# Patient Record
Sex: FEMALE | Race: WHITE | NOT HISPANIC OR LATINO | Employment: FULL TIME | URBAN - NONMETROPOLITAN AREA
[De-identification: names, ages, dates, MRNs, and addresses within clinical notes are randomized per-mention and may not be internally consistent; named-entity substitution may affect disease eponyms.]

---

## 2017-03-02 ENCOUNTER — TRANSCRIBE ORDERS (OUTPATIENT)
Dept: LAB | Facility: MEDICAL CENTER | Age: 42
End: 2017-03-02

## 2017-03-02 ENCOUNTER — APPOINTMENT (OUTPATIENT)
Dept: LAB | Facility: MEDICAL CENTER | Age: 42
End: 2017-03-02
Payer: COMMERCIAL

## 2017-03-02 DIAGNOSIS — K91.2 MALNUTRITION FOLLOWING GASTROINTESTINAL SURGERY: ICD-10-CM

## 2017-03-02 DIAGNOSIS — E46 OTHER PROTEIN-CALORIE MALNUTRITION: ICD-10-CM

## 2017-03-02 DIAGNOSIS — Z98.84 BARIATRIC SURGERY STATUS: ICD-10-CM

## 2017-03-02 DIAGNOSIS — Z98.84 BARIATRIC SURGERY STATUS: Primary | ICD-10-CM

## 2017-03-02 LAB
25(OH)D3 SERPL-MCNC: 26.5 NG/ML (ref 30–100)
ANION GAP SERPL CALCULATED.3IONS-SCNC: 9 MMOL/L (ref 4–13)
BUN SERPL-MCNC: 10 MG/DL (ref 5–25)
CALCIUM SERPL-MCNC: 9.2 MG/DL (ref 8.3–10.1)
CHLORIDE SERPL-SCNC: 106 MMOL/L (ref 100–108)
CO2 SERPL-SCNC: 25 MMOL/L (ref 21–32)
CREAT SERPL-MCNC: 0.53 MG/DL (ref 0.6–1.3)
ERYTHROCYTE [DISTWIDTH] IN BLOOD BY AUTOMATED COUNT: 13.2 % (ref 11.6–15.1)
FERRITIN SERPL-MCNC: 119 NG/ML (ref 8–388)
FOLATE SERPL-MCNC: >20 NG/ML (ref 3.1–17.5)
GFR SERPL CREATININE-BSD FRML MDRD: >60 ML/MIN/1.73SQ M
GLUCOSE SERPL-MCNC: 100 MG/DL (ref 65–140)
HCT VFR BLD AUTO: 39.7 % (ref 34.8–46.1)
HGB BLD-MCNC: 13.3 G/DL (ref 11.5–15.4)
IRON SERPL-MCNC: 59 UG/DL (ref 50–170)
MCH RBC QN AUTO: 30.3 PG (ref 26.8–34.3)
MCHC RBC AUTO-ENTMCNC: 33.5 G/DL (ref 31.4–37.4)
MCV RBC AUTO: 90 FL (ref 82–98)
PLATELET # BLD AUTO: 219 THOUSANDS/UL (ref 149–390)
PMV BLD AUTO: 11.9 FL (ref 8.9–12.7)
POTASSIUM SERPL-SCNC: 3.5 MMOL/L (ref 3.5–5.3)
PTH-INTACT SERPL-MCNC: 40.3 PG/ML (ref 14–72)
RBC # BLD AUTO: 4.39 MILLION/UL (ref 3.81–5.12)
SODIUM SERPL-SCNC: 140 MMOL/L (ref 136–145)
VIT B12 SERPL-MCNC: 454 PG/ML (ref 100–900)
WBC # BLD AUTO: 4.62 THOUSAND/UL (ref 4.31–10.16)

## 2017-03-02 PROCEDURE — 82306 VITAMIN D 25 HYDROXY: CPT

## 2017-03-02 PROCEDURE — 82728 ASSAY OF FERRITIN: CPT

## 2017-03-02 PROCEDURE — 83970 ASSAY OF PARATHORMONE: CPT

## 2017-03-02 PROCEDURE — 83540 ASSAY OF IRON: CPT

## 2017-03-02 PROCEDURE — 85027 COMPLETE CBC AUTOMATED: CPT

## 2017-03-02 PROCEDURE — 82607 VITAMIN B-12: CPT

## 2017-03-02 PROCEDURE — 36415 COLL VENOUS BLD VENIPUNCTURE: CPT

## 2017-03-02 PROCEDURE — 82746 ASSAY OF FOLIC ACID SERUM: CPT

## 2017-03-02 PROCEDURE — 84425 ASSAY OF VITAMIN B-1: CPT

## 2017-03-02 PROCEDURE — 80048 BASIC METABOLIC PNL TOTAL CA: CPT

## 2017-03-02 PROCEDURE — 84590 ASSAY OF VITAMIN A: CPT

## 2017-03-02 PROCEDURE — 84630 ASSAY OF ZINC: CPT

## 2017-03-04 LAB — ZINC SERPL-MCNC: 91 UG/DL (ref 56–134)

## 2017-03-06 LAB
VIT A SERPL-MCNC: 29 UG/DL (ref 20–65)
VIT B1 BLD-SCNC: 130 NMOL/L (ref 66.5–200)

## 2017-06-23 ENCOUNTER — TRANSCRIBE ORDERS (OUTPATIENT)
Dept: LAB | Facility: MEDICAL CENTER | Age: 42
End: 2017-06-23

## 2017-06-23 ENCOUNTER — APPOINTMENT (OUTPATIENT)
Dept: LAB | Facility: MEDICAL CENTER | Age: 42
End: 2017-06-23
Payer: COMMERCIAL

## 2017-06-23 DIAGNOSIS — E55.9 UNSPECIFIED VITAMIN D DEFICIENCY: ICD-10-CM

## 2017-06-23 DIAGNOSIS — E46 UNSPECIFIED PROTEIN-CALORIE MALNUTRITION (HCC): ICD-10-CM

## 2017-06-23 DIAGNOSIS — K91.2 MALNUTRITION FOLLOWING GASTROINTESTINAL SURGERY: ICD-10-CM

## 2017-06-23 DIAGNOSIS — Z98.84 BARIATRIC SURGERY STATUS: Primary | ICD-10-CM

## 2017-06-23 DIAGNOSIS — Z98.84 BARIATRIC SURGERY STATUS: ICD-10-CM

## 2017-06-23 LAB
25(OH)D3 SERPL-MCNC: 26.1 NG/ML (ref 30–100)
ANION GAP SERPL CALCULATED.3IONS-SCNC: 6 MMOL/L (ref 4–13)
BUN SERPL-MCNC: 14 MG/DL (ref 5–25)
CALCIUM SERPL-MCNC: 9 MG/DL (ref 8.3–10.1)
CHLORIDE SERPL-SCNC: 107 MMOL/L (ref 100–108)
CO2 SERPL-SCNC: 25 MMOL/L (ref 21–32)
CREAT SERPL-MCNC: 0.49 MG/DL (ref 0.6–1.3)
ERYTHROCYTE [DISTWIDTH] IN BLOOD BY AUTOMATED COUNT: 12.9 % (ref 11.6–15.1)
FERRITIN SERPL-MCNC: 132 NG/ML (ref 8–388)
FOLATE SERPL-MCNC: 19.4 NG/ML (ref 3.1–17.5)
GFR SERPL CREATININE-BSD FRML MDRD: >60 ML/MIN/1.73SQ M
GLUCOSE P FAST SERPL-MCNC: 79 MG/DL (ref 65–99)
HCT VFR BLD AUTO: 40.1 % (ref 34.8–46.1)
HGB BLD-MCNC: 13.6 G/DL (ref 11.5–15.4)
IRON SERPL-MCNC: 130 UG/DL (ref 50–170)
MCH RBC QN AUTO: 31.2 PG (ref 26.8–34.3)
MCHC RBC AUTO-ENTMCNC: 33.9 G/DL (ref 31.4–37.4)
MCV RBC AUTO: 92 FL (ref 82–98)
PLATELET # BLD AUTO: 180 THOUSANDS/UL (ref 149–390)
PMV BLD AUTO: 12.4 FL (ref 8.9–12.7)
POTASSIUM SERPL-SCNC: 4.9 MMOL/L (ref 3.5–5.3)
PTH-INTACT SERPL-MCNC: 32 PG/ML (ref 14–72)
RBC # BLD AUTO: 4.36 MILLION/UL (ref 3.81–5.12)
SODIUM SERPL-SCNC: 138 MMOL/L (ref 136–145)
VIT B12 SERPL-MCNC: 2051 PG/ML (ref 100–900)
WBC # BLD AUTO: 4.28 THOUSAND/UL (ref 4.31–10.16)

## 2017-06-23 PROCEDURE — 83970 ASSAY OF PARATHORMONE: CPT

## 2017-06-23 PROCEDURE — 85027 COMPLETE CBC AUTOMATED: CPT

## 2017-06-23 PROCEDURE — 84590 ASSAY OF VITAMIN A: CPT

## 2017-06-23 PROCEDURE — 84425 ASSAY OF VITAMIN B-1: CPT

## 2017-06-23 PROCEDURE — 82306 VITAMIN D 25 HYDROXY: CPT

## 2017-06-23 PROCEDURE — 82607 VITAMIN B-12: CPT

## 2017-06-23 PROCEDURE — 84630 ASSAY OF ZINC: CPT

## 2017-06-23 PROCEDURE — 82746 ASSAY OF FOLIC ACID SERUM: CPT

## 2017-06-23 PROCEDURE — 82728 ASSAY OF FERRITIN: CPT

## 2017-06-23 PROCEDURE — 80048 BASIC METABOLIC PNL TOTAL CA: CPT

## 2017-06-23 PROCEDURE — 83540 ASSAY OF IRON: CPT

## 2017-06-24 ENCOUNTER — APPOINTMENT (OUTPATIENT)
Dept: RADIOLOGY | Facility: CLINIC | Age: 42
End: 2017-06-24
Payer: COMMERCIAL

## 2017-06-24 ENCOUNTER — OFFICE VISIT (OUTPATIENT)
Dept: URGENT CARE | Facility: CLINIC | Age: 42
End: 2017-06-24
Payer: COMMERCIAL

## 2017-06-24 DIAGNOSIS — S69.90XA UNSPECIFIED INJURY OF UNSPECIFIED WRIST, HAND AND FINGER(S), INITIAL ENCOUNTER: ICD-10-CM

## 2017-06-24 LAB — ZINC SERPL-MCNC: 83 UG/DL (ref 56–134)

## 2017-06-24 PROCEDURE — 73140 X-RAY EXAM OF FINGER(S): CPT

## 2017-06-24 PROCEDURE — 99213 OFFICE O/P EST LOW 20 MIN: CPT

## 2017-06-24 PROCEDURE — 29130 APPL FINGER SPLINT STATIC: CPT

## 2017-06-26 LAB
VIT A SERPL-MCNC: 36 UG/DL (ref 20–65)
VIT B1 BLD-SCNC: 136 NMOL/L (ref 66.5–200)

## 2017-06-29 ENCOUNTER — GENERIC CONVERSION - ENCOUNTER (OUTPATIENT)
Dept: OTHER | Facility: OTHER | Age: 42
End: 2017-06-29

## 2017-07-15 ENCOUNTER — GENERIC CONVERSION - ENCOUNTER (OUTPATIENT)
Dept: OTHER | Facility: OTHER | Age: 42
End: 2017-07-15

## 2017-07-15 ENCOUNTER — APPOINTMENT (EMERGENCY)
Dept: CT IMAGING | Facility: HOSPITAL | Age: 42
End: 2017-07-15
Payer: COMMERCIAL

## 2017-07-15 ENCOUNTER — HOSPITAL ENCOUNTER (EMERGENCY)
Facility: HOSPITAL | Age: 42
Discharge: ANOTHER HOSPITAL | End: 2017-07-15
Attending: EMERGENCY MEDICINE
Payer: COMMERCIAL

## 2017-07-15 VITALS
HEART RATE: 70 BPM | BODY MASS INDEX: 23.18 KG/M2 | SYSTOLIC BLOOD PRESSURE: 161 MMHG | WEIGHT: 139.11 LBS | HEIGHT: 65 IN | DIASTOLIC BLOOD PRESSURE: 69 MMHG | RESPIRATION RATE: 16 BRPM | OXYGEN SATURATION: 100 % | TEMPERATURE: 99.4 F

## 2017-07-15 DIAGNOSIS — K35.80 ACUTE APPENDICITIS: Primary | ICD-10-CM

## 2017-07-15 LAB
ALBUMIN SERPL BCP-MCNC: 3.7 G/DL (ref 3.5–5)
ALP SERPL-CCNC: 45 U/L (ref 46–116)
ALT SERPL W P-5'-P-CCNC: 18 U/L (ref 12–78)
ANION GAP SERPL CALCULATED.3IONS-SCNC: 8 MMOL/L (ref 4–13)
AST SERPL W P-5'-P-CCNC: 13 U/L (ref 5–45)
BASOPHILS # BLD AUTO: 0.02 THOUSANDS/ΜL (ref 0–0.1)
BASOPHILS NFR BLD AUTO: 0 % (ref 0–1)
BILIRUB SERPL-MCNC: 0.4 MG/DL (ref 0.2–1)
BILIRUB UR QL STRIP: NEGATIVE
BUN SERPL-MCNC: 16 MG/DL (ref 5–25)
CALCIUM SERPL-MCNC: 8.9 MG/DL (ref 8.3–10.1)
CHLORIDE SERPL-SCNC: 106 MMOL/L (ref 100–108)
CLARITY UR: CLEAR
CO2 SERPL-SCNC: 27 MMOL/L (ref 21–32)
COLOR UR: YELLOW
CREAT SERPL-MCNC: 0.62 MG/DL (ref 0.6–1.3)
EOSINOPHIL # BLD AUTO: 0.1 THOUSAND/ΜL (ref 0–0.61)
EOSINOPHIL NFR BLD AUTO: 2 % (ref 0–6)
ERYTHROCYTE [DISTWIDTH] IN BLOOD BY AUTOMATED COUNT: 12.4 % (ref 11.6–15.1)
GFR SERPL CREATININE-BSD FRML MDRD: >60 ML/MIN/1.73SQ M
GLUCOSE SERPL-MCNC: 85 MG/DL (ref 65–140)
GLUCOSE UR STRIP-MCNC: NEGATIVE MG/DL
HCT VFR BLD AUTO: 40.2 % (ref 34.8–46.1)
HGB BLD-MCNC: 13.3 G/DL (ref 11.5–15.4)
HGB UR QL STRIP.AUTO: NEGATIVE
KETONES UR STRIP-MCNC: NEGATIVE MG/DL
LEUKOCYTE ESTERASE UR QL STRIP: NEGATIVE
LIPASE SERPL-CCNC: 194 U/L (ref 73–393)
LYMPHOCYTES # BLD AUTO: 2.49 THOUSANDS/ΜL (ref 0.6–4.47)
LYMPHOCYTES NFR BLD AUTO: 42 % (ref 14–44)
MCH RBC QN AUTO: 30.7 PG (ref 26.8–34.3)
MCHC RBC AUTO-ENTMCNC: 33.1 G/DL (ref 31.4–37.4)
MCV RBC AUTO: 93 FL (ref 82–98)
MONOCYTES # BLD AUTO: 0.5 THOUSAND/ΜL (ref 0.17–1.22)
MONOCYTES NFR BLD AUTO: 8 % (ref 4–12)
NEUTROPHILS # BLD AUTO: 2.88 THOUSANDS/ΜL (ref 1.85–7.62)
NEUTS SEG NFR BLD AUTO: 48 % (ref 43–75)
NITRITE UR QL STRIP: NEGATIVE
PH UR STRIP.AUTO: 6 [PH] (ref 4.5–8)
PLATELET # BLD AUTO: 204 THOUSANDS/UL (ref 149–390)
PMV BLD AUTO: 10.9 FL (ref 8.9–12.7)
POTASSIUM SERPL-SCNC: 4.2 MMOL/L (ref 3.5–5.3)
PROT SERPL-MCNC: 7.4 G/DL (ref 6.4–8.2)
PROT UR STRIP-MCNC: NEGATIVE MG/DL
RBC # BLD AUTO: 4.33 MILLION/UL (ref 3.81–5.12)
SODIUM SERPL-SCNC: 141 MMOL/L (ref 136–145)
SP GR UR STRIP.AUTO: 1.01 (ref 1–1.03)
UROBILINOGEN UR QL STRIP.AUTO: 0.2 E.U./DL
WBC # BLD AUTO: 5.99 THOUSAND/UL (ref 4.31–10.16)

## 2017-07-15 PROCEDURE — 74177 CT ABD & PELVIS W/CONTRAST: CPT

## 2017-07-15 PROCEDURE — 96375 TX/PRO/DX INJ NEW DRUG ADDON: CPT

## 2017-07-15 PROCEDURE — 83690 ASSAY OF LIPASE: CPT | Performed by: EMERGENCY MEDICINE

## 2017-07-15 PROCEDURE — 80053 COMPREHEN METABOLIC PANEL: CPT | Performed by: EMERGENCY MEDICINE

## 2017-07-15 PROCEDURE — 96361 HYDRATE IV INFUSION ADD-ON: CPT

## 2017-07-15 PROCEDURE — 99285 EMERGENCY DEPT VISIT HI MDM: CPT

## 2017-07-15 PROCEDURE — 81003 URINALYSIS AUTO W/O SCOPE: CPT | Performed by: EMERGENCY MEDICINE

## 2017-07-15 PROCEDURE — 85025 COMPLETE CBC W/AUTO DIFF WBC: CPT | Performed by: EMERGENCY MEDICINE

## 2017-07-15 PROCEDURE — 96365 THER/PROPH/DIAG IV INF INIT: CPT

## 2017-07-15 RX ORDER — ACETAMINOPHEN 325 MG/1
650 TABLET ORAL ONCE
Status: COMPLETED | OUTPATIENT
Start: 2017-07-15 | End: 2017-07-15

## 2017-07-15 RX ORDER — LEVOFLOXACIN 5 MG/ML
750 INJECTION, SOLUTION INTRAVENOUS ONCE
Status: COMPLETED | OUTPATIENT
Start: 2017-07-15 | End: 2017-07-15

## 2017-07-15 RX ORDER — DIPHENOXYLATE HYDROCHLORIDE AND ATROPINE SULFATE 2.5; .025 MG/1; MG/1
1 TABLET ORAL DAILY
COMMUNITY

## 2017-07-15 RX ADMIN — SODIUM CHLORIDE 1000 ML: 0.9 INJECTION, SOLUTION INTRAVENOUS at 18:32

## 2017-07-15 RX ADMIN — IOHEXOL 50 ML: 240 INJECTION, SOLUTION INTRATHECAL; INTRAVASCULAR; INTRAVENOUS; ORAL at 16:22

## 2017-07-15 RX ADMIN — LEVOFLOXACIN 750 MG: 5 INJECTION, SOLUTION INTRAVENOUS at 19:33

## 2017-07-15 RX ADMIN — IOHEXOL 100 ML: 350 INJECTION, SOLUTION INTRAVENOUS at 17:46

## 2017-07-15 RX ADMIN — SODIUM CHLORIDE 1000 ML: 0.9 INJECTION, SOLUTION INTRAVENOUS at 17:31

## 2017-07-15 RX ADMIN — ACETAMINOPHEN 650 MG: 325 TABLET, FILM COATED ORAL at 19:31

## 2017-07-15 RX ADMIN — METRONIDAZOLE 500 MG: 500 INJECTION, SOLUTION INTRAVENOUS at 18:28

## 2017-08-02 ENCOUNTER — GENERIC CONVERSION - ENCOUNTER (OUTPATIENT)
Dept: OTHER | Facility: OTHER | Age: 42
End: 2017-08-02

## 2017-08-16 ENCOUNTER — GENERIC CONVERSION - ENCOUNTER (OUTPATIENT)
Dept: OTHER | Facility: OTHER | Age: 42
End: 2017-08-16

## 2017-08-28 ENCOUNTER — APPOINTMENT (OUTPATIENT)
Dept: PHYSICAL THERAPY | Facility: CLINIC | Age: 42
End: 2017-08-28
Payer: COMMERCIAL

## 2017-08-28 PROCEDURE — 97760 ORTHOTIC MGMT&TRAING 1ST ENC: CPT

## 2017-08-28 PROCEDURE — 97162 PT EVAL MOD COMPLEX 30 MIN: CPT

## 2017-08-28 PROCEDURE — 97140 MANUAL THERAPY 1/> REGIONS: CPT

## 2017-08-28 PROCEDURE — G0283 ELEC STIM OTHER THAN WOUND: HCPCS

## 2017-08-28 PROCEDURE — L3913 HFO W/O JOINTS CF: HCPCS

## 2017-08-28 PROCEDURE — 97014 ELECTRIC STIMULATION THERAPY: CPT

## 2017-08-31 ENCOUNTER — APPOINTMENT (OUTPATIENT)
Dept: PHYSICAL THERAPY | Facility: CLINIC | Age: 42
End: 2017-08-31
Payer: COMMERCIAL

## 2017-08-31 PROCEDURE — 97014 ELECTRIC STIMULATION THERAPY: CPT

## 2017-08-31 PROCEDURE — G0283 ELEC STIM OTHER THAN WOUND: HCPCS

## 2017-08-31 PROCEDURE — 97110 THERAPEUTIC EXERCISES: CPT

## 2017-08-31 PROCEDURE — 97035 APP MDLTY 1+ULTRASOUND EA 15: CPT

## 2017-08-31 PROCEDURE — 97140 MANUAL THERAPY 1/> REGIONS: CPT

## 2017-09-05 ENCOUNTER — APPOINTMENT (OUTPATIENT)
Dept: PHYSICAL THERAPY | Facility: CLINIC | Age: 42
End: 2017-09-05
Payer: COMMERCIAL

## 2017-09-05 PROCEDURE — 97140 MANUAL THERAPY 1/> REGIONS: CPT

## 2017-09-05 PROCEDURE — 97110 THERAPEUTIC EXERCISES: CPT

## 2017-09-05 PROCEDURE — 97014 ELECTRIC STIMULATION THERAPY: CPT

## 2017-09-05 PROCEDURE — G0283 ELEC STIM OTHER THAN WOUND: HCPCS

## 2017-09-05 PROCEDURE — 97035 APP MDLTY 1+ULTRASOUND EA 15: CPT

## 2017-09-07 ENCOUNTER — APPOINTMENT (OUTPATIENT)
Dept: PHYSICAL THERAPY | Facility: CLINIC | Age: 42
End: 2017-09-07
Payer: COMMERCIAL

## 2017-09-07 PROCEDURE — 97035 APP MDLTY 1+ULTRASOUND EA 15: CPT

## 2017-09-07 PROCEDURE — 97760 ORTHOTIC MGMT&TRAING 1ST ENC: CPT

## 2017-09-07 PROCEDURE — 97140 MANUAL THERAPY 1/> REGIONS: CPT

## 2017-09-07 PROCEDURE — G0283 ELEC STIM OTHER THAN WOUND: HCPCS

## 2017-09-07 PROCEDURE — 97014 ELECTRIC STIMULATION THERAPY: CPT

## 2017-09-07 PROCEDURE — 97110 THERAPEUTIC EXERCISES: CPT

## 2017-09-11 ENCOUNTER — APPOINTMENT (OUTPATIENT)
Dept: PHYSICAL THERAPY | Facility: CLINIC | Age: 42
End: 2017-09-11
Payer: COMMERCIAL

## 2017-09-11 PROCEDURE — 97140 MANUAL THERAPY 1/> REGIONS: CPT

## 2017-09-11 PROCEDURE — 97014 ELECTRIC STIMULATION THERAPY: CPT

## 2017-09-11 PROCEDURE — G0283 ELEC STIM OTHER THAN WOUND: HCPCS

## 2017-09-11 PROCEDURE — 97110 THERAPEUTIC EXERCISES: CPT

## 2017-09-11 PROCEDURE — 97035 APP MDLTY 1+ULTRASOUND EA 15: CPT

## 2017-09-18 ENCOUNTER — APPOINTMENT (OUTPATIENT)
Dept: PHYSICAL THERAPY | Facility: CLINIC | Age: 42
End: 2017-09-18
Payer: COMMERCIAL

## 2017-09-19 ENCOUNTER — APPOINTMENT (OUTPATIENT)
Dept: PHYSICAL THERAPY | Facility: CLINIC | Age: 42
End: 2017-09-19
Payer: COMMERCIAL

## 2017-09-19 PROCEDURE — G8987 SELF CARE CURRENT STATUS: HCPCS

## 2017-09-19 PROCEDURE — 97035 APP MDLTY 1+ULTRASOUND EA 15: CPT

## 2017-09-19 PROCEDURE — G0283 ELEC STIM OTHER THAN WOUND: HCPCS

## 2017-09-19 PROCEDURE — G8988 SELF CARE GOAL STATUS: HCPCS

## 2017-09-19 PROCEDURE — 97110 THERAPEUTIC EXERCISES: CPT

## 2017-09-19 PROCEDURE — 97014 ELECTRIC STIMULATION THERAPY: CPT

## 2017-09-19 PROCEDURE — 97140 MANUAL THERAPY 1/> REGIONS: CPT

## 2017-09-20 ENCOUNTER — GENERIC CONVERSION - ENCOUNTER (OUTPATIENT)
Dept: OTHER | Facility: OTHER | Age: 42
End: 2017-09-20

## 2017-09-21 ENCOUNTER — APPOINTMENT (OUTPATIENT)
Dept: PHYSICAL THERAPY | Facility: CLINIC | Age: 42
End: 2017-09-21
Payer: COMMERCIAL

## 2017-09-21 PROCEDURE — 97760 ORTHOTIC MGMT&TRAING 1ST ENC: CPT

## 2017-09-26 ENCOUNTER — APPOINTMENT (OUTPATIENT)
Dept: PHYSICAL THERAPY | Facility: CLINIC | Age: 42
End: 2017-09-26
Payer: COMMERCIAL

## 2017-09-26 PROCEDURE — 97014 ELECTRIC STIMULATION THERAPY: CPT

## 2017-09-26 PROCEDURE — 97035 APP MDLTY 1+ULTRASOUND EA 15: CPT

## 2017-09-26 PROCEDURE — 97140 MANUAL THERAPY 1/> REGIONS: CPT

## 2017-09-26 PROCEDURE — G0283 ELEC STIM OTHER THAN WOUND: HCPCS

## 2017-09-26 PROCEDURE — 97110 THERAPEUTIC EXERCISES: CPT

## 2017-09-28 ENCOUNTER — APPOINTMENT (OUTPATIENT)
Dept: PHYSICAL THERAPY | Facility: CLINIC | Age: 42
End: 2017-09-28
Payer: COMMERCIAL

## 2017-09-28 PROCEDURE — 97110 THERAPEUTIC EXERCISES: CPT

## 2017-09-28 PROCEDURE — 97035 APP MDLTY 1+ULTRASOUND EA 15: CPT

## 2017-09-28 PROCEDURE — G0283 ELEC STIM OTHER THAN WOUND: HCPCS

## 2017-09-28 PROCEDURE — 97140 MANUAL THERAPY 1/> REGIONS: CPT

## 2017-09-28 PROCEDURE — 97014 ELECTRIC STIMULATION THERAPY: CPT

## 2017-10-03 ENCOUNTER — APPOINTMENT (OUTPATIENT)
Dept: PHYSICAL THERAPY | Facility: CLINIC | Age: 42
End: 2017-10-03
Payer: COMMERCIAL

## 2017-10-03 PROCEDURE — G0283 ELEC STIM OTHER THAN WOUND: HCPCS

## 2017-10-03 PROCEDURE — 97140 MANUAL THERAPY 1/> REGIONS: CPT

## 2017-10-03 PROCEDURE — 97035 APP MDLTY 1+ULTRASOUND EA 15: CPT

## 2017-10-03 PROCEDURE — 97760 ORTHOTIC MGMT&TRAING 1ST ENC: CPT

## 2017-10-03 PROCEDURE — 97110 THERAPEUTIC EXERCISES: CPT

## 2017-10-03 PROCEDURE — 97014 ELECTRIC STIMULATION THERAPY: CPT

## 2017-10-05 ENCOUNTER — APPOINTMENT (OUTPATIENT)
Dept: PHYSICAL THERAPY | Facility: CLINIC | Age: 42
End: 2017-10-05
Payer: COMMERCIAL

## 2017-10-05 PROCEDURE — G0283 ELEC STIM OTHER THAN WOUND: HCPCS

## 2017-10-05 PROCEDURE — 97110 THERAPEUTIC EXERCISES: CPT

## 2017-10-05 PROCEDURE — 97035 APP MDLTY 1+ULTRASOUND EA 15: CPT

## 2017-10-05 PROCEDURE — 97014 ELECTRIC STIMULATION THERAPY: CPT

## 2017-10-05 PROCEDURE — 97140 MANUAL THERAPY 1/> REGIONS: CPT

## 2017-10-12 ENCOUNTER — APPOINTMENT (OUTPATIENT)
Dept: PHYSICAL THERAPY | Facility: CLINIC | Age: 42
End: 2017-10-12
Payer: COMMERCIAL

## 2017-10-12 PROCEDURE — 97035 APP MDLTY 1+ULTRASOUND EA 15: CPT

## 2017-10-12 PROCEDURE — G0283 ELEC STIM OTHER THAN WOUND: HCPCS

## 2017-10-12 PROCEDURE — 97760 ORTHOTIC MGMT&TRAING 1ST ENC: CPT

## 2017-10-12 PROCEDURE — 97110 THERAPEUTIC EXERCISES: CPT

## 2017-10-12 PROCEDURE — 97014 ELECTRIC STIMULATION THERAPY: CPT

## 2017-10-12 PROCEDURE — 97140 MANUAL THERAPY 1/> REGIONS: CPT

## 2017-10-17 ENCOUNTER — APPOINTMENT (OUTPATIENT)
Dept: PHYSICAL THERAPY | Facility: CLINIC | Age: 42
End: 2017-10-17
Payer: COMMERCIAL

## 2017-10-17 PROCEDURE — 97110 THERAPEUTIC EXERCISES: CPT

## 2017-10-17 PROCEDURE — 97035 APP MDLTY 1+ULTRASOUND EA 15: CPT

## 2017-10-17 PROCEDURE — 97140 MANUAL THERAPY 1/> REGIONS: CPT

## 2017-10-17 PROCEDURE — 97014 ELECTRIC STIMULATION THERAPY: CPT

## 2017-10-17 PROCEDURE — G0283 ELEC STIM OTHER THAN WOUND: HCPCS

## 2017-10-18 ENCOUNTER — GENERIC CONVERSION - ENCOUNTER (OUTPATIENT)
Dept: OTHER | Facility: OTHER | Age: 42
End: 2017-10-18

## 2017-10-19 ENCOUNTER — APPOINTMENT (OUTPATIENT)
Dept: PHYSICAL THERAPY | Facility: CLINIC | Age: 42
End: 2017-10-19
Payer: COMMERCIAL

## 2017-10-20 ENCOUNTER — APPOINTMENT (OUTPATIENT)
Dept: PHYSICAL THERAPY | Facility: CLINIC | Age: 42
End: 2017-10-20
Payer: COMMERCIAL

## 2017-10-20 PROCEDURE — 97014 ELECTRIC STIMULATION THERAPY: CPT

## 2017-10-20 PROCEDURE — G0283 ELEC STIM OTHER THAN WOUND: HCPCS

## 2017-10-20 PROCEDURE — 97140 MANUAL THERAPY 1/> REGIONS: CPT

## 2017-10-20 PROCEDURE — 97035 APP MDLTY 1+ULTRASOUND EA 15: CPT

## 2017-10-20 PROCEDURE — 97760 ORTHOTIC MGMT&TRAING 1ST ENC: CPT

## 2017-10-20 PROCEDURE — 97110 THERAPEUTIC EXERCISES: CPT

## 2017-10-24 ENCOUNTER — APPOINTMENT (OUTPATIENT)
Dept: PHYSICAL THERAPY | Facility: CLINIC | Age: 42
End: 2017-10-24
Payer: COMMERCIAL

## 2017-10-24 PROCEDURE — 97110 THERAPEUTIC EXERCISES: CPT

## 2017-10-24 PROCEDURE — 97014 ELECTRIC STIMULATION THERAPY: CPT

## 2017-10-24 PROCEDURE — 97035 APP MDLTY 1+ULTRASOUND EA 15: CPT

## 2017-10-24 PROCEDURE — G0283 ELEC STIM OTHER THAN WOUND: HCPCS

## 2017-10-24 PROCEDURE — 97140 MANUAL THERAPY 1/> REGIONS: CPT

## 2017-10-26 ENCOUNTER — APPOINTMENT (OUTPATIENT)
Dept: PHYSICAL THERAPY | Facility: CLINIC | Age: 42
End: 2017-10-26
Payer: COMMERCIAL

## 2017-10-26 PROCEDURE — 97110 THERAPEUTIC EXERCISES: CPT

## 2017-10-26 PROCEDURE — 97035 APP MDLTY 1+ULTRASOUND EA 15: CPT

## 2017-10-26 PROCEDURE — G8988 SELF CARE GOAL STATUS: HCPCS

## 2017-10-26 PROCEDURE — G0283 ELEC STIM OTHER THAN WOUND: HCPCS

## 2017-10-26 PROCEDURE — 97140 MANUAL THERAPY 1/> REGIONS: CPT

## 2017-10-26 PROCEDURE — 97014 ELECTRIC STIMULATION THERAPY: CPT

## 2017-10-26 PROCEDURE — G8987 SELF CARE CURRENT STATUS: HCPCS

## 2017-10-31 ENCOUNTER — APPOINTMENT (OUTPATIENT)
Dept: PHYSICAL THERAPY | Facility: CLINIC | Age: 42
End: 2017-10-31
Payer: COMMERCIAL

## 2017-11-02 ENCOUNTER — APPOINTMENT (OUTPATIENT)
Dept: PHYSICAL THERAPY | Facility: CLINIC | Age: 42
End: 2017-11-02
Payer: COMMERCIAL

## 2017-11-02 PROCEDURE — 97014 ELECTRIC STIMULATION THERAPY: CPT

## 2017-11-02 PROCEDURE — 97140 MANUAL THERAPY 1/> REGIONS: CPT

## 2017-11-02 PROCEDURE — 97035 APP MDLTY 1+ULTRASOUND EA 15: CPT

## 2017-11-02 PROCEDURE — 97110 THERAPEUTIC EXERCISES: CPT

## 2017-11-02 PROCEDURE — G0283 ELEC STIM OTHER THAN WOUND: HCPCS

## 2017-11-07 ENCOUNTER — APPOINTMENT (OUTPATIENT)
Dept: PHYSICAL THERAPY | Facility: CLINIC | Age: 42
End: 2017-11-07
Payer: COMMERCIAL

## 2017-11-09 ENCOUNTER — APPOINTMENT (OUTPATIENT)
Dept: PHYSICAL THERAPY | Facility: CLINIC | Age: 42
End: 2017-11-09
Payer: COMMERCIAL

## 2017-11-09 PROCEDURE — 97014 ELECTRIC STIMULATION THERAPY: CPT

## 2017-11-09 PROCEDURE — 97140 MANUAL THERAPY 1/> REGIONS: CPT

## 2017-11-09 PROCEDURE — 97110 THERAPEUTIC EXERCISES: CPT

## 2017-11-09 PROCEDURE — 97035 APP MDLTY 1+ULTRASOUND EA 15: CPT

## 2017-11-09 PROCEDURE — G0283 ELEC STIM OTHER THAN WOUND: HCPCS

## 2017-11-16 ENCOUNTER — APPOINTMENT (OUTPATIENT)
Dept: PHYSICAL THERAPY | Facility: CLINIC | Age: 42
End: 2017-11-16
Payer: COMMERCIAL

## 2017-11-30 ENCOUNTER — ALLSCRIPTS OFFICE VISIT (OUTPATIENT)
Dept: OTHER | Facility: OTHER | Age: 42
End: 2017-11-30

## 2017-12-05 NOTE — PROGRESS NOTES
Assessment    1  Acute sinusitis (461 9) (J01 90)   2  Acute bronchitis (466 0) (J20 9)    Plan  Acute bronchitis, Acute sinusitis    · DrRx Zithromax Z-Greg 250 MG #6 pill pack; Take 2 pills on day 1, take 1 pill on  days 2-5  Depression screen    · *VB-Depression Screening; Status:Complete;   Done: 72KCT1557 07:17AM  Increased BMI    · We recommend that you bring your body mass index down to 26 ; Status:Complete;    Done: [id number]  Need for influenza vaccination    · Stop: Fluzone Quadrivalent Intramuscular Suspension    Discussion/Summary  The patient was counseled regarding instructions for management, risk factor reductions, prognosis, patient and family education, risks and benefits of treatment options, importance of compliance with treatment  Possible side effects of new medications were reviewed with the patient/guardian today  The treatment plan was reviewed with the patient/guardian  The patient/guardian understands and agrees with the treatment plan      Chief Complaint    1  Cold Symptoms  Geneva Sesay is here today with cold symptoms x 3-4 weeks  She took a complete course of Amoxicillin with temporary relief, but symptoms returned once she finished the medication  History of Present Illness  HPI: See chief complaint  Review of Systems    Constitutional: chills, but no fever  ENT: earache, sore throat and nasal discharge  Cardiovascular: no chest pain and no palpitations  Respiratory: cough, but no shortness of breath and no wheezing  Gastrointestinal: no abdominal pain, no constipation and no diarrhea  ROS reviewed  Active Problems    1  Acute pain of left wrist (719 43) (M25 532)   2  Arthralgia of hand (719 44) (M79 643)   3  Benign essential hypertension (401 1) (I10)   4  CAP (community acquired pneumonia) (5) (J18 9)   5  Carpal tunnel syndrome of left wrist (354 0) (G56 02)   6  Chest congestion (786 9) (R09 89)   7  Depression screen (V79 0) (Z13 89)   8   Increased BMI (783 9) (R63 8)   9  Morbid obesity with BMI of 40 0-44 9, adult (278 01,V85 41) (E66 01,Z68 41)   10  Need for influenza vaccination (V04 81) (Z23)   11  Obstructive sleep apnea, adult (327 23) (G47 33)   12  Pap smear for cervical cancer screening (V76 2) (Z12 4)   13  Sinusitis (473 9) (J32 9)   14  Thumb contusion (923 3) (S60 019A)   15  Thumb injury (959 5) (S69 90XA)    Past Medical History    1  History of Acute Bronchitis With Bronchospasm (466 0)   2  History of Acute conjunctivitis (372 00) (H10 30)   3  History of Acute pain (338 19) (R52)   4  History of Acute serous otitis media (381 01) (H65 00)   5  History of Acute sinusitis (461 9) (J01 90)   6  History of Acute tracheobronchitis (466 0) (J20 9)   7  History of Contusion of bone (924 9) (T14 8XXA)   8  History of Eye infection (360 00) (H44 009)   9  History of Head cold (460) (J00)   10  History of Vaginal candidiasis (112 1) (B37 3)  Active Problems And Past Medical History Reviewed: The active problems and past medical history were reviewed and updated today  Family History  Mother    1  Family history of hypertension (V17 49) (Z82 49)  Paternal Grandmother    2  Family history of Alzheimer's disease (V17 2) (Z82 0)  Maternal Aunt    3  Family history of Uterine fibroid in pregnancy (654 10,218 9) (O34 10,D25 9)  Family History Reviewed: The family history was reviewed and updated today  Social History    · Never a smoker   · Never Drank Alcohol   · Never Used Drugs  The social history was reviewed and updated today  The social history was reviewed and is unchanged  Surgical History    1  History of Hysteroscopy   2  History of Neuroplasty Decompression Median Nerve At Carpal Tunnel   3  History of Shoulder Surgery   4  History of Tonsillectomy With Adenoidectomy  Surgical History Reviewed: The surgical history was reviewed and updated today  Current Meds   1   No Reported Medications  Requested for: 80IXL1628 Recorded   2  No Reported Medications Recorded   3  No Reported Medications Recorded   4  No Reported Medications Recorded    Allergies    1  No Known Drug Allergies    Vitals   Recorded: 59LXI2967 07:16AM   Temperature 50 8 F   Systolic 274, LUE, Sitting   Diastolic 68, LUE, Sitting   Height 5 ft 1 5 in   Weight 136 lb 8 0 oz   BMI Calculated 25 37   BSA Calculated 1 62     Physical Exam    Constitutional   General appearance: No acute distress, well appearing and well nourished  Ears, Nose, Mouth, and Throat   External inspection of ears and nose: Normal     Otoscopic examination: Tympanic membranes translucent with normal light reflex  Canals patent without erythema  Oropharynx: Abnormal   The posterior pharynx was erythematous, but did not have an exudate  +PND  Pulmonary   Respiratory effort: No increased work of breathing or signs of respiratory distress  Auscultation of lungs: Abnormal   coarse BS  Cardiovascular   Auscultation of heart: Normal rate and rhythm, normal S1 and S2, without murmurs  Lymphatic   Palpation of lymph nodes in neck: No lymphadenopathy  Skin   Skin and subcutaneous tissue: Normal without rashes or lesions  Psychiatric   Orientation to person, place, and time: Normal     Mood and affect: Normal          Results/Data  PHQ-2 Adult Depression Screening 18BVA5145 07:17AM User, [facility]     Test Name Result Flag Reference   PHQ-2 Adult Depression Score 0     Over the last two weeks, how often have you been bothered by any of the following problems?   Little interest or pleasure in doing things: Not at all - 0  Feeling down, depressed, or hopeless: Not at all - 0   PHQ-2 Adult Depression Screening Negative       *VB-Depression Screening 52WQO9195 07:17AM Ricardo Gee     Test Name Result Flag Reference   Depression Scale Result      Depression Screen - Negative For Symptoms       Signatures   Electronically signed by : Mena Schwab, [facility]; Nov 30 2017  8:17AM EST (Author)    Electronically signed by : Melita Arreola DO; Nov 30 2017  8:23AM EST                       (Author)

## 2018-01-12 NOTE — RESULT NOTES
Verified Results  * XR CHEST PA & LATERAL [id number] 04:55PM Loc [facility] Order Number: [id number]     Test Name Result Flag Reference   XR CHEST PA & LATERAL (Report)     CHEST     INDICATION: Cough and congestion x4 days  Community-acquired pneumonia  Upper back pain  COMPARISON: November 14, 2014     VIEWS: PA and lateral; 2 images     FINDINGS:     The cardiomediastinal silhouette is unremarkable  The lungs are clear  No pleural effusions  Bony thorax unremarkable  IMPRESSION:     No active pulmonary disease          Workstation performed: [id number]     Signed by:   Desirae Luque MD   11/8/16

## 2018-01-15 VITALS
TEMPERATURE: 98.9 F | SYSTOLIC BLOOD PRESSURE: 104 MMHG | WEIGHT: 136.5 LBS | DIASTOLIC BLOOD PRESSURE: 68 MMHG | HEIGHT: 62 IN | BODY MASS INDEX: 25.12 KG/M2

## 2018-02-05 ENCOUNTER — LAB (OUTPATIENT)
Dept: LAB | Facility: MEDICAL CENTER | Age: 43
End: 2018-02-05
Payer: COMMERCIAL

## 2018-02-05 ENCOUNTER — TRANSCRIBE ORDERS (OUTPATIENT)
Dept: LAB | Facility: MEDICAL CENTER | Age: 43
End: 2018-02-05

## 2018-02-05 DIAGNOSIS — Z98.84 BARIATRIC SURGERY STATUS: ICD-10-CM

## 2018-02-05 DIAGNOSIS — Z09 FOLLOW-UP EXAMINATION AFTER ABDOMINAL SURGERY: ICD-10-CM

## 2018-02-05 DIAGNOSIS — E55.9 VITAMIN D DEFICIENCY: ICD-10-CM

## 2018-02-05 DIAGNOSIS — R74.8 ELEVATED VITAMIN B12 LEVEL: ICD-10-CM

## 2018-02-05 DIAGNOSIS — E46 PROTEIN-CALORIE MALNUTRITION, UNSPECIFIED SEVERITY (HCC): ICD-10-CM

## 2018-02-05 DIAGNOSIS — K91.2 POSTSURGICAL MALABSORPTION: ICD-10-CM

## 2018-02-05 DIAGNOSIS — E63.8 INADEQUATE DIETARY INTAKE OF PROTEIN: ICD-10-CM

## 2018-02-05 DIAGNOSIS — E55.9 VITAMIN D DEFICIENCY: Primary | ICD-10-CM

## 2018-02-05 LAB
25(OH)D3 SERPL-MCNC: 31.4 NG/ML (ref 30–100)
ALBUMIN SERPL BCP-MCNC: 3.9 G/DL (ref 3.5–5)
ALP SERPL-CCNC: 36 U/L (ref 46–116)
ALT SERPL W P-5'-P-CCNC: 30 U/L (ref 12–78)
ANION GAP SERPL CALCULATED.3IONS-SCNC: 5 MMOL/L (ref 4–13)
AST SERPL W P-5'-P-CCNC: 16 U/L (ref 5–45)
BASOPHILS # BLD AUTO: 0.04 THOUSANDS/ΜL (ref 0–0.1)
BASOPHILS NFR BLD AUTO: 1 % (ref 0–1)
BILIRUB SERPL-MCNC: 0.5 MG/DL (ref 0.2–1)
BUN SERPL-MCNC: 14 MG/DL (ref 5–25)
CALCIUM SERPL-MCNC: 8.6 MG/DL (ref 8.3–10.1)
CHLORIDE SERPL-SCNC: 106 MMOL/L (ref 100–108)
CO2 SERPL-SCNC: 28 MMOL/L (ref 21–32)
CREAT SERPL-MCNC: 0.47 MG/DL (ref 0.6–1.3)
EOSINOPHIL # BLD AUTO: 0.09 THOUSAND/ΜL (ref 0–0.61)
EOSINOPHIL NFR BLD AUTO: 2 % (ref 0–6)
ERYTHROCYTE [DISTWIDTH] IN BLOOD BY AUTOMATED COUNT: 12.7 % (ref 11.6–15.1)
FERRITIN SERPL-MCNC: 104 NG/ML (ref 8–388)
FOLATE SERPL-MCNC: >20 NG/ML (ref 3.1–17.5)
GFR SERPL CREATININE-BSD FRML MDRD: 121 ML/MIN/1.73SQ M
GLUCOSE P FAST SERPL-MCNC: 84 MG/DL (ref 65–99)
HCT VFR BLD AUTO: 40 % (ref 34.8–46.1)
HGB BLD-MCNC: 13.8 G/DL (ref 11.5–15.4)
IRON SERPL-MCNC: 107 UG/DL (ref 50–170)
LYMPHOCYTES # BLD AUTO: 2.12 THOUSANDS/ΜL (ref 0.6–4.47)
LYMPHOCYTES NFR BLD AUTO: 49 % (ref 14–44)
MCH RBC QN AUTO: 31.4 PG (ref 26.8–34.3)
MCHC RBC AUTO-ENTMCNC: 34.5 G/DL (ref 31.4–37.4)
MCV RBC AUTO: 91 FL (ref 82–98)
MONOCYTES # BLD AUTO: 0.28 THOUSAND/ΜL (ref 0.17–1.22)
MONOCYTES NFR BLD AUTO: 7 % (ref 4–12)
NEUTROPHILS # BLD AUTO: 1.79 THOUSANDS/ΜL (ref 1.85–7.62)
NEUTS SEG NFR BLD AUTO: 41 % (ref 43–75)
NRBC BLD AUTO-RTO: 0 /100 WBCS
PLATELET # BLD AUTO: 213 THOUSANDS/UL (ref 149–390)
PMV BLD AUTO: 10.3 FL (ref 8.9–12.7)
POTASSIUM SERPL-SCNC: 4.1 MMOL/L (ref 3.5–5.3)
PROT SERPL-MCNC: 7.3 G/DL (ref 6.4–8.2)
PTH-INTACT SERPL-MCNC: 56.5 PG/ML (ref 14–72)
RBC # BLD AUTO: 4.4 MILLION/UL (ref 3.81–5.12)
SODIUM SERPL-SCNC: 139 MMOL/L (ref 136–145)
VIT B12 SERPL-MCNC: 817 PG/ML (ref 100–900)
WBC # BLD AUTO: 4.33 THOUSAND/UL (ref 4.31–10.16)

## 2018-02-05 PROCEDURE — 80053 COMPREHEN METABOLIC PANEL: CPT

## 2018-02-05 PROCEDURE — 82728 ASSAY OF FERRITIN: CPT

## 2018-02-05 PROCEDURE — 36415 COLL VENOUS BLD VENIPUNCTURE: CPT

## 2018-02-05 PROCEDURE — 82746 ASSAY OF FOLIC ACID SERUM: CPT

## 2018-02-05 PROCEDURE — 82607 VITAMIN B-12: CPT

## 2018-02-05 PROCEDURE — 83970 ASSAY OF PARATHORMONE: CPT

## 2018-02-05 PROCEDURE — 82306 VITAMIN D 25 HYDROXY: CPT

## 2018-02-05 PROCEDURE — 83540 ASSAY OF IRON: CPT

## 2018-02-05 PROCEDURE — 84630 ASSAY OF ZINC: CPT

## 2018-02-05 PROCEDURE — 84425 ASSAY OF VITAMIN B-1: CPT

## 2018-02-05 PROCEDURE — 84590 ASSAY OF VITAMIN A: CPT

## 2018-02-05 PROCEDURE — 85025 COMPLETE CBC W/AUTO DIFF WBC: CPT

## 2018-02-07 LAB — ZINC SERPL-MCNC: 70 UG/DL (ref 56–134)

## 2018-02-08 LAB
VIT A SERPL-MCNC: 47 UG/DL (ref 20–65)
VIT B1 BLD-SCNC: 125.8 NMOL/L (ref 66.5–200)

## 2018-03-06 ENCOUNTER — TELEPHONE (OUTPATIENT)
Dept: FAMILY MEDICINE CLINIC | Facility: CLINIC | Age: 43
End: 2018-03-06

## 2018-03-06 DIAGNOSIS — J06.0 ACUTE LARYNGOPHARYNGITIS: Primary | ICD-10-CM

## 2018-03-06 RX ORDER — AZITHROMYCIN 250 MG/1
TABLET, FILM COATED ORAL
Qty: 6 TABLET | Refills: 0 | Status: SHIPPED | OUTPATIENT
Start: 2018-03-06 | End: 2018-03-11

## 2018-03-06 NOTE — TELEPHONE ENCOUNTER
Sinus congestion with headache, sore throat, Ear ache    Requesting an antibiotic - Zpack     NKDA    Pharm: [facility]

## 2018-03-13 ENCOUNTER — OFFICE VISIT (OUTPATIENT)
Dept: FAMILY MEDICINE CLINIC | Facility: CLINIC | Age: 43
End: 2018-03-13
Payer: COMMERCIAL

## 2018-03-13 VITALS
HEIGHT: 62 IN | WEIGHT: 140.4 LBS | TEMPERATURE: 98.4 F | BODY MASS INDEX: 25.83 KG/M2 | OXYGEN SATURATION: 98 % | SYSTOLIC BLOOD PRESSURE: 108 MMHG | DIASTOLIC BLOOD PRESSURE: 68 MMHG

## 2018-03-13 DIAGNOSIS — J40 BRONCHITIS: Primary | ICD-10-CM

## 2018-03-13 DIAGNOSIS — J06.9 UPPER RESPIRATORY TRACT INFECTION, UNSPECIFIED TYPE: ICD-10-CM

## 2018-03-13 PROBLEM — Z90.49 S/P LAPAROSCOPIC APPENDECTOMY: Status: ACTIVE | Noted: 2017-08-01

## 2018-03-13 PROCEDURE — 99213 OFFICE O/P EST LOW 20 MIN: CPT | Performed by: PHYSICIAN ASSISTANT

## 2018-03-13 RX ORDER — AZITHROMYCIN 250 MG/1
TABLET, FILM COATED ORAL
Qty: 6 TABLET | Refills: 0 | Status: SHIPPED | OUTPATIENT
Start: 2018-03-13 | End: 2018-03-17

## 2018-03-13 NOTE — PATIENT INSTRUCTIONS

## 2018-04-04 ENCOUNTER — OFFICE VISIT (OUTPATIENT)
Dept: URGENT CARE | Facility: CLINIC | Age: 43
End: 2018-04-04
Payer: COMMERCIAL

## 2018-04-04 VITALS
HEART RATE: 88 BPM | SYSTOLIC BLOOD PRESSURE: 112 MMHG | OXYGEN SATURATION: 99 % | TEMPERATURE: 100.7 F | DIASTOLIC BLOOD PRESSURE: 80 MMHG | RESPIRATION RATE: 18 BRPM

## 2018-04-04 DIAGNOSIS — J20.9 ACUTE BRONCHITIS, UNSPECIFIED ORGANISM: Primary | ICD-10-CM

## 2018-04-04 DIAGNOSIS — G93.3 POST-INFLUENZA SYNDROME: ICD-10-CM

## 2018-04-04 PROCEDURE — 99213 OFFICE O/P EST LOW 20 MIN: CPT | Performed by: PHYSICIAN ASSISTANT

## 2018-04-04 RX ORDER — LEVOFLOXACIN 500 MG/1
500 TABLET, FILM COATED ORAL EVERY 24 HOURS
Qty: 7 TABLET | Refills: 0 | Status: SHIPPED | OUTPATIENT
Start: 2018-04-04 | End: 2018-04-11

## 2018-04-04 RX ORDER — ALBUTEROL SULFATE 90 UG/1
2 AEROSOL, METERED RESPIRATORY (INHALATION) EVERY 4 HOURS PRN
Qty: 1 INHALER | Refills: 0 | Status: SHIPPED | OUTPATIENT
Start: 2018-04-04

## 2018-04-04 NOTE — LETTER
April 4, 2018     Patient: Noreen Barlow   YOB: 1975   Date of Visit: 4/4/2018       To Whom It May Concern: It is my medical opinion that Noreen Barlow may return to work on 4/6/18  If you have any questions or concerns, please don't hesitate to call           Sincerely,        Bert Flores PA-C    CC: No Recipients

## 2018-04-04 NOTE — PROGRESS NOTES
[facility]    NAME: Elizabeth Gallardo is a 37 y o  female  : 1975    MRN: [id number]  DATE: 2018  TIME: 4:36 PM    Assessment and Plan   Acute bronchitis, unspecified organism [J20 9]  1  Acute bronchitis, unspecified organism  levofloxacin (LEVAQUIN) 500 mg tablet    albuterol (PROVENTIL HFA,VENTOLIN HFA) 90 mcg/act inhaler   2  Post-influenza syndrome      This is likely began as influenza  Patient's symptoms should be improving and fever should be gone at this point  Will treat for bronchitis with antibiotic and albuterol inhaler  Patient Instructions     Patient Instructions   I have prescribed an antibiotic for the infection  Please take the antibiotic as prescribed and finish the entire prescription  I recommend that the patient takes an over the counter probiotic or eats yogurt with live cultures in it [place]) to keep good bacteria in the gut and help prevent diarrhea  Wash hands frequently to prevent the spread of infection  Can use over the counter cough and cold medications to help with symptoms  Ibuprofen and/or tylenol as needed for pain or fever  If not improving over the next 7-10 days, follow up with PCP  The antibiotic you have been prescribed can affect the tendons  Patient warned of the risk of tendon rupture due to the antibiotic  Patient is to limit physical activity for 3-4 weeks after finishing the antibiotic  Chief Complaint     Chief Complaint   Patient presents with   Garsia Muscat Like Symptoms     Started Friday with body aches dry cough upper back pain and fevers  burning of eyes  Tye Edwards LPN        History of Present Illness   25-year-old female here with complaint of fever, cough and pain behind her eyes for the last 5-6 days  Fever is high around 104  Temp is decreased with taking Tylenol or ibuprofen but then returns when the medication wears off  She reports having body aches and pain in her upper back  Has chills    Decreased appetite  Cough is very dry and nonproductive  Chest feels tight  Denies any urinary complaints  Denies any nausea, vomiting or diarrhea  Review of Systems   Review of Systems   Constitutional: Positive for chills, fatigue and fever  Negative for activity change, appetite change, diaphoresis and unexpected weight change  HENT: Positive for congestion and sore throat  Negative for dental problem, hearing loss, sinus pressure, sneezing, tinnitus, trouble swallowing and voice change  Eyes: Positive for pain  Negative for photophobia, redness and visual disturbance  Respiratory: Positive for cough and chest tightness  Negative for apnea, shortness of breath, wheezing and stridor  Cardiovascular: Negative for chest pain, palpitations and leg swelling  Gastrointestinal: Negative for abdominal distention, abdominal pain, blood in stool, constipation, diarrhea, nausea and vomiting  Endocrine: Negative for cold intolerance, heat intolerance, polydipsia, polyphagia and polyuria  Genitourinary: Negative for difficulty urinating, dysuria, flank pain, frequency, hematuria and urgency  Musculoskeletal: Positive for myalgias  Negative for arthralgias, back pain, gait problem, joint swelling, neck pain and neck stiffness  Skin: Negative for pallor, rash and wound  Neurological: Negative for dizziness, tremors, seizures, speech difficulty, weakness and headaches  Hematological: Negative for adenopathy  Does not bruise/bleed easily  Psychiatric/Behavioral: Negative for agitation, confusion, dysphoric mood and sleep disturbance  The patient is not nervous/anxious  All other systems reviewed and are negative        Current Medications     Current Outpatient Prescriptions:     Calcium Carbonate (CALCIUM 600 PO), Take by mouth, Disp: , Rfl:     Ferrous Gluconate-C-Folic Acid (IRON-C PO), Take by mouth, Disp: , Rfl:     multivitamin (THERAGRAN) TABS, Take 1 tablet by mouth daily, Disp: , Rfl:    VITAMIN D, ERGOCALCIFEROL, PO, Take by mouth, Disp: , Rfl:     albuterol (PROVENTIL HFA,VENTOLIN HFA) 90 mcg/act inhaler, Inhale 2 puffs every 4 (four) hours as needed for wheezing, Disp: 1 Inhaler, Rfl: 0    levofloxacin (LEVAQUIN) 500 mg tablet, Take 1 tablet (500 mg total) by mouth every 24 hours for 7 days, Disp: 7 tablet, Rfl: 0    Current Allergies     Allergies as of 04/04/2018    (No Known Allergies)          The following portions of the patient's history were reviewed and updated as appropriate: allergies, current medications, past family history, past medical history, past social history, past surgical history and problem list    Past Medical History:   Diagnosis Date    Benign essential hypertension 12/6/2016     Past Surgical History:   Procedure Laterality Date    GASTRECTOMY SLEEVE LAPAROSCOPIC  12/2016    HYSTERECTOMY       Family History   Problem Relation Age of Onset    No Known Problems Mother      Medications have been verified  Objective   /80   Pulse 88   Temp (!) 100 7 °F (38 2 °C) (Tympanic)   Resp 18   LMP  (LMP Unknown)   SpO2 99%      Physical Exam   Physical Exam   Constitutional: She appears well-developed and well-nourished  No distress  HENT:   Head: Normocephalic  Right Ear: Tympanic membrane and external ear normal    Left Ear: Tympanic membrane and external ear normal    Nose: Mucosal edema present  Mouth/Throat: Posterior oropharyngeal erythema present  No oropharyngeal exudate  Neck: Normal range of motion  Neck supple  Cardiovascular: Normal rate, regular rhythm and normal heart sounds  No murmur heard  Pulmonary/Chest: Effort normal and breath sounds normal  No respiratory distress  She has no wheezes  She has no rales  Abdominal: Soft  Bowel sounds are normal  There is no tenderness  Musculoskeletal: Normal range of motion  Lymphadenopathy:     She has no cervical adenopathy  Skin: Skin is warm  No rash noted

## 2018-04-04 NOTE — PATIENT INSTRUCTIONS
I have prescribed an antibiotic for the infection  Please take the antibiotic as prescribed and finish the entire prescription  I recommend that the patient takes an over the counter probiotic or eats yogurt with live cultures in it [place]) to keep good bacteria in the gut and help prevent diarrhea  Wash hands frequently to prevent the spread of infection  Can use over the counter cough and cold medications to help with symptoms  Ibuprofen and/or tylenol as needed for pain or fever  If not improving over the next 7-10 days, follow up with PCP  The antibiotic you have been prescribed can affect the tendons  Patient warned of the risk of tendon rupture due to the antibiotic  Patient is to limit physical activity for 3-4 weeks after finishing the antibiotic

## 2018-04-16 ENCOUNTER — APPOINTMENT (OUTPATIENT)
Dept: RADIOLOGY | Facility: MEDICAL CENTER | Age: 43
End: 2018-04-16
Payer: COMMERCIAL

## 2018-04-16 ENCOUNTER — TRANSCRIBE ORDERS (OUTPATIENT)
Dept: RADIOLOGY | Facility: MEDICAL CENTER | Age: 43
End: 2018-04-16

## 2018-04-16 ENCOUNTER — OFFICE VISIT (OUTPATIENT)
Dept: FAMILY MEDICINE CLINIC | Facility: CLINIC | Age: 43
End: 2018-04-16
Payer: COMMERCIAL

## 2018-04-16 ENCOUNTER — APPOINTMENT (OUTPATIENT)
Dept: LAB | Facility: MEDICAL CENTER | Age: 43
End: 2018-04-16
Payer: COMMERCIAL

## 2018-04-16 VITALS
BODY MASS INDEX: 25.91 KG/M2 | TEMPERATURE: 97.7 F | WEIGHT: 140.8 LBS | HEIGHT: 62 IN | SYSTOLIC BLOOD PRESSURE: 120 MMHG | DIASTOLIC BLOOD PRESSURE: 62 MMHG

## 2018-04-16 DIAGNOSIS — R07.81 PLEURITIC CHEST PAIN: ICD-10-CM

## 2018-04-16 DIAGNOSIS — R07.81 PLEURITIC CHEST PAIN: Primary | ICD-10-CM

## 2018-04-16 LAB
BASOPHILS # BLD AUTO: 0.03 THOUSANDS/ΜL (ref 0–0.1)
BASOPHILS NFR BLD AUTO: 1 % (ref 0–1)
DEPRECATED D DIMER PPP: 324 NG/ML (FEU) (ref 0–424)
EOSINOPHIL # BLD AUTO: 0.15 THOUSAND/ΜL (ref 0–0.61)
EOSINOPHIL NFR BLD AUTO: 2 % (ref 0–6)
ERYTHROCYTE [DISTWIDTH] IN BLOOD BY AUTOMATED COUNT: 12.3 % (ref 11.6–15.1)
HCT VFR BLD AUTO: 43.2 % (ref 34.8–46.1)
HGB BLD-MCNC: 14.1 G/DL (ref 11.5–15.4)
LYMPHOCYTES # BLD AUTO: 2.7 THOUSANDS/ΜL (ref 0.6–4.47)
LYMPHOCYTES NFR BLD AUTO: 41 % (ref 14–44)
MCH RBC QN AUTO: 30.7 PG (ref 26.8–34.3)
MCHC RBC AUTO-ENTMCNC: 32.6 G/DL (ref 31.4–37.4)
MCV RBC AUTO: 94 FL (ref 82–98)
MONOCYTES # BLD AUTO: 0.37 THOUSAND/ΜL (ref 0.17–1.22)
MONOCYTES NFR BLD AUTO: 6 % (ref 4–12)
NEUTROPHILS # BLD AUTO: 3.33 THOUSANDS/ΜL (ref 1.85–7.62)
NEUTS SEG NFR BLD AUTO: 50 % (ref 43–75)
NRBC BLD AUTO-RTO: 0 /100 WBCS
PLATELET # BLD AUTO: 296 THOUSANDS/UL (ref 149–390)
PMV BLD AUTO: 10.4 FL (ref 8.9–12.7)
RBC # BLD AUTO: 4.59 MILLION/UL (ref 3.81–5.12)
WBC # BLD AUTO: 6.59 THOUSAND/UL (ref 4.31–10.16)

## 2018-04-16 PROCEDURE — 99213 OFFICE O/P EST LOW 20 MIN: CPT | Performed by: FAMILY MEDICINE

## 2018-04-16 PROCEDURE — 3008F BODY MASS INDEX DOCD: CPT | Performed by: FAMILY MEDICINE

## 2018-04-16 PROCEDURE — 85379 FIBRIN DEGRADATION QUANT: CPT

## 2018-04-16 PROCEDURE — 85025 COMPLETE CBC W/AUTO DIFF WBC: CPT

## 2018-04-16 PROCEDURE — 71046 X-RAY EXAM CHEST 2 VIEWS: CPT

## 2018-04-16 PROCEDURE — 36415 COLL VENOUS BLD VENIPUNCTURE: CPT

## 2018-04-23 ENCOUNTER — TELEPHONE (OUTPATIENT)
Dept: FAMILY MEDICINE CLINIC | Facility: CLINIC | Age: 43
End: 2018-04-23

## 2018-04-23 DIAGNOSIS — E03.9 ACQUIRED HYPOTHYROIDISM: Primary | ICD-10-CM

## 2018-04-23 NOTE — TELEPHONE ENCOUNTER
Thyroid order has been put in the chart any time she wants that she can just go to the lab and they will draw at

## 2018-04-23 NOTE — TELEPHONE ENCOUNTER
She still feels terrible, she wants to know if you can do labs to check her thyroid    Her sister has thyroid issues

## 2018-04-26 ENCOUNTER — TRANSCRIBE ORDERS (OUTPATIENT)
Dept: RADIOLOGY | Facility: MEDICAL CENTER | Age: 43
End: 2018-04-26

## 2018-04-26 ENCOUNTER — LAB (OUTPATIENT)
Dept: LAB | Facility: MEDICAL CENTER | Age: 43
End: 2018-04-26
Payer: COMMERCIAL

## 2018-04-26 DIAGNOSIS — E03.9 ACQUIRED HYPOTHYROIDISM: ICD-10-CM

## 2018-04-26 LAB — TSH SERPL DL<=0.05 MIU/L-ACNC: 2.64 UIU/ML (ref 0.36–3.74)

## 2018-04-26 PROCEDURE — 36415 COLL VENOUS BLD VENIPUNCTURE: CPT

## 2018-04-26 PROCEDURE — 84443 ASSAY THYROID STIM HORMONE: CPT

## 2018-08-22 ENCOUNTER — APPOINTMENT (OUTPATIENT)
Dept: LAB | Facility: MEDICAL CENTER | Age: 43
End: 2018-08-22
Payer: COMMERCIAL

## 2018-08-22 ENCOUNTER — TRANSCRIBE ORDERS (OUTPATIENT)
Dept: RADIOLOGY | Facility: MEDICAL CENTER | Age: 43
End: 2018-08-22

## 2018-08-22 DIAGNOSIS — E55.9 VITAMIN D DEFICIENCY DISEASE: ICD-10-CM

## 2018-08-22 DIAGNOSIS — R53.83 FATIGUE, UNSPECIFIED TYPE: ICD-10-CM

## 2018-08-22 DIAGNOSIS — K91.2 MALNUTRITION FOLLOWING GASTROINTESTINAL SURGERY: ICD-10-CM

## 2018-08-22 DIAGNOSIS — E46 PROTEIN-CALORIE MALNUTRITION, UNSPECIFIED SEVERITY (HCC): ICD-10-CM

## 2018-08-22 DIAGNOSIS — Z98.84 BARIATRIC SURGERY STATUS: ICD-10-CM

## 2018-08-22 DIAGNOSIS — R63.8 INADEQUATE ORAL INTAKE: ICD-10-CM

## 2018-08-22 DIAGNOSIS — E46 PROTEIN-CALORIE MALNUTRITION, UNSPECIFIED SEVERITY (HCC): Primary | ICD-10-CM

## 2018-08-22 LAB
25(OH)D3 SERPL-MCNC: 79.2 NG/ML (ref 30–100)
ANION GAP SERPL CALCULATED.3IONS-SCNC: 7 MMOL/L (ref 4–13)
BASOPHILS # BLD AUTO: 0.04 THOUSANDS/ΜL (ref 0–0.1)
BASOPHILS NFR BLD AUTO: 1 % (ref 0–1)
BUN SERPL-MCNC: 16 MG/DL (ref 5–25)
CALCIUM SERPL-MCNC: 8.7 MG/DL (ref 8.3–10.1)
CHLORIDE SERPL-SCNC: 104 MMOL/L (ref 100–108)
CO2 SERPL-SCNC: 25 MMOL/L (ref 21–32)
CREAT SERPL-MCNC: 0.52 MG/DL (ref 0.6–1.3)
EOSINOPHIL # BLD AUTO: 0.11 THOUSAND/ΜL (ref 0–0.61)
EOSINOPHIL NFR BLD AUTO: 3 % (ref 0–6)
ERYTHROCYTE [DISTWIDTH] IN BLOOD BY AUTOMATED COUNT: 11.9 % (ref 11.6–15.1)
FERRITIN SERPL-MCNC: 137 NG/ML (ref 8–388)
FOLATE SERPL-MCNC: >20 NG/ML (ref 3.1–17.5)
GFR SERPL CREATININE-BSD FRML MDRD: 116 ML/MIN/1.73SQ M
GLUCOSE P FAST SERPL-MCNC: 89 MG/DL (ref 65–99)
HCT VFR BLD AUTO: 41.1 % (ref 34.8–46.1)
HGB BLD-MCNC: 13.9 G/DL (ref 11.5–15.4)
IMM GRANULOCYTES # BLD AUTO: 0.01 THOUSAND/UL (ref 0–0.2)
IMM GRANULOCYTES NFR BLD AUTO: 0 % (ref 0–2)
IRON SERPL-MCNC: 126 UG/DL (ref 50–170)
LYMPHOCYTES # BLD AUTO: 1.55 THOUSANDS/ΜL (ref 0.6–4.47)
LYMPHOCYTES NFR BLD AUTO: 44 % (ref 14–44)
MCH RBC QN AUTO: 31.7 PG (ref 26.8–34.3)
MCHC RBC AUTO-ENTMCNC: 33.8 G/DL (ref 31.4–37.4)
MCV RBC AUTO: 94 FL (ref 82–98)
MONOCYTES # BLD AUTO: 0.25 THOUSAND/ΜL (ref 0.17–1.22)
MONOCYTES NFR BLD AUTO: 7 % (ref 4–12)
NEUTROPHILS # BLD AUTO: 1.57 THOUSANDS/ΜL (ref 1.85–7.62)
NEUTS SEG NFR BLD AUTO: 45 % (ref 43–75)
NRBC BLD AUTO-RTO: 0 /100 WBCS
PLATELET # BLD AUTO: 171 THOUSANDS/UL (ref 149–390)
PMV BLD AUTO: 11 FL (ref 8.9–12.7)
POTASSIUM SERPL-SCNC: 3.8 MMOL/L (ref 3.5–5.3)
PTH-INTACT SERPL-MCNC: 70.6 PG/ML (ref 18.4–80.1)
RBC # BLD AUTO: 4.38 MILLION/UL (ref 3.81–5.12)
SODIUM SERPL-SCNC: 136 MMOL/L (ref 136–145)
VIT B12 SERPL-MCNC: 892 PG/ML (ref 100–900)
WBC # BLD AUTO: 3.53 THOUSAND/UL (ref 4.31–10.16)

## 2018-08-22 PROCEDURE — 84590 ASSAY OF VITAMIN A: CPT

## 2018-08-22 PROCEDURE — 83540 ASSAY OF IRON: CPT

## 2018-08-22 PROCEDURE — 82728 ASSAY OF FERRITIN: CPT

## 2018-08-22 PROCEDURE — 84630 ASSAY OF ZINC: CPT

## 2018-08-22 PROCEDURE — 82607 VITAMIN B-12: CPT

## 2018-08-22 PROCEDURE — 84425 ASSAY OF VITAMIN B-1: CPT

## 2018-08-22 PROCEDURE — 80048 BASIC METABOLIC PNL TOTAL CA: CPT

## 2018-08-22 PROCEDURE — 82306 VITAMIN D 25 HYDROXY: CPT

## 2018-08-22 PROCEDURE — 83970 ASSAY OF PARATHORMONE: CPT

## 2018-08-22 PROCEDURE — 82746 ASSAY OF FOLIC ACID SERUM: CPT

## 2018-08-22 PROCEDURE — 36415 COLL VENOUS BLD VENIPUNCTURE: CPT

## 2018-08-22 PROCEDURE — 85025 COMPLETE CBC W/AUTO DIFF WBC: CPT

## 2018-08-24 LAB — ZINC SERPL-MCNC: 74 UG/DL (ref 56–134)

## 2018-08-25 LAB
VIT A SERPL-MCNC: 26.4 UG/DL (ref 33.1–100)
VIT B1 BLD-SCNC: 95.8 NMOL/L (ref 66.5–200)

## 2018-10-02 ENCOUNTER — TELEPHONE (OUTPATIENT)
Dept: FAMILY MEDICINE CLINIC | Facility: CLINIC | Age: 43
End: 2018-10-02

## 2018-10-02 DIAGNOSIS — J01.01 ACUTE RECURRENT MAXILLARY SINUSITIS: Primary | ICD-10-CM

## 2018-10-02 RX ORDER — AMOXICILLIN 500 MG/1
1000 CAPSULE ORAL EVERY 12 HOURS SCHEDULED
Qty: 40 CAPSULE | Refills: 0 | Status: SHIPPED | OUTPATIENT
Start: 2018-10-02 | End: 2018-10-12

## 2018-10-02 NOTE — TELEPHONE ENCOUNTER
CC: Sinusitis - Sore throat, Ear ache, nasal congestion     Requesting antibiotic    NKDA    Pharm: [facility]

## 2018-12-17 ENCOUNTER — EVALUATION (OUTPATIENT)
Dept: PHYSICAL THERAPY | Facility: CLINIC | Age: 43
End: 2018-12-17
Payer: COMMERCIAL

## 2018-12-17 DIAGNOSIS — S63.641D TRAUMATIC GAMEKEEPER'S THUMB OF RIGHT HAND, SUBSEQUENT ENCOUNTER: Primary | ICD-10-CM

## 2018-12-17 PROCEDURE — 97535 SELF CARE MNGMENT TRAINING: CPT | Performed by: PHYSICAL THERAPIST

## 2018-12-17 PROCEDURE — 97140 MANUAL THERAPY 1/> REGIONS: CPT | Performed by: PHYSICAL THERAPIST

## 2018-12-17 PROCEDURE — 97010 HOT OR COLD PACKS THERAPY: CPT | Performed by: PHYSICAL THERAPIST

## 2018-12-17 PROCEDURE — G8991 OTHER PT/OT GOAL STATUS: HCPCS | Performed by: PHYSICAL THERAPIST

## 2018-12-17 PROCEDURE — L3913 HFO W/O JOINTS CF: HCPCS | Performed by: PHYSICAL THERAPIST

## 2018-12-17 PROCEDURE — G8990 OTHER PT/OT CURRENT STATUS: HCPCS | Performed by: PHYSICAL THERAPIST

## 2018-12-17 PROCEDURE — 97112 NEUROMUSCULAR REEDUCATION: CPT | Performed by: PHYSICAL THERAPIST

## 2018-12-17 PROCEDURE — 97110 THERAPEUTIC EXERCISES: CPT | Performed by: PHYSICAL THERAPIST

## 2018-12-17 PROCEDURE — 97161 PT EVAL LOW COMPLEX 20 MIN: CPT | Performed by: PHYSICAL THERAPIST

## 2018-12-17 NOTE — LETTER
2018    Matt Martinez MD  [facility] [address] [place] [address]    Patient: Ernesto Concepcion   YOB: 1975   Date of Visit: 2018     Encounter Diagnosis     ICD-10-CM    1  Traumatic gamekeeper's thumb of right hand, subsequent encounter Z00 146V        Dear Dr Gris Choudhury:    Please review the attached Plan of Care from [facility] recent visit  Please verify that you agree therapy should continue by signing the attached document and sending it back to our office  If you have any questions or concerns, please don't hesitate to call  Sincerely,    Grady Bustamante, DPT, CHT    Referring Provider:      I certify that I have read the below Plan of Care and certify the need for these services furnished under this plan of treatment while under my care  Matt Martinez MD  [facility]: [phone]          PT Evaluation     Today's date: 2018  Patient name: Ernesto Concepcion  : 1975  MRN: [id number]  Referring provider: Renan Stanford MD  Dx:   Encounter Diagnosis     ICD-10-CM    1  Traumatic gamekeeper's thumb of right hand, subsequent encounter S63 648B                   Assessment  Assessment details: Pt is a 38 YO female/male presenting to PT with pain, decreased AROM, strength and tolerance to activity  Pt would benefit from skilled intervention to adddress these issues and maximize overall function  Occupation- teacher- currently working  Dominant- Right; Involved- Right    Goals  ST  Decrease pain to 3-4 in 4 weeks            2  Decrease swelling in righ thand            3  Increase AROM to IP; MP when allowed by protocol            4  Maintain clean wound and promote healing            5   Provide orthotic for protection  LT  Increase functional motion and strength for independence with ADL and self care by DC            2   Ability to RTW and recreational activity by DC    Plan  Frequency: 2x week  Duration in weeks: 4  Treatment plan discussed with: patient        Subjective Evaluation    History of Present Illness  Date of surgery: 2018  Mechanism of injury: History of injury to UCL right thumb with recent re-injury requiring surgery  Pain  Current pain ratin  At best pain ratin  At worst pain ratin  Location: right hand    Hand dominance: right    Treatments  Current treatment: physical therapy  Patient Goals  Patient goals for therapy: decreased edema, decreased pain, increased motion, increased strength, independence with ADLs/IADLs, return to sport/leisure activities and return to work          Objective     General Comments     Wrist/Hand Comments  AROM right wrist E/F-45/45; Thumb MP- 0/0; IP- 0/35  Strength- un-assessed  Inspection- sutures clean and dry  Sensation- dulled distal thumb  Circumference at wrist- 14 5 cm; MPs- 16 5 cm;  Thumb P1- 6 0 cm  HFO fabricated with protection to MO and radial IP      Flowsheet Rows      Most Recent Value   PT/OT G-Codes   Current Score  psfs-82   Projected Score  psfs-20   FOTO information reviewed  N/A   Assessment Type  Evaluation   G code set  Other PT/OT Primary   Other PT Primary Current Status ()  CM   Other PT Primary Goal Status ()  CJ          Precautions: wear aplint at all times ecept hygiene    Daily Treatment Diary     Manual              MAG 15                                      liners 2            HFO MAG                Exercise Diary              Wrist/ IP 2/5                                                                                                                                                                                                                                                                       Modalities              HP/ Biph 15                         CP 15

## 2018-12-17 NOTE — PROGRESS NOTES
PT Evaluation     Today's date: 2018  Patient name: Megan Rowley  : 1975  MRN: [id number]  Referring provider: Vy Farooq MD  Dx:   Encounter Diagnosis     ICD-10-CM    1  Traumatic gamekeeper's thumb of right hand, subsequent encounter S61 303A                   Assessment  Assessment details: Pt is a 36 YO female/male presenting to PT with pain, decreased AROM, strength and tolerance to activity  Pt would benefit from skilled intervention to adddress these issues and maximize overall function  Occupation- teacher- currently working  Dominant- Right; Involved- Right    Goals  ST  Decrease pain to 3-4 in 4 weeks            2  Decrease swelling in righ thand            3  Increase AROM to IP; MP when allowed by protocol            4  Maintain clean wound and promote healing            5   Provide orthotic for protection  LT  Increase functional motion and strength for independence with ADL and self care by DC            2  Ability to RTW and recreational activity by DC    Plan  Frequency: 2x week  Duration in weeks: 4  Treatment plan discussed with: patient        Subjective Evaluation    History of Present Illness  Date of surgery: 2018  Mechanism of injury: History of injury to UCL right thumb with recent re-injury requiring surgery  Pain  Current pain ratin  At best pain ratin  At worst pain ratin  Location: right hand    Hand dominance: right    Treatments  Current treatment: physical therapy  Patient Goals  Patient goals for therapy: decreased edema, decreased pain, increased motion, increased strength, independence with ADLs/IADLs, return to sport/leisure activities and return to work          Objective     General Comments     Wrist/Hand Comments  AROM right wrist E/F-45/45; Thumb MP- 0/0; IP- 0/35  Strength- un-assessed  Inspection- sutures clean and dry  Sensation- dulled distal thumb  Circumference at wrist- 14 5 cm; MPs- 16 5 cm;  Thumb P1- 6 0 cm  HFO fabricated with protection to MO and radial IP      Flowsheet Rows      Most Recent Value   PT/OT G-Codes   Current Score  psfs-82   Projected Score  psfs-20   FOTO information reviewed  N/A   Assessment Type  Evaluation   G code set  Other PT/OT Primary   Other PT Primary Current Status ()  CM   Other PT Primary Goal Status ()  CJ          Precautions: wear aplint at all times ecept hygiene    Daily Treatment Diary     Manual  12/17            MAG 15                                      liners 2            HFO MAG                Exercise Diary  12/17            Wrist/ IP 2/5                                                                                                                                                                                                                                                                       Modalities  12/17            HP/ Biph 15                         CP 15

## 2018-12-18 ENCOUNTER — TRANSCRIBE ORDERS (OUTPATIENT)
Dept: PHYSICAL THERAPY | Facility: CLINIC | Age: 43
End: 2018-12-18

## 2018-12-18 DIAGNOSIS — S63.641D TRAUMATIC GAMEKEEPER'S THUMB OF RIGHT HAND, SUBSEQUENT ENCOUNTER: Primary | ICD-10-CM

## 2018-12-19 ENCOUNTER — OFFICE VISIT (OUTPATIENT)
Dept: PHYSICAL THERAPY | Facility: CLINIC | Age: 43
End: 2018-12-19
Payer: COMMERCIAL

## 2018-12-19 DIAGNOSIS — S63.641D TRAUMATIC GAMEKEEPER'S THUMB OF RIGHT HAND, SUBSEQUENT ENCOUNTER: Primary | ICD-10-CM

## 2018-12-19 PROCEDURE — 97140 MANUAL THERAPY 1/> REGIONS: CPT | Performed by: PHYSICAL THERAPIST

## 2018-12-19 PROCEDURE — 97010 HOT OR COLD PACKS THERAPY: CPT | Performed by: PHYSICAL THERAPIST

## 2018-12-19 PROCEDURE — 97535 SELF CARE MNGMENT TRAINING: CPT | Performed by: PHYSICAL THERAPIST

## 2018-12-19 PROCEDURE — 97112 NEUROMUSCULAR REEDUCATION: CPT | Performed by: PHYSICAL THERAPIST

## 2018-12-19 PROCEDURE — 97110 THERAPEUTIC EXERCISES: CPT | Performed by: PHYSICAL THERAPIST

## 2018-12-19 NOTE — PROGRESS NOTES
Daily Note     Today's date: 2018  Patient name: Kourtney Muller  : 1975  MRN: [id number]  Referring provider: Kaelyn Payan MD  Dx:   Encounter Diagnosis     ICD-10-CM    1  Traumatic gamekeeper's thumb of right hand, subsequent encounter S63 641D                   Subjective: no irritability; moving slowly to protect thumb  Objective: See treatment diary below    AROM right wrist E/F-45/45; Thumb MP- 0/0; IP- 0/35  Strength- un-assessed  Inspection- sutures clean and dry  Sensation- dulled distal thumb  Circumference at wrist- 14 5 cm; MPs- 16 5 cm; Thumb P1- 6 0 cm  HFO fabricated with protection to MO and radial IP     Manual                     MAG 15  15                                                                   liners 2  2                   HFO MAG                           Exercise Diary                     Wrist/ IP 2/5  2/5                                                                                                                                                                                                                                                                                                                                                                                                                                                                                                 Modalities                     HP/ Biph 15  15                                           CP 15  15                                   Assessment: Tolerated treatment well  Patient would benefit from continued PT      Plan: Continue per plan of care

## 2018-12-26 ENCOUNTER — OFFICE VISIT (OUTPATIENT)
Dept: PHYSICAL THERAPY | Facility: CLINIC | Age: 43
End: 2018-12-26
Payer: COMMERCIAL

## 2018-12-26 DIAGNOSIS — S63.641D TRAUMATIC GAMEKEEPER'S THUMB OF RIGHT HAND, SUBSEQUENT ENCOUNTER: Primary | ICD-10-CM

## 2018-12-26 PROCEDURE — 97140 MANUAL THERAPY 1/> REGIONS: CPT

## 2018-12-26 PROCEDURE — 97110 THERAPEUTIC EXERCISES: CPT

## 2018-12-26 PROCEDURE — 97112 NEUROMUSCULAR REEDUCATION: CPT

## 2018-12-26 PROCEDURE — 97010 HOT OR COLD PACKS THERAPY: CPT

## 2018-12-26 NOTE — PROGRESS NOTES
Daily Note     Today's date: 2018  Patient name: Vel Brown  : 1975  MRN: [id number]  Referring provider: Chacho Ulrich MD  Dx:   Encounter Diagnosis     ICD-10-CM    1  Traumatic gamekeeper's thumb of right hand, subsequent encounter C25 503K                   Subjective: Pt reports less swelling but was allergic to recent Vit E lotion usage  Objective: AROM right wrist E/F-45/45; Thumb MP- 0/0; IP- 0/35  Strength- un-assessed  Inspection- sutures clean and dry  Sensation- dulled distal thumb  Circumference at wrist- 14 5 cm; MPs- 16 5 cm; Thumb P1- 6 0 cm  HFO fabricated with protection to MO and radial IP     Manual                   MAG 15  15  15                                                                 liners 2  2  1                 HFO MAG                           Exercise Diary                   Wrist/ IP 2/5  2/5  2/5                                                                                                                                                                                                                                                                                                                                                                                                                                                                                               Modalities                   HP/ Biph 15  15  15                                         CP 15  15  15                            Assessment: Tolerated treatment well  Patient would benefit from continued PT   Pt given additional liner          Plan: Continue per plan of care

## 2018-12-28 ENCOUNTER — OFFICE VISIT (OUTPATIENT)
Dept: PHYSICAL THERAPY | Facility: CLINIC | Age: 43
End: 2018-12-28
Payer: COMMERCIAL

## 2018-12-28 DIAGNOSIS — S63.641D TRAUMATIC GAMEKEEPER'S THUMB OF RIGHT HAND, SUBSEQUENT ENCOUNTER: Primary | ICD-10-CM

## 2018-12-28 PROCEDURE — 97110 THERAPEUTIC EXERCISES: CPT | Performed by: PHYSICAL THERAPIST

## 2018-12-28 PROCEDURE — 97140 MANUAL THERAPY 1/> REGIONS: CPT | Performed by: PHYSICAL THERAPIST

## 2018-12-28 PROCEDURE — 97010 HOT OR COLD PACKS THERAPY: CPT | Performed by: PHYSICAL THERAPIST

## 2018-12-28 PROCEDURE — 97035 APP MDLTY 1+ULTRASOUND EA 15: CPT | Performed by: PHYSICAL THERAPIST

## 2018-12-28 PROCEDURE — 97112 NEUROMUSCULAR REEDUCATION: CPT | Performed by: PHYSICAL THERAPIST

## 2018-12-28 NOTE — PROGRESS NOTES
Daily Note     Today's date: 2018  Patient name: Dale Jeronimo  : 1975  MRN: [id number]  Referring provider: Bola Esquivel MD  Dx:   Encounter Diagnosis     ICD-10-CM    1  Traumatic gamekeeper's thumb of right hand, subsequent encounter S63 641D                   Subjective: pt notes transient tingling in her palm area at nighttime  This is also associated with thenat pain  PT will monitor and provide wrist support if needed  Objective: See treatment diary below  AROM right wrist E/F-45/45; Thumb MP- 0/0; IP- 0/35  Strength- un-assessed  Inspection- incision healing well  Sensation- dulled distal thumb  Circumference at wrist- 14 5 cm; MPs- 16 5 cm; Thumb P1- 6 0 cm  HFO fabricated with protection to MP and radial IP     Manual                 MAG 15  15  15  15                                                               liners 2  2  1                 HFO MAG                           Exercise Diary                 Wrist/ IP 2/5  2/5  2/5  2/5                                                                                                                                                                                                                                                                                                                                                                                                                                                                                             Modalities                 HP/ Biph 15  15  15  15                US 3 MZ        12               CP 15  15  15  15                       Assessment: Tolerated treatment well  Patient would benefit from continued PT      Plan: Continue per plan of care

## 2018-12-31 ENCOUNTER — OFFICE VISIT (OUTPATIENT)
Dept: PHYSICAL THERAPY | Facility: CLINIC | Age: 43
End: 2018-12-31
Payer: COMMERCIAL

## 2018-12-31 DIAGNOSIS — S63.641D TRAUMATIC GAMEKEEPER'S THUMB OF RIGHT HAND, SUBSEQUENT ENCOUNTER: Primary | ICD-10-CM

## 2018-12-31 PROCEDURE — 97035 APP MDLTY 1+ULTRASOUND EA 15: CPT | Performed by: PHYSICAL THERAPIST

## 2018-12-31 PROCEDURE — 97112 NEUROMUSCULAR REEDUCATION: CPT | Performed by: PHYSICAL THERAPIST

## 2018-12-31 PROCEDURE — L3908 WHO COCK-UP NONMOLDE PRE OTS: HCPCS | Performed by: PHYSICAL THERAPIST

## 2018-12-31 PROCEDURE — 97010 HOT OR COLD PACKS THERAPY: CPT | Performed by: PHYSICAL THERAPIST

## 2018-12-31 PROCEDURE — 97140 MANUAL THERAPY 1/> REGIONS: CPT | Performed by: PHYSICAL THERAPIST

## 2018-12-31 PROCEDURE — 97110 THERAPEUTIC EXERCISES: CPT | Performed by: PHYSICAL THERAPIST

## 2018-12-31 NOTE — PROGRESS NOTES
Daily Note     Today's date: 2018  Patient name: Megan Rowley  : 1975  MRN: [id number]  Referring provider: Vy Farooq MD  Dx:   Encounter Diagnosis     ICD-10-CM    1  Traumatic gamekeeper's thumb of right hand, subsequent encounter S62 073Q                   Subjective: pt notes continued wrist and palmar soreness  She cannot associate with activity or position and notes it has been getiing worse since last week      Objective: See treatment diary below    AROM right wrist E/F-45/45; Thumb MP- 0/0; IP- 0/35  Strength- un-assessed  Inspection- incision healing well  Sensation- dulled distal thumb  Circumference at wrist- 14 5 cm; MPs- 16 5 cm; Thumb P1- 6 0 cm  HFO fabricated with protection to MP and radial IP     Manual               MAG 15  15  15  15  15                                      WHO          MAG             liners 2  2  1                 HFO MAG                           Exercise Diary               Wrist/ IP 2/5  2/5  2/5  2/5  2/5                                                                                                                                                                                                                                                                                                                                                                                                                                                                                           Modalities               HP/ Biph 15  15  15  15  15              US 3 MZ        12  12             CP 15  15  15  15  15                  Assessment: Tolerated treatment well  Patient would benefit from continued PT      Plan: Continue per plan of care

## 2019-01-08 ENCOUNTER — OFFICE VISIT (OUTPATIENT)
Dept: PHYSICAL THERAPY | Facility: CLINIC | Age: 44
End: 2019-01-08
Payer: COMMERCIAL

## 2019-01-08 DIAGNOSIS — S63.641D TRAUMATIC GAMEKEEPER'S THUMB OF RIGHT HAND, SUBSEQUENT ENCOUNTER: Primary | ICD-10-CM

## 2019-01-08 PROCEDURE — 97110 THERAPEUTIC EXERCISES: CPT | Performed by: PHYSICAL THERAPIST

## 2019-01-08 PROCEDURE — 97112 NEUROMUSCULAR REEDUCATION: CPT | Performed by: PHYSICAL THERAPIST

## 2019-01-08 PROCEDURE — 97010 HOT OR COLD PACKS THERAPY: CPT | Performed by: PHYSICAL THERAPIST

## 2019-01-08 PROCEDURE — 97035 APP MDLTY 1+ULTRASOUND EA 15: CPT | Performed by: PHYSICAL THERAPIST

## 2019-01-08 PROCEDURE — 97535 SELF CARE MNGMENT TRAINING: CPT | Performed by: PHYSICAL THERAPIST

## 2019-01-08 PROCEDURE — 97140 MANUAL THERAPY 1/> REGIONS: CPT | Performed by: PHYSICAL THERAPIST

## 2019-01-08 NOTE — PROGRESS NOTES
Daily Note     Today's date: 2019  Patient name: Dale Jeronimo  : 1975  MRN: [id number]  Referring provider: Bola Esquivel MD  Dx:   Encounter Diagnosis     ICD-10-CM    1  Traumatic gamekeeper's thumb of right hand, subsequent encounter Z50 382L                   Subjective: pt notes no increase in pain, swelling as she us=es her hand for light activity      Objective: See treatment diary below  AROM right wrist E/F-45/45; Thumb MP- 0/0; IP- 0/35  Strength- un-assessed  Inspection- incision healing well  Sensation- dulled distal thumb  Circumference at wrist- 14 5 cm; MPs- 16 5 cm; Thumb P1- 6 0 cm  HFO fabricated with protection to MP and radial IP     Manual             MAG 15  15  15  15  15  15                                    WHO          MAG             liners 2  2  1      2           HFO MAG                           Exercise Diary    18           Wrist/ IP 2/5  2/5  2/5  2/5  2/5  2/5                                                                                                                                                                                                                                                                                                                                                                                                                                                                                         Modalities    18           HP/ Biph 15  15  15  15  15  15            US 3 MZ          12  12           CP 15  15  15  15  15  15             Assessment: Tolerated treatment well  Patient would benefit from continued PT      Plan: Continue per plan of care

## 2019-01-15 ENCOUNTER — TRANSCRIBE ORDERS (OUTPATIENT)
Dept: PHYSICAL THERAPY | Facility: CLINIC | Age: 44
End: 2019-01-15

## 2019-01-15 ENCOUNTER — OFFICE VISIT (OUTPATIENT)
Dept: PHYSICAL THERAPY | Facility: CLINIC | Age: 44
End: 2019-01-15
Payer: COMMERCIAL

## 2019-01-15 DIAGNOSIS — S63.641D GAMEKEEPER'S THUMB, TRAUMATIC, RIGHT, SUBSEQUENT ENCOUNTER: Primary | ICD-10-CM

## 2019-01-15 DIAGNOSIS — S63.641D TRAUMATIC GAMEKEEPER'S THUMB OF RIGHT HAND, SUBSEQUENT ENCOUNTER: Primary | ICD-10-CM

## 2019-01-15 PROCEDURE — 97010 HOT OR COLD PACKS THERAPY: CPT | Performed by: PHYSICAL THERAPIST

## 2019-01-15 PROCEDURE — 97035 APP MDLTY 1+ULTRASOUND EA 15: CPT | Performed by: PHYSICAL THERAPIST

## 2019-01-15 PROCEDURE — G8991 OTHER PT/OT GOAL STATUS: HCPCS | Performed by: PHYSICAL THERAPIST

## 2019-01-15 PROCEDURE — G8990 OTHER PT/OT CURRENT STATUS: HCPCS | Performed by: PHYSICAL THERAPIST

## 2019-01-15 PROCEDURE — 97535 SELF CARE MNGMENT TRAINING: CPT | Performed by: PHYSICAL THERAPIST

## 2019-01-15 PROCEDURE — 97140 MANUAL THERAPY 1/> REGIONS: CPT | Performed by: PHYSICAL THERAPIST

## 2019-01-15 PROCEDURE — 97110 THERAPEUTIC EXERCISES: CPT | Performed by: PHYSICAL THERAPIST

## 2019-01-15 PROCEDURE — 97112 NEUROMUSCULAR REEDUCATION: CPT | Performed by: PHYSICAL THERAPIST

## 2019-01-15 NOTE — PROGRESS NOTES
PT Re-Evaluation     Today's date: 1/15/2019  Patient name: Humberto Rivera  : 1975  MRN: [id number]  Referring provider: Lisa Wilkinson MD  Dx:   Encounter Diagnosis     ICD-10-CM    1  Traumatic gamekeeper's thumb of right hand, subsequent encounter O01 160C                   Assessment  Assessment details: Pt is a 36 YO female/male presenting to PT with pain, decreased AROM, strength and tolerance to activity  Pt would benefit from skilled intervention to adddress these issues and maximize overall function  Occupation- teacher- currently working  Dominant- Right; Involved- Right  Pt is now 4 weeks post surgery and continues to improve AROM  She wears her spica for protection    Goals  ST  Decrease pain to 3-4 in 4 weeks            2  Decrease swelling in righ thand            3  Increase AROM to IP; MP when allowed by protocol            4  Maintain clean wound and promote healing            5   Provide orthotic for protection  LT  Increase functional motion and strength for independence with ADL and self care by DC            2  Ability to RTW and recreational activity by DC    Plan  Frequency: 2x week  Duration in weeks: 4  Treatment plan discussed with: patient        Subjective Evaluation    History of Present Illness  Date of surgery: 2018  Mechanism of injury: History of injury to UCL right thumb with recent re-injury requiring surgery  Pain  Current pain ratin  At best pain ratin  At worst pain ratin  Location: right hand    Hand dominance: right    Treatments  Current treatment: physical therapy  Patient Goals  Patient goals for therapy: decreased edema, decreased pain, increased motion, increased strength, independence with ADLs/IADLs, return to sport/leisure activities and return to work          Objective     General Comments     Wrist/Hand Comments  AROM right wrist E/F-75/80;  Thumb MP- 0/0; IP- 0505  Strength- un-assessed  Inspection- sutures clean and dry  Sensation- dulled distal thumb  Circumference at wrist- 14 5 cm; MPs- 16 5 cm;  Thumb P1- 5 3; PIP- 7 4 cm  HFO fabricated with protection to MP and radial IP      Flowsheet Rows      Most Recent Value   PT/OT G-Codes   Current Score  62   Projected Score  20   FOTO information reviewed  N/A   Assessment Type  Re-evaluation   G code set  Other PT/OT Primary   Other PT Primary Current Status ()  CL   Other PT Primary Goal Status ()  CJ          Precautions: wear aplint at all times ecept hygiene    Daily Treatment Diary   Manual  12/17  12/19  12/26  12/28  12/31  1/8  1/15         MAG 15  15  15  15  15  15  15                                  WHO          MAG             liners 2  2  1      2  2         HFO MAG                           Exercise Diary  12/17  12/19  12/26  12/28 12/31  1/8  1/15         Wrist/ IP 2/5  2/5  2/5  2/5  2/5  2/5  2/5                                                                                                                                                                                                                                                                                                                                                                                                                                                                                       Modalities  12/17 12/19  12/26  12/28  12/31  1/8  1/15         HP/ Biph 15  15  15  15  15  15  15          US 3 MZ        12 12 12 12         CP 15  15  15  15  15  15  15

## 2019-01-15 NOTE — LETTER
January 15, 2019    Radha Hua MD  [facility] [place] [address]    Patient: Yvonne Roa   YOB: 1975   Date of Visit: 1/15/2019     Encounter Diagnosis     ICD-10-CM    1  Traumatic gamekeeper's thumb of right hand, subsequent encounter B00 137Z        Dear Dr Nolan Guzmán:    Please review the attached Plan of Care from [facility] recent visit  Please verify that you agree therapy should continue by signing the attached document and sending it back to our office  If you have any questions or concerns, please don't hesitate to call  Sincerely,    Carisa Banuelos, DPT, CHT    Referring Provider:      I certify that I have read the below Plan of Care and certify the need for these services furnished under this plan of treatment while under my care  Radha Hua MD  [facility]: [phone]          PT Re-Evaluation     Today's date: 1/15/2019  Patient name: Yvonne Roa  : 1975  MRN: [id number]  Referring provider: Corey Morrison MD  Dx:   Encounter Diagnosis     ICD-10-CM    1  Traumatic gamekeeper's thumb of right hand, subsequent encounter S63 641D                   Assessment  Assessment details: Pt is a 38 YO female/male presenting to PT with pain, decreased AROM, strength and tolerance to activity  Pt would benefit from skilled intervention to adddress these issues and maximize overall function  Occupation- teacher- currently working  Dominant- Right; Involved- Right  Pt is now 4 weeks post surgery and continues to improve AROM  She wears her spica for protection    Goals  ST  Decrease pain to 3-4 in 4 weeks            2  Decrease swelling in righ thand            3  Increase AROM to IP; MP when allowed by protocol            4  Maintain clean wound and promote healing            5   Provide orthotic for protection  LT    Increase functional motion and strength for independence with ADL and self care by DC            2  Ability to RTW and recreational activity by DC    Plan  Frequency: 2x week  Duration in weeks: 4  Treatment plan discussed with: patient        Subjective Evaluation    History of Present Illness  Date of surgery: 2018  Mechanism of injury: History of injury to UCL right thumb with recent re-injury requiring surgery  Pain  Current pain ratin  At best pain ratin  At worst pain ratin  Location: right hand    Hand dominance: right    Treatments  Current treatment: physical therapy  Patient Goals  Patient goals for therapy: decreased edema, decreased pain, increased motion, increased strength, independence with ADLs/IADLs, return to sport/leisure activities and return to work          Objective     General Comments     Wrist/Hand Comments  AROM right wrist E/F-75/80; Thumb MP- 0/0; IP- 0505  Strength- un-assessed  Inspection- sutures clean and dry  Sensation- dulled distal thumb  Circumference at wrist- 14 5 cm; MPs- 16 5 cm;  Thumb P1- 5 3; PIP- 7 4 cm  HFO fabricated with protection to MP and radial IP      Flowsheet Rows      Most Recent Value   PT/OT G-Codes   Current Score  62   Projected Score  20   FOTO information reviewed  N/A   Assessment Type  Re-evaluation   G code set  Other PT/OT Primary   Other PT Primary Current Status ()  CL   Other PT Primary Goal Status ()  CJ          Precautions: wear aplint at all times ecept hygiene    Daily Treatment Diary   Manual  12/17  12/19  12/26  12/28  12/31  1/8  1/15         MAG 15  15  15  15  15  15  15                                  WHO          MAG             liners 2  2  1      2  2         HFO MAG                           Exercise Diary  12/17  12/19  12/26  12/28 12/31  1/8  1/15         Wrist/ IP 2/5  2/5  2  2  2/  2  2                                                                                                                                                                                                                                                                                                                                                                                                                                                                                       Modalities  12/17 12/19  12/26  12/28  12/31  1/8  1/15         HP/ Biph 15  15  15  15  15  15  15          US 3 MZ        12  12  12  12         CP 15  15  15  15  15  15  15

## 2019-01-21 ENCOUNTER — OFFICE VISIT (OUTPATIENT)
Dept: PHYSICAL THERAPY | Facility: CLINIC | Age: 44
End: 2019-01-21
Payer: COMMERCIAL

## 2019-01-21 DIAGNOSIS — S63.641D TRAUMATIC GAMEKEEPER'S THUMB OF RIGHT HAND, SUBSEQUENT ENCOUNTER: Primary | ICD-10-CM

## 2019-01-21 PROCEDURE — 97535 SELF CARE MNGMENT TRAINING: CPT

## 2019-01-21 PROCEDURE — 97010 HOT OR COLD PACKS THERAPY: CPT

## 2019-01-21 PROCEDURE — 97112 NEUROMUSCULAR REEDUCATION: CPT

## 2019-01-21 PROCEDURE — 97110 THERAPEUTIC EXERCISES: CPT

## 2019-01-21 PROCEDURE — 97140 MANUAL THERAPY 1/> REGIONS: CPT

## 2019-01-21 PROCEDURE — 97035 APP MDLTY 1+ULTRASOUND EA 15: CPT

## 2019-01-21 NOTE — PROGRESS NOTES
Daily Note     Today's date: 2019  Patient name: Dale Jeronimo  : 1975  MRN: [id number]  Referring provider: Bola Esquivel MD  Dx:   Encounter Diagnosis     ICD-10-CM    1  Traumatic gamekeeper's thumb of right hand, subsequent encounter Q21 565O                   Subjective: Pt reports a callous on her thumb maybe from the brace but does not bother me  Objective: AROM right wrist E/F-45/45; Thumb MP- 0/0; IP- 0/35  Strength- un-assessed  Inspection- sutures clean and dry  Sensation- dulled distal thumb  Circumference at wrist- 14 5 cm; MPs- 16 5 cm; Thumb P1- 6 0 cm  HFO fabricated with protection to MO and radial IP     Manual                 MAG 15  15  15  15                                                               liners 2  2  1                 HFO MAG                           Exercise Diary                 Wrist/ IP 2/5  2/5  2/5  2/5                                                                                                                                                                                                                                                                                                                                                                                                                                                                                             Modalities                 HP/ Biph 15  15  15  15                                       CP 15  15  15  15                           Assessment: Tolerated treatment well  Patient would benefit from continued PT   Pt brace adjusted for callous           Plan: Continue per plan of care

## 2019-01-28 ENCOUNTER — OFFICE VISIT (OUTPATIENT)
Dept: PHYSICAL THERAPY | Facility: CLINIC | Age: 44
End: 2019-01-28
Payer: COMMERCIAL

## 2019-01-28 DIAGNOSIS — S63.641D TRAUMATIC GAMEKEEPER'S THUMB OF RIGHT HAND, SUBSEQUENT ENCOUNTER: Primary | ICD-10-CM

## 2019-01-28 PROCEDURE — 97140 MANUAL THERAPY 1/> REGIONS: CPT

## 2019-01-28 PROCEDURE — 97535 SELF CARE MNGMENT TRAINING: CPT

## 2019-01-28 PROCEDURE — 97035 APP MDLTY 1+ULTRASOUND EA 15: CPT

## 2019-01-28 PROCEDURE — 97110 THERAPEUTIC EXERCISES: CPT

## 2019-01-28 PROCEDURE — 97010 HOT OR COLD PACKS THERAPY: CPT

## 2019-01-28 PROCEDURE — 97112 NEUROMUSCULAR REEDUCATION: CPT

## 2019-01-28 NOTE — PROGRESS NOTES
Daily Note     Today's date: 2019  Patient name: Servando Silva  : 1975  MRN: [id number]  Referring provider: Damian Montano MD  Dx:   Encounter Diagnosis     ICD-10-CM    1  Traumatic gamekeeper's thumb of right hand, subsequent encounter G45 848F                   Subjective: Pt reports the thumb feels okay but "I am not moving it "      Objective: AROM right wrist E/F-45/45; Thumb MP- 0/0; IP- 0/35  Strength- un-assessed  Inspection- sutures clean and dry  Sensation- dulled distal thumb  Circumference at wrist- 14 5 cm; MPs- 16 5 cm; Thumb P1- 6 0 cm  HFO fabricated with protection to MO and radial IP     Manual               MAG 15  15  15  15                                                               liners 2  2  1    2             HFO MAG                           Exercise Diary               Wrist/ IP 2/5  2/5  2/5  2/5  2/5                                                                                                                                                                                                                                                                                                                                                                                                                                                                                           Modalities               HP/ Biph 15  15  15  15  15                                     CP 15  15  15  15  15                         Assessment: Tolerated treatment well  Patient would benefit from continued PT   Pt to get shoulder surgery this Wednesday           Plan: Continue per plan of care

## 2019-02-04 NOTE — PROGRESS NOTES
PT Evaluation     Today's date: 2019  Patient name: Nandini Bradley  : 1975  MRN: [id number]  Referring provider: Jomar Camejo MD  Dx:   Encounter Diagnosis     ICD-10-CM    1  Acute pain of left shoulder M25 512                   Assessment  Assessment details:   CURRENT FUNCTIONAL STATUS    Dressing tolerance: Asisst of one  Bathing/washing hair tolerance: Unable to use left arm  Unable to reach with the left arm  Unable to lift with the left arm  Unable to perform housework with the left arm  Work status: Off     SHORT TERM GOALS (2 WEEKS)    Increase shoulder as per protocol  Increase elbow AROM to WNL  Decrease pain to 2-4/10  Dressing tolerance: Independent  Bathing/washing hair tolerance: Refrain from using left arm  Refrain from reaching with the left arm  Refrain from lifting with the left arm  Refrain from performing housework with the left arm  Work status: Remain off    LONG TERM GOALS (DISCHARGE)    Shoulder AROM: WFL  Shoulder PROM: [facility]   Shoulder Strength: 5/5 t/o  Elbow AROM: WFL  Elbow Strength: 5/5 t/o  Decrease pain to 0-2/10  Dressing tolerance: Independent  Bathing/washing hair tolerance: Good with left arm  Able to reach overhead with the left arm  Able to lift 8 lbs to shoulder level, and 5 lbs overhead with the left arm  Able to perform all housework with the left arm  Work status: Full Duty  Understanding of Dx/Px/POC: good   Prognosis: good    Plan  Plan details: Nandini Bradley is a 37 y o  female presenting to PT with pain, decreased range of motion, decreased strength, and decreased tolerance to activity  This patient would benefit from skilled PT services to address these issues and to maximize function  A home exercise program was provided and all questions were answered   Thank you for the referral     Patient would benefit from: skilled physical therapy  Planned modality interventions: unattended electrical stimulation and cryotherapy  Planned therapy interventions: manual therapy, therapeutic exercise and therapeutic activities  Frequency: 1x week  Duration in weeks: 4        Subjective Evaluation    History of Present Illness  Mechanism of injury: CC: Left shoulder pain, stiffness, and weakness  HPI: The patient's left shoulder pain began from a work injury which occurred on 2018  She was standing on a chair 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  She lost her balance and grabbed onto a ledge, yanking on her shoulder and twisting it  She felt immediate pain  She went to the doctor next day  An MRI without contrast showed normal wear and tear  She performed a HEP for 3 months without improvement  Then an MRI with contrast revealed a possible RTC  She had it repaired on 2019  She is wearing a sling, applies ice, and takes medication for relief  She is off of work as a   Pain  Current pain ratin  At best pain ratin  At worst pain ratin    Hand dominance: right    Patient Goals  Patient goals for therapy: decreased pain, increased motion, increased strength, return to work and independence with ADLs/IADLs          Objective     General Comments:      Shoulder Comments   CURRENT OBJECTIVE MEASUREMENTS    Shoulder AROM: Deferred  Shoulder PROM: Deferred  Shoulder Strength: Deferred  Elbow AROM: -5-140 deg  Elbow Strength: Deferred               Precautions: None    Daily Treatment Diary     Manual          PROM                  Exercise Diary          Pendulums 10x TID        Scapular retractions 10x TID        Elbow, forearm, wrist, and hand AROM 10x TID        Pulleys         Wand         Table Slides         Wall Slides         UBE: S         T-Band High Row         T-Band Mid Row         T-Band Low Row         T-Band Biceps         T-Band Triceps         T-Band SA Pulldowns         T-Band IR         T-Band ER         Forward/Lateral Raises         Hoist Row: S          Lat Pulldown: S XO Biceps         XO Triceps         XO Upright Row         XO IR         XO ER         XO SA Pulldown         [facility]         Ball Stabilization         Bodyblade                  Modalities          CP 15'

## 2019-02-05 ENCOUNTER — OFFICE VISIT (OUTPATIENT)
Dept: PHYSICAL THERAPY | Facility: CLINIC | Age: 44
End: 2019-02-05
Payer: OTHER MISCELLANEOUS

## 2019-02-05 ENCOUNTER — EVALUATION (OUTPATIENT)
Dept: PHYSICAL THERAPY | Facility: CLINIC | Age: 44
End: 2019-02-05
Payer: OTHER MISCELLANEOUS

## 2019-02-05 DIAGNOSIS — M25.512 ACUTE PAIN OF LEFT SHOULDER: Primary | ICD-10-CM

## 2019-02-05 DIAGNOSIS — S63.641D TRAUMATIC GAMEKEEPER'S THUMB OF RIGHT HAND, SUBSEQUENT ENCOUNTER: Primary | ICD-10-CM

## 2019-02-05 PROCEDURE — 97110 THERAPEUTIC EXERCISES: CPT | Performed by: PHYSICAL THERAPIST

## 2019-02-05 PROCEDURE — 97140 MANUAL THERAPY 1/> REGIONS: CPT | Performed by: PHYSICAL THERAPIST

## 2019-02-05 PROCEDURE — G8991 OTHER PT/OT GOAL STATUS: HCPCS | Performed by: PHYSICAL THERAPIST

## 2019-02-05 PROCEDURE — 97035 APP MDLTY 1+ULTRASOUND EA 15: CPT | Performed by: PHYSICAL THERAPIST

## 2019-02-05 PROCEDURE — 97010 HOT OR COLD PACKS THERAPY: CPT | Performed by: PHYSICAL THERAPIST

## 2019-02-05 PROCEDURE — 97112 NEUROMUSCULAR REEDUCATION: CPT | Performed by: PHYSICAL THERAPIST

## 2019-02-05 PROCEDURE — G8990 OTHER PT/OT CURRENT STATUS: HCPCS | Performed by: PHYSICAL THERAPIST

## 2019-02-05 PROCEDURE — 97161 PT EVAL LOW COMPLEX 20 MIN: CPT | Performed by: PHYSICAL THERAPIST

## 2019-02-05 NOTE — PROGRESS NOTES
Daily Note     Today's date: 2019  Patient name: Ailyn Haji  : 1975  MRN: [id number]  Referring provider: Owen Pike MD  Dx:   Encounter Diagnosis     ICD-10-CM    1  Traumatic gamekeeper's thumb of right hand, subsequent encounter Z92 304H                   Subjective: pt doing well with gaining AROM; she has been wearing her hand based spica for protection      Objective: See treatment diary below     AROM right wrist E/F-45/45; Thumb MP- 0/0; IP- 0/35  Strength- un-assessed  Inspection- sutures clean and dry  Sensation- dulled distal thumb  Circumference at wrist- 14 5 cm; MPs- 16 5 cm; Thumb P1- 6 0 cm  HFO fabricated with protection to MO and radial IP     Manual    2/5           MAG 15  15  15  15  15  15                                                           liners 2  2  1    2             HFO MAG                           Exercise Diary   2/5           Wrist/ IP 2/5  2/5  2/5  2/5  2/5  2/10            AAROM MP/IP            2/5                                                                                                                                                                                                                                                                                                                                                                                                                                                                 Modalities    2/5           HP/ Biph 15  15  15  15  15  15                                   CP 15  15  15  15  15  15                  Assessment: Tolerated treatment well  Patient would benefit from continued PT      Plan: Continue per plan of care

## 2019-02-05 NOTE — LETTER
2019    Lisa Castaneda MD  [facility] [place] [address]    Patient: Abdulkadir Maria   YOB: 1975   Date of Visit: 2019     Encounter Diagnosis     ICD-10-CM    1  Acute pain of left shoulder M25 512        Dear Dr Carlin Deleon:    Please review the attached Plan of Care from [facility] recent visit  Please verify that you agree therapy should continue by signing the attached document and sending it back to our office  If you have any questions or concerns, please don't hesitate to call  Sincerely,    Pramod Monique, PT      Referring Provider:      I certify that I have read the below Plan of Care and certify the need for these services furnished under this plan of treatment while under my care  Lisa Castaneda MD  [facility]: [phone]          PT Evaluation     Today's date: 2019  Patient name: Abdulkadir Maria  : 1975  MRN: [id number]  Referring provider: Geovany Gonzalez MD  Dx:   Encounter Diagnosis     ICD-10-CM    1  Acute pain of left shoulder M25 512                   Assessment  Assessment details:   CURRENT FUNCTIONAL STATUS    Dressing tolerance: Asisst of one  Bathing/washing hair tolerance: Unable to use left arm  Unable to reach with the left arm  Unable to lift with the left arm  Unable to perform housework with the left arm  Work status: Off     SHORT TERM GOALS (2 WEEKS)    Increase shoulder as per protocol  Increase elbow AROM to WNL  Decrease pain to 2-4/10  Dressing tolerance: Independent  Bathing/washing hair tolerance: Refrain from using left arm  Refrain from reaching with the left arm  Refrain from lifting with the left arm  Refrain from performing housework with the left arm    Work status: Remain off    LONG TERM GOALS (DISCHARGE)    Shoulder AROM: WFL  Shoulder PROM: [facility]   Shoulder Strength: 5/5 t/o    Elbow AROM: WFL  Elbow Strength: 5/5 t/o  Decrease pain to 0-2/10  Dressing tolerance: Independent  Bathing/washing hair tolerance: Good with left arm  Able to reach overhead with the left arm  Able to lift 8 lbs to shoulder level, and 5 lbs overhead with the left arm  Able to perform all housework with the left arm  Work status: Full Duty  Understanding of Dx/Px/POC: good   Prognosis: good    Plan  Plan details: Yousif Sher is a 37 y o  female presenting to PT with pain, decreased range of motion, decreased strength, and decreased tolerance to activity  This patient would benefit from skilled PT services to address these issues and to maximize function  A home exercise program was provided and all questions were answered  Thank you for the referral     Patient would benefit from: skilled physical therapy  Planned modality interventions: unattended electrical stimulation and cryotherapy  Planned therapy interventions: manual therapy, therapeutic exercise and therapeutic activities  Frequency: 1x week  Duration in weeks: 4        Subjective Evaluation    History of Present Illness  Mechanism of injury: CC: Left shoulder pain, stiffness, and weakness  HPI: The patient's left shoulder pain began from a work injury which occurred on 2018  She was standing on a chair 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  She lost her balance and grabbed onto a ledge, yanking on her shoulder and twisting it  She felt immediate pain  She went to the doctor next day  An MRI without contrast showed normal wear and tear  She performed a HEP for 3 months without improvement  Then an MRI with contrast revealed a possible RTC  She had it repaired on 2019  She is wearing a sling, applies ice, and takes medication for relief  She is off of work as a     Pain  Current pain ratin  At best pain ratin  At worst pain ratin    Hand dominance: right    Patient Goals  Patient goals for therapy: decreased pain, increased motion, increased strength, return to work and independence with ADLs/IADLs          Objective     General Comments:      Shoulder Comments   CURRENT OBJECTIVE MEASUREMENTS    Shoulder AROM: Deferred  Shoulder PROM: Deferred  Shoulder Strength: Deferred  Elbow AROM: -5-140 deg  Elbow Strength: Deferred               Precautions: None    Daily Treatment Diary     Manual  2/5        PROM                  Exercise Diary          Pendulums 10x TID        Scapular retractions 10x TID        Elbow, forearm, wrist, and hand AROM 10x TID        Pulleys         Wand         Table Slides         Wall Slides         UBE: S         T-Band High Row         T-Band Mid Row         T-Band Low Row         T-Band Biceps         T-Band Triceps         T-Band SA Pulldowns         T-Band IR         T-Band ER         Forward/Lateral Raises         Hoist Row: S          Lat Pulldown: S         XO Biceps         XO Triceps         XO Upright Row         XO IR         XO ER         XO SA Pulldown         Blackburns         Ball Stabilization         Bodyblade                  Modalities          CP 15'

## 2019-02-12 ENCOUNTER — APPOINTMENT (OUTPATIENT)
Dept: PHYSICAL THERAPY | Facility: CLINIC | Age: 44
End: 2019-02-12
Payer: OTHER MISCELLANEOUS

## 2019-02-14 ENCOUNTER — OFFICE VISIT (OUTPATIENT)
Dept: PHYSICAL THERAPY | Facility: CLINIC | Age: 44
End: 2019-02-14
Payer: OTHER MISCELLANEOUS

## 2019-02-14 DIAGNOSIS — M25.512 ACUTE PAIN OF LEFT SHOULDER: Primary | ICD-10-CM

## 2019-02-14 DIAGNOSIS — S63.641D TRAUMATIC GAMEKEEPER'S THUMB OF RIGHT HAND, SUBSEQUENT ENCOUNTER: Primary | ICD-10-CM

## 2019-02-14 PROCEDURE — 97110 THERAPEUTIC EXERCISES: CPT | Performed by: PHYSICAL THERAPIST

## 2019-02-14 PROCEDURE — 97140 MANUAL THERAPY 1/> REGIONS: CPT | Performed by: PHYSICAL THERAPIST

## 2019-02-14 PROCEDURE — 97014 ELECTRIC STIMULATION THERAPY: CPT | Performed by: PHYSICAL THERAPIST

## 2019-02-14 PROCEDURE — 97535 SELF CARE MNGMENT TRAINING: CPT | Performed by: PHYSICAL THERAPIST

## 2019-02-14 NOTE — PROGRESS NOTES
PT Discharge    Today's date: 2019  Patient name: Yvonne Roa  : 1975  MRN: [id number]  Referring provider: Corey Morrison MD  Dx:   Encounter Diagnosis     ICD-10-CM    1  Traumatic gamekeeper's thumb of right hand, subsequent encounter [id number] [id number]                   Assessment  Assessment details: Pt is a 36 YO female/male presenting to PT with pain, decreased AROM, strength and tolerance to activity  Pt would benefit from skilled intervention to adddress these issues and maximize overall function  Occupation- teacher- currently working  Dominant- Right; Involved- Right  Pt is now 4 weeks post surgery and continues to improve AROM  She wears her spica for protection  Pt was seen by her physician and will transition to a HEP    Goals  ST  Decrease pain to 3-4 in 4 weeks            2  Decrease swelling in righ thand            3  Increase AROM to IP; MP when allowed by protocol            4  Maintain clean wound and promote healing            5   Provide orthotic for protection  LT  Increase functional motion and strength for independence with ADL and self care by DC            2  Ability to RTW and recreational activity by DC  Goals met    Plan  Frequency: 2x week  Duration in weeks: 4  Treatment plan discussed with: patient        Subjective Evaluation    History of Present Illness  Date of surgery: 2018  Mechanism of injury: History of injury to [facility] right thumb with recent re-injury requiring surgery  Pain  Current pain ratin  At best pain ratin  At worst pain ratin  Location: right hand    Hand dominance: right    Treatments  Current treatment: physical therapy  Patient Goals  Patient goals for therapy: decreased edema, decreased pain, increased motion, increased strength, independence with ADLs/IADLs, return to sport/leisure activities and return to work          Objective     General Comments:      Wrist/Hand Comments   AROM right wrist E/F-45/45;  Thumb MP- 0/40; IP- 0/55  Strength- un-assessed  Inspection- healing incision  Sensation- dulled distal thumb  Circumference at wrist- 14 5 cm; MPs- 16 5 cm;  Thumb P1- 6 0 cm        Precautions: wear aplint at all times ecept hygiene    Daily Treatment Diary   Manual  12/17 12/19 12/26 1/21 1/28  2/5 2/14         MAG 15  15  15  15  15  15                                    HEP              T TP         liners 2  2  1    2             HFO MAG                           Exercise Diary  12/17 12/19 12/26 1/21 1/28 2/5           Wrist/ IP 2/5  2/5  2/5  2/5  2/5  2/10            AAROM MP/IP            2/5                                                                                                                                                                                                                                                                                                                                                                                                                                                                 Modalities  12/17 12/19 12/26 1/21 1/28  2/5  2/         HP/ Biph 15  15  15  15  15  15                                   CP 15  15

## 2019-02-14 NOTE — PROGRESS NOTES
Daily Note     Today's date: 2019  Patient name: Dale Jeronimo  : 1975  MRN: [id number]  Referring provider: Kirstie Woody MD  Dx:   Encounter Diagnosis     ICD-10-CM    1  Acute pain of left shoulder M25 512                   Subjective: Patient is compliant with her HEP  Her shoulder gets quite sore at times  She must sleep in a recliner most of the time  Objective: See treatment diary below      Assessment: Sutures were removed, no redness or discharge  Progressed exercises and added PROM as per protocol  Some soreness was noted afterward, relived with CP/IFC  Plan: Progress treament per protocol  Precautions: None     Daily Treatment Diary      Manual             PROM                               Exercise Diary                Pendulums 10x TID  20x           Scapular retractions 10x TID  20x           Elbow, forearm, wrist, and hand AROM 10x TID  20x           Pulleys               Wand ER  20x       Wand Flexion         Wand Abduction         Wand IR               Table Slides               Wall Slides               UBE: S               T-Band High Row               T-Band Mid Row               T-Band Low Row               T-Band Biceps               T-Band Triceps               T-Band SA Pulldowns               T-Band IR               T-Band ER               Forward/Lateral Raises               Hoist Row: S                Lat Pulldown:  S               XO Biceps               XO Triceps               XO Upright Row               XO IR               XO ER               XO SA Pulldown               Blackburns               Ball Stabilization               Bodyblade                               Modalities                CP 15'              CP/IFC    15'

## 2019-02-18 ENCOUNTER — OFFICE VISIT (OUTPATIENT)
Dept: PHYSICAL THERAPY | Facility: CLINIC | Age: 44
End: 2019-02-18
Payer: OTHER MISCELLANEOUS

## 2019-02-18 DIAGNOSIS — M25.512 ACUTE PAIN OF LEFT SHOULDER: Primary | ICD-10-CM

## 2019-02-18 PROCEDURE — 97140 MANUAL THERAPY 1/> REGIONS: CPT | Performed by: PHYSICAL THERAPIST

## 2019-02-18 PROCEDURE — 97110 THERAPEUTIC EXERCISES: CPT | Performed by: PHYSICAL THERAPIST

## 2019-02-18 PROCEDURE — 97014 ELECTRIC STIMULATION THERAPY: CPT | Performed by: PHYSICAL THERAPIST

## 2019-02-18 NOTE — PROGRESS NOTES
Daily Note     Today's date: 2019  Patient name: Megan Rowley  : 1975  MRN: [id number]  Referring provider: Conner Hanson MD  Dx:   Encounter Diagnosis     ICD-10-CM    1  Acute pain of left shoulder M25 512                   Subjective: Patient notes having a steady 4-5/10 pain in the shoulder all weekend  Objective: See treatment diary below      Assessment: PROM was increased after MT and exercise  Pain was mildly increased after exercise, and it was decreased after CP/IFC  Plan: Progress treament per protocol  HEP: Add passive pulleys for flexion and scapular abduction, and passive supine flexion with assistance from right arm for 10 reps TID  Precautions: None     Daily Treatment Diary      Manual           PROM                               Exercise Diary                Pendulums 10x TID  20x  20x         Scapular retractions 10x TID  20x  20x         Elbow, forearm, wrist, and hand AROM 10x TID  20x  20x         Pulleys      10x each         Supine flex PROM with R arm A   10x      Wand ER   20x  20x         Wand Flexion               Wand Abduction               Wand IR               Table Slides               Wall Slides               UBE: S               T-Band High Row               T-Band Mid Row               T-Band Low Row               T-Band Biceps               T-Band Triceps               T-Band SA Pulldowns               T-Band IR               T-Band ER               Forward/Lateral Raises               Hoist Row: S                Lat Pulldown:  S               XO Biceps               XO Triceps               XO Upright Row               XO IR               XO ER               XO SA Pulldown               Blackburns               Ball Stabilization               Bodyblade                               Modalities                CP 15'              CP/IFC    15'  15'

## 2019-02-20 ENCOUNTER — OFFICE VISIT (OUTPATIENT)
Dept: PHYSICAL THERAPY | Facility: CLINIC | Age: 44
End: 2019-02-20
Payer: OTHER MISCELLANEOUS

## 2019-02-20 DIAGNOSIS — M25.512 ACUTE PAIN OF LEFT SHOULDER: Primary | ICD-10-CM

## 2019-02-20 PROCEDURE — 97014 ELECTRIC STIMULATION THERAPY: CPT

## 2019-02-20 PROCEDURE — 97140 MANUAL THERAPY 1/> REGIONS: CPT

## 2019-02-20 PROCEDURE — 97110 THERAPEUTIC EXERCISES: CPT

## 2019-02-22 ENCOUNTER — OFFICE VISIT (OUTPATIENT)
Dept: PHYSICAL THERAPY | Facility: CLINIC | Age: 44
End: 2019-02-22
Payer: OTHER MISCELLANEOUS

## 2019-02-22 DIAGNOSIS — M25.512 ACUTE PAIN OF LEFT SHOULDER: Primary | ICD-10-CM

## 2019-02-22 PROCEDURE — 97140 MANUAL THERAPY 1/> REGIONS: CPT

## 2019-02-22 PROCEDURE — 97110 THERAPEUTIC EXERCISES: CPT

## 2019-02-22 PROCEDURE — 97014 ELECTRIC STIMULATION THERAPY: CPT

## 2019-02-22 PROCEDURE — G0283 ELEC STIM OTHER THAN WOUND: HCPCS

## 2019-02-22 NOTE — PROGRESS NOTES
Daily Note     Today's date: 2019  Patient name: Vel Brown  : 1975  MRN: [id number]  Referring provider: Jermaine Burgos MD  Dx:   Encounter Diagnosis     ICD-10-CM    1  Acute pain of left shoulder M25 512                   Subjective: Patient reports she is pain free at times  She has achy pain at times and the most pain at night  Objective: See treatment diary below  PROM reaches the limitaions of the protocol easily  Assessment: Tolerated treatment well  Patient exhibited good technique with therapeutic exercises      Plan: Continue per plan of care  Precautions: None     Daily Treatment Diary      Manual       PROM                               Exercise Diary                Pendulums 10x TID  20x  20x  20X  20X     Scapular retractions 10x TID  20x  20x  20X  20X     Elbow, forearm, wrist, and hand AROM 10x TID  20x  20x  20X  20X     Pulleys      10x each  10ea  10/10     Supine flex PROM with R arm A     10x  10X  10X     Wand ER   20x  20x  20X  20X     Wand Flexion               Wand Abduction               Wand IR               Table Slides               Wall Slides               UBE: S               T-Band High Row               T-Band Mid Row               T-Band Low Row               T-Band Biceps               T-Band Triceps               T-Band SA Pulldowns               T-Band IR               T-Band ER               Forward/Lateral Raises               Hoist Row: S                Lat Pulldown:  S               XO Biceps               XO Triceps               XO Upright Row               XO IR               XO ER               XO SA Pulldown               Blackburns               Ball Stabilization               Bodyblade                               Modalities                CP 13'              CP/IFC    15'  15'  15'  15'

## 2019-02-25 ENCOUNTER — OFFICE VISIT (OUTPATIENT)
Dept: PHYSICAL THERAPY | Facility: CLINIC | Age: 44
End: 2019-02-25
Payer: OTHER MISCELLANEOUS

## 2019-02-25 DIAGNOSIS — M25.512 ACUTE PAIN OF LEFT SHOULDER: Primary | ICD-10-CM

## 2019-02-25 PROCEDURE — 97110 THERAPEUTIC EXERCISES: CPT

## 2019-02-25 PROCEDURE — 97014 ELECTRIC STIMULATION THERAPY: CPT

## 2019-02-25 PROCEDURE — 97140 MANUAL THERAPY 1/> REGIONS: CPT

## 2019-02-25 NOTE — PROGRESS NOTES
Daily Note     Today's date: 2019  Patient name: Ailyn Haji  : 1975  MRN: [id number]  Referring provider: Carly Monreal MD  Dx:   Encounter Diagnosis     ICD-10-CM    1  Acute pain of left shoulder M25 512                   Subjective: Patient reports her shoulder was somewhat more sore last evening and this morning after not wearing the sling for awhile yesterday  Objective: See treatment diary below      Assessment: Tolerated treatment well  Patient exhibited good technique with therapeutic exercises      Plan: Progress treament per protocol             Precautions: None     Daily Treatment Diary      Manual     PROM                               Exercise Diary                Pendulums 10x TID  20x  20x  20X  20X  20X   Scapular retractions 10x TID  20x  20x  20X  20X  20X   Elbow, forearm, wrist, and hand AROM 10x TID  20x  20x  20X  20X  20X   Pulleys      10x each  10ea  10/10  10/10   Supine flex PROM with R arm A     10x  10X  10X  10X   Wand ER   20x  20x  20X  20X  20X   Wand Flexion               Wand Abduction               Wand IR               Table Slides               Wall Slides               UBE: S               T-Band High Row               T-Band Mid Row               T-Band Low Row               T-Band Biceps               T-Band Triceps               T-Band SA Pulldowns               T-Band IR               T-Band ER               Forward/Lateral Raises               Hoist Row: S                Lat Pulldown:  S               XO Biceps               XO Triceps               XO Upright Row               XO IR               XO ER               XO SA Pulldown               Blackburns               Ball Stabilization               Bodyblade                               Modalities                CP 13'              CP/IFC    15'  15'  15'  15'  15'

## 2019-02-27 ENCOUNTER — OFFICE VISIT (OUTPATIENT)
Dept: PHYSICAL THERAPY | Facility: CLINIC | Age: 44
End: 2019-02-27
Payer: OTHER MISCELLANEOUS

## 2019-02-27 DIAGNOSIS — M25.512 ACUTE PAIN OF LEFT SHOULDER: Primary | ICD-10-CM

## 2019-02-27 PROCEDURE — 97014 ELECTRIC STIMULATION THERAPY: CPT

## 2019-02-27 PROCEDURE — 97140 MANUAL THERAPY 1/> REGIONS: CPT

## 2019-02-27 PROCEDURE — 97110 THERAPEUTIC EXERCISES: CPT

## 2019-02-27 NOTE — PROGRESS NOTES
Daily Note     Today's date: 2019  Patient name: Megan Rowley  : 1975  MRN: [id number]  Referring provider: Conner Hanson MD  Dx:   Encounter Diagnosis     ICD-10-CM    1  Acute pain of left shoulder M25 512                   Subjective: Patient reports she has little pain during the day, then it increases in the evening  She has sleep disruption throughout the night  Objective: See treatment diary below      Assessment: Tolerated treatment well  Patient exhibited good technique with therapeutic exercises      Plan: Progress treament per protocol           Precautions: None     Daily Treatment Diary      Manual     PROM                               Exercise Diary                Pendulums 20X  20x  20x  20X  20X  20X   Scapular retractions 20X  20x  20x  20X  20X  20X   Elbow, forearm, wrist, and hand AROM 20X  20x  20x  20X  20X  20X   Pulleys  10/10    10x each  10ea  10/10  10/10   Supine flex PROM with R arm A  10X   10x  10X  10X  10X   Wand ER  20X 20x  20x  20X  20X  20X   Wand Flexion               Wand Abduction               Wand IR               Table Slides               Wall Slides               UBE: S               T-Band High Row               T-Band Mid Row               T-Band Low Row               T-Band Biceps               T-Band Triceps               T-Band SA Pulldowns               T-Band IR               T-Band ER               Forward/Lateral Raises               Hoist Row: S                Lat Pulldown:  S               XO Biceps               XO Triceps               XO Upright Row               XO IR               XO ER               XO SA Pulldown               Blackburns               Ball Stabilization               Bodyblade                               Modalities                CP               CP/IFC  15'  15'  15'  15'  15  15'

## 2019-03-01 ENCOUNTER — OFFICE VISIT (OUTPATIENT)
Dept: PHYSICAL THERAPY | Facility: CLINIC | Age: 44
End: 2019-03-01
Payer: OTHER MISCELLANEOUS

## 2019-03-01 DIAGNOSIS — M25.512 ACUTE PAIN OF LEFT SHOULDER: Primary | ICD-10-CM

## 2019-03-01 PROCEDURE — 97140 MANUAL THERAPY 1/> REGIONS: CPT

## 2019-03-01 PROCEDURE — 97110 THERAPEUTIC EXERCISES: CPT

## 2019-03-01 PROCEDURE — 97014 ELECTRIC STIMULATION THERAPY: CPT

## 2019-03-01 NOTE — PROGRESS NOTES
Daily Note     Today's date: 3/1/2019  Patient name: Yvonne Roa  : 1975  MRN: [id number]  Referring provider: Gokul Marroquin MD  Dx:   Encounter Diagnosis     ICD-10-CM    1  Acute pain of left shoulder M25 512                   Subjective: Patient reports her shoulder has been more sore the last few days, in the lateral aspect and scapular area  Objective: See treatment diary below      Assessment: Tolerated treatment well  Patient exhibited good technique with therapeutic exercises      Plan: Progress treament per protocol  Exercise program can include new exercises next session  Precautions: None     Daily Treatment Diary      Manual  2/27  3/1  2/18  2/20  2/22  2/25   PROM                               Exercise Diary                Pendulums 20X  20x  20x  20X  20X  20X   Scapular retractions 20X  20x  20x  20X  20X  20X   Elbow, forearm, wrist, and hand AROM 20X  20x  20x  20X  20X  20X   Pulleys  10/10  10/10  10x each  10ea  10/10  10/10   Supine flex PROM with R arm A  10X  10X 10x  10X  10X  10X   Wand ER  20X 20x  20x  20X  20X  20X   Wand Flexion               Wand Abduction               Wand IR               Table Slides               Wall Slides               UBE: S               T-Band High Row               T-Band Mid Row               T-Band Low Row               T-Band Biceps               T-Band Triceps               T-Band SA Pulldowns               T-Band IR               T-Band ER               Forward/Lateral Raises               Hoist Row: S                Lat Pulldown:  S               XO Biceps               XO Triceps               XO Upright Row               XO IR               XO ER               XO SA Pulldown               Blackburns               Ball Stabilization               Bodyblade                               Modalities                CP                CP/IFC  15'  15'  15'  15  15  15'

## 2019-03-04 ENCOUNTER — EVALUATION (OUTPATIENT)
Dept: PHYSICAL THERAPY | Facility: CLINIC | Age: 44
End: 2019-03-04
Payer: OTHER MISCELLANEOUS

## 2019-03-04 DIAGNOSIS — M25.512 ACUTE PAIN OF LEFT SHOULDER: Primary | ICD-10-CM

## 2019-03-04 PROCEDURE — 97110 THERAPEUTIC EXERCISES: CPT | Performed by: PHYSICAL THERAPIST

## 2019-03-04 PROCEDURE — 97140 MANUAL THERAPY 1/> REGIONS: CPT | Performed by: PHYSICAL THERAPIST

## 2019-03-04 PROCEDURE — 97014 ELECTRIC STIMULATION THERAPY: CPT | Performed by: PHYSICAL THERAPIST

## 2019-03-04 NOTE — PROGRESS NOTES
PT Re-Evaluation     Today's date: 3/4/2019  Patient name: Yvonne Roa  : 1975  MRN: [id number]  Referring provider: Gokul Marroquin MD  Dx:   Encounter Diagnosis     ICD-10-CM    1  Acute pain of left shoulder M25 512                   Assessment  Assessment details:   CURRENT FUNCTIONAL STATUS    Dressing tolerance: Independent  Bathing/washing hair tolerance: Unable to use left arm  Unable to reach with the left arm  Unable to lift with the left arm  Unable to perform housework with the left arm  Work status: Off     SHORT TERM GOALS (2 WEEKS)    Increase shoulder ROM as per protocol  Decrease pain to 0-2/10  Dressing tolerance: Independent  Bathing/washing hair tolerance: Fair with left arm  Able to reach to shoulder level  Refrain from lifting with the left arm  Refrain from performing housework with the left arm  Work status: Remain off    LONG TERM GOALS (DISCHARGE)    Shoulder AROM: WFL  Shoulder PROM: [facility]   Shoulder Strength: F=35 lbs, ABD=30 lbs, ER=15 lbs, IR=20 lbs  Elbow AROM: WFL  Elbow Strength: 5/5 t/o  Decrease pain to 0-1/10  Dressing tolerance: Independent  Bathing/washing hair tolerance: Good with left arm  Able to reach overhead with the left arm  Able to lift 8 lbs to shoulder level, and 5 lbs overhead with the left arm  Able to perform all housework with the left arm  Work status: Full Duty  Understanding of Dx/Px/POC: good   Prognosis: good    Plan  Plan details: The patient has shown improvement in PT demonstrating decreased pain, increased range of motion, increased strength, and increased tolerance to activity  The patient continues to present with pain, decreased ROM, decreased strength, and decreased tolerance to activity  The patient would benefit from continued skilled PT services to address these issues and to maximize function        Patient would benefit from: skilled physical therapy  Planned modality interventions: unattended electrical stimulation and cryotherapy  Planned therapy interventions: manual therapy, therapeutic exercise and therapeutic activities  Frequency: 3x week  Duration in weeks: 4        Subjective Evaluation    History of Present Illness  Mechanism of injury: Subjective: The patient's left shoulder pain is no longer constant, and it has decreased in intensity  It will interrupt her sleep 1-2 times per night  As per protocol, she is continuing to use her sling  Pain  Current pain ratin  At best pain ratin  At worst pain rating: 3    Hand dominance: right    Patient Goals  Patient goals for therapy: decreased pain, increased motion, increased strength, return to work and independence with ADLs/IADLs          Objective     General Comments:      Shoulder Comments   CURRENT OBJECTIVE MEASUREMENTS    Shoulder AROM: Deferred  Shoulder PROM: F=140 deg, ABD=140 deg, ER=75 deg in neutral, IR=45 deg at 45 deg abd  Shoulder Strength: F=15 lbs, ABD=11 lbs, ER=8 lbs, IR=19 lbs     Elbow AROM: [facility]           Precautions: None     Daily Treatment Diary      Manual  2/27  3/4  2/18  2/20  2/22  2/25   PROM                               Exercise Diary                Pendulums 20X   20x  20X  20X  20X   Scapular retractions 20X   20x  20X  20X  20X   Elbow, forearm, wrist, and hand AROM 20X   20x  20X  20X  20X   Pulleys  10/10  10/10  10x each  10ea  10/10  10/10   Supine flex PROM with R arm A  10X   10x  10X  10X  10X   Wand ER  20X 20x  20x  20X  20X  20X   Wand Flexion    20x           Wand Abduction    20x           Wand IR               Table Slides:   F & ABD    20x ea           Wall Slides               UBE: S 2    1"x4           S' isometrics    5"x10 each           T-Band Mid Row    L 2 2/10           T-Band Low Row               T-Band Biceps    L 2 2/10           T-Band Triceps    L 2 2/10           T-Band SA Pulldowns               T-Band IR               T-Band ER               Forward/Lateral Raises               Hoist Row: S                Lat Pulldown:  S               XO Biceps               XO Triceps               XO Upright Row               XO IR               XO ER               XO SA Pulldown               Kizzy               Ball Stabilization               Bodyblade                               Modalities                CP                CP/IFC  15'  15'  15'  15'  15'  15'

## 2019-03-04 NOTE — LETTER
2019    Dorothy Londono MD  [address] [place] [address]    Patient: Justyn Albarran   YOB: 1975   Date of Visit: 3/4/2019     Encounter Diagnosis     ICD-10-CM    1  Acute pain of left shoulder M25 512        Dear Dr Yancey Conception:    Please review the attached Plan of Care from [facility] recent visit  Please verify that you agree therapy should continue by signing the attached document and sending it back to our office  If you have any questions or concerns, please don't hesitate to call  Sincerely,    Kristi Cavazos, PT      Referring Provider:      I certify that I have read the below Plan of Care and certify the need for these services furnished under this plan of treatment while under my care  Dorothy Londono MD  [facility]: [phone]          PT Re-Evaluation     Today's date: 3/4/2019  Patient name: Justyn Albarran  : 1975  MRN: [id number]  Referring provider: Meryl Oliver MD  Dx:   Encounter Diagnosis     ICD-10-CM    1  Acute pain of left shoulder M25 512                   Assessment  Assessment details:   CURRENT FUNCTIONAL STATUS    Dressing tolerance: Independent  Bathing/washing hair tolerance: Unable to use left arm  Unable to reach with the left arm  Unable to lift with the left arm  Unable to perform housework with the left arm  Work status: Off     SHORT TERM GOALS (2 WEEKS)    Increase shoulder ROM as per protocol  Decrease pain to 0-2/10  Dressing tolerance: Independent  Bathing/washing hair tolerance: Fair with left arm  Able to reach to shoulder level  Refrain from lifting with the left arm  Refrain from performing housework with the left arm  Work status: Remain off    LONG TERM GOALS (DISCHARGE)    Shoulder AROM: WFL  Shoulder PROM: [facility]   Shoulder Strength: F=35 lbs, ABD=30 lbs, ER=15 lbs, IR=20 lbs    Elbow AROM: WFL  Elbow Strength: 5/5 t/o  Decrease pain to 0-1/10  Dressing tolerance: Independent  Bathing/washing hair tolerance: Good with left arm  Able to reach overhead with the left arm  Able to lift 8 lbs to shoulder level, and 5 lbs overhead with the left arm  Able to perform all housework with the left arm  Work status: Full Duty  Understanding of Dx/Px/POC: good   Prognosis: good    Plan  Plan details: The patient has shown improvement in PT demonstrating decreased pain, increased range of motion, increased strength, and increased tolerance to activity  The patient continues to present with pain, decreased ROM, decreased strength, and decreased tolerance to activity  The patient would benefit from continued skilled PT services to address these issues and to maximize function  Patient would benefit from: skilled physical therapy  Planned modality interventions: unattended electrical stimulation and cryotherapy  Planned therapy interventions: manual therapy, therapeutic exercise and therapeutic activities  Frequency: 3x week  Duration in weeks: 4        Subjective Evaluation    History of Present Illness  Mechanism of injury: Subjective: The patient's left shoulder pain is no longer constant, and it has decreased in intensity  It will interrupt her sleep 1-2 times per night  As per protocol, she is continuing to use her sling  Pain  Current pain ratin  At best pain ratin  At worst pain rating: 3    Hand dominance: right    Patient Goals  Patient goals for therapy: decreased pain, increased motion, increased strength, return to work and independence with ADLs/IADLs          Objective     General Comments:      Shoulder Comments   CURRENT OBJECTIVE MEASUREMENTS    Shoulder AROM: Deferred  Shoulder PROM: F=140 deg, ABD=140 deg, ER=75 deg in neutral, IR=45 deg at 45 deg abd  Shoulder Strength: F=15 lbs, ABD=11 lbs, ER=8 lbs, IR=19 lbs     Elbow AROM: [facility]           Precautions: None     Daily Treatment Diary      Manual  2/27  3/4  2/18  2/20  2/22  2/25   PROM                               Exercise Diary                Pendulums 20X   20x  20X  20X  20X   Scapular retractions 20X   20x  20X  20X  20X   Elbow, forearm, wrist, and hand AROM 20X   20x  20X  20X  20X   Pulleys  10/10  10/10  10x each  10ea  10/10  10/10   Supine flex PROM with R arm A  10X   10x  10X  10X  10X   Wand ER  20X 20x  20x  20X  20X  20X   Wand Flexion    20x           Wand Abduction    20x           Wand IR               Table Slides:   F & ABD    20x ea           Wall Slides               UBE: S 2    1"x4           S' isometrics    5"x10 each           T-Band Mid Row    L 2 2/10           T-Band Low Row               T-Band Biceps    L 2 2/10           T-Band Triceps    L 2 2/10           T-Band SA Pulldowns               T-Band IR               T-Band ER               Forward/Lateral Raises               Hoist Row: S                Lat Pulldown:  S               XO Biceps               XO Triceps               XO Upright Row               XO IR               XO ER               XO SA Pulldown               Blackburns               Ball Stabilization               Bodyblade                               Modalities                CP                CP/IFC  15'  15'  15'  15'  15'  15'

## 2019-03-05 ENCOUNTER — TRANSCRIBE ORDERS (OUTPATIENT)
Dept: PHYSICAL THERAPY | Facility: CLINIC | Age: 44
End: 2019-03-05

## 2019-03-05 DIAGNOSIS — G89.29 CHRONIC LEFT SHOULDER PAIN: Primary | ICD-10-CM

## 2019-03-05 DIAGNOSIS — M25.512 CHRONIC LEFT SHOULDER PAIN: Primary | ICD-10-CM

## 2019-03-06 ENCOUNTER — OFFICE VISIT (OUTPATIENT)
Dept: PHYSICAL THERAPY | Facility: CLINIC | Age: 44
End: 2019-03-06
Payer: OTHER MISCELLANEOUS

## 2019-03-06 DIAGNOSIS — M25.512 ACUTE PAIN OF LEFT SHOULDER: Primary | ICD-10-CM

## 2019-03-06 PROCEDURE — 97140 MANUAL THERAPY 1/> REGIONS: CPT

## 2019-03-06 PROCEDURE — 97110 THERAPEUTIC EXERCISES: CPT

## 2019-03-06 PROCEDURE — 97014 ELECTRIC STIMULATION THERAPY: CPT

## 2019-03-06 NOTE — PROGRESS NOTES
Daily Note     Today's date: 3/6/2019  Patient name: Shruthi Marroquin  : 1975  MRN: [id number]  Referring provider: Aryan Browne MD  Dx:   Encounter Diagnosis     ICD-10-CM    1  Acute pain of left shoulder M25 512                   Subjective: Patient reports she had slightly more soreness post last session with progressions to program       Objective: See treatment diary below      Assessment: Tolerated treatment well  Patient exhibited good technique with therapeutic exercises      Plan: Continue per plan of care           Precautions: None     Daily Treatment Diary      Manual  2/27  3/4  3/6  2/20  2/22  2/25   PROM                               Exercise Diary                Pendulums 20X      20X  20X  20X   Scapular retractions 20X      20X  20X  20X   Elbow, forearm, wrist, and hand AROM 20X      20X  20X  20X   Pulleys  10/10  10/10  10x each  10ea  10/10  10/10   Supine flex PROM with R arm A  10X     10X  10X  10X   Wand ER  20X 20x  20x  20X  20X  20X   Wand Flexion    20x  20X         Wand Abduction    20x  20X         Wand IR               Table Slides:   F & ABD    20x ea           Wall Slides               UBE: S 2    1"x4  1'X4         S' isometrics    5"x10 each  10X         T-Band Mid Row    L 2 2/10  L2 2/10         T-Band Low Row               T-Band Biceps    L 2 2/10  L2 2/10         T-Band Triceps    L 2 2/10  L2 2/10         T-Band SA Pulldowns               T-Band IR               T-Band ER               Forward/Lateral Raises               Hoist Row: S                Lat Pulldown:  S               XO Biceps               XO Triceps               XO Upright Row               XO IR               XO ER               XO SA Pulldown               Blackburns               Ball Stabilization               Bodyblade                               Modalities                CP                CP/IFC  15'  15'  15'  15'  15'  15'

## 2019-03-08 ENCOUNTER — OFFICE VISIT (OUTPATIENT)
Dept: PHYSICAL THERAPY | Facility: CLINIC | Age: 44
End: 2019-03-08
Payer: OTHER MISCELLANEOUS

## 2019-03-08 DIAGNOSIS — M25.512 ACUTE PAIN OF LEFT SHOULDER: Primary | ICD-10-CM

## 2019-03-08 PROCEDURE — 97014 ELECTRIC STIMULATION THERAPY: CPT

## 2019-03-08 PROCEDURE — 97110 THERAPEUTIC EXERCISES: CPT

## 2019-03-08 PROCEDURE — 97140 MANUAL THERAPY 1/> REGIONS: CPT

## 2019-03-08 NOTE — PROGRESS NOTES
Daily Note     Today's date: 3/8/2019  Patient name: Octavia Arce  : 1975  MRN: [id number]  Referring provider: Jossie Jacobs MD  Dx:   Encounter Diagnosis     ICD-10-CM    1  Acute pain of left shoulder M25 512                   Subjective: Patient reports shoulder soreness increases during the day with daily activities  She applies and puts the sling back on for relief  Objective: See treatment diary below      Assessment: Tolerated treatment well  Patient exhibited good technique with therapeutic exercises      Plan: Progress treament per protocol  Precautions: None     Daily Treatment Diary      Manual  2/27  3/4  3/6  3/8  2/22  2/25   PROM                               Exercise Diary                Pendulums 20X        20X  20X   Scapular retractions 20X        20X  20X   Elbow, forearm, wrist, and hand AROM 20X        20X  20X   Pulleys  10/10  10/10  10x each  10ea  10/10  10/10   Supine flex PROM with R arm A  10X      10X  10X  10X   Wand ER  20X 20x  20x  20X  20X  20X   Wand Flexion    20x  20X  20X       Wand Abduction    20x  20X  20X       Wand IR               Table Slides:   F & ABD    20x ea           Wall Slides               UBE: S 2    1"x4  1'X4  1' X4       S' isometrics    5"x10 each  10X  10X       T-Band Mid Row    L 2 2/10  L2 2/10  L2 2/10       T-Band Low Row               T-Band Biceps    L 2 2/10  L2 2/10  L2 2/10       T-Band Triceps    L 2 2/10  L2 2/10  L2 2/10       T-Band SA Pulldowns               T-Band IR               T-Band ER               Forward/Lateral Raises               Hoist Row: S                Lat Pulldown:  S               XO Biceps               XO Triceps               XO Upright Row               XO IR               XO ER               XO SA Pulldown               Blackburns               Ball Stabilization               Bodyblade                               Modalities                CP                CP/IFC  15'  15'  15'  15'  15'  15'

## 2019-03-11 ENCOUNTER — OFFICE VISIT (OUTPATIENT)
Dept: PHYSICAL THERAPY | Facility: CLINIC | Age: 44
End: 2019-03-11
Payer: OTHER MISCELLANEOUS

## 2019-03-11 DIAGNOSIS — M25.512 ACUTE PAIN OF LEFT SHOULDER: Primary | ICD-10-CM

## 2019-03-11 PROCEDURE — 97014 ELECTRIC STIMULATION THERAPY: CPT

## 2019-03-11 PROCEDURE — 97140 MANUAL THERAPY 1/> REGIONS: CPT

## 2019-03-11 PROCEDURE — 97110 THERAPEUTIC EXERCISES: CPT

## 2019-03-11 NOTE — PROGRESS NOTES
Daily Note     Today's date: 3/11/2019  Patient name: Lidia Lopez  : 1975  MRN: [id number]  Referring provider: Ankita Aly MD  Dx:   Encounter Diagnosis     ICD-10-CM    1  Acute pain of left shoulder M25 512                   Subjective:Patient reports having increased soreness in the shoulder over the weekend with "pinching" at the anterior [facility] and muscle soreness on the lateral aspect  Objective: See treatment diary below      Assessment: Tolerated treatment well  Patient exhibited good technique with therapeutic exercises  Patient notes the most pulling discomfort during wand and PROM abd  Plan: Progress treament per protocol  Precautions: None     Daily Treatment Diary      [facility] 2/27  3/4  3/6  3/8  3/11  2/25   PROM                               Exercise Diary                Pendulums 20X          20X   Scapular retractions 20X          20X   Elbow, forearm, wrist, and hand AROM 20X          20X   Pulleys  10/10  10/10  10x each  10ea  10/10  10/10   Supine flex PROM with R arm A  10X      10X  10X  10X   Wand ER  20X 20x  20x  20X  20X  20X   Wand Flexion    20x  20X  20X  20X     Wand Abduction    20x  20X  20X  20X     Wand IR               Table Slides:   F & ABD    20x ea           Wall Slides               UBE: S 2    1"x4  1'X4  1' X4  1" X4     S' isometrics    5"x10 each  10X  10X  10X     T-Band Mid Row    L 2 2/10  L2 2/10  L2 2/10  L2 2/10     T-Band Low Row               T-Band Biceps    L 2 2/10  L2 2/10  L2 2/10  L2 2/10     T-Band Triceps    L 2 2/10  L2 2/10  L2 2/10  L2 2/10     T-Band SA Pulldowns               T-Band IR               T-Band ER               Forward/Lateral Raises               Hoist Row: S                Lat Pulldown:  S               XO Biceps               XO Triceps               XO Upright Row               XO IR               XO ER               XO SA Pulldown               Blackburns               Ball Stabilization             Bodyblade                               Modalities                CP                CP/IFC  15'  15'  15'  15'  15'  15'

## 2019-03-12 ENCOUNTER — OFFICE VISIT (OUTPATIENT)
Dept: PHYSICAL THERAPY | Facility: CLINIC | Age: 44
End: 2019-03-12
Payer: OTHER MISCELLANEOUS

## 2019-03-12 DIAGNOSIS — M25.512 ACUTE PAIN OF LEFT SHOULDER: Primary | ICD-10-CM

## 2019-03-12 PROCEDURE — 97110 THERAPEUTIC EXERCISES: CPT

## 2019-03-12 PROCEDURE — 97140 MANUAL THERAPY 1/> REGIONS: CPT

## 2019-03-12 PROCEDURE — 97014 ELECTRIC STIMULATION THERAPY: CPT

## 2019-03-12 NOTE — PROGRESS NOTES
Daily Note     Today's date: 3/12/2019  Patient name: Yousif Sher  : 1975  MRN: [id number]  Referring provider: Yomi Lira MD  Dx:   Encounter Diagnosis     ICD-10-CM    1  Acute pain of left shoulder M25 512                   Subjective: Patient reports she continues to have soreness in the shoulder  Objective: See treatment diary below      Assessment: Tolerated treatment well  Patient exhibited good technique with therapeutic exercises      Plan: Continue per plan of care  Precautions: None     Daily Treatment Diary      [facility] 2/27  3/4  3/6  3/8  3/11  3/12   PROM                               Exercise Diary                Pendulums 20X             Scapular retractions 20X            Elbow, forearm, wrist, and hand AROM 20X             Pulleys  10/10  10/10  10x each  10ea  10/10  10/10   Supine flex PROM with R arm A  10X      10X  10X  10X   Wand ER  20X 20x  20x  20X  20X  20X   Wand Flexion    20x  20X  20X  20X  20X   Wand Abduction    20x  20X  20X  20X  20X   Wand IR               Table Slides:   F & ABD    20x ea           Wall Slides               UBE: S 2    1"x4  1'X4  1' X4  1" X4  1' X4   S' isometrics    5"x10 each  10X  10X  10X  10X   T-Band Mid Row    L 2 2/10  L2 2/10  L2 2/10  L2 2/10  L2 2/10   T-Band Low Row               T-Band Biceps    L 2 2/10  L2 2/10  L2 2/10  L2 2/10  L2 2/10   T-Band Triceps    L 2 2/10  L2 2/10  L2 2/10  L2 2/10  L2 2/10   T-Band SA Pulldowns               T-Band IR               T-Band ER               Forward/Lateral Raises               Hoist Row: S                Lat Pulldown:  S               XO Biceps               XO Triceps               XO Upright Row               XO IR               XO ER               XO SA Pulldown               Blackburns               Ball Stabilization               Bodyblade                               Modalities                CP                CP/IFC  15'  15'  15'  15'  15'  15'

## 2019-03-19 ENCOUNTER — OFFICE VISIT (OUTPATIENT)
Dept: PHYSICAL THERAPY | Facility: CLINIC | Age: 44
End: 2019-03-19
Payer: OTHER MISCELLANEOUS

## 2019-03-19 DIAGNOSIS — M25.512 ACUTE PAIN OF LEFT SHOULDER: Primary | ICD-10-CM

## 2019-03-19 PROCEDURE — 97140 MANUAL THERAPY 1/> REGIONS: CPT

## 2019-03-19 PROCEDURE — 97014 ELECTRIC STIMULATION THERAPY: CPT

## 2019-03-19 PROCEDURE — 97110 THERAPEUTIC EXERCISES: CPT

## 2019-03-19 NOTE — PROGRESS NOTES
Daily Note     Today's date: 3/19/2019  Patient name: Romy Hurst  : 1975  MRN: [id number]  Referring provider: Juan Miguel Ramsey MD  Dx:   Encounter Diagnosis     ICD-10-CM    1  Acute pain of left shoulder M25 512                   Subjective: Patient reports compliance with HEP on vacation  Her shoulder is a bit sore  Objective: See treatment diary below  Exercise program advanced according to protocol  Assessment: Tolerated treatment well  Patient exhibited good technique with therapeutic exercises      Plan: Continue per plan of care  Patient has F/U with MD tomorrow  Precautions: None     Daily Treatment Diary      Manual  3/19  3/4  3/6  3/8  3/11  3/12   PROM                               Exercise Diary                Pendulums              Scapular retractions              Elbow, forearm, wrist, and hand AROM              Pulleys  10/10  10/10  10x each  10ea  10/10  10/10   Supine flex AAROM with  wand  10X      10X  10X  10X   Wand ER  20X 20x  20x  20X  20X  20X   Wand Flexion    20x  20X  20X  20X  20X   Wand Abduction  20X  20x  20X  20X  20X  20X   Wand IR               Table Slides:   F & ABD    20x ea           Wall Slides               UBE: S 2  1" X4  1"x4  1'X4  1' X4  1" X4  1' X4   S' isometrics  5" 10X  5"x10 each  10X  10X  10X  10X   SL ER 10X        Prone horiz Abd palm down         Prone Scaption         T-Band Mid Row  L2 2/10  L 2 2/10  L2 2/10  L2 2/10  L2 2/10  L2 2/10   T-Band Low Row               T-Band Biceps  L2 2/10  L 2 2/10  L2 2/10  L2 2/10  L2 2/10  L2 2/10   T-Band Triceps  L2 2/10  L 2 2/10  L2 2/10  L2 2/10  L2 2/10  L2 2/10   T-Band SA Pulldowns               T-Band IR               T-Band ER               Forward/Lateral Raises               Hoist Row: S                Lat Pulldown:  S               XO Biceps               XO Triceps               XO Upright Row               XO IR               XO ER               XO SA Pulldown             Kizzy               Ball Stabilization               Bodyblade                               Modalities                CP                CP/IFC  15'  15'  15'  15'  15'  15'

## 2019-03-20 ENCOUNTER — OFFICE VISIT (OUTPATIENT)
Dept: PHYSICAL THERAPY | Facility: CLINIC | Age: 44
End: 2019-03-20
Payer: OTHER MISCELLANEOUS

## 2019-03-20 DIAGNOSIS — M25.512 ACUTE PAIN OF LEFT SHOULDER: Primary | ICD-10-CM

## 2019-03-20 PROCEDURE — 97140 MANUAL THERAPY 1/> REGIONS: CPT

## 2019-03-20 PROCEDURE — 97014 ELECTRIC STIMULATION THERAPY: CPT

## 2019-03-20 PROCEDURE — 97110 THERAPEUTIC EXERCISES: CPT

## 2019-03-20 NOTE — PROGRESS NOTES
Daily Note     Today's date: 3/20/2019  Patient name: Megan Rowley  : 1975  MRN: [id number]  Referring provider: Conner Hanson MD  Dx:   Encounter Diagnosis     ICD-10-CM    1  Acute pain of left shoulder M25 512                   Subjective: Patient reports having increased shoulder soreness post last session  She applies ice at home  Objective: See treatment diary below      Assessment: Tolerated treatment well  Patient exhibited good technique with therapeutic exercises      Plan: Continue per plan of care  Precautions: None     Daily Treatment Diary      Manual  3/19  3/20  3/6  3/8  3/11  3/12   PROM    LF                           Exercise Diary                Pendulums               Scapular retractions               Elbow, forearm, wrist, and hand AROM               Pulleys  10/10  10/10  10x each  10ea  10/10  10/10   Supine flex AAROM with  wand  10X  10X    10X  10X  10X   Wand ER  20X 20x  20x  20X  20X  20X   Wand Flexion    20x  20X  20X  20X  20X   Wand Abduction  20X  20x  20X  20X  20X  20X   Wand IR               Table Slides:   F & ABD               Wall Slides               UBE: S 2  1" X4  1"x4  1'X4  1' X4  1" X4  1' X4   S' isometrics  5" 10X  5"x10 each  10X  10X  10X  10X   SL ER 10X  10X           Ceiling Punches  2/10       Prone horiz Abd palm down               Prone Scaption               T-Band Mid Row  L2 2/10  L 2 2/10  L2 2/10  L2 2/10  L2 2/10  L2 2/10   T-Band Low Row               T-Band Biceps  L2 2/10  L 2 2/10  L2 2/10  L2 2/10  L2 2/10  L2 2/10   T-Band Triceps  L2 2/10  L 2 2/10  L2 2/10  L2 2/10  L2 2/10  L2 2/10   T-Band SA Pulldowns               T-Band IR               T-Band ER               Forward/Lateral Raises               Hoist Row: S                Lat Pulldown:  S               XO Biceps               XO Triceps               XO Upright Row               XO IR               XO ER               XO SA Pulldown               Blackburns               Ball Stabilization               Bodyblade                               Modalities                CP                CP/IFC  15'  15'  15'  15'  15'  15'

## 2019-03-25 ENCOUNTER — OFFICE VISIT (OUTPATIENT)
Dept: PHYSICAL THERAPY | Facility: CLINIC | Age: 44
End: 2019-03-25
Payer: OTHER MISCELLANEOUS

## 2019-03-25 DIAGNOSIS — M25.512 ACUTE PAIN OF LEFT SHOULDER: Primary | ICD-10-CM

## 2019-03-25 PROCEDURE — 97014 ELECTRIC STIMULATION THERAPY: CPT

## 2019-03-25 PROCEDURE — 97110 THERAPEUTIC EXERCISES: CPT

## 2019-03-25 PROCEDURE — 97140 MANUAL THERAPY 1/> REGIONS: CPT

## 2019-03-25 NOTE — PROGRESS NOTES
Daily Note     Today's date: 3/25/2019  Patient name: Lidia Lopez  : 1975  MRN: [id number]  Referring provider: Ankita Aly MD  Dx:   Encounter Diagnosis     ICD-10-CM    1  Acute pain of left shoulder M25 512                   Subjective: Patient repports her shoulder has been sore since D/C sling, attributed to using the arm more  Objective: See treatment diary below      Assessment: Tolerated treatment well  Patient exhibited good technique with therapeutic exercises      Plan: Continue per plan of care           Precautions: None     Daily Treatment Diary      [facility] 3/19  3/20  3/25  3/8  3/11  3/12   PROM    LF  LF                         Exercise Diary                Pendulums               Scapular retractions               Elbow, forearm, wrist, and hand AROM               Pulleys  10/10  10/10  10x each  10ea  10/10  10/10   Supine flex AAROM with  wand  10X  10X  10X  10X  10X  10X   Wand ER  20X 20x  20x  20X  20X  20X   Wand Flexion    20x  20X  20X  20X  20X   Wand Abduction  20X  20x  20X  20X  20X  20X   Wand IR               Table Slides:   F & ABD               Wall Slides               UBE: S 2  1" X4  1"x4  1'X4  1' X4  1" X4  1' X4   S' isometrics  5" 10X  5"x10 each  10X  10X  10X  10X   SL ER 10X  10X  10X         Ceiling Punches   2/10  2/10         Prone horiz Abd palm down               Prone Scaption               T-Band Mid Row  L2 2/10  L 2 2/10  L2 2/10  L2 2/10  L2 2/10  L2 2/10   T-Band Low Row               T-Band Biceps  L2 2/10  L 2 2/10  L2 2/10  L2 2/10  L2 2/10  L2 2/10   T-Band Triceps  L2 2/10  L 2 2/10  L2 2/10  L2 2/10  L2 2/10  L2 2/10   T-Band SA Pulldowns               T-Band IR               T-Band ER               Forward/Lateral Raises               Hoist Row: S                Lat Pulldown:  S               XO Biceps               XO Triceps               XO Upright Row               XO IR               XO ER               XO SA Pulldown               Kizzy               Ball Stabilization               Bodyblade                               Modalities                CP                CP/IFC  15'  15'  15'  15'  15'  15'

## 2019-03-27 ENCOUNTER — OFFICE VISIT (OUTPATIENT)
Dept: PHYSICAL THERAPY | Facility: CLINIC | Age: 44
End: 2019-03-27
Payer: OTHER MISCELLANEOUS

## 2019-03-27 DIAGNOSIS — M25.512 ACUTE PAIN OF LEFT SHOULDER: Primary | ICD-10-CM

## 2019-03-27 PROCEDURE — 97014 ELECTRIC STIMULATION THERAPY: CPT

## 2019-03-27 PROCEDURE — 97140 MANUAL THERAPY 1/> REGIONS: CPT

## 2019-03-27 PROCEDURE — 97110 THERAPEUTIC EXERCISES: CPT

## 2019-03-27 NOTE — PROGRESS NOTES
Daily Note     Today's date: 3/27/2019  Patient name: Megan Rowley  : 1975  MRN: [id number]  Referring provider: Conner Hanson MD  Dx:   Encounter Diagnosis     ICD-10-CM    1  Acute pain of left shoulder M25 512                   Subjective: Patient continues to report soreness in the L shoulder  Objective: See treatment diary below      Assessment: Tolerated treatment well  Patient exhibited good technique with therapeutic exercises      Plan: Continue per plan of care           Precautions: None     Daily Treatment Diary      [address] 3/19  3/20  3/25  3/27  3/11  3/12   PROM    LF  LF  LF                       Exercise Diary                Pendulums               Scapular retractions               Elbow, forearm, wrist, and hand AROM               Pulleys  10/10  10/10  10x each  10ea  10/10  10/10   Supine flex AAROM with  wand  10X  10X  10X  10X  10X  10X   Wand ER  20X 20x  20x  20X  20X  20X   Wand Flexion    20x  20X  20X  20X  20X   Wand Abduction  20X  20x  20X  20X  20X  20X   Wand IR               Table Slides:   F & ABD               Wall Slides               UBE: S 2  1" X4  1"x4  1'X4  1' X4  1" X4  1' X4   S' isometrics  5" 10X  5"x10 each  10X  10X  10X  10X   SL ER 10X  10X  10X  10X       Ceiling Punches   2/10  2/10  2/10       Prone horiz Abd palm down        10X       Prone Scaption        10X       T-Band Mid Row  L2 2/10  L 2 2/10  L2 2/10  L2 2/10  L2 2/10  L2 2/10   T-Band Low Row               T-Band Biceps  L2 2/10  L 2 2/10  L2 2/10  L2 2/10  L2 2/10  L2 2/10   T-Band Triceps  L2 2/10  L 2 2/10  L2 2/10  L2 2/10  L2 2/10  L2 2/10   T-Band SA Pulldowns               T-Band IR               T-Band ER               Forward/Lateral Raises               Hoist Row: S                Lat Pulldown:  S               XO Biceps               XO Triceps               XO Upright Row               XO IR               XO ER               XO SA Pulldown               Kizzy               Ball Stabilization               Bodyblade                               Modalities                CP                CP/IFC  15'  15'  15'  15'  15'  15'

## 2019-03-29 ENCOUNTER — OFFICE VISIT (OUTPATIENT)
Dept: PHYSICAL THERAPY | Facility: CLINIC | Age: 44
End: 2019-03-29
Payer: OTHER MISCELLANEOUS

## 2019-03-29 DIAGNOSIS — M25.512 ACUTE PAIN OF LEFT SHOULDER: Primary | ICD-10-CM

## 2019-03-29 PROCEDURE — 97110 THERAPEUTIC EXERCISES: CPT | Performed by: PHYSICAL THERAPIST

## 2019-03-29 PROCEDURE — 97014 ELECTRIC STIMULATION THERAPY: CPT | Performed by: PHYSICAL THERAPIST

## 2019-03-29 PROCEDURE — 97140 MANUAL THERAPY 1/> REGIONS: CPT | Performed by: PHYSICAL THERAPIST

## 2019-03-29 NOTE — PROGRESS NOTES
Daily Note     Today's date: 3/29/2019  Patient name: Adilia Glass  : 1975  MRN: [id number]  Referring provider: Edward Atkinson MD  Dx:   Encounter Diagnosis     ICD-10-CM    1  Acute pain of left shoulder M25 512                   Subjective: Patient notes general soreness in the shoulder, and an occasional sharp twinge when lowering the arm  Objective: See treatment diary below      Assessment: Tolerated treatment well  Patient exhibited good technique with therapeutic exercises      Plan: Progress treament per protocol  Precautions: None     Daily Treatment Diary      [facility] 3/19  3/20  3/25  3/27  3/29  3/12   PROM    LF  LF  LF  RK                     Exercise Diary                Pendulums               Scapular retractions               Elbow, forearm, wrist, and hand AROM               Pulleys  10/10  10/10  10x each  10ea  10/10  10/10   Supine flex AAROM with  wand  10X  10X  10X  10X  10X  10X   Wand ER  20X 20x  20x  20X  20X  20X   Wand Flexion    20x  20X  20X  20X  20X   Wand Abduction  20X  20x  20X  20X  20X  20X   Wand IR               Table Slides:   F & ABD               Wall Slides               UBE: S 2  1" X4  1"x4  1'X4  1' X4  1" X4  1' X4   S' isometrics  5" 10X  5"x10 each  10X  10X  10X  10X   SL ER 10X  10X  10X  10X  10x     Ceiling Punches   2/10  2/10  2/10  2/10     Prone horiz Abd palm down        10X  10x     Prone Scaption        10X  10x     T-Band Mid Row  L2 2/10  L 2 2/10  L2 2/10  L2 2/10  L2 2/10  L2 2/10   T-Band Low Row               T-Band Biceps  L2 2/10  L 2 2/10  L2 2/10  L2 2/10  L2 2/10  L2 2/10   T-Band Triceps  L2 2/10  L 2 2/10  L2 2/10  L2 2/10  L2 2/10  L2 2/10   T-Band SA Pulldowns               T-Band IR               T-Band ER               Forward/Lateral Raises               Hoist Row: S                Lat Pulldown:  S               XO Biceps               XO Triceps               XO Upright Row               XO IR             XO ER               XO SA Pulldown               [place]               Ball Stabilization               Bodyblade                               Modalities                CP                CP/IFC  15'  15'  15'  15'  15'  15'

## 2019-04-01 ENCOUNTER — OFFICE VISIT (OUTPATIENT)
Dept: PHYSICAL THERAPY | Facility: CLINIC | Age: 44
End: 2019-04-01
Payer: OTHER MISCELLANEOUS

## 2019-04-01 DIAGNOSIS — M25.512 ACUTE PAIN OF LEFT SHOULDER: Primary | ICD-10-CM

## 2019-04-01 PROCEDURE — 97140 MANUAL THERAPY 1/> REGIONS: CPT | Performed by: PHYSICAL THERAPIST

## 2019-04-01 PROCEDURE — 97110 THERAPEUTIC EXERCISES: CPT | Performed by: PHYSICAL THERAPIST

## 2019-04-01 PROCEDURE — 97014 ELECTRIC STIMULATION THERAPY: CPT | Performed by: PHYSICAL THERAPIST

## 2019-04-02 ENCOUNTER — TRANSCRIBE ORDERS (OUTPATIENT)
Dept: PHYSICAL THERAPY | Facility: CLINIC | Age: 44
End: 2019-04-02

## 2019-04-02 ENCOUNTER — EVALUATION (OUTPATIENT)
Dept: PHYSICAL THERAPY | Facility: CLINIC | Age: 44
End: 2019-04-02
Payer: OTHER MISCELLANEOUS

## 2019-04-02 DIAGNOSIS — M25.512 CHRONIC LEFT SHOULDER PAIN: Primary | ICD-10-CM

## 2019-04-02 DIAGNOSIS — G89.29 CHRONIC LEFT SHOULDER PAIN: Primary | ICD-10-CM

## 2019-04-02 DIAGNOSIS — M25.512 ACUTE PAIN OF LEFT SHOULDER: Primary | ICD-10-CM

## 2019-04-02 PROCEDURE — 97014 ELECTRIC STIMULATION THERAPY: CPT | Performed by: PHYSICAL THERAPIST

## 2019-04-02 PROCEDURE — 97110 THERAPEUTIC EXERCISES: CPT | Performed by: PHYSICAL THERAPIST

## 2019-04-02 PROCEDURE — 97140 MANUAL THERAPY 1/> REGIONS: CPT | Performed by: PHYSICAL THERAPIST

## 2019-04-05 ENCOUNTER — OFFICE VISIT (OUTPATIENT)
Dept: PHYSICAL THERAPY | Facility: CLINIC | Age: 44
End: 2019-04-05
Payer: OTHER MISCELLANEOUS

## 2019-04-05 DIAGNOSIS — M25.512 ACUTE PAIN OF LEFT SHOULDER: Primary | ICD-10-CM

## 2019-04-05 PROCEDURE — 97140 MANUAL THERAPY 1/> REGIONS: CPT

## 2019-04-05 PROCEDURE — 97110 THERAPEUTIC EXERCISES: CPT

## 2019-04-09 ENCOUNTER — OFFICE VISIT (OUTPATIENT)
Dept: PHYSICAL THERAPY | Facility: CLINIC | Age: 44
End: 2019-04-09
Payer: OTHER MISCELLANEOUS

## 2019-04-09 DIAGNOSIS — M25.512 ACUTE PAIN OF LEFT SHOULDER: Primary | ICD-10-CM

## 2019-04-09 PROCEDURE — 97110 THERAPEUTIC EXERCISES: CPT

## 2019-04-09 PROCEDURE — 97140 MANUAL THERAPY 1/> REGIONS: CPT

## 2019-04-12 ENCOUNTER — OFFICE VISIT (OUTPATIENT)
Dept: PHYSICAL THERAPY | Facility: CLINIC | Age: 44
End: 2019-04-12
Payer: OTHER MISCELLANEOUS

## 2019-04-12 DIAGNOSIS — M25.512 ACUTE PAIN OF LEFT SHOULDER: Primary | ICD-10-CM

## 2019-04-12 PROCEDURE — 97140 MANUAL THERAPY 1/> REGIONS: CPT | Performed by: PHYSICAL THERAPIST

## 2019-04-12 PROCEDURE — 97110 THERAPEUTIC EXERCISES: CPT | Performed by: PHYSICAL THERAPIST

## 2019-04-18 ENCOUNTER — OFFICE VISIT (OUTPATIENT)
Dept: PHYSICAL THERAPY | Facility: CLINIC | Age: 44
End: 2019-04-18
Payer: OTHER MISCELLANEOUS

## 2019-04-18 DIAGNOSIS — M25.512 ACUTE PAIN OF LEFT SHOULDER: Primary | ICD-10-CM

## 2019-04-18 PROCEDURE — 97140 MANUAL THERAPY 1/> REGIONS: CPT | Performed by: PHYSICAL THERAPIST

## 2019-04-18 PROCEDURE — 97110 THERAPEUTIC EXERCISES: CPT | Performed by: PHYSICAL THERAPIST

## 2019-04-19 ENCOUNTER — OFFICE VISIT (OUTPATIENT)
Dept: PHYSICAL THERAPY | Facility: CLINIC | Age: 44
End: 2019-04-19
Payer: OTHER MISCELLANEOUS

## 2019-04-19 DIAGNOSIS — M25.512 ACUTE PAIN OF LEFT SHOULDER: Primary | ICD-10-CM

## 2019-04-19 PROCEDURE — 97110 THERAPEUTIC EXERCISES: CPT | Performed by: PHYSICAL THERAPIST

## 2019-04-19 PROCEDURE — 97140 MANUAL THERAPY 1/> REGIONS: CPT | Performed by: PHYSICAL THERAPIST

## 2019-04-19 PROCEDURE — 97014 ELECTRIC STIMULATION THERAPY: CPT | Performed by: PHYSICAL THERAPIST

## 2019-04-22 ENCOUNTER — OFFICE VISIT (OUTPATIENT)
Dept: PHYSICAL THERAPY | Facility: CLINIC | Age: 44
End: 2019-04-22
Payer: OTHER MISCELLANEOUS

## 2019-04-22 DIAGNOSIS — M25.512 ACUTE PAIN OF LEFT SHOULDER: Primary | ICD-10-CM

## 2019-04-22 PROCEDURE — 97140 MANUAL THERAPY 1/> REGIONS: CPT | Performed by: PHYSICAL THERAPIST

## 2019-04-22 PROCEDURE — 97014 ELECTRIC STIMULATION THERAPY: CPT | Performed by: PHYSICAL THERAPIST

## 2019-04-22 PROCEDURE — 97110 THERAPEUTIC EXERCISES: CPT | Performed by: PHYSICAL THERAPIST

## 2019-04-24 ENCOUNTER — OFFICE VISIT (OUTPATIENT)
Dept: PHYSICAL THERAPY | Facility: CLINIC | Age: 44
End: 2019-04-24
Payer: OTHER MISCELLANEOUS

## 2019-04-24 DIAGNOSIS — M25.512 ACUTE PAIN OF LEFT SHOULDER: ICD-10-CM

## 2019-04-24 DIAGNOSIS — M75.01 ADHESIVE CAPSULITIS OF RIGHT SHOULDER: Primary | ICD-10-CM

## 2019-04-24 PROCEDURE — 97140 MANUAL THERAPY 1/> REGIONS: CPT | Performed by: PHYSICAL THERAPIST

## 2019-04-24 PROCEDURE — 97110 THERAPEUTIC EXERCISES: CPT | Performed by: PHYSICAL THERAPIST

## 2019-04-26 ENCOUNTER — OFFICE VISIT (OUTPATIENT)
Dept: PHYSICAL THERAPY | Facility: CLINIC | Age: 44
End: 2019-04-26
Payer: OTHER MISCELLANEOUS

## 2019-04-26 DIAGNOSIS — M75.01 ADHESIVE CAPSULITIS OF RIGHT SHOULDER: Primary | ICD-10-CM

## 2019-04-26 DIAGNOSIS — M25.512 ACUTE PAIN OF LEFT SHOULDER: ICD-10-CM

## 2019-04-26 PROCEDURE — 97140 MANUAL THERAPY 1/> REGIONS: CPT | Performed by: PHYSICAL THERAPIST

## 2019-04-26 PROCEDURE — 97110 THERAPEUTIC EXERCISES: CPT | Performed by: PHYSICAL THERAPIST

## 2019-05-01 ENCOUNTER — TRANSCRIBE ORDERS (OUTPATIENT)
Dept: PHYSICAL THERAPY | Facility: CLINIC | Age: 44
End: 2019-05-01

## 2019-05-01 ENCOUNTER — EVALUATION (OUTPATIENT)
Dept: PHYSICAL THERAPY | Facility: CLINIC | Age: 44
End: 2019-05-01
Payer: OTHER MISCELLANEOUS

## 2019-05-01 DIAGNOSIS — M25.512 ACUTE PAIN OF LEFT SHOULDER: ICD-10-CM

## 2019-05-01 DIAGNOSIS — M25.512 CHRONIC LEFT SHOULDER PAIN: ICD-10-CM

## 2019-05-01 DIAGNOSIS — M75.01 ADHESIVE BURSITIS OF RIGHT SHOULDER: Primary | ICD-10-CM

## 2019-05-01 DIAGNOSIS — G89.29 CHRONIC LEFT SHOULDER PAIN: ICD-10-CM

## 2019-05-01 DIAGNOSIS — M75.01 ADHESIVE CAPSULITIS OF RIGHT SHOULDER: Primary | ICD-10-CM

## 2019-05-01 PROCEDURE — 97110 THERAPEUTIC EXERCISES: CPT | Performed by: PHYSICAL THERAPIST

## 2019-05-01 PROCEDURE — 97140 MANUAL THERAPY 1/> REGIONS: CPT | Performed by: PHYSICAL THERAPIST

## 2019-05-02 ENCOUNTER — OFFICE VISIT (OUTPATIENT)
Dept: PHYSICAL THERAPY | Facility: CLINIC | Age: 44
End: 2019-05-02
Payer: OTHER MISCELLANEOUS

## 2019-05-02 DIAGNOSIS — M25.512 ACUTE PAIN OF LEFT SHOULDER: ICD-10-CM

## 2019-05-02 DIAGNOSIS — M75.01 ADHESIVE CAPSULITIS OF RIGHT SHOULDER: Primary | ICD-10-CM

## 2019-05-02 PROCEDURE — 97140 MANUAL THERAPY 1/> REGIONS: CPT | Performed by: PHYSICAL THERAPIST

## 2019-05-02 PROCEDURE — 97110 THERAPEUTIC EXERCISES: CPT | Performed by: PHYSICAL THERAPIST

## 2019-05-02 PROCEDURE — 97014 ELECTRIC STIMULATION THERAPY: CPT | Performed by: PHYSICAL THERAPIST

## 2019-05-03 ENCOUNTER — OFFICE VISIT (OUTPATIENT)
Dept: PHYSICAL THERAPY | Facility: CLINIC | Age: 44
End: 2019-05-03
Payer: OTHER MISCELLANEOUS

## 2019-05-03 DIAGNOSIS — M25.512 ACUTE PAIN OF LEFT SHOULDER: ICD-10-CM

## 2019-05-03 DIAGNOSIS — M75.01 ADHESIVE CAPSULITIS OF RIGHT SHOULDER: Primary | ICD-10-CM

## 2019-05-03 PROCEDURE — 97110 THERAPEUTIC EXERCISES: CPT | Performed by: PHYSICAL THERAPIST

## 2019-05-03 PROCEDURE — 97140 MANUAL THERAPY 1/> REGIONS: CPT | Performed by: PHYSICAL THERAPIST

## 2019-05-06 ENCOUNTER — OFFICE VISIT (OUTPATIENT)
Dept: PHYSICAL THERAPY | Facility: CLINIC | Age: 44
End: 2019-05-06
Payer: OTHER MISCELLANEOUS

## 2019-05-06 DIAGNOSIS — M75.01 ADHESIVE CAPSULITIS OF RIGHT SHOULDER: Primary | ICD-10-CM

## 2019-05-06 PROCEDURE — 97140 MANUAL THERAPY 1/> REGIONS: CPT

## 2019-05-06 PROCEDURE — 97110 THERAPEUTIC EXERCISES: CPT

## 2019-05-08 ENCOUNTER — OFFICE VISIT (OUTPATIENT)
Dept: PHYSICAL THERAPY | Facility: CLINIC | Age: 44
End: 2019-05-08
Payer: OTHER MISCELLANEOUS

## 2019-05-08 DIAGNOSIS — M75.01 ADHESIVE CAPSULITIS OF RIGHT SHOULDER: Primary | ICD-10-CM

## 2019-05-08 DIAGNOSIS — M25.512 ACUTE PAIN OF LEFT SHOULDER: ICD-10-CM

## 2019-05-08 PROCEDURE — 97110 THERAPEUTIC EXERCISES: CPT

## 2019-05-08 PROCEDURE — 97140 MANUAL THERAPY 1/> REGIONS: CPT

## 2019-05-10 ENCOUNTER — APPOINTMENT (OUTPATIENT)
Dept: PHYSICAL THERAPY | Facility: CLINIC | Age: 44
End: 2019-05-10
Payer: OTHER MISCELLANEOUS

## 2019-05-13 ENCOUNTER — OFFICE VISIT (OUTPATIENT)
Dept: PHYSICAL THERAPY | Facility: CLINIC | Age: 44
End: 2019-05-13
Payer: OTHER MISCELLANEOUS

## 2019-05-13 DIAGNOSIS — M25.512 ACUTE PAIN OF LEFT SHOULDER: ICD-10-CM

## 2019-05-13 DIAGNOSIS — M75.01 ADHESIVE CAPSULITIS OF RIGHT SHOULDER: Primary | ICD-10-CM

## 2019-05-13 PROCEDURE — 97110 THERAPEUTIC EXERCISES: CPT | Performed by: PHYSICAL THERAPIST

## 2019-05-13 PROCEDURE — 97140 MANUAL THERAPY 1/> REGIONS: CPT | Performed by: PHYSICAL THERAPIST

## 2019-05-14 ENCOUNTER — OFFICE VISIT (OUTPATIENT)
Dept: PHYSICAL THERAPY | Facility: CLINIC | Age: 44
End: 2019-05-14
Payer: OTHER MISCELLANEOUS

## 2019-05-14 DIAGNOSIS — M25.512 ACUTE PAIN OF LEFT SHOULDER: ICD-10-CM

## 2019-05-14 DIAGNOSIS — M75.01 ADHESIVE CAPSULITIS OF RIGHT SHOULDER: Primary | ICD-10-CM

## 2019-05-14 PROCEDURE — 97110 THERAPEUTIC EXERCISES: CPT | Performed by: PHYSICAL THERAPIST

## 2019-05-14 PROCEDURE — 97140 MANUAL THERAPY 1/> REGIONS: CPT | Performed by: PHYSICAL THERAPIST

## 2019-05-16 ENCOUNTER — OFFICE VISIT (OUTPATIENT)
Dept: PHYSICAL THERAPY | Facility: CLINIC | Age: 44
End: 2019-05-16
Payer: OTHER MISCELLANEOUS

## 2019-05-16 DIAGNOSIS — M25.512 ACUTE PAIN OF LEFT SHOULDER: ICD-10-CM

## 2019-05-16 DIAGNOSIS — M75.01 ADHESIVE CAPSULITIS OF RIGHT SHOULDER: Primary | ICD-10-CM

## 2019-05-16 PROCEDURE — 97110 THERAPEUTIC EXERCISES: CPT

## 2019-05-16 PROCEDURE — 97140 MANUAL THERAPY 1/> REGIONS: CPT

## 2019-05-17 ENCOUNTER — APPOINTMENT (OUTPATIENT)
Dept: PHYSICAL THERAPY | Facility: CLINIC | Age: 44
End: 2019-05-17
Payer: OTHER MISCELLANEOUS

## 2019-05-21 ENCOUNTER — OFFICE VISIT (OUTPATIENT)
Dept: PHYSICAL THERAPY | Facility: CLINIC | Age: 44
End: 2019-05-21
Payer: OTHER MISCELLANEOUS

## 2019-05-21 DIAGNOSIS — M75.01 ADHESIVE CAPSULITIS OF RIGHT SHOULDER: Primary | ICD-10-CM

## 2019-05-21 DIAGNOSIS — M25.512 ACUTE PAIN OF LEFT SHOULDER: ICD-10-CM

## 2019-05-21 PROCEDURE — 97140 MANUAL THERAPY 1/> REGIONS: CPT | Performed by: PHYSICAL THERAPIST

## 2019-05-21 PROCEDURE — 97110 THERAPEUTIC EXERCISES: CPT | Performed by: PHYSICAL THERAPIST

## 2019-05-22 ENCOUNTER — OFFICE VISIT (OUTPATIENT)
Dept: PHYSICAL THERAPY | Facility: CLINIC | Age: 44
End: 2019-05-22
Payer: OTHER MISCELLANEOUS

## 2019-05-22 DIAGNOSIS — M25.512 ACUTE PAIN OF LEFT SHOULDER: ICD-10-CM

## 2019-05-22 DIAGNOSIS — M75.01 ADHESIVE CAPSULITIS OF RIGHT SHOULDER: Primary | ICD-10-CM

## 2019-05-22 PROCEDURE — 97140 MANUAL THERAPY 1/> REGIONS: CPT | Performed by: PHYSICAL THERAPIST

## 2019-05-22 PROCEDURE — 97110 THERAPEUTIC EXERCISES: CPT | Performed by: PHYSICAL THERAPIST

## 2019-05-28 ENCOUNTER — OFFICE VISIT (OUTPATIENT)
Dept: PHYSICAL THERAPY | Facility: CLINIC | Age: 44
End: 2019-05-28
Payer: OTHER MISCELLANEOUS

## 2019-05-28 DIAGNOSIS — M75.01 ADHESIVE CAPSULITIS OF RIGHT SHOULDER: Primary | ICD-10-CM

## 2019-05-28 DIAGNOSIS — M25.512 ACUTE PAIN OF LEFT SHOULDER: ICD-10-CM

## 2019-05-28 PROCEDURE — 97140 MANUAL THERAPY 1/> REGIONS: CPT | Performed by: PHYSICAL THERAPIST

## 2019-05-28 PROCEDURE — 97110 THERAPEUTIC EXERCISES: CPT | Performed by: PHYSICAL THERAPIST

## 2019-05-29 ENCOUNTER — EVALUATION (OUTPATIENT)
Dept: PHYSICAL THERAPY | Facility: CLINIC | Age: 44
End: 2019-05-29
Payer: OTHER MISCELLANEOUS

## 2019-05-29 ENCOUNTER — TRANSCRIBE ORDERS (OUTPATIENT)
Dept: PHYSICAL THERAPY | Facility: CLINIC | Age: 44
End: 2019-05-29

## 2019-05-29 DIAGNOSIS — G89.29 CHRONIC PAIN IN LEFT SHOULDER: ICD-10-CM

## 2019-05-29 DIAGNOSIS — M25.512 CHRONIC PAIN IN LEFT SHOULDER: ICD-10-CM

## 2019-05-29 DIAGNOSIS — M25.512 ACUTE PAIN OF LEFT SHOULDER: ICD-10-CM

## 2019-05-29 DIAGNOSIS — M75.01 ADHESIVE BURSITIS OF RIGHT SHOULDER: Primary | ICD-10-CM

## 2019-05-29 DIAGNOSIS — M75.01 ADHESIVE CAPSULITIS OF RIGHT SHOULDER: Primary | ICD-10-CM

## 2019-05-29 PROCEDURE — 97110 THERAPEUTIC EXERCISES: CPT | Performed by: PHYSICAL THERAPIST

## 2019-05-29 PROCEDURE — 97140 MANUAL THERAPY 1/> REGIONS: CPT | Performed by: PHYSICAL THERAPIST

## 2019-05-31 ENCOUNTER — APPOINTMENT (OUTPATIENT)
Dept: PHYSICAL THERAPY | Facility: CLINIC | Age: 44
End: 2019-05-31
Payer: OTHER MISCELLANEOUS

## 2019-06-03 ENCOUNTER — APPOINTMENT (OUTPATIENT)
Dept: PHYSICAL THERAPY | Facility: CLINIC | Age: 44
End: 2019-06-03
Payer: OTHER MISCELLANEOUS

## 2019-06-05 ENCOUNTER — APPOINTMENT (OUTPATIENT)
Dept: PHYSICAL THERAPY | Facility: CLINIC | Age: 44
End: 2019-06-05
Payer: OTHER MISCELLANEOUS

## 2019-06-07 ENCOUNTER — APPOINTMENT (OUTPATIENT)
Dept: PHYSICAL THERAPY | Facility: CLINIC | Age: 44
End: 2019-06-07
Payer: OTHER MISCELLANEOUS

## 2019-06-11 ENCOUNTER — OFFICE VISIT (OUTPATIENT)
Dept: PHYSICAL THERAPY | Facility: CLINIC | Age: 44
End: 2019-06-11
Payer: OTHER MISCELLANEOUS

## 2019-06-11 DIAGNOSIS — M75.01 ADHESIVE CAPSULITIS OF RIGHT SHOULDER: Primary | ICD-10-CM

## 2019-06-11 PROCEDURE — 97140 MANUAL THERAPY 1/> REGIONS: CPT

## 2019-06-11 PROCEDURE — 97014 ELECTRIC STIMULATION THERAPY: CPT

## 2019-06-11 PROCEDURE — 97110 THERAPEUTIC EXERCISES: CPT

## 2019-06-12 ENCOUNTER — OFFICE VISIT (OUTPATIENT)
Dept: PHYSICAL THERAPY | Facility: CLINIC | Age: 44
End: 2019-06-12
Payer: OTHER MISCELLANEOUS

## 2019-06-12 DIAGNOSIS — M75.01 ADHESIVE CAPSULITIS OF RIGHT SHOULDER: Primary | ICD-10-CM

## 2019-06-12 PROCEDURE — 97014 ELECTRIC STIMULATION THERAPY: CPT

## 2019-06-12 PROCEDURE — 97110 THERAPEUTIC EXERCISES: CPT

## 2019-06-12 PROCEDURE — 97140 MANUAL THERAPY 1/> REGIONS: CPT

## 2019-06-13 ENCOUNTER — OFFICE VISIT (OUTPATIENT)
Dept: PHYSICAL THERAPY | Facility: CLINIC | Age: 44
End: 2019-06-13
Payer: OTHER MISCELLANEOUS

## 2019-06-13 DIAGNOSIS — M75.01 ADHESIVE CAPSULITIS OF RIGHT SHOULDER: Primary | ICD-10-CM

## 2019-06-13 PROCEDURE — 97014 ELECTRIC STIMULATION THERAPY: CPT

## 2019-06-13 PROCEDURE — 97110 THERAPEUTIC EXERCISES: CPT

## 2019-06-13 PROCEDURE — 97140 MANUAL THERAPY 1/> REGIONS: CPT

## 2019-06-14 ENCOUNTER — OFFICE VISIT (OUTPATIENT)
Dept: PHYSICAL THERAPY | Facility: CLINIC | Age: 44
End: 2019-06-14
Payer: OTHER MISCELLANEOUS

## 2019-06-14 DIAGNOSIS — M75.01 ADHESIVE CAPSULITIS OF RIGHT SHOULDER: Primary | ICD-10-CM

## 2019-06-14 PROCEDURE — 97014 ELECTRIC STIMULATION THERAPY: CPT

## 2019-06-14 PROCEDURE — 97140 MANUAL THERAPY 1/> REGIONS: CPT

## 2019-06-14 PROCEDURE — 97110 THERAPEUTIC EXERCISES: CPT

## 2019-06-17 ENCOUNTER — OFFICE VISIT (OUTPATIENT)
Dept: PHYSICAL THERAPY | Facility: CLINIC | Age: 44
End: 2019-06-17
Payer: OTHER MISCELLANEOUS

## 2019-06-17 DIAGNOSIS — M75.01 ADHESIVE CAPSULITIS OF RIGHT SHOULDER: Primary | ICD-10-CM

## 2019-06-17 DIAGNOSIS — M25.512 ACUTE PAIN OF LEFT SHOULDER: ICD-10-CM

## 2019-06-17 PROCEDURE — 97110 THERAPEUTIC EXERCISES: CPT | Performed by: PHYSICAL THERAPIST

## 2019-06-17 PROCEDURE — 97140 MANUAL THERAPY 1/> REGIONS: CPT | Performed by: PHYSICAL THERAPIST

## 2019-06-18 ENCOUNTER — APPOINTMENT (OUTPATIENT)
Dept: PHYSICAL THERAPY | Facility: CLINIC | Age: 44
End: 2019-06-18
Payer: OTHER MISCELLANEOUS

## 2019-06-20 ENCOUNTER — EVALUATION (OUTPATIENT)
Dept: PHYSICAL THERAPY | Facility: CLINIC | Age: 44
End: 2019-06-20
Payer: OTHER MISCELLANEOUS

## 2019-06-20 PROCEDURE — 97110 THERAPEUTIC EXERCISES: CPT | Performed by: PHYSICAL THERAPIST

## 2019-06-20 PROCEDURE — 97140 MANUAL THERAPY 1/> REGIONS: CPT | Performed by: PHYSICAL THERAPIST

## 2019-10-31 ENCOUNTER — OFFICE VISIT (OUTPATIENT)
Dept: FAMILY MEDICINE CLINIC | Facility: CLINIC | Age: 44
End: 2019-10-31
Payer: COMMERCIAL

## 2019-10-31 VITALS
TEMPERATURE: 98.3 F | SYSTOLIC BLOOD PRESSURE: 122 MMHG | BODY MASS INDEX: 29.45 KG/M2 | HEIGHT: 61 IN | DIASTOLIC BLOOD PRESSURE: 76 MMHG | WEIGHT: 156 LBS

## 2019-10-31 DIAGNOSIS — Z12.4 SCREENING FOR CERVICAL CANCER: Primary | ICD-10-CM

## 2019-10-31 DIAGNOSIS — J00 ACUTE NASOPHARYNGITIS: ICD-10-CM

## 2019-10-31 DIAGNOSIS — L50.9 URTICARIA: ICD-10-CM

## 2019-10-31 PROCEDURE — 3008F BODY MASS INDEX DOCD: CPT | Performed by: FAMILY MEDICINE

## 2019-10-31 PROCEDURE — 99213 OFFICE O/P EST LOW 20 MIN: CPT | Performed by: FAMILY MEDICINE

## 2019-10-31 RX ORDER — LANOLIN ALCOHOL/MO/W.PET/CERES
1000 CREAM (GRAM) TOPICAL DAILY
COMMUNITY

## 2019-10-31 RX ORDER — AMOXICILLIN 500 MG/1
500 CAPSULE ORAL EVERY 8 HOURS SCHEDULED
Qty: 30 CAPSULE | Refills: 0 | Status: SHIPPED | OUTPATIENT
Start: 2019-10-31 | End: 2019-11-10

## 2019-10-31 RX ORDER — MONTELUKAST SODIUM 10 MG/1
10 TABLET ORAL
Qty: 30 TABLET | Refills: 5 | Status: SHIPPED | OUTPATIENT
Start: 2019-10-31

## 2019-10-31 RX ORDER — CIMETIDINE 300 MG/1
300 TABLET, FILM COATED ORAL 3 TIMES DAILY
Qty: 90 TABLET | Refills: 2 | Status: SHIPPED | OUTPATIENT
Start: 2019-10-31 | End: 2020-07-13 | Stop reason: ALTCHOICE

## 2019-10-31 NOTE — PROGRESS NOTES
Assessment/Plan:    Problem List Items Addressed This Visit     None      Visit Diagnoses     Screening for cervical cancer    -  Primary    Relevant Orders    Ambulatory referral to Gynecology           Diagnoses and all orders for this visit:    Screening for cervical cancer  -     Ambulatory referral to Gynecology; Future    Other orders  -     vitamin B-12 (VITAMIN B-12) 1,000 mcg tablet; Take 1,000 mcg by mouth daily        No problem-specific Assessment & Plan notes found for this encounter  Subjective:      Patient ID: Riki Irizarry is a 53 y o  female  Mrs  Better here with a respiratory infection which is most likely viral in nature sinuses and head cold type of symptoms also has developed urticaria for the past several days probably related to the viral infection      The following portions of the patient's history were reviewed and updated as appropriate:   She has a past medical history of Benign essential hypertension (12/6/2016), Carpal tunnel syndrome of left wrist, Community acquired pneumonia, and Morbid obesity with BMI of 40 0-44 9, adult ([facility] [address] )  ,  does not have any pertinent problems on file  ,   has a past surgical history that includes GASTRECTOMY SLEEVE LAPAROSCOPIC (12/2016); Hysterectomy; Hand surgery (Bilateral); Hysteroscopy; Neuroplasty / transposition median nerve at carpal tunnel (Right, 2013); Shoulder surgery (Right, 2013); Tonsillectomy and adenoidectomy; and Appendectomy (2017)  ,  family history includes Alzheimer's disease in her paternal grandmother; Hypertension in her mother; Other in her maternal aunt  ,   reports that she has never smoked  She has never used smokeless tobacco  She reports that she does not drink alcohol or use drugs  ,  has No Known Allergies     Current Outpatient Medications   Medication Sig Dispense Refill    albuterol (PROVENTIL HFA,VENTOLIN HFA) 90 mcg/act inhaler Inhale 2 puffs every 4 (four) hours as needed for wheezing 1 Inhaler 0    Calcium Carbonate (CALCIUM 600 PO) Take by mouth      Ferrous Gluconate-C-Folic Acid (IRON-C PO) Take by mouth      multivitamin (THERAGRAN) TABS Take 1 tablet by mouth daily      vitamin B-12 (VITAMIN B-12) 1,000 mcg tablet Take 1,000 mcg by mouth daily      VITAMIN D, ERGOCALCIFEROL, PO Take by mouth       No current facility-administered medications for this visit  Review of Systems   Constitutional: Negative for activity change, appetite change, diaphoresis, fatigue and fever  HENT: Positive for sinus pain  Eyes: Negative  Respiratory: Positive for cough  Negative for apnea, chest tightness, shortness of breath and wheezing  Cardiovascular: Negative for chest pain, palpitations and leg swelling  Gastrointestinal: Negative for abdominal distention, abdominal pain, anal bleeding, constipation, diarrhea, nausea and vomiting  Endocrine: Negative for cold intolerance, heat intolerance, polydipsia, polyphagia and polyuria  Genitourinary: Negative for difficulty urinating, dysuria, flank pain, hematuria and urgency  Musculoskeletal: Negative for arthralgias, back pain, gait problem, joint swelling and myalgias  Skin: Negative for color change, rash and wound  urticaria   Allergic/Immunologic: Negative for environmental allergies, food allergies and immunocompromised state  Neurological: Negative for dizziness, seizures, syncope, speech difficulty, numbness and headaches  Hematological: Negative for adenopathy  Does not bruise/bleed easily  Psychiatric/Behavioral: Negative for agitation, behavioral problems, hallucinations, sleep disturbance and suicidal ideas  Objective:  Vitals:    10/31/19 1334   BP: 122/76   BP Location: Left arm   Patient Position: Sitting   Cuff Size: Standard   Temp: 98 3 °F (36 8 °C)   TempSrc: Tympanic   Weight: 70 8 kg (156 lb)   Height: 5' 1" (1 549 m)     Body mass index is 29 48 kg/m²       Physical Exam   Constitutional: She is oriented to person, place, and time  She appears well-developed and well-nourished  No distress  HENT:   Head: Normocephalic  Right Ear: External ear normal    Left Ear: External ear normal    Nose: Nose normal    Mouth/Throat: Oropharynx is clear and moist    Eyes: Pupils are equal, round, and reactive to light  Conjunctivae and EOM are normal  Right eye exhibits no discharge  Left eye exhibits no discharge  No scleral icterus  Neck: Normal range of motion  No tracheal deviation present  No thyromegaly present  Cardiovascular: Normal rate, regular rhythm and normal heart sounds  Exam reveals no gallop and no friction rub  No murmur heard  Pulmonary/Chest: Effort normal and breath sounds normal  No respiratory distress  She has no wheezes  Abdominal: Soft  Bowel sounds are normal  She exhibits no mass  There is no tenderness  There is no guarding  Musculoskeletal: She exhibits no edema or deformity  Lymphadenopathy:     She has no cervical adenopathy  Neurological: She is alert and oriented to person, place, and time  No cranial nerve deficit  Skin: Skin is warm and dry  No rash noted  She is not diaphoretic  No erythema  Psychiatric: She has a normal mood and affect   Thought content normal

## 2019-11-05 ENCOUNTER — DOCUMENTATION (OUTPATIENT)
Dept: GYNECOLOGY | Facility: CLINIC | Age: 44
End: 2019-11-05

## 2020-02-03 ENCOUNTER — OFFICE VISIT (OUTPATIENT)
Dept: FAMILY MEDICINE CLINIC | Facility: CLINIC | Age: 45
End: 2020-02-03
Payer: COMMERCIAL

## 2020-02-03 ENCOUNTER — APPOINTMENT (OUTPATIENT)
Dept: RADIOLOGY | Facility: MEDICAL CENTER | Age: 45
End: 2020-02-03
Payer: COMMERCIAL

## 2020-02-03 VITALS
HEART RATE: 102 BPM | BODY MASS INDEX: 29.87 KG/M2 | OXYGEN SATURATION: 96 % | TEMPERATURE: 99.7 F | HEIGHT: 61 IN | DIASTOLIC BLOOD PRESSURE: 92 MMHG | SYSTOLIC BLOOD PRESSURE: 140 MMHG | WEIGHT: 158.2 LBS

## 2020-02-03 DIAGNOSIS — J22 LOWER RESPIRATORY INFECTION: ICD-10-CM

## 2020-02-03 DIAGNOSIS — Z12.31 ENCOUNTER FOR SCREENING MAMMOGRAM FOR BREAST CANCER: ICD-10-CM

## 2020-02-03 DIAGNOSIS — R68.89 FLU-LIKE SYMPTOMS: ICD-10-CM

## 2020-02-03 DIAGNOSIS — Z12.4 ENCOUNTER FOR SCREENING FOR CERVICAL CANCER: Primary | ICD-10-CM

## 2020-02-03 PROCEDURE — 87631 RESP VIRUS 3-5 TARGETS: CPT | Performed by: PHYSICIAN ASSISTANT

## 2020-02-03 PROCEDURE — 99214 OFFICE O/P EST MOD 30 MIN: CPT | Performed by: PHYSICIAN ASSISTANT

## 2020-02-03 PROCEDURE — 3080F DIAST BP >= 90 MM HG: CPT | Performed by: PHYSICIAN ASSISTANT

## 2020-02-03 PROCEDURE — 71046 X-RAY EXAM CHEST 2 VIEWS: CPT

## 2020-02-03 PROCEDURE — 3008F BODY MASS INDEX DOCD: CPT | Performed by: PHYSICIAN ASSISTANT

## 2020-02-03 PROCEDURE — 1036F TOBACCO NON-USER: CPT | Performed by: PHYSICIAN ASSISTANT

## 2020-02-03 PROCEDURE — 3077F SYST BP >= 140 MM HG: CPT | Performed by: PHYSICIAN ASSISTANT

## 2020-02-03 RX ORDER — AZITHROMYCIN 250 MG/1
TABLET, FILM COATED ORAL
Qty: 6 TABLET | Refills: 0 | Status: SHIPPED | OUTPATIENT
Start: 2020-02-03 | End: 2020-02-07

## 2020-02-03 NOTE — PROGRESS NOTES
Assessment/Plan:    Problem List Items Addressed This Visit     None      Visit Diagnoses     Encounter for screening for cervical cancer     -  Primary    Relevant Orders    Ambulatory referral to Gynecology    Encounter for screening mammogram for breast cancer        Relevant Orders    Mammo screening bilateral w 3d & cad    Flu-like symptoms        Relevant Orders    Influenza A/B and RSV PCR    Lower respiratory infection        Relevant Medications    azithromycin (ZITHROMAX) 250 mg tablet    Other Relevant Orders    XR chest pa & lateral           Diagnoses and all orders for this visit:    Encounter for screening for cervical cancer   -     Ambulatory referral to Gynecology; Future    Encounter for screening mammogram for breast cancer  -     Mammo screening bilateral w 3d & cad; Future    Flu-like symptoms  -     Influenza A/B and RSV PCR; Future  -     Influenza A/B and RSV PCR    Lower respiratory infection  -     XR chest pa & lateral; Future  -     azithromycin (ZITHROMAX) 250 mg tablet; Take 2 tablets today then 1 tablet daily x 4 days        Will start her empirically on an antibiotic  Will get a chest xray and flu swab  Advised her to let us know if symptoms do not improve or worsen  Push fluids and continue symptomatic relief  Screening mammogram ordered  Referral placed to gynecology  Subjective:      Patient ID: Moses Butcher is a 53 y o  female  Chante Doan is a pleasant 53year old female who is here today complaining of fever, cough, fatigue, chills, and generalized body aches since last night  She states that her temperature yesterday was 101 8  She has been taking tylenol to keep her temperature down  Today her temperature was as high as 100 5  She is a , and has been around sick kids  She is unsure if any of them had the flu  She also complains of pain in the right lower side of her back   She states that the pain is worse when she coughs, takes a deep breath, or is lying on her back  She also has acid reflux  She does not typically get reflux type pain  She denies any nausea, vomiting, abdominal pain, constipation, or diarrhea  The following portions of the patient's history were reviewed and updated as appropriate:   She has a past medical history of Benign essential hypertension (12/6/2016), Carpal tunnel syndrome of left wrist, Community acquired pneumonia, and Morbid obesity with BMI of 40 0-44 9, adult ([facility] [address] )  ,  does not have any pertinent problems on file  ,   has a past surgical history that includes GASTRECTOMY SLEEVE LAPAROSCOPIC (12/2016); Hysterectomy; Hand surgery (Bilateral); Hysteroscopy; Neuroplasty / transposition median nerve at carpal tunnel (Right, 2013); Shoulder surgery (Right, 2013); Tonsillectomy and adenoidectomy; and Appendectomy (2017)  ,  family history includes Alzheimer's disease in her paternal grandmother; Hypertension in her mother; Other in her maternal aunt  ,   reports that she has never smoked  She has never used smokeless tobacco  She reports that she does not drink alcohol or use drugs  ,  has No Known Allergies     Current Outpatient Medications   Medication Sig Dispense Refill    Calcium Carbonate (CALCIUM 600 PO) Take by mouth      Ferrous Gluconate-C-Folic Acid (IRON-C PO) Take by mouth      montelukast (SINGULAIR) 10 mg tablet Take 1 tablet (10 mg total) by mouth daily at bedtime 30 tablet 5    multivitamin (THERAGRAN) TABS Take 1 tablet by mouth daily      vitamin B-12 (VITAMIN B-12) 1,000 mcg tablet Take 1,000 mcg by mouth daily      VITAMIN D, ERGOCALCIFEROL, PO Take by mouth      albuterol (PROVENTIL HFA,VENTOLIN HFA) 90 mcg/act inhaler Inhale 2 puffs every 4 (four) hours as needed for wheezing (Patient not taking: Reported on 2/3/2020) 1 Inhaler 0    azithromycin (ZITHROMAX) 250 mg tablet Take 2 tablets today then 1 tablet daily x 4 days 6 tablet 0    cimetidine (TAGAMET) 300 MG tablet Take 1 tablet (300 mg total) by mouth 3 (three) times a day (Patient not taking: Reported on 2/3/2020) 90 tablet 2     No current facility-administered medications for this visit  Review of Systems   Constitutional: Positive for chills, fatigue and fever  Negative for diaphoresis  HENT: Negative for congestion, ear pain, postnasal drip, rhinorrhea, sneezing, sore throat and trouble swallowing  Eyes: Negative for pain and visual disturbance  Respiratory: Positive for cough  Negative for apnea, shortness of breath and wheezing  Cardiovascular: Negative for chest pain and palpitations  Gastrointestinal: Negative for abdominal pain, constipation, diarrhea, nausea and vomiting         +Acid Reflux   Genitourinary: Negative for dysuria and hematuria  Musculoskeletal: Positive for myalgias  Negative for arthralgias and gait problem  Neurological: Negative for dizziness, syncope, weakness, light-headedness, numbness and headaches  Psychiatric/Behavioral: Negative for suicidal ideas  The patient is not nervous/anxious  Objective:  Vitals:    02/03/20 1533   BP: 140/92   BP Location: Left arm   Patient Position: Sitting   Pulse: 102   Temp: 99 7 °F (37 6 °C)   SpO2: 96%   Weight: 71 8 kg (158 lb 3 2 oz)   Height: 5' 1" (1 549 m)     Body mass index is 29 89 kg/m²  Physical Exam   Constitutional: She is oriented to person, place, and time  She appears well-developed and well-nourished  HENT:   Head: Normocephalic and atraumatic  Right Ear: Tympanic membrane, external ear and ear canal normal    Left Ear: Tympanic membrane, external ear and ear canal normal    Nose: Nose normal    Mouth/Throat: Oropharynx is clear and moist and mucous membranes are normal  No oropharyngeal exudate, posterior oropharyngeal edema or posterior oropharyngeal erythema  Eyes: Pupils are equal, round, and reactive to light  EOM are normal    Neck: Normal range of motion  Neck supple     Cardiovascular: Normal rate, regular rhythm and normal heart sounds  Exam reveals no gallop and no friction rub  No murmur heard  Pulmonary/Chest: Effort normal  No respiratory distress  She has no wheezes  She has rales in the right lower field  Abdominal: Soft  Bowel sounds are normal  There is tenderness (Mild epigastric tenderness to palpation)  There is no rebound and no guarding  Musculoskeletal: Normal range of motion  Lymphadenopathy:     She has no cervical adenopathy  Neurological: She is alert and oriented to person, place, and time  Skin: Skin is warm and dry  Psychiatric: She has a normal mood and affect  Her behavior is normal  Judgment and thought content normal    Vitals reviewed  BMI Counseling: Body mass index is 29 89 kg/m²  The BMI is above normal  Nutrition recommendations include 3-5 servings of fruits/vegetables daily  Exercise recommendations include exercising 3-5 times per week

## 2020-02-04 LAB
FLUAV RNA NPH QL NAA+PROBE: NORMAL
FLUBV RNA NPH QL NAA+PROBE: NORMAL
RSV RNA NPH QL NAA+PROBE: NORMAL

## 2020-02-17 ENCOUNTER — EVALUATION (OUTPATIENT)
Dept: PHYSICAL THERAPY | Facility: CLINIC | Age: 45
End: 2020-02-17
Payer: OTHER MISCELLANEOUS

## 2020-02-17 ENCOUNTER — TRANSCRIBE ORDERS (OUTPATIENT)
Dept: PHYSICAL THERAPY | Facility: CLINIC | Age: 45
End: 2020-02-17

## 2020-02-17 DIAGNOSIS — M25.512 LEFT SHOULDER PAIN, UNSPECIFIED CHRONICITY: Primary | ICD-10-CM

## 2020-02-17 DIAGNOSIS — M25.512 ACUTE PAIN OF LEFT SHOULDER: Primary | ICD-10-CM

## 2020-02-17 PROCEDURE — 97162 PT EVAL MOD COMPLEX 30 MIN: CPT | Performed by: PHYSICAL THERAPIST

## 2020-02-17 PROCEDURE — 97140 MANUAL THERAPY 1/> REGIONS: CPT | Performed by: PHYSICAL THERAPIST

## 2020-02-17 NOTE — PROGRESS NOTES
PT Evaluation     Today's date: 2020  Patient name: Tessa Vega  : 1975  MRN: [id number]  Referring provider: Liliam Chicas MD  Dx:   Encounter Diagnosis     ICD-10-CM    1  Acute pain of left shoulder M25 512                   Assessment  Assessment details:   CURRENT FUNCTIONAL STATUS    Dressing tolerance: Fair doffing shirts  Bathing/washing hair tolerance: Fair with left arm  Able to reach above shoulder level with the left arm  Able to lift 1-2 lbs with the left arm  SHORT TERM GOALS (2 WEEKS)    Increase shoulder AROM 10-15 degrees in all planes  Decrease pain to 0-3/10  Dressing tolerance: Fair/Good doffing shirts  Bathing/washing hair tolerance: Fair/Good with left arm  Able to reach overhead with the left arm  Able to lift 2-3 lbs to shoulder level with the left arm  LONG TERM GOALS (DISCHARGE)    Shoulder AROM: F=160 deg, ABD=160 deg, ER=60 deg, IR BTB=Upper thoracic spine  Shoulder PROM: F=165 deg, ABD=170 deg, ER=75 deg in neutral, IR=45 deg at 45 deg abd  Shoulder Strength: F=33 lbs, ABD=33 lbs, ER=17 lbs, IR=24 lbs  Decrease pain to 0-1/10  Dressing tolerance: No pain doffing shirts  Bathing/washing hair tolerance: Good with left arm  Able to reach overhead with the left arm  Able to lift 8 lbs to shoulder level, and 5 lbs overhead with the left arm  Understanding of Dx/Px/POC: good   Prognosis: good    Plan  Plan details: Tessa Vega is a 37 y o  female presenting to PT with pain, decreased range of motion, decreased strength, and decreased tolerance to activity  This patient would benefit from skilled PT services to address these issues and to maximize function   Thank you for the referral     Patient would benefit from: skilled physical therapy  Planned modality interventions: unattended electrical stimulation and cryotherapy  Planned therapy interventions: manual therapy, therapeutic exercise, therapeutic activities, neuromuscular re-education and self care  Frequency: 2x week  Duration in weeks: 4        Subjective Evaluation    History of Present Illness  Mechanism of injury: CC: Left shoulder pain, stiffness, and weakness  HPI: The patient's left shoulder pain began from a work injury which occurred on 2018  She was standing on a chair 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  She lost her balance and grabbed onto a ledge, yanking on her shoulder and twisting it  An MRI revealed a RTC which was repaired on 2019  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 which responded well to a manipulation performed on 6/10/2019  This past September or October, she started to develop shoulder pain again  It has worsened since, and the shoulder is beginning to tighten up again  She had an MRI on 2020, and will be seeing her surgeon on 2020  She applies ice and takes Naproxen for relief  She is working full duty as a   Pain  Current pain ratin  At best pain ratin  At worst pain ratin    Hand dominance: right    Patient Goals  Patient goals for therapy: decreased pain and increased motion          Objective     General Comments:      Shoulder Comments   CURRENT OBJECTIVE MEASUREMENTS    Shoulder AROM: Shoulder AROM: F=135 deg, ABD=115 deg, ER=55 deg, IR BTB=Mid Thoracic spine  Shoulder PROM: F=145 deg, ABD=125 deg, ER=60 deg in neutral, IR=45 deg at 45 deg abd  Shoulder Strength: F=27 lbs, ABD=27 lbs, ER=14 lbs, IR=24 lbs                 Precautions: None     Daily Treatment Diary      Manual          PROM  RK                   Exercise Diary           Pulleys          Supine flex AAROM with  wand         Wand ER         Wand Flexion         Wand Abduction         Wand IR         Cerv/thoracic retraction  extension         Ball Slides         UBE: S 2         S' isometrics         SL ER         Ceiling Punches            T-Band Upright Row         T-Band Mid Row         T-Band Low Row         T-Band Biceps         T-Band Triceps         T-Band SA Pulldowns         T-Band IR         T-Band ER         Forward/Lateral Raises         Hoist Row: S          Lat Pulldown: S         XO Biceps         XO Triceps         XO Upright Row         XO IR         XO ER         XO SA Pulldown         [place]         Ball Stabilization         Bodyblade                   Modalities          CP         CP/IFC

## 2020-02-17 NOTE — LETTER
2020    Augustina Yan MD  [facility] [address] [place] [address]    Patient: Carlotta Evans   YOB: 1975   Date of Visit: 2020     Encounter Diagnosis     ICD-10-CM    1  Acute pain of left shoulder M25 512        Dear Dr Moira Ochoa: Thank you for your recent referral of Carlotta Evans  Please review the attached evaluation summary from Edwin's recent visit  Please verify that you agree with the plan of care by signing the attached order  If you have any questions or concerns, please do not hesitate to call  I sincerely appreciate the opportunity to share in the care of one of your patients and hope to have another opportunity to work with you in the near future  Sincerely,    Bayron Paul PT      Referring Provider:      I certify that I have read the below Plan of Care and certify the need for these services furnished under this plan of treatment while under my care  Augustina Yan MD  [facility]: [phone]          PT Evaluation     Today's date: 2020  Patient name: Carlotta Evans  : 1975  MRN: [id number]  Referring provider: Severiano Kerns, MD  Dx:   Encounter Diagnosis     ICD-10-CM    1  Acute pain of left shoulder M25 512                   Assessment  Assessment details:   CURRENT FUNCTIONAL STATUS    Dressing tolerance: Fair doffing shirts  Bathing/washing hair tolerance: Fair with left arm  Able to reach above shoulder level with the left arm  Able to lift 1-2 lbs with the left arm  SHORT TERM GOALS (2 WEEKS)    Increase shoulder AROM 10-15 degrees in all planes  Decrease pain to 0-3/10  Dressing tolerance: Fair/Good doffing shirts  Bathing/washing hair tolerance: Fair/Good with left arm  Able to reach overhead with the left arm                         Able to lift 2-3 lbs to shoulder level with the left arm  LONG TERM GOALS (DISCHARGE)    Shoulder AROM: F=160 deg, ABD=160 deg, ER=60 deg, IR BTB=Upper thoracic spine  Shoulder PROM: F=165 deg, ABD=170 deg, ER=75 deg in neutral, IR=45 deg at 45 deg abd  Shoulder Strength: F=33 lbs, ABD=33 lbs, ER=17 lbs, IR=24 lbs  Decrease pain to 0-1/10  Dressing tolerance: No pain doffing shirts  Bathing/washing hair tolerance: Good with left arm  Able to reach overhead with the left arm  Able to lift 8 lbs to shoulder level, and 5 lbs overhead with the left arm  Understanding of Dx/Px/POC: good   Prognosis: good    Plan  Plan details: Torsten Painter is a [address] y o  female presenting to PT with pain, decreased range of motion, decreased strength, and decreased tolerance to activity  This patient would benefit from skilled PT services to address these issues and to maximize function  Thank you for the referral     Patient would benefit from: skilled physical therapy  Planned modality interventions: unattended electrical stimulation and cryotherapy  Planned therapy interventions: manual therapy, therapeutic exercise, therapeutic activities, neuromuscular re-education and self care  Frequency: 2x week  Duration in weeks: 4        Subjective Evaluation    History of Present Illness  Mechanism of injury: CC: Left shoulder pain, stiffness, and weakness  HPI: The patient's left shoulder pain began from a work injury which occurred on 8/21/2018  She was standing on a chair 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  She lost her balance and grabbed onto a ledge, yanking on her shoulder and twisting it  An MRI revealed a RTC which was repaired on 2/4/2019  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 which responded well to a manipulation performed on 6/10/2019  This past September or October, she started to develop shoulder pain again  It has worsened since, and the shoulder is beginning to tighten up again   She had an MRI on 2/12/2020, and will be seeing her surgeon on 2020  She applies ice and takes Naproxen for relief  She is working full duty as a   Pain  Current pain ratin  At best pain ratin  At worst pain ratin    Hand dominance: right    Patient Goals  Patient goals for therapy: decreased pain and increased motion          Objective     General Comments:      Shoulder Comments   CURRENT OBJECTIVE MEASUREMENTS    Shoulder AROM: Shoulder AROM: F=135 deg, ABD=115 deg, ER=55 deg, IR BTB=Mid Thoracic spine  Shoulder PROM: F=145 deg, ABD=125 deg, ER=60 deg in neutral, IR=45 deg at 45 deg abd  Shoulder Strength: F=27 lbs, ABD=27 lbs, ER=14 lbs, IR=24 lbs                 Precautions: None     Daily Treatment Diary      Manual          PROM  RK                   Exercise Diary           Pulleys          Supine flex AAROM with  wand         Wand ER         Wand Flexion         Wand Abduction         Wand IR         Cerv/thoracic retraction  extension         Ball Slides         UBE: S 2         S' isometrics         SL ER         Ceiling Punches                    T-Band Upright Row         T-Band Mid Row         T-Band Low Row         T-Band Biceps         T-Band Triceps         T-Band SA Pulldowns         T-Band IR         T-Band ER         Forward/Lateral Raises         Hoist Row: S          Lat Pulldown: S         XO Biceps         XO Triceps         XO Upright Row         XO IR         XO ER         XO SA Pulldown         [place]         Ball Stabilization         Bodyblade                   Modalities          CP         CP/IFC

## 2020-02-20 ENCOUNTER — OFFICE VISIT (OUTPATIENT)
Dept: PHYSICAL THERAPY | Facility: CLINIC | Age: 45
End: 2020-02-20
Payer: OTHER MISCELLANEOUS

## 2020-02-20 DIAGNOSIS — M25.512 ACUTE PAIN OF LEFT SHOULDER: Primary | ICD-10-CM

## 2020-02-20 PROCEDURE — 97110 THERAPEUTIC EXERCISES: CPT | Performed by: PHYSICAL THERAPIST

## 2020-02-20 PROCEDURE — 97140 MANUAL THERAPY 1/> REGIONS: CPT | Performed by: PHYSICAL THERAPIST

## 2020-02-20 NOTE — PROGRESS NOTES
Daily Note     Today's date: 2020  Patient name: Lorena Hyde  : 1975  MRN: [id number]  Referring provider: Jen Sam MD  Dx:   Encounter Diagnosis     ICD-10-CM    1  Acute pain of left shoulder M25 512                   Subjective: Patient saw her surgeon yesterday  He told her the MRI showed some inflammation  He gave her an injection and told her to continue therapy  Her pain and stiffness are somewhat worse since the injection  Objective: AROM: F=125 deg, ABD=95  Assessment: Flexion and abduction AROM increased 20 degrees after treatment  Plan: Progress treatment as tolerated  Precautions: None     Daily Treatment Diary      Manual             PROM  RK  RK           Joint Mob    RK           Exercise Diary                Pulleys flexion and abduction    20x QID HEP           Supine flex AAROM with  wand               Wand ER               Wand Flexion               Wand Abduction               Wand IR               Cerv/thoracic retraction  extension               Ball Slides               UBE: S 2               S' isometrics               SL ER               Ceiling Punches                                T-Band Upright Row               T-Band Mid Row               T-Band Low Row               T-Band Biceps               T-Band Triceps               T-Band SA Pulldowns               T-Band IR               T-Band ER               Forward/Lateral Raises               Hoist Row: S                Lat Pulldown:  S               XO Biceps               XO Triceps               XO Upright Row               XO IR               XO ER               XO SA Pulldown               [place]               Ball Stabilization               Bodyblade                               Modalities                CP               CP/IFC

## 2020-02-25 ENCOUNTER — OFFICE VISIT (OUTPATIENT)
Dept: PHYSICAL THERAPY | Facility: CLINIC | Age: 45
End: 2020-02-25
Payer: OTHER MISCELLANEOUS

## 2020-02-25 DIAGNOSIS — M25.512 ACUTE PAIN OF LEFT SHOULDER: Primary | ICD-10-CM

## 2020-02-25 PROCEDURE — 97112 NEUROMUSCULAR REEDUCATION: CPT | Performed by: PHYSICAL THERAPIST

## 2020-02-25 PROCEDURE — 97110 THERAPEUTIC EXERCISES: CPT | Performed by: PHYSICAL THERAPIST

## 2020-02-25 PROCEDURE — 97140 MANUAL THERAPY 1/> REGIONS: CPT | Performed by: PHYSICAL THERAPIST

## 2020-02-25 NOTE — PROGRESS NOTES
Daily Note     Today's date: 2020  Patient name: Shubham Gusman  : 1975  MRN: [id number]  Referring provider: Suzanne Quezada MD  Dx:   Encounter Diagnosis     ICD-10-CM    1  Acute pain of left shoulder M25 512                   Subjective: The left shoulder loosens up after exercise, but it tightens up t/o the day  Her injection did not help her pain  Objective: Shoulder Flexion AROM=120 deg, abduction=95 deg  Assessment: Shoulder flexion AROM increased 30 degrees after treatment, abduction increased 10 degrees  Plan: Progress treatment as tolerated         Precautions: None     Daily Treatment Diary      Manual           PROM  RK  RK  RK         Joint Mob    RK  RK         Exercise Diary                Pulleys flexion and abduction    20x QID HEP  20x ea         C-T extension sitting      10x BID HEP         Upper thoracic ext sitting      10x BID HEP         Left pectoral stretch standing 2 position      10x BID HEP                                         Modalities                CP

## 2020-02-28 ENCOUNTER — TRANSCRIBE ORDERS (OUTPATIENT)
Dept: PHYSICAL THERAPY | Facility: CLINIC | Age: 45
End: 2020-02-28

## 2020-02-28 ENCOUNTER — OFFICE VISIT (OUTPATIENT)
Dept: PHYSICAL THERAPY | Facility: CLINIC | Age: 45
End: 2020-02-28
Payer: OTHER MISCELLANEOUS

## 2020-02-28 DIAGNOSIS — M25.512 LEFT SHOULDER PAIN, UNSPECIFIED CHRONICITY: Primary | ICD-10-CM

## 2020-02-28 DIAGNOSIS — M25.512 ACUTE PAIN OF LEFT SHOULDER: Primary | ICD-10-CM

## 2020-02-28 PROCEDURE — 97110 THERAPEUTIC EXERCISES: CPT | Performed by: PHYSICAL THERAPIST

## 2020-02-28 PROCEDURE — 97140 MANUAL THERAPY 1/> REGIONS: CPT | Performed by: PHYSICAL THERAPIST

## 2020-02-28 NOTE — LETTER
2020    Cheryl Mark MD  [facility] [place] [address]    Patient: Torsten Painter   YOB: 1975   Date of Visit: 2020     Encounter Diagnosis     ICD-10-CM    1  Acute pain of left shoulder M25 512        Dear Dr Richie Hernandezt: Thank you for your recent referral of Torsten Painter  Please review the attached evaluation summary from Edwin's recent visit  She has been placed on hold from PT and will be contacting your office for follow up  If you have any questions or concerns, please do not hesitate to call  I sincerely appreciate the opportunity to share in the care of one of your patients and hope to have another opportunity to work with you in the near future  Sincerely,    Kaylie Landers, PT      Referring Provider:                      Cheryl Mark MD  [facility] 31: [phone]          PT Re-Evaluation     Today's date: 2020  Patient name: Torsten Painter  : 1975  MRN: [id number]  Referring provider: Alfredo Ahn MD  Dx:   Encounter Diagnosis     ICD-10-CM    1  Acute pain of left shoulder M25 512                   Assessment  Assessment details:   CURRENT FUNCTIONAL STATUS    Dressing tolerance: Fair doffing shirts  Bathing/washing hair tolerance: Fair with left arm  Able to reach above shoulder level with the left arm  Able to lift 1-2 lbs with the left arm  SHORT TERM GOALS (2 WEEKS)    Increase shoulder AROM 10-15 degrees in all planes  Decrease pain to 0-3/10  Dressing tolerance: Fair/Good doffing shirts  Bathing/washing hair tolerance: Fair/Good with left arm  Able to reach overhead with the left arm  Able to lift 2-3 lbs to shoulder level with the left arm        LONG TERM GOALS (DISCHARGE)    Shoulder AROM: F=160 deg, ABD=160 deg, ER=60 deg, IR BTB=Upper thoracic spine -not met  Shoulder PROM: F=165 deg, ABD=170 deg, ER=75 deg in neutral, IR=45 deg at 45 deg abd -not met          Shoulder Strength: F=33 lbs, ABD=33 lbs, ER=17 lbs, IR=24 lbs  -not met  Decrease pain to 0-1/10 -not met  Dressing tolerance: No pain doffing shirts -not met  Bathing/washing hair tolerance: Good with left arm -not met  Able to reach overhead with the left arm -not met                   Able to lift 8 lbs to shoulder level, and 5 lbs overhead with the left arm-not met  Understanding of Dx/Px/POC: good   Prognosis: good    Plan  Plan details: The patient's shoulder ROM has been decreasing from visit to visit, despite aggressive stretching in the clinic and at home  The patient has been placed on hold from treatment and will contact her surgeon to discuss other treatment options  Planned modality interventions: unattended electrical stimulation and cryotherapy  Planned therapy interventions: manual therapy, therapeutic exercise, therapeutic activities, neuromuscular re-education and self care        Subjective Evaluation    History of Present Illness  Mechanism of injury: Subjective: The patient's left shoulder pain is unchanged after 2 weeks of therapy  Her shoulder stiffness is worsening, with her having more difficulty reaching overhead  She does not feel that treatment is of benefit at this time  Pain  Current pain ratin  At best pain ratin  At worst pain ratin    Hand dominance: right    Patient Goals  Patient goals for therapy: decreased pain and increased motion          Objective     General Comments:      Shoulder Comments   CURRENT OBJECTIVE MEASUREMENTS    Shoulder AROM: Shoulder AROM: F=115 deg, ABD=105 deg, ER=50 deg, IR BTB=Mid Thoracic spine  Shoulder PROM: F=135 deg, ABD=115 deg, ER=60 deg in neutral, IR=65 deg at 45 deg abd  Shoulder Strength: F=29 lbs, ABD=26 lbs, ER=14 lbs, IR=24 lbs                 Precautions: None     Daily Treatment Diary      [facility]   2/28       PROM  RK  RK  RK  RK       Joint Mob    RK  RK  RK       Exercise Diary                Pulleys flexion and abduction    20x QID HEP  20x ea  20x       C-T extension sitting      10x BID HEP         Upper thoracic ext sitting      10x BID HEP         Left pectoral stretch standing 2 position      10x BID HEP                                         Modalities                CP

## 2020-02-28 NOTE — LETTER
2020    Ivanna Mitchell MD  [facility] [place] [address]    Patient: Ronnie Zamorano   YOB: 1975   Date of Visit: 2020     Encounter Diagnosis     ICD-10-CM    1  Acute pain of left shoulder M25 512        Dear Dr Bela Manjarrez: Thank you for your recent referral of Ronnie Zamorano  Please review the attached evaluation summary from Edwin's recent visit  She has been placed on hold from treatment and will be contacting your office for follow up  If you have any questions or concerns, please do not hesitate to call  I sincerely appreciate the opportunity to share in the care of one of your patients and hope to have another opportunity to work with you in the near future  Sincerely,    Bonnie Jacobs, PT      Referring Provider:      I certify that I have read the below Plan of Care and certify the need for these services furnished under this plan of treatment while under my care  Ivanna Mitchell MD  [facility] 31: [phone]          PT Re-Evaluation     Today's date: 2020  Patient name: Ronnie Zamorano  : 1975  MRN: [id number]  Referring provider: Kaden Shipley MD  Dx:   Encounter Diagnosis     ICD-10-CM    1  Acute pain of left shoulder M25 512                   Assessment  Assessment details:   CURRENT FUNCTIONAL STATUS    Dressing tolerance: Fair doffing shirts  Bathing/washing hair tolerance: Fair with left arm  Able to reach above shoulder level with the left arm  Able to lift 1-2 lbs with the left arm  SHORT TERM GOALS (2 WEEKS)    Increase shoulder AROM 10-15 degrees in all planes  Decrease pain to 0-3/10  Dressing tolerance: Fair/Good doffing shirts  Bathing/washing hair tolerance: Fair/Good with left arm  Able to reach overhead with the left arm                         Able to lift 2-3 lbs to shoulder level with the left arm  LONG TERM GOALS (DISCHARGE)    Shoulder AROM: F=160 deg, ABD=160 deg, ER=60 deg, IR BTB=Upper thoracic spine -not met  Shoulder PROM: F=165 deg, ABD=170 deg, ER=75 deg in neutral, IR=45 deg at 45 deg abd -not met          Shoulder Strength: F=33 lbs, ABD=33 lbs, ER=17 lbs, IR=24 lbs  -not met  Decrease pain to 0-1/10 -not met  Dressing tolerance: No pain doffing shirts -not met  Bathing/washing hair tolerance: Good with left arm -not met  Able to reach overhead with the left arm -not met                   Able to lift 8 lbs to shoulder level, and 5 lbs overhead with the left arm-not met  Understanding of Dx/Px/POC: good   Prognosis: good    Plan  Plan details: The patient's shoulder ROM has been decreasing from visit to visit, despite aggressive stretching in the clinic and at home  The patient has been placed on hold from treatment and will contact her surgeon to discuss other treatment options  Planned modality interventions: unattended electrical stimulation and cryotherapy  Planned therapy interventions: manual therapy, therapeutic exercise, therapeutic activities, neuromuscular re-education and self care        Subjective Evaluation    History of Present Illness  Mechanism of injury: Subjective: The patient's left shoulder pain is unchanged after 2 weeks of therapy  Her shoulder stiffness is worsening, with her having more difficulty reaching overhead  She does not feel that treatment is of benefit at this time  Pain  Current pain ratin  At best pain ratin  At worst pain ratin    Hand dominance: right    Patient Goals  Patient goals for therapy: decreased pain and increased motion          Objective     General Comments:      Shoulder Comments   CURRENT OBJECTIVE MEASUREMENTS    Shoulder AROM: Shoulder AROM: F=115 deg, ABD=105 deg, ER=50 deg, IR BTB=Mid Thoracic spine  Shoulder PROM: F=135 deg, ABD=115 deg, ER=60 deg in neutral, IR=65 deg at 45 deg abd  Shoulder Strength: F=29 lbs, ABD=26 lbs, ER=14 lbs, IR=24 lbs                 Precautions: None     Daily Treatment Diary      Manual  2/17 2/20 2/25 2/28       PROM  RK  RK  RK  RK       Joint Mob    RK  RK  RK       Exercise Diary                Pulleys flexion and abduction    20x QID HEP  20x ea  20x       C-T extension sitting      10x BID HEP         Upper thoracic ext sitting      10x BID HEP         Left pectoral stretch standing 2 position      10x BID HEP                                         Modalities                CP

## 2020-02-28 NOTE — PROGRESS NOTES
PT Re-Evaluation  and PT Discharge    Today's date: 2020  Patient name: Carmencita Murry  : 1975  MRN: [id number]  Referring provider: Raven Nieves MD  Dx:   Encounter Diagnosis     ICD-10-CM    1  Acute pain of left shoulder M25 512      Addendum 3/19/2020: Tiffany Hall has not contacted our office for treatment since 2020 and will be discharged from outpatient physical therapy  No updated objective measures are available for this report due to self discharge  Assessment  Assessment details:   CURRENT FUNCTIONAL STATUS    Dressing tolerance: Fair doffing shirts  Bathing/washing hair tolerance: Fair with left arm  Able to reach above shoulder level with the left arm  Able to lift 1-2 lbs with the left arm  SHORT TERM GOALS (2 WEEKS)    Increase shoulder AROM 10-15 degrees in all planes  Decrease pain to 0-3/10  Dressing tolerance: Fair/Good doffing shirts  Bathing/washing hair tolerance: Fair/Good with left arm  Able to reach overhead with the left arm  Able to lift 2-3 lbs to shoulder level with the left arm  LONG TERM GOALS (DISCHARGE)    Shoulder AROM: F=160 deg, ABD=160 deg, ER=60 deg, IR BTB=Upper thoracic spine -not met  Shoulder PROM: F=165 deg, ABD=170 deg, ER=75 deg in neutral, IR=45 deg at 45 deg abd -not met          Shoulder Strength: F=33 lbs, ABD=33 lbs, ER=17 lbs, IR=24 lbs  -not met  Decrease pain to 0-1/10 -not met  Dressing tolerance: No pain doffing shirts -not met  Bathing/washing hair tolerance: Good with left arm -not met  Able to reach overhead with the left arm -not met                   Able to lift 8 lbs to shoulder level, and 5 lbs overhead with the left arm-not met  Understanding of Dx/Px/POC: good   Prognosis: good    Plan  Plan details: The patient's shoulder ROM has been decreasing from visit to visit, despite aggressive stretching in the clinic and at home   The patient has been placed on hold from treatment and will contact her surgeon to discuss other treatment options  Planned modality interventions: unattended electrical stimulation and cryotherapy  Planned therapy interventions: manual therapy, therapeutic exercise, therapeutic activities, neuromuscular re-education and self care        Subjective Evaluation    History of Present Illness  Mechanism of injury: Subjective: The patient's left shoulder pain is unchanged after 2 weeks of therapy  Her shoulder stiffness is worsening, with her having more difficulty reaching overhead  She does not feel that treatment is of benefit at this time  Pain  Current pain ratin  At best pain ratin  At worst pain ratin    Hand dominance: right    Patient Goals  Patient goals for therapy: decreased pain and increased motion          Objective     General Comments:      Shoulder Comments   CURRENT OBJECTIVE MEASUREMENTS    Shoulder AROM: Shoulder AROM: F=115 deg, ABD=105 deg, ER=50 deg, IR BTB=Mid Thoracic spine  Shoulder PROM: F=135 deg, ABD=115 deg, ER=60 deg in neutral, IR=65 deg at 45 deg abd  Shoulder Strength: F=29 lbs, ABD=26 lbs, ER=14 lbs, IR=24 lbs                 Precautions: None     Daily Treatment Diary      Manual         PROM  RK  RK  RK  RK       Joint Mob    RK  RK  RK       Exercise Diary                Pulleys flexion and abduction    20x QID HEP  20x ea  20x       C-T extension sitting      10x BID HEP         Upper thoracic ext sitting      10x BID HEP         Left pectoral stretch standing 2 position      10x BID HEP                                         Modalities                CP

## 2020-06-01 ENCOUNTER — EVALUATION (OUTPATIENT)
Dept: PHYSICAL THERAPY | Facility: CLINIC | Age: 45
End: 2020-06-01
Payer: OTHER MISCELLANEOUS

## 2020-06-01 ENCOUNTER — TRANSCRIBE ORDERS (OUTPATIENT)
Dept: PHYSICAL THERAPY | Facility: CLINIC | Age: 45
End: 2020-06-01

## 2020-06-01 DIAGNOSIS — M75.02 ADHESIVE BURSITIS OF LEFT SHOULDER: Primary | ICD-10-CM

## 2020-06-01 DIAGNOSIS — M75.02 ADHESIVE CAPSULITIS OF LEFT SHOULDER: Primary | ICD-10-CM

## 2020-06-01 PROCEDURE — 97010 HOT OR COLD PACKS THERAPY: CPT | Performed by: PHYSICAL THERAPIST

## 2020-06-01 PROCEDURE — 97140 MANUAL THERAPY 1/> REGIONS: CPT | Performed by: PHYSICAL THERAPIST

## 2020-06-01 PROCEDURE — 97110 THERAPEUTIC EXERCISES: CPT | Performed by: PHYSICAL THERAPIST

## 2020-06-01 PROCEDURE — 97162 PT EVAL MOD COMPLEX 30 MIN: CPT | Performed by: PHYSICAL THERAPIST

## 2020-06-02 ENCOUNTER — OFFICE VISIT (OUTPATIENT)
Dept: PHYSICAL THERAPY | Facility: CLINIC | Age: 45
End: 2020-06-02
Payer: OTHER MISCELLANEOUS

## 2020-06-02 DIAGNOSIS — M75.02 ADHESIVE CAPSULITIS OF LEFT SHOULDER: Primary | ICD-10-CM

## 2020-06-02 PROCEDURE — 97140 MANUAL THERAPY 1/> REGIONS: CPT

## 2020-06-02 PROCEDURE — 97110 THERAPEUTIC EXERCISES: CPT

## 2020-06-02 PROCEDURE — 97014 ELECTRIC STIMULATION THERAPY: CPT

## 2020-06-03 ENCOUNTER — OFFICE VISIT (OUTPATIENT)
Dept: PHYSICAL THERAPY | Facility: CLINIC | Age: 45
End: 2020-06-03
Payer: OTHER MISCELLANEOUS

## 2020-06-03 DIAGNOSIS — M75.02 ADHESIVE CAPSULITIS OF LEFT SHOULDER: Primary | ICD-10-CM

## 2020-06-03 PROCEDURE — 97014 ELECTRIC STIMULATION THERAPY: CPT

## 2020-06-03 PROCEDURE — 97110 THERAPEUTIC EXERCISES: CPT

## 2020-06-03 PROCEDURE — 97140 MANUAL THERAPY 1/> REGIONS: CPT

## 2020-06-04 ENCOUNTER — OFFICE VISIT (OUTPATIENT)
Dept: PHYSICAL THERAPY | Facility: CLINIC | Age: 45
End: 2020-06-04
Payer: OTHER MISCELLANEOUS

## 2020-06-04 DIAGNOSIS — M75.02 ADHESIVE CAPSULITIS OF LEFT SHOULDER: Primary | ICD-10-CM

## 2020-06-04 PROCEDURE — 97530 THERAPEUTIC ACTIVITIES: CPT

## 2020-06-04 PROCEDURE — 97110 THERAPEUTIC EXERCISES: CPT

## 2020-06-04 PROCEDURE — 97014 ELECTRIC STIMULATION THERAPY: CPT

## 2020-06-04 PROCEDURE — 97140 MANUAL THERAPY 1/> REGIONS: CPT

## 2020-06-05 ENCOUNTER — APPOINTMENT (OUTPATIENT)
Dept: PHYSICAL THERAPY | Facility: CLINIC | Age: 45
End: 2020-06-05
Payer: OTHER MISCELLANEOUS

## 2020-06-08 ENCOUNTER — TRANSCRIBE ORDERS (OUTPATIENT)
Dept: PHYSICAL THERAPY | Facility: CLINIC | Age: 45
End: 2020-06-08

## 2020-06-08 ENCOUNTER — EVALUATION (OUTPATIENT)
Dept: PHYSICAL THERAPY | Facility: CLINIC | Age: 45
End: 2020-06-08
Payer: OTHER MISCELLANEOUS

## 2020-06-08 DIAGNOSIS — M75.02 ADHESIVE BURSITIS OF LEFT SHOULDER: Primary | ICD-10-CM

## 2020-06-08 DIAGNOSIS — M75.02 ADHESIVE CAPSULITIS OF LEFT SHOULDER: Primary | ICD-10-CM

## 2020-06-08 PROCEDURE — 97010 HOT OR COLD PACKS THERAPY: CPT | Performed by: PHYSICAL THERAPIST

## 2020-06-08 PROCEDURE — 97014 ELECTRIC STIMULATION THERAPY: CPT | Performed by: PHYSICAL THERAPIST

## 2020-06-08 PROCEDURE — 97110 THERAPEUTIC EXERCISES: CPT | Performed by: PHYSICAL THERAPIST

## 2020-06-08 PROCEDURE — 97140 MANUAL THERAPY 1/> REGIONS: CPT | Performed by: PHYSICAL THERAPIST

## 2020-06-10 ENCOUNTER — OFFICE VISIT (OUTPATIENT)
Dept: PHYSICAL THERAPY | Facility: CLINIC | Age: 45
End: 2020-06-10
Payer: OTHER MISCELLANEOUS

## 2020-06-10 DIAGNOSIS — M75.02 ADHESIVE CAPSULITIS OF LEFT SHOULDER: Primary | ICD-10-CM

## 2020-06-10 PROCEDURE — 97530 THERAPEUTIC ACTIVITIES: CPT

## 2020-06-10 PROCEDURE — 97110 THERAPEUTIC EXERCISES: CPT

## 2020-06-10 PROCEDURE — 97014 ELECTRIC STIMULATION THERAPY: CPT

## 2020-06-10 PROCEDURE — 97140 MANUAL THERAPY 1/> REGIONS: CPT

## 2020-06-12 ENCOUNTER — OFFICE VISIT (OUTPATIENT)
Dept: PHYSICAL THERAPY | Facility: CLINIC | Age: 45
End: 2020-06-12
Payer: OTHER MISCELLANEOUS

## 2020-06-12 DIAGNOSIS — M75.02 ADHESIVE CAPSULITIS OF LEFT SHOULDER: Primary | ICD-10-CM

## 2020-06-12 PROCEDURE — 97110 THERAPEUTIC EXERCISES: CPT

## 2020-06-12 PROCEDURE — 97140 MANUAL THERAPY 1/> REGIONS: CPT

## 2020-06-12 PROCEDURE — 97014 ELECTRIC STIMULATION THERAPY: CPT

## 2020-06-17 ENCOUNTER — OFFICE VISIT (OUTPATIENT)
Dept: PHYSICAL THERAPY | Facility: CLINIC | Age: 45
End: 2020-06-17
Payer: OTHER MISCELLANEOUS

## 2020-06-17 ENCOUNTER — TRANSCRIBE ORDERS (OUTPATIENT)
Dept: PHYSICAL THERAPY | Facility: CLINIC | Age: 45
End: 2020-06-17

## 2020-06-17 DIAGNOSIS — M75.02 ADHESIVE CAPSULITIS OF LEFT SHOULDER: Primary | ICD-10-CM

## 2020-06-17 PROCEDURE — 97110 THERAPEUTIC EXERCISES: CPT

## 2020-06-17 PROCEDURE — 97014 ELECTRIC STIMULATION THERAPY: CPT

## 2020-06-17 PROCEDURE — 97140 MANUAL THERAPY 1/> REGIONS: CPT

## 2020-06-18 ENCOUNTER — OFFICE VISIT (OUTPATIENT)
Dept: PHYSICAL THERAPY | Facility: CLINIC | Age: 45
End: 2020-06-18
Payer: OTHER MISCELLANEOUS

## 2020-06-18 DIAGNOSIS — M75.02 ADHESIVE CAPSULITIS OF LEFT SHOULDER: Primary | ICD-10-CM

## 2020-06-18 PROCEDURE — 97014 ELECTRIC STIMULATION THERAPY: CPT

## 2020-06-18 PROCEDURE — 97110 THERAPEUTIC EXERCISES: CPT

## 2020-06-18 PROCEDURE — 97140 MANUAL THERAPY 1/> REGIONS: CPT

## 2020-07-01 ENCOUNTER — OFFICE VISIT (OUTPATIENT)
Dept: PHYSICAL THERAPY | Facility: CLINIC | Age: 45
End: 2020-07-01
Payer: OTHER MISCELLANEOUS

## 2020-07-01 DIAGNOSIS — M75.02 ADHESIVE CAPSULITIS OF LEFT SHOULDER: Primary | ICD-10-CM

## 2020-07-01 PROCEDURE — 97110 THERAPEUTIC EXERCISES: CPT

## 2020-07-01 PROCEDURE — 97014 ELECTRIC STIMULATION THERAPY: CPT

## 2020-07-01 PROCEDURE — 97140 MANUAL THERAPY 1/> REGIONS: CPT

## 2020-07-01 NOTE — PROGRESS NOTES
Daily Note     Today's date: 2020  Patient name: Shruthi Marroquin  : 1975  MRN: [id number]  Referring provider: Aryan Browne MD  Dx:   Encounter Diagnosis     ICD-10-CM    1  Adhesive capsulitis of left shoulder M75 02                   Subjective: Patient reports compliance with HEP on vacation  She reports her motion is still good, but pain remains  She also c/o cracking at times with movement  Objective: See treatment diary below      Assessment: Tolerated treatment well  Patient exhibited good technique with therapeutic exercises  No loss of AROM or PROM with lapse between appointments  Plan: Continue per plan of care        Precautions: None        Manuals   6/  6/10   PROM LF LF LF LF  RK  LF   Joint Mob                              Neuro Re-Ed               Ball Stabilization               Bodyblade               PNF                               Ther Ex               Pendulums               Pulleys               Posterior Capsule Stretch L SL 10x  10X 10X 10X  10x  10X   Horizontal Adduction Stetch 10x  10X 10X 10X  10x  10X   Table Slides             Wall Slides           Shoulder Isometrics             TB High Row               TB Mid Row  L4 2/10  L4 2/10 L4 2/10 L4 2/10  L4 2/10  L4 2/10   TB Low Row               TB Biceps  L4 2/10  L4 2/10 L4 2/10 L4 2/10  L4 2/10  L4 2/10   TB Triceps  L4 2/10  L4 2/10 L4 2/10 L4 2/10  L4 2/10  L4 2/10   TB Upright Row               TB Flexion  L2 2/10  L2 2/10  L4 2/10  L4 2/10  L1 2/10  L1 2/10   TB Abduction  L2 2/10  L2 2/10  L2 2/10  L2 2/10  L1 2/10  L1 2/10   TB SA Pulldown  L4 2/10  L4 2/10  L4 2/10  L4 2/10  L2 2/10  L4 2/10   TB IR  L2 2/10  L2 2/10  L2 2/10 L2 2/10  L2 2/10  L2 2/10   TB ER L2 2/10  L2 2/10  L2 2/10 L2 2/10  L2 2/10  L2 2/10   TB Clock               TB OH Press   L2 10X L2     SL ER 2# 2/10 2# 2/10 2# 2/10 2# 2/10  1# 2/10  1# 2/10   SL ABD  2# 2/10  2# 2/10 2# 2/10 2# 2/10  1# 2/10  1# 2/10 SL Flexion               SL Horiz ABD               FWD/LAT Raises               Ceiling Punches    2# 2/10  2# 2/10  2# 2/10       Hoist Row: S               LAT Pulldown               XO Biceps               XO Triceps               XO Upright Row               XO IR               XO ER               XO SA Pulldowns               Blackburns    1# 10X   1# 10X  2# 10X    10X                   Ther Activity               Reaching               Carrying               Lifting               Catching               Throwing                               Modalities               CP              CP/IFC  15' 15' 15' 15'  15'  15'

## 2020-07-02 ENCOUNTER — APPOINTMENT (OUTPATIENT)
Dept: PHYSICAL THERAPY | Facility: CLINIC | Age: 45
End: 2020-07-02
Payer: OTHER MISCELLANEOUS

## 2020-07-06 NOTE — PROGRESS NOTES
PT Re-Evaluation     Today's date: 2020  Patient name: Yvonne Roa  : 1975  MRN: [id number]  Referring provider: Gokul Marroquin MD  Dx:   No diagnosis found  Assessment  Assessment details:   CURRENT FUNCTIONAL STATUS    Dressing tolerance: Fair doffing shirts  Bathing/washing hair tolerance: Fair with left arm  Able to reach overhead level with the left arm with some discomfort  Able to lift 1-2 lbs with the left arm  SHORT TERM GOALS (2 WEEKS)    Increase shoulder AROM 10-15 degrees in all planes  Decrease pain to 0-4/10  Dressing tolerance: Fair/Good doffing shirts  Bathing/washing hair tolerance: Fair/Good with left arm  Able to reach overhead with the left arm without discomfort  Able to lift 2-3 lbs to shoulder level with the left arm  LONG TERM GOALS (DISCHARGE)    Shoulder AROM: F=160 deg, ABD=160 deg, ER=60 deg, IR BTB=Upper thoracic spine -partially met  Shoulder PROM: F=165 deg, ABD=170 deg, ER=75 deg in neutral, IR=45 deg at 45 deg abd -partially met         Shoulder Strength: F=33 lbs, ABD=33 lbs, ER=17 lbs, IR=24 lbs -partially met  Decrease pain to 0-1/10 -partially met  Dressing tolerance: No pain doffing shirts -partially met  Bathing/washing hair tolerance: Good with left arm -partially met      Able to reach overhead with the left arm -partially met                         Able to lift 8 lbs to shoulder level, and 5 lbs overhead with the left arm -partially met            Understanding of Dx/Px/POC: good   Prognosis: good    Plan  Plan details: The patient has shown improvement in PT demonstrating decreased pain, increased range of motion, increased strength, and increased tolerance to activity  The patient continues to present with pain, decreased ROM, decreased strength, and decreased tolerance to activity    The patient would benefit from continued skilled PT services to address these issues and to maximize function  The patient will also continue performing their HEP  Patient would benefit from: skilled physical therapy  Planned modality interventions: unattended electrical stimulation and cryotherapy  Planned therapy interventions: manual therapy, therapeutic exercise, therapeutic activities, neuromuscular re-education and self care  Frequency: 3x week (Daily x 1 week, TIW x 3 weeks)  Duration in weeks: 4        Subjective Evaluation    History of Present Illness  Mechanism of injury: Subjective: The patient's left shoulder pain is no longer constant, but it can reach moderate levels at times  She can reach overhead with some tightness and discomfort  Lifting with the arm is limited to light weight  Pain  Current pain ratin  At best pain ratin  At worst pain ratin    Hand dominance: right    Patient Goals  Patient goals for therapy: decreased pain, increased motion, increased strength and return to work          Objective     General Comments:      Shoulder Comments   CURRENT OBJECTIVE MEASUREMENTS    Shoulder AROM: Shoulder AROM: F=165 deg, ABD=175 deg, ER=60 deg, IR BTB=T-L JCT  Shoulder PROM: F=145 deg, ABD=150 deg, ER=60 deg in neutral, IR=65 deg at 45 deg abd  Shoulder Strength: F=29 lbs, ABD=26 lbs, ER=13 lbs, IR=23 lbs                    Precautions: None        Manuals 6/12 6/17 6/18 7/1  7/7  6/10   PROM LF LF LF LF  RK  LF   Joint Mob                               Neuro Re-Ed               Ball Stabilization               Bodyblade               PNF                               Ther Ex               Pendulums               Pulleys               Posterior Capsule Stretch L SL 10x  10X 10X 10X  10x  10X   Horizontal Adduction Stetch 10x  10X 10X 10X  10x  10X   Table Slides               Wall Slides               Shoulder Isometrics               TB High Row               TB Mid Row  L4 2/10  L4 2/10 L4 2/10 L4 2/10  L4 2/10  L4 2/10   TB Low Row               TB Biceps  L4 2/10  L4 2/10 L4 2/10 L4 2/10  L4 2/10  L4 2/10   TB Triceps  L4 2/10  L4 2/10 L4 2/10 L4 2/10  L4 2/10  L4 2/10   TB Upright Row               TB Flexion  L2 2/10  L2 2/10  L4 2/10  L4 2/10  L4 2/10  L1 2/10   TB Abduction  L2 2/10  L2 2/10  L2 2/10  L2 2/10  L2 2/10  L1 2/10   TB SA Pulldown  L4 2/10  L4 2/10  L4 2/10  L4 2/10  L4 2/10  L4 2/10   TB IR  L2 2/10  L2 2/10  L2 2/10 L2 2/10  L2 2/10  L2 2/10   TB ER L2 2/10  L2 2/10  L2 2/10 L2 2/10  L2 2/10  L2 2/10   TB Clock               TB OH Press     L2 10X L2  L2 10x     SL ER 2# 2/10 2# 2/10 2# 2/10 2# 2/10  2# 2/10  1# 2/10   SL ABD  2# 2/10  2# 2/10 2# 2/10 2# 2/10  2# 2/10  1# 2/10   SL Flexion               SL Horiz ABD               FWD/LAT Raises               Ceiling Punches    2# 2/10  2# 2/10  2# 2/10  2# 2/10     Hoist Row: S               LAT Pulldown               XO Biceps               XO Triceps               XO Upright Row               XO IR               XO ER               XO SA Pulldowns               Blackburns    1# 10X   1# 10X  2# 10X  2# 10x  10X                   Ther Activity               Reaching               Carrying               Lifting               Catching               Throwing                               Modalities               CP               CP/IFC  15' 15' 15' 15'  15'  15'

## 2020-07-07 ENCOUNTER — APPOINTMENT (OUTPATIENT)
Dept: PHYSICAL THERAPY | Facility: CLINIC | Age: 45
End: 2020-07-07
Payer: OTHER MISCELLANEOUS

## 2020-07-07 NOTE — PROGRESS NOTES
PT Re-Evaluation     Today's date: 7/10/2020  Patient name: Vinicius Lew  : 1975  MRN: [id number]  Referring provider: Harry Echevarria MD  Dx:   No diagnosis found  Assessment  Assessment details:   CURRENT FUNCTIONAL STATUS    Dressing tolerance: Fair doffing shirts  Bathing/washing hair tolerance: Fair/Good with left arm  Able to reach overhead level with the left arm with mild discomfort  Able to lift 5 lbs with the left arm  SHORT TERM GOALS (2 WEEKS)    Increase shoulder AROM 5 degrees in all planes  Decrease pain to 0-3/10  Dressing tolerance: Fair/Good doffing shirts  Bathing/washing hair tolerance: Good with left arm  Able to reach overhead with the left arm with minimal/no discomfort  Able to lift 6 lbs to shoulder level with the left arm  LONG TERM GOALS (DISCHARGE)    Shoulder AROM: F=160 deg, ABD=160 deg, ER=60 deg, IR BTB=Upper thoracic spine -partially met  Shoulder PROM: F=165 deg, ABD=170 deg, ER=75 deg in neutral, IR=45 deg at 45 deg abd -partially met         Shoulder Strength: F=33 lbs, ABD=33 lbs, ER=17 lbs, IR=24 lbs -partially met  Decrease pain to 0-1/10 -partially met  Dressing tolerance: No pain doffing shirts -partially met  Bathing/washing hair tolerance: Good with left arm -partially met      Able to reach overhead with the left arm -partially met                         Able to lift 8 lbs to shoulder level, and 5 lbs overhead with the left arm -partially met            Understanding of Dx/Px/POC: good   Prognosis: good    Plan  Plan details: The patient continues to present with pain, decreased ROM, decreased strength, and decreased tolerance to activity  The patient would benefit from continued skilled PT services to address these issues and to maximize function  The patient will also continue performing their HEP          Patient would benefit from: skilled physical therapy  Planned modality interventions: unattended electrical stimulation and cryotherapy  Planned therapy interventions: manual therapy, therapeutic exercise, therapeutic activities, neuromuscular re-education and self care  Frequency: 3x week (Daily x 1 week, TIW x 3 weeks)  Duration in weeks: 4        Subjective Evaluation    History of Present Illness  Mechanism of injury: Subjective: The patient's left shoulder pain is mild and intermittent  She can reach overhead forward and to the side, but there is pain when raising and lowering her arm  Lifting with the arm is limited to 5 lbs with the left arm  Pain  Current pain ratin  At best pain ratin  At worst pain ratin    Hand dominance: right    Patient Goals  Patient goals for therapy: decreased pain, increased motion, increased strength and return to work          Objective     General Comments:      Shoulder Comments   CURRENT OBJECTIVE MEASUREMENTS    Shoulder AROM: Shoulder AROM: F=155 deg, ABD=175 deg, ER=60 deg, IR BTB=T-L JCT  Shoulder PROM: F=160 deg, ABD=160 deg, ER=65 deg in neutral, IR=65 deg at 45 deg abd  Shoulder Strength: F=29 lbs, ABD=26 lbs, ER=13 lbs, IR=23 lbs                    Precautions: None        Manuals  7  7/10  6/10   PROM LF LF LF LF  RK  LF   Joint Mob                               Neuro Re-Ed               Ball Stabilization               Bodyblade               PNF                               Ther Ex               Pendulums               Pulleys               Posterior Capsule Stretch L SL 10x  10X 10X 10X  10x  10X   Horizontal Adduction Stetch 10x  10X 10X 10X  10x  10X   Table Slides               Wall Slides               Shoulder Isometrics               TB High Row               TB Mid Row  L4 2/10  L4 2/10 L4 2/10 L4 2/10   L4 2/10   TB Low Row              TB Biceps  L4 2/10  L4 2/10 L4 2/10 L4 2/10   L4 2/10   TB Triceps  L4 2/10  L4 2/10 L4 2/10 L4 2/10   L4 2/10   TB Upright Row              TB Flexion  L2 2/10  L2 2/10  L4 2/10  L4 2/10   L1 2/10   TB Abduction  L2 2/10  L2 2/10  L2 2/10  L2 2/10   L1 2/10   TB SA Pulldown  L4 2/10  L4 2/10  L4 2/10  L4 2/10   L4 2/10   TB IR  L2 2/10  L2 2/10  L2 2/10 L2 2/10   L2 2/10   TB ER L2 2/10  L2 2/10  L2 2/10 L2 2/10   L2 2/10   TB Clock               TB OH Press     L2 10X L2 10x       SL ER 2# 2/10 2# 2/10 2# 2/10 2# 2/10  3# 2/10  1# 2/10   SL ABD  2# 2/10  2# 2/10 2# 2/10 2# 2/10  3# 2/10  1# 2/10   SL Flexion               SL Horiz ABD               FWD/LAT Raises               Ceiling Punches    2# 2/10  2# 2/10  2# 2/10  5# 2/10     Hoist Row: S               LAT Pulldown               XO Biceps               XO Triceps               XO Upright Row               XO IR               XO ER               XO SA Pulldowns               Blackburns    1# 10X   1# 10X  2# 10X  2# 10x  10X                   Ther Activity               Reaching               Carrying               Lifting               Catching               Throwing                               Modalities               CP               CP/IFC  15' 15' 15' 15'    15'

## 2020-07-10 ENCOUNTER — EVALUATION (OUTPATIENT)
Dept: PHYSICAL THERAPY | Facility: CLINIC | Age: 45
End: 2020-07-10
Payer: OTHER MISCELLANEOUS

## 2020-07-10 ENCOUNTER — TRANSCRIBE ORDERS (OUTPATIENT)
Dept: PHYSICAL THERAPY | Facility: CLINIC | Age: 45
End: 2020-07-10

## 2020-07-10 DIAGNOSIS — M75.02 ADHESIVE CAPSULITIS OF LEFT SHOULDER: Primary | ICD-10-CM

## 2020-07-10 DIAGNOSIS — M75.02 ADHESIVE BURSITIS OF LEFT SHOULDER: Primary | ICD-10-CM

## 2020-07-10 PROCEDURE — 97140 MANUAL THERAPY 1/> REGIONS: CPT | Performed by: PHYSICAL THERAPIST

## 2020-07-10 PROCEDURE — 97110 THERAPEUTIC EXERCISES: CPT | Performed by: PHYSICAL THERAPIST

## 2020-07-10 NOTE — LETTER
July 10, 2020    Charbel Naik MD  [address] [place] [address]    Patient: Efrain Sung   YOB: 1975   Date of Visit: 7/10/2020     Encounter Diagnosis     ICD-10-CM    1  Adhesive capsulitis of left shoulder M75 02        Dear Dr Jacobo Zayas: Thank you for your recent referral of Efrain Sung  Please review the attached evaluation summary from Edwin's recent visit  Please verify that you agree with the plan of care by signing the attached order  If you have any questions or concerns, please do not hesitate to call  I sincerely appreciate the opportunity to share in the care of one of your patients and hope to have another opportunity to work with you in the near future  Sincerely,    Bob Genao, PT      Referring Provider:      I certify that I have read the below Plan of Care and certify the need for these services furnished under this plan of treatment while under my care  Charbel Naik MD  [facility]: [phone]          PT Re-Evaluation     Today's date: 7/10/2020  Patient name: Efrain Sung  : 1975  MRN: [id number]  Referring provider: Nikhil Hanks MD  Dx:   No diagnosis found  Assessment  Assessment details:   CURRENT FUNCTIONAL STATUS    Dressing tolerance: Fair doffing shirts  Bathing/washing hair tolerance: Fair/Good with left arm  Able to reach overhead level with the left arm with mild discomfort  Able to lift 5 lbs with the left arm  SHORT TERM GOALS (2 WEEKS)    Increase shoulder AROM 5 degrees in all planes  Decrease pain to 0-3/10  Dressing tolerance: Fair/Good doffing shirts  Bathing/washing hair tolerance: Good with left arm  Able to reach overhead with the left arm with minimal/no discomfort  Able to lift 6 lbs to shoulder level with the left arm        LONG TERM GOALS (DISCHARGE)    Shoulder AROM: F=160 deg, ABD=160 deg, ER=60 deg, IR BTB=Upper thoracic spine -partially met  Shoulder PROM: F=165 deg, ABD=170 deg, ER=75 deg in neutral, IR=45 deg at 45 deg abd -partially met         Shoulder Strength: F=33 lbs, ABD=33 lbs, ER=17 lbs, IR=24 lbs -partially met  Decrease pain to 0-1/10 -partially met  Dressing tolerance: No pain doffing shirts -partially met  Bathing/washing hair tolerance: Good with left arm -partially met      Able to reach overhead with the left arm -partially met                         Able to lift 8 lbs to shoulder level, and 5 lbs overhead with the left arm -partially met            Understanding of Dx/Px/POC: good   Prognosis: good    Plan  Plan details: The patient continues to present with pain, decreased ROM, decreased strength, and decreased tolerance to activity  The patient would benefit from continued skilled PT services to address these issues and to maximize function  The patient will also continue performing their HEP  Patient would benefit from: skilled physical therapy  Planned modality interventions: unattended electrical stimulation and cryotherapy  Planned therapy interventions: manual therapy, therapeutic exercise, therapeutic activities, neuromuscular re-education and self care  Frequency: 3x week (Daily x 1 week, TIW x 3 weeks)  Duration in weeks: 4        Subjective Evaluation    History of Present Illness  Mechanism of injury: Subjective: The patient's left shoulder pain is mild and intermittent  She can reach overhead forward and to the side, but there is pain when raising and lowering her arm  Lifting with the arm is limited to 5 lbs with the left arm    Pain  Current pain ratin  At best pain ratin  At worst pain ratin    Hand dominance: right    Patient Goals  Patient goals for therapy: decreased pain, increased motion, increased strength and return to work          Objective     General Comments:      Shoulder Comments   CURRENT OBJECTIVE MEASUREMENTS    Shoulder AROM: Shoulder AROM: F=155 deg, ABD=175 deg, ER=60 deg, IR BTB=T-L JCT  Shoulder PROM: F=160 deg, ABD=160 deg, ER=65 deg in neutral, IR=65 deg at 45 deg abd  Shoulder Strength: F=29 lbs, ABD=26 lbs, ER=13 lbs, IR=23 lbs                    Precautions: None        Manuals 6/12 6/17 6/18 7/1  7/10  6/10   PROM LF LF LF LF  RK  LF   Joint Mob                               Neuro Re-Ed               Ball Stabilization               Bodyblade               PNF                               Ther Ex               Pendulums               Pulleys               Posterior Capsule Stretch L SL 10x  10X 10X 10X  10x  10X   Horizontal Adduction Stetch 10x  10X 10X 10X  10x  10X   Table Slides               Wall Slides               Shoulder Isometrics               TB High Row               TB Mid Row  L4 2/10  L4 2/10 L4 2/10 L4 2/10   L4 2/10   TB Low Row              TB Biceps  L4 2/10  L4 2/10 L4 2/10 L4 2/10   L4 2/10   TB Triceps  L4 2/10  L4 2/10 L4 2/10 L4 2/10   L4 2/10   TB Upright Row              TB Flexion  L2 2/10  L2 2/10  L4 2/10  L4 2/10   L1 2/10   TB Abduction  L2 2/10  L2 2/10  L2 2/10  L2 2/10   L1 2/10   TB SA Pulldown  L4 2/10  L4 2/10  L4 2/10  L4 2/10   L4 2/10   TB IR  L2 2/10  L2 2/10  L2 2/10 L2 2/10   L2 2/10   TB ER L2 2/10  L2 2/10  L2 2/10 L2 2/10   L2 2/10   TB Clock               TB OH Press     L2 10X L2 10x       SL ER 2# 2/10 2# 2/10 2# 2/10 2# 2/10  3# 2/10  1# 2/10   SL ABD  2# 2/10  2# 2/10 2# 2/10 2# 2/10  3# 2/10  1# 2/10   SL Flexion               SL Horiz ABD               FWD/LAT Raises               Ceiling Punches    2# 2/10  2# 2/10  2# 2/10  5# 2/10     Hoist Row: S               LAT Pulldown               XO Biceps               XO Triceps               XO Upright Row               XO IR               XO ER               XO SA Pulldowns               Blackburns    1# 10X   1# 10X  2# 10X  2# 10x  10X                   Ther Activity               Reaching               Carrying               Lifting               Catching               Throwing                               Modalities               CP               CP/IFC  15' 15' 15' 15'    15'

## 2020-07-13 ENCOUNTER — OFFICE VISIT (OUTPATIENT)
Dept: OBGYN CLINIC | Facility: CLINIC | Age: 45
End: 2020-07-13
Payer: COMMERCIAL

## 2020-07-13 VITALS
WEIGHT: 162 LBS | SYSTOLIC BLOOD PRESSURE: 124 MMHG | HEIGHT: 61 IN | BODY MASS INDEX: 30.58 KG/M2 | TEMPERATURE: 97.3 F | DIASTOLIC BLOOD PRESSURE: 70 MMHG

## 2020-07-13 DIAGNOSIS — Z11.51 ENCOUNTER FOR SCREENING FOR HUMAN PAPILLOMAVIRUS (HPV): ICD-10-CM

## 2020-07-13 DIAGNOSIS — Z01.419 ENCOUNTER FOR GYNECOLOGICAL EXAMINATION WITH PAPANICOLAOU SMEAR OF CERVIX: Primary | ICD-10-CM

## 2020-07-13 PROCEDURE — S0610 ANNUAL GYNECOLOGICAL EXAMINA: HCPCS | Performed by: NURSE PRACTITIONER

## 2020-07-13 PROCEDURE — 3008F BODY MASS INDEX DOCD: CPT | Performed by: PHYSICIAN ASSISTANT

## 2020-07-13 PROCEDURE — 3078F DIAST BP <80 MM HG: CPT | Performed by: NURSE PRACTITIONER

## 2020-07-13 PROCEDURE — 3008F BODY MASS INDEX DOCD: CPT | Performed by: NURSE PRACTITIONER

## 2020-07-13 PROCEDURE — 3074F SYST BP LT 130 MM HG: CPT | Performed by: NURSE PRACTITIONER

## 2020-07-13 NOTE — PROGRESS NOTES
ASSESSMENT & PLAN: Adilia Glass is a 50 y o  [id number] with normal gynecologic exam     1   Routine well woman exam done today  2  Pap and HPV:  The patient's last pap and hpv was Years ago  It was normal     Pap and cotesting was done today  Current ASCCP Guidelines reviewed  3   Mammogram ordered  4  The following were reviewed in today's visit: breast self exam, mammography screening ordered, adequate intake of calcium and vitamin D, exercise and healthy diet  5  rto yearly gyn exam    CC:  Annual Gynecologic Examination    HPI: Adilia Glass is a 50 y o  [id number] who presents for annual gynecologic examination  She has the following concerns: none          Hx of gastric bypass 4 years ago lost about 100#    Health Maintenance:    She wears her seatbelt routinely  She does perform regular monthly self breast exams  She feels safe at home       Patient Active Problem List   Diagnosis    History of sleeve gastrectomy    Benign essential hypertension    Obstructive sleep apnea, adult    S/P laparoscopic appendectomy       Past Medical History:   Diagnosis Date    Benign essential hypertension 2016    Carpal tunnel syndrome of left wrist     last assessed 1/23/15     Community acquired pneumonia     last assessed 16    Morbid obesity with BMI of 40 0-44 9, adult ([facility] [address] )     last assessed 16        Past Surgical History:   Procedure Laterality Date    APPENDECTOMY  2017    GASTRECTOMY SLEEVE LAPAROSCOPIC  2016    HAND SURGERY Bilateral     Carpal tunnel     HYSTERECTOMY      HYSTEROSCOPY      NEUROPLASTY / TRANSPOSITION MEDIAN NERVE AT CARPAL TUNNEL Right     decompression     SHOULDER SURGERY Right 2013    TONSILLECTOMY AND ADENOIDECTOMY         Past OB/Gyn History:  OB History        2    Para   2    Term   2            AB        Living   2       SAB        TAB        Ectopic        Multiple        Live Births   2                  Pt does not have menstrual issues     History of sexually transmitted infection: No   History of abnormal pap smears: No      Patient is currently sexually active  heterosexual  The current method of family planning is status post hysterectomy  For benign condition not sure if she had her cx removed was doen ~ 25 years ago     Family History   Problem Relation Age of Onset    Hypertension Mother     Alzheimer's disease Paternal Grandmother     Other Maternal Aunt         uterine fibroid in pregnancy     Asthma Father        Social History:  Social History     Socioeconomic History    Marital status: /Civil Union     Spouse name: Not on file    Number of children: Not on file    Years of education: Not on file    Highest education level: Not on file   Occupational History    Not on file   Social Needs    Financial resource strain: Not on file    Food insecurity:     Worry: Not on file     Inability: Not on file    Transportation needs:     Medical: Not on file     Non-medical: Not on file   Tobacco Use    Smoking status: Never Smoker    Smokeless tobacco: Never Used   Substance and Sexual Activity    Alcohol use: No    Drug use: No    Sexual activity: Yes     Partners: Male     Comment:    Lifestyle    Physical activity:     Days per week: Not on file     Minutes per session: Not on file    Stress: Not on file   Relationships    Social connections:     Talks on phone: Not on file     Gets together: Not on file     Attends Advent service: Not on file     Active member of club or organization: Not on file     Attends meetings of clubs or organizations: Not on file     Relationship status: Not on file    Intimate partner violence:     Fear of current or ex partner: Not on file     Emotionally abused: Not on file     Physically abused: Not on file     Forced sexual activity: Not on file   Other Topics Concern    Not on file   Social History Narrative    Not on file     Presently lives with spouse   Patient is   Patient is currently employed as a teacher Having her first grandson in december  No Known Allergies    Current Outpatient Medications:     Calcium Carbonate (CALCIUM 600 PO), Take by mouth, Disp: , Rfl:     Ferrous Gluconate-C-Folic Acid (IRON-C PO), Take by mouth, Disp: , Rfl:     montelukast (SINGULAIR) 10 mg tablet, Take 1 tablet (10 mg total) by mouth daily at bedtime, Disp: 30 tablet, Rfl: 5    multivitamin (THERAGRAN) TABS, Take 1 tablet by mouth daily, Disp: , Rfl:     vitamin B-12 (VITAMIN B-12) 1,000 mcg tablet, Take 1,000 mcg by mouth daily, Disp: , Rfl:     VITAMIN D, ERGOCALCIFEROL, PO, Take by mouth, Disp: , Rfl:     albuterol (PROVENTIL HFA,VENTOLIN HFA) 90 mcg/act inhaler, Inhale 2 puffs every 4 (four) hours as needed for wheezing (Patient not taking: Reported on 2/3/2020), Disp: 1 Inhaler, Rfl: 0    Review of Systems:    Review of Systems  Constitutional :no fever, feels well, no tiredness, no recent weight gain or loss  ENT: no ear ache, no loss of hearing, no nosebleeds or nasal discharge, no sore throat or hoarseness  Cardiovascular: no complaints of slow or fast heart beat, no chest pain, no palpitations, no leg claudication or lower extremity edema  Respiratory: no complaints of shortness of shortness of breath, no GARCIA  Breasts:no complaints of breast pain, breast lump, or nipple discharge  Gastrointestinal: no complaints of abdominal pain, constipation, nausea, vomiting, or diarrhea or bloody stools  Genitourinary : no complaints of dysuria, incontinence, pelvic pain, no dysmenorrhea, vaginal discharge or abnormal vaginal bleeding and as noted in HPI  Musculoskeletal: no complaints of arthralgia, no myalgia, no joint swelling or stiffness, no limb pain or swelling    Integumentary: no complaints of skin rash or lesion, itching or dry skin  Neurological: no complaints of headache, no confusion, no numbness or tingling, no dizziness or fainting    Objective      BP 124/70   Temp (!) 97 3 °F (36 3 °C) (Temporal)   Ht 5' 1" (1 549 m)   Wt 73 5 kg (162 lb)   LMP  (LMP Unknown)   BMI 30 61 kg/m²     General:   appears stated age, cooperative, alert normal mood and affect   Neck: normal, supple,trachea midline, no masses   Heart: regular rate and rhythm, S1, S2 normal, no murmur, click, rub or gallop   Lungs: clear to auscultation bilaterally   Breasts: normal appearance, no masses or tenderness   Abdomen: soft, non-tender, without masses or organomegaly   Vulva: normal   Vagina: normal vagina   Urethra: normal   Cervix: Normal, no discharge  Uterus: normal size, contour, position, consistency, mobility, non-tender   Adnexa: no mass, fullness, tenderness   Lymphatic palpation of lymph nodes in neck, axilla, groin and/or other locations: no lymphadenopathy or masses noted   Skin normal skin turgor and no rashes     Psychiatric orientation to person, place, and time: normal  mood and affect: normal

## 2020-07-14 ENCOUNTER — OFFICE VISIT (OUTPATIENT)
Dept: PHYSICAL THERAPY | Facility: CLINIC | Age: 45
End: 2020-07-14
Payer: OTHER MISCELLANEOUS

## 2020-07-14 DIAGNOSIS — M75.02 ADHESIVE CAPSULITIS OF LEFT SHOULDER: Primary | ICD-10-CM

## 2020-07-14 PROCEDURE — 97140 MANUAL THERAPY 1/> REGIONS: CPT

## 2020-07-14 PROCEDURE — 97110 THERAPEUTIC EXERCISES: CPT

## 2020-07-14 PROCEDURE — 97014 ELECTRIC STIMULATION THERAPY: CPT

## 2020-07-14 PROCEDURE — 97530 THERAPEUTIC ACTIVITIES: CPT

## 2020-07-14 NOTE — PROGRESS NOTES
Daily Note     Today's date: 2020  Patient name: Merline Salinas  : 1975  MRN: [id number]  Referring provider: Lamin Kapoor MD  Dx:   Encounter Diagnosis     ICD-10-CM    1  Adhesive capsulitis of left shoulder M75 02                   Subjective: Patient reports she continues to have pain and "clicking" in the L shoulder  Objective: See treatment diary below      Assessment: Tolerated treatment well  Patient exhibited good technique with therapeutic exercises      Plan: Continue per plan of care   Patient has F/U with MD tomorrow 7/15     Precautions: None        Manuals 6/12 6/17 6/18 7/1  7/10 7/14   PROM LF LF LF LF  RK  LF   Joint Mob                               Neuro Re-Ed               Ball Stabilization               Bodyblade               PNF                               Ther Ex               Pendulums               Pulleys               Posterior Capsule Stretch L SL 10x  10X 10X 10X  10x  10X   Horizontal Adduction Stetch 10x  10X 10X 10X  10x  10X   Table Slides               Wall Slides               Shoulder Isometrics               TB High Row               TB Mid Row  L4 2/10  L4 2/10 L4 2/10 L4 2/10   L5 2/10   TB Low Row              TB Biceps  L4 2/10  L4 2/10 L4 2/10 L4 2/10   L4 2/10   TB Triceps  L4 2/10  L4 2/10 L4 2/10 L4 2/10   L5 2/10   TB Upright Row              TB Flexion  L2 2/10  L2 2/10  L4 2/10  L4 2/10   L4 2/10   TB Abduction  L2 2/10  L2 2/10  L2 2/10  L2 2/10   L3 2/10   TB SA Pulldown  L4 2/10  L4 2/10  L4 2/10  L4 2/10   L5 2/10   TB IR  L2 2/10  L2 2/10  L2 2/10 L2 2/10   L3 2/10   TB ER L2 2/10  L2 2/10  L2 2/10 L2 2/10   L3 2/10   TB Clock               TB OH Press     L2 10X L2 10x       SL ER 2# 2/10 2# 2/10 2# 2/10 2# 2/10  3# 2/10  3# 2/10   SL ABD  2# 2/10  2# 2/10 2# 2/10 2# 2/10  3# 2/10  3# 2/10   SL Flexion               SL Horiz ABD               FWD/LAT Raises               Ceiling Punches    2# 2/10  2# 2/10  2# 2/10  5# 2/10  3# 2/10 Hoist Row: S               LAT Pulldown               XO Biceps               XO Triceps               XO Upright Row               XO IR               XO ER               XO SA Pulldowns               Blackburns    1# 10X   1# 10X  2# 10X  2# 10x  3# 10X                   Ther Activity               Reaching               Carrying               Lifting               Catching               Throwing                               Modalities               CP               CP/IFC  15' 15' 15' 15'    15'

## 2020-07-15 LAB
CLINICAL INFO: NORMAL
CYTO CVX: NORMAL
DATE PREVIOUS BX: NORMAL
HPV E6+E7 MRNA CVX QL NAA+PROBE: NOT DETECTED
LMP START DATE: NORMAL
MICROORGANISM CVX/VAG CYTO: NORMAL
SL AMB PREV. PAP:: NORMAL
SPECIMEN SOURCE CVX/VAG CYTO: NORMAL
STAT OF ADQ CVX/VAG CYTO-IMP: NORMAL

## 2020-07-16 ENCOUNTER — OFFICE VISIT (OUTPATIENT)
Dept: PHYSICAL THERAPY | Facility: CLINIC | Age: 45
End: 2020-07-16
Payer: OTHER MISCELLANEOUS

## 2020-07-16 DIAGNOSIS — M75.02 ADHESIVE CAPSULITIS OF LEFT SHOULDER: Primary | ICD-10-CM

## 2020-07-16 PROCEDURE — 97530 THERAPEUTIC ACTIVITIES: CPT

## 2020-07-16 PROCEDURE — 97140 MANUAL THERAPY 1/> REGIONS: CPT

## 2020-07-16 PROCEDURE — 97110 THERAPEUTIC EXERCISES: CPT

## 2020-07-16 PROCEDURE — 97014 ELECTRIC STIMULATION THERAPY: CPT

## 2020-07-16 NOTE — PROGRESS NOTES
Daily Note     Today's date: 2020  Patient name: Alyx Delgado  : 1975  MRN: [id number]  Referring provider: Eliecer Fagan MD  Dx:   Encounter Diagnosis     ICD-10-CM    1  Adhesive capsulitis of left shoulder M75 02                   Subjective: Patient reports she had F/U with MD  She is to take Aleve and continue PT  Objective: See treatment diary below      Assessment: Tolerated treatment well  Patient exhibited good technique with therapeutic exercises      Plan: Continue per plan of care        Precautions: None        Manuals 7/16 6/17 6/18 7/1  7/10 7/14   PROM LF LF LF LF  RK  LF   Joint Mob                               Neuro Re-Ed               Ball Stabilization               Bodyblade               PNF                               Ther Ex               Pendulums               Pulleys               Posterior Capsule Stretch L SL 10x  10X 10X 10X  10x  10X   Horizontal Adduction Stetch 10x  10X 10X 10X  10x  10X   Table Slides               Wall Slides               Shoulder Isometrics               TB High Row               TB Mid Row  L4 2/10  L4 2/10 L4 2/10 L4 2/10   L5 2/10   TB Low Row  L4 2/10            TB Biceps  L4 2/10  L4 2/10 L4 2/10 L4 2/10   L4 2/10   TB Triceps  L5 2/10  L4 2/10 L4 2/10 L4 2/10   L5 2/10   TB Upright Row              TB Flexion  L42/10  L2 2/10  L4 2/10  L4 2/10   L4 2/10   TB Abduction  L3 2/10  L2 2/10  L2 2/10  L2 2/10   L3 2/10   TB SA Pulldown  L5 2/10  L4 2/10  L4 2/10  L4 2/10   L5 2/10   TB IR  L4 2/10  L2 2/10  L2 2/10 L2 2/10   L3 2/10   TB ER L4 2/10  L2 2/10  L2 2/10 L2 2/10   L3 2/10   TB Clock               TB [facility] Press  L3 2/10   L2 10X L2 10x       SL ER 3# 2/10 2# 2/10 2# 2/10 2# 2/10  3# 2/10  3# 2/10   SL ABD  3# 2/10  2# 2/10 2# 2/10 2# 2/10  3# 2/10  3# 2/10   SL Flexion               SL Horiz ABD               FWD/LAT Raises               Ceiling Punches  3# 2/10  2# 2/10  2# 2/10  2# 2/10  5# 2/10  3# 2/10   Hoist Row: S               LAT Pulldown               XO Biceps               XO Triceps               XO Upright Row               XO IR               XO ER               XO SA Pulldowns               Blackburns  3# 10X  1# 10X   1# 10X  2# 10X  2# 10x  3# 10X                   Ther Activity               Reaching               Carrying               Lifting               Catching               Throwing                               Modalities               CP               CP/IFC  15' 15' 15' 15'    15'

## 2020-07-20 ENCOUNTER — OFFICE VISIT (OUTPATIENT)
Dept: PHYSICAL THERAPY | Facility: CLINIC | Age: 45
End: 2020-07-20
Payer: OTHER MISCELLANEOUS

## 2020-07-20 DIAGNOSIS — B37.3 VAGINAL YEAST INFECTION: Primary | ICD-10-CM

## 2020-07-20 DIAGNOSIS — M75.02 ADHESIVE CAPSULITIS OF LEFT SHOULDER: Primary | ICD-10-CM

## 2020-07-20 PROCEDURE — 97140 MANUAL THERAPY 1/> REGIONS: CPT

## 2020-07-20 PROCEDURE — 97014 ELECTRIC STIMULATION THERAPY: CPT

## 2020-07-20 PROCEDURE — 97530 THERAPEUTIC ACTIVITIES: CPT

## 2020-07-20 PROCEDURE — 97110 THERAPEUTIC EXERCISES: CPT

## 2020-07-20 NOTE — TELEPHONE ENCOUNTER
----- Message from Spenser Pyle MD sent at 7/19/2020  2:50 PM EDT -----  Yeast noted on pap  Treat with fluconazole 150 mg po x 1 if still symptomatic

## 2020-07-20 NOTE — PROGRESS NOTES
Daily Note     Today's date: 2020  Patient name: Megan Rowley  : 1975  MRN: [id number]  Referring provider: Conner Hanson MD  Dx:   Encounter Diagnosis     ICD-10-CM    1  Adhesive capsulitis of left shoulder M75 02                   Subjective: Patient reports her shoulder was really sore after using it for normal household activities  The aching pain lasted into the following day  Objective: See treatment diary below      Assessment: Tolerated treatment well  Patient exhibited good technique with therapeutic exercises      Plan: Continue per plan of care        Precautions: None        Manuals 7/16 7/20 6/18 7/1  7/10 7/14   PROM LF LF LF LF  RK  LF   Joint Mob                               Neuro Re-Ed               Ball Stabilization               Bodyblade               PNF                               Ther Ex               Pendulums               Pulleys               Posterior Capsule Stretch L SL 10x  10X 10X 10X  10x  10X   Horizontal Adduction Stetch 10x  10X 10X 10X  10x  10X   Table Slides               Wall Slides               Shoulder Isometrics               TB High Row               TB Mid Row  L4 2/10  L5 2/10 L4 2/10 L4 2/10   L5 2/10   TB Low Row  L4 2/10  L4 2/10          TB Biceps  L4 2/10  L4 2/10 L4 2/10 L4 2/10   L4 2/10   TB Triceps  L5 2/10  L5 2/10 L4 2/10 L4 2/10   L5 2/10   TB Upright Row              TB Flexion  L42/10  L4 2/10  L4 2/10  L4 2/10   L4 2/10   TB Abduction  L3 2/10  L3 2/10  L2 2/10  L2 2/10   L3 2/10   TB SA Pulldown  L5 2/10  L5 2/10  L4 2/10  L4 2/10   L5 2/10   TB IR  L4 2/10  L4 2/10  L2 2/10 L2 2/10   L3 2/10   TB ER L4 2/10  L4 2/10  L2 2/10 L2 2/10   L3 2/10   TB Clock               TB [facility] Press  L3 2/10  L3 2/10 L2 10X L2 10x       SL ER 3# 2/10 3# 2/10 2# 2/10 2# 2/10  3# 2/10  3# 2/10   SL ABD  3# 2/10  3# 2/10 2# 2/10 2# 2/10  3# 2/10  3# 2/10   SL Flexion               SL Horiz ABD               FWD/LAT Raises               Ceiling Punches  3# 2/10  3# 2/10  2# 2/10  2# 2/10  5# 2/10  3# 2/10   Hoist Row: S               LAT Pulldown               XO Biceps               XO Triceps               XO Upright Row               XO IR               XO ER               XO SA Pulldowns               Blackburns  3# 10X  1# 10X   1# 10X  2# 10X  2# 10x  3# 10X                   Ther Activity               Reaching               Carrying               Lifting               Catching               Throwing                               Modalities               CP               CP/IFC  15' 15' 15' 15'    15'

## 2020-07-21 RX ORDER — FLUCONAZOLE 150 MG/1
150 TABLET ORAL ONCE
Qty: 1 TABLET | Refills: 0 | Status: SHIPPED | OUTPATIENT
Start: 2020-07-21 | End: 2020-07-21

## 2020-07-24 ENCOUNTER — OFFICE VISIT (OUTPATIENT)
Dept: PHYSICAL THERAPY | Facility: CLINIC | Age: 45
End: 2020-07-24
Payer: OTHER MISCELLANEOUS

## 2020-07-24 DIAGNOSIS — M75.02 ADHESIVE CAPSULITIS OF LEFT SHOULDER: Primary | ICD-10-CM

## 2020-07-24 PROCEDURE — 97140 MANUAL THERAPY 1/> REGIONS: CPT

## 2020-07-24 PROCEDURE — 97014 ELECTRIC STIMULATION THERAPY: CPT

## 2020-07-24 PROCEDURE — 97110 THERAPEUTIC EXERCISES: CPT

## 2020-07-24 PROCEDURE — 97530 THERAPEUTIC ACTIVITIES: CPT

## 2020-07-24 NOTE — PROGRESS NOTES
Daily Note     Today's date: 2020  Patient name: Efrain Sung  : 1975  MRN: [id number]  Referring provider: Nikhil Hanks MD  Dx:   Encounter Diagnosis     ICD-10-CM    1  Adhesive capsulitis of left shoulder M75 02                   Subjective: Patient reports her shoulder pain has been worse lately, including sleep disruption  She reports compliance with the HEP, and is performing normal household activities through the pain  Objective: See treatment diary below      Assessment: Tolerated treatment well  Patient exhibited good technique with therapeutic exercises  AROM and PROM remain full, with c/o painful arc and cracking with abduction  Plan: Continue per plan of care        Precautions: None        Manuals 7/16 7/20 7/24 7/1  7/10 7/14   PROM LF LF LF LF  RK  LF   Joint Mob                               Neuro Re-Ed               Ball Stabilization               Bodyblade               PNF                               Ther Ex               Pendulums               Pulleys               Posterior Capsule Stretch L SL 10x  10X 10X 10X  10x  10X   Horizontal Adduction Stetch 10x  10X 10X 10X  10x  10X   Table Slides               Wall Slides               Shoulder Isometrics               TB High Row               TB Mid Row  L4 2/10  L5 2/10 L5 2/10 L4 2/10   L5 2/10   TB Low Row  L4 2/10  L4 2/10  L4 2/10        TB Biceps  L4 2/10  L4 2/10 L4 2/10 L4 2/10   L4 2/10   TB Triceps  L5 2/10  L5 2/10 L5 2/10 L4 2/10   L5 2/10   TB Upright Row              TB Flexion  L42/10  L4 2/10  L4 2/10  L4 2/10   L4 2/10   TB Abduction  L3 2/10  L3 2/10  L3 2/10  L2 2/10   L3 2/10   TB SA Pulldown  L5 2/10  L5 2/10  L5 2/10  L4 2/10   L5 2/10   TB IR  L4 2/10  L4 2/10  L4 2/10 L2 2/10   L3 2/10   TB ER L4 2/10  L4 2/10  L4 2/10 L2 2/10   L3 2/10   TB Clock               TB [facility] Press  L3 2/10  L3 2/10 L3 10X L2 10x       SL ER 3# 2/10 3# 2/10 3# 2/10 2# 2/10  3# 2/10  3# 2/10   SL ABD  3# 2/10  3# 2/10 3# 2/10 2# 2/10  3# 2/10  3# 2/10   SL Flexion               SL Horiz ABD               FWD/LAT Raises               Ceiling Punches  3# 2/10  3# 2/10  3# 2/10  2# 2/10  5# 2/10  3# 2/10   Hoist Row: S               LAT Pulldown               XO Biceps               XO Triceps               XO Upright Row               XO IR               XO ER               XO SA Pulldowns               Blackburns  3# 10X  1# 10X   3# 10X  2# 10X  2# 10x  3# 10X                   Ther Activity               Reaching               Carrying               Lifting               Catching               Throwing                               Modalities               CP               CP/IFC  15' 15' 15' 15'    15'

## 2020-07-29 ENCOUNTER — OFFICE VISIT (OUTPATIENT)
Dept: PHYSICAL THERAPY | Facility: CLINIC | Age: 45
End: 2020-07-29
Payer: OTHER MISCELLANEOUS

## 2020-07-29 DIAGNOSIS — M75.02 ADHESIVE CAPSULITIS OF LEFT SHOULDER: Primary | ICD-10-CM

## 2020-07-29 PROCEDURE — 97140 MANUAL THERAPY 1/> REGIONS: CPT | Performed by: PHYSICAL THERAPIST

## 2020-07-29 PROCEDURE — 97110 THERAPEUTIC EXERCISES: CPT | Performed by: PHYSICAL THERAPIST

## 2020-07-29 PROCEDURE — 97535 SELF CARE MNGMENT TRAINING: CPT | Performed by: PHYSICAL THERAPIST

## 2020-07-29 NOTE — PROGRESS NOTES
Daily Note     Today's date: 2020  Patient name: Octavia Arce  : 1975  MRN: [id number]  Referring provider: Jossie Jacobs MD  Dx:   Encounter Diagnosis     ICD-10-CM    1  Adhesive capsulitis of left shoulder M75 02                   Subjective: Shoulder aches up to 5/10 with use of the arm for housework  Objective: See treatment diary below      Assessment: After active eccentric abduction, shoulder pain was decreased  ROM was also increased after MT  Plan: Progress note during next visit           Precautions: None        Manuals 7/16 7/20 7/29 7/1  7/10 7/14   PROM LF LF RK LF  RK  LF   Joint Mob                               Neuro Re-Ed               Ball Stabilization               Bodyblade               PNF                               Ther Ex               Active eccentric abduction standing      10x QID HEP         Pulleys               Posterior Capsule Stretch L SL 10x  10X 10X 10X  10x  10X   Horizontal Adduction Stetch 10x  10X 10X 10X  10x  10X   Table Slides               Wall Slides               Shoulder Isometrics               TB High Row               TB Mid Row  L4 2/10  L5 2/10 L5 2/10 L4 2/10   L5 2/10   TB Low Row  L4 2/10  L4 2/10  L4 2/10        TB Biceps  L4 2/10  L4 2/10 L4 2/10 L4 2/10   L4 2/10   TB Triceps  L5 2/10  L5 2/10 L5 2/10 L4 2/10   L5 2/10   TB Upright Row      L4 2/10        TB Flexion  L42/10  L4 2/10  L4 2/10  L4 2/10   L4 2/10   TB Abduction  L3 2/10  L3 2/10  L3 2/10  L2 2/10   L3 2/10   TB SA Pulldown  L5 2/10  L5 2/10  L5 2/10  L4 2/10   L5 2/10   TB IR  L4 2/10  L4 2/10  L4 2/10 L2 2/10   L3 2/10   TB ER L4 2/10  L4 2/10  L4 2/10 L2 2/10   L3 2/10   TB Clock               TB [facility]  L3 2/10  L3 2/10 L3 2/10 L2 10x       SL ER 3# 2/10 3# 2/10 3# 2/10 2# 2/10  3# 2/10  3# 2/10   SL ABD  3# 2/10  3# 2/10 3# 2/10 2# 2/10  3# 2/10  3# 2/10   SL Flexion               SL Horiz ABD               FWD/LAT Raises               Ceiling Punches  3# 2/10  3# 2/10  3# 2/10  2# 2/10  5# 2/10  3# 2/10   Hoist Row: S               LAT Pulldown               XO Biceps               XO Triceps               XO Upright Row               XO IR               XO ER               XO SA Pulldowns               Blackburns  3# 10X  1# 10X   3# 10X  2# 10X  2# 10x  3# 10X                   Ther Activity               Reaching               Carrying               Lifting               Catching               Throwing                               Modalities               CP               CP/IFC  15' 15'  15'    15'

## 2020-08-03 NOTE — PROGRESS NOTES
PT Re-Evaluation     Today's date: 2020  Patient name: Dale Jeronimo  : 1975  MRN: [id number]  Referring provider: Kirstie Woody MD  Dx:   Encounter Diagnosis     ICD-10-CM    1  Adhesive capsulitis of left shoulder  M75 02                   Assessment  Assessment details:   CURRENT FUNCTIONAL STATUS    Dressing tolerance: Fair doffing shirts  Bathing/washing hair tolerance: Fair/Good with left arm  Able to reach overhead level with the left arm with mild discomfort  Able to lift 4 lbs with the left arm  SHORT TERM GOALS (2 WEEKS)    Increase shoulder AROM 5 degrees in all planes  Decrease pain to 0-3/10  Dressing tolerance: Fair/Good doffing shirts  Bathing/washing hair tolerance: Good with left arm  Able to reach overhead with the left arm with minimal/no discomfort  Able to lift 6 lbs to shoulder level with the left arm  LONG TERM GOALS (DISCHARGE)    Shoulder AROM: F=160 deg, ABD=160 deg, ER=60 deg, IR BTB=Upper thoracic spine -partially met  Shoulder PROM: F=165 deg, ABD=170 deg, ER=75 deg in neutral, IR=45 deg at 45 deg abd -partially met         Shoulder Strength: F=33 lbs, ABD=33 lbs, ER=17 lbs, IR=24 lbs -partially met  Decrease pain to 0-1/10 -partially met  Dressing tolerance: No pain doffing shirts -partially met  Bathing/washing hair tolerance: Good with left arm -partially met      Able to reach overhead with the left arm -partially met                         Able to lift 8 lbs to shoulder level, and 5 lbs overhead with the left arm -partially met            Understanding of Dx/Px/POC: good   Prognosis: good    Goals  See assessment details above  Plan  Plan details: The patient continues to present with pain, decreased ROM, decreased strength, and decreased tolerance to activity  PT plans to continue treatment until her surgeon's reevaluation on 2020    Patient would benefit from: skilled physical therapy  Planned modality interventions: unattended electrical stimulation and cryotherapy  Planned therapy interventions: manual therapy, therapeutic exercise, therapeutic activities, neuromuscular re-education and self care  Frequency: 2x week (Daily x 1 week, TIW x 3 weeks)  Duration in weeks: 4        Subjective Evaluation    History of Present Illness  Mechanism of injury: Subjective: The patient has been trying to use her left arm for all normal daily activities the past 2 weeks  Her left shoulder pain has increased because of it  She can reach overhead, but there is pain and clicking when raising and lowering the arm, especially out to the side  Lifting with the arm is limited to 4 lbs with the left arm in isolation  Pain  Current pain ratin  At best pain ratin  At worst pain ratin    Hand dominance: right    Patient Goals  Patient goals for therapy: decreased pain, increased motion, increased strength and return to work          Objective     General Comments:      Shoulder Comments   CURRENT OBJECTIVE MEASUREMENTS    Shoulder AROM: Shoulder AROM: F=155 deg, ABD=175 deg, ER=60 deg, IR BTB=T-L JCT  Shoulder PROM: F=160 deg, ABD=160 deg, ER=65 deg in neutral, IR=65 deg at 45 deg abd  Shoulder Strength: F=29 lbs, ABD=19 lbs, ER=11 lbs, IR=25 lbs                    Precautions: None   Manuals  8/4 7/10 7/14   PROM LF LF RK RK RK LF   Joint Mob                  Neuro Re-Ed         Ball Stabilization         Bodyblade         PNF                  Ther Ex         Active eccentric abduction standing   10x QID HEP 10x     Pulleys         Posterior Capsule Stretch L SL 10x  10X 10X 10X 10x 10X   Horizontal Adduction Stetch 10x  10X 10X 10X 10x 10X   Table Slides         Wall Slides         Shoulder Isometrics         TB High Row         TB Mid Row L4 2/10 L5 2/10 L5 2/10 L5 2/10  L5 2/10   TB Low Row L4 2/10 L4 2/10 L4 2/10 L4 2/10     TB Biceps L4 2/10 L4 2/10 L4 2/10 L4 2/10  L4 2/10   TB Triceps L5 2/10 L5 2/10 L5 2/10 L5 2/10  L5 2/10   TB Upright Row   L4 2/10 L4 2/10     TB Flexion L42/10 L4 2/10 L4 2/10 L4 2/10  L4 2/10   TB Abduction L3 2/10 L3 2/10 L3 2/10 L3 2/10  L3 2/10   TB SA Pulldown L5 2/10 L5 2/10 L5 2/10 L5 2/10  L5 2/10   TB IR L4 2/10 L4 2/10 L4 2/10 L4 2/10  L3 2/10   TB ER L4 2/10 L4 2/10 L4 2/10 L4 2/10  L3 2/10   TB Clock         TB OH Press L3 2/10 L3 2/10 L3 2/10 L3 10x     SL ER 3# 2/10 3# 2/10 3# 2/10 3# 2/10 3# 2/10 3# 2/10   SL ABD 3# 2/10 3# 2/10 3# 2/10 3# 2/10 3# 2/10 3# 2/10   SL Flexion         SL Horiz ABD         FWD/LAT Raises         Ceiling Punches 3# 2/10 3# 2/10 3# 2/10 3# 2/10 5# 2/10 3# 2/10   Hoist Row: S         LAT Pulldown         XO Biceps         XO Triceps         XO Upright Row         XO IR         XO ER         XO SA Pulldowns         Blackburns 3# 10X 1# 10X  3# 10X 3# 10X 2# 10x 3# 10X            Ther Activity         Reaching         Carrying         Lifting         Catching         Throwing                  Modalities         CP         CP/IFC 15' 15'  15'  15'

## 2020-08-04 ENCOUNTER — TRANSCRIBE ORDERS (OUTPATIENT)
Dept: PHYSICAL THERAPY | Facility: CLINIC | Age: 45
End: 2020-08-04

## 2020-08-04 ENCOUNTER — EVALUATION (OUTPATIENT)
Dept: PHYSICAL THERAPY | Facility: CLINIC | Age: 45
End: 2020-08-04
Payer: OTHER MISCELLANEOUS

## 2020-08-04 DIAGNOSIS — M75.02 ADHESIVE BURSITIS OF LEFT SHOULDER: Primary | ICD-10-CM

## 2020-08-04 DIAGNOSIS — M75.02 ADHESIVE CAPSULITIS OF LEFT SHOULDER: Primary | ICD-10-CM

## 2020-08-04 PROCEDURE — 97140 MANUAL THERAPY 1/> REGIONS: CPT | Performed by: PHYSICAL THERAPIST

## 2020-08-04 PROCEDURE — 97110 THERAPEUTIC EXERCISES: CPT | Performed by: PHYSICAL THERAPIST

## 2020-08-04 PROCEDURE — 97010 HOT OR COLD PACKS THERAPY: CPT | Performed by: PHYSICAL THERAPIST

## 2020-08-04 PROCEDURE — 97014 ELECTRIC STIMULATION THERAPY: CPT | Performed by: PHYSICAL THERAPIST

## 2020-08-04 NOTE — LETTER
2020    Mckayla Nolasco MD  [address] [place] [address]    Patient: Vinicius Lew   YOB: 1975   Date of Visit: 2020     Encounter Diagnosis     ICD-10-CM    1  Adhesive capsulitis of left shoulder  M75 02        Dear Dr Iván Brewster: Thank you for your recent referral of Vinicius Lew  Please review the attached evaluation summary from Edwin's recent visit  Please verify that you agree with the plan of care by signing the attached order  If you have any questions or concerns, please do not hesitate to call  I sincerely appreciate the opportunity to share in the care of one of your patients and hope to have another opportunity to work with you in the near future  Sincerely,    Philly Doan, PT      Referring Provider:      I certify that I have read the below Plan of Care and certify the need for these services furnished under this plan of treatment while under my care  Mckayla Nolasco MD  [facility]: [phone]          PT Re-Evaluation     Today's date: 2020  Patient name: Vinicius Lew  : 1975  MRN: [id number]  Referring provider: Harry Echevarria MD  Dx:   Encounter Diagnosis     ICD-10-CM    1  Adhesive capsulitis of left shoulder  M75 02                   Assessment  Assessment details:   CURRENT FUNCTIONAL STATUS    Dressing tolerance: Fair doffing shirts  Bathing/washing hair tolerance: Fair/Good with left arm  Able to reach overhead level with the left arm with mild discomfort  Able to lift 4 lbs with the left arm  SHORT TERM GOALS (2 WEEKS)    Increase shoulder AROM 5 degrees in all planes  Decrease pain to 0-3/10  Dressing tolerance: Fair/Good doffing shirts  Bathing/washing hair tolerance: Good with left arm  Able to reach overhead with the left arm with minimal/no discomfort  Able to lift 6 lbs to shoulder level with the left arm  LONG TERM GOALS (DISCHARGE)    Shoulder AROM: F=160 deg, ABD=160 deg, ER=60 deg, IR BTB=Upper thoracic spine -partially met  Shoulder PROM: F=165 deg, ABD=170 deg, ER=75 deg in neutral, IR=45 deg at 45 deg abd -partially met         Shoulder Strength: F=33 lbs, ABD=33 lbs, ER=17 lbs, IR=24 lbs -partially met  Decrease pain to 0-1/10 -partially met  Dressing tolerance: No pain doffing shirts -partially met  Bathing/washing hair tolerance: Good with left arm -partially met      Able to reach overhead with the left arm -partially met                         Able to lift 8 lbs to shoulder level, and 5 lbs overhead with the left arm -partially met            Understanding of Dx/Px/POC: good   Prognosis: good    Goals  See assessment details above  Plan  Plan details: The patient continues to present with pain, decreased ROM, decreased strength, and decreased tolerance to activity  PT plans to continue treatment until her surgeon's reevaluation on 2020  Patient would benefit from: skilled physical therapy  Planned modality interventions: unattended electrical stimulation and cryotherapy  Planned therapy interventions: manual therapy, therapeutic exercise, therapeutic activities, neuromuscular re-education and self care  Frequency: 2x week (Daily x 1 week, TIW x 3 weeks)  Duration in weeks: 4        Subjective Evaluation    History of Present Illness  Mechanism of injury: Subjective: The patient has been trying to use her left arm for all normal daily activities the past 2 weeks  Her left shoulder pain has increased because of it  She can reach overhead, but there is pain and clicking when raising and lowering the arm, especially out to the side  Lifting with the arm is limited to 4 lbs with the left arm in isolation    Pain  Current pain ratin  At best pain ratin  At worst pain ratin    Hand dominance: right    Patient Goals  Patient goals for therapy: decreased pain, increased motion, increased strength and return to work          Objective     General Comments:      Shoulder Comments   CURRENT OBJECTIVE MEASUREMENTS    Shoulder AROM: Shoulder AROM: F=155 deg, ABD=175 deg, ER=60 deg, IR BTB=T-L JCT  Shoulder PROM: F=160 deg, ABD=160 deg, ER=65 deg in neutral, IR=65 deg at 45 deg abd  Shoulder Strength: F=29 lbs, ABD=19 lbs, ER=11 lbs, IR=25 lbs                    Precautions: None   Manuals 7/16 7/20 7/29 8/4 7/10 7/14   PROM LF LF RK RK RK LF   Joint Mob                  Neuro Re-Ed         Ball Stabilization         Bodyblade         PNF                  Ther Ex         Active eccentric abduction standing   10x QID HEP 10x     Pulleys         Posterior Capsule Stretch L SL 10x  10X 10X 10X 10x 10X   Horizontal Adduction Stetch 10x  10X 10X 10X 10x 10X   Table Slides         Wall Slides         Shoulder Isometrics         TB High Row         TB Mid Row L4 2/10 L5 2/10 L5 2/10 L5 2/10  L5 2/10   TB Low Row L4 2/10 L4 2/10 L4 2/10 L4 2/10     TB Biceps L4 2/10 L4 2/10 L4 2/10 L4 2/10  L4 2/10   TB Triceps L5 2/10 L5 2/10 L5 2/10 L5 2/10  L5 2/10   TB Upright Row   L4 2/10 L4 2/10     TB Flexion L42/10 L4 2/10 L4 2/10 L4 2/10  L4 2/10   TB Abduction L3 2/10 L3 2/10 L3 2/10 L3 2/10  L3 2/10   TB SA Pulldown L5 2/10 L5 2/10 L5 2/10 L5 2/10  L5 2/10   TB IR L4 2/10 L4 2/10 L4 2/10 L4 2/10  L3 2/10   TB ER L4 2/10 L4 2/10 L4 2/10 L4 2/10  L3 2/10   TB Clock         TB OH Press L3 2/10 L3 2/10 L3 2/10 L3 10x     SL ER 3# 2/10 3# 2/10 3# 2/10 3# 2/10 3# 2/10 3# 2/10   SL ABD 3# 2/10 3# 2/10 3# 2/10 3# 2/10 3# 2/10 3# 2/10   SL Flexion         SL Horiz ABD         FWD/LAT Raises         Ceiling Punches 3# 2/10 3# 2/10 3# 2/10 3# 2/10 5# 2/10 3# 2/10   Hoist Row: S         LAT Pulldown         XO Biceps         XO Triceps         XO Upright Row         XO IR         XO ER         XO SA Pulldowns         Blackburns 3# 10X 1# 10X  3# 10X 3# 10X 2# 10x 3# 10X            Ther Activity         Reaching         Carrying         Lifting         Catching         Throwing                  Modalities         CP         CP/IFC 15' 15'  15'  15'

## 2020-08-06 ENCOUNTER — OFFICE VISIT (OUTPATIENT)
Dept: PHYSICAL THERAPY | Facility: CLINIC | Age: 45
End: 2020-08-06
Payer: OTHER MISCELLANEOUS

## 2020-08-06 DIAGNOSIS — M75.02 ADHESIVE CAPSULITIS OF LEFT SHOULDER: Primary | ICD-10-CM

## 2020-08-06 PROCEDURE — 97014 ELECTRIC STIMULATION THERAPY: CPT | Performed by: PHYSICAL THERAPIST

## 2020-08-06 PROCEDURE — 97140 MANUAL THERAPY 1/> REGIONS: CPT | Performed by: PHYSICAL THERAPIST

## 2020-08-06 PROCEDURE — 97010 HOT OR COLD PACKS THERAPY: CPT | Performed by: PHYSICAL THERAPIST

## 2020-08-06 NOTE — PROGRESS NOTES
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  She states that Dr Bree Zhao just wants her to stretch and hold on strengthening exercises  She will see him next week for follow up  Objective: See treatment diary below      Assessment: Mild discomfort was noted during stretching exercises and MT  Plan: Hold PT until surgical reevaluation next week       Precautions: None   Manuals    PROM LF LF RK RK RK LF   Joint Mob     RK             Neuro Re-Ed         Ball Stabilization         Bodyblade         PNF                  Ther Ex         Active eccentric abduction standing   10x QID HEP 10x     Pulleys         Posterior Capsule Stretch L SL 10x  10X 10X 10X  10X   Horizontal Adduction Stetch 10x  10X 10X 10X  10X   Table Slides         Wall Slides         Shoulder Isometrics         TB High Row         TB Mid Row L4 2/10 L5 2/10 L5 2/10 L5 2/10  L5 2/10   TB Low Row L4 2/10 L4 2/10 L4 2/10 L4 2/10     TB Biceps L4 2/10 L4 2/10 L4 2/10 L4 2/10  L4 2/10   TB Triceps L5 2/10 L5 2/10 L5 2/10 L5 2/10  L5 2/10   TB Upright Row   L4 2/10 L4 2/10     TB Flexion L42/10 L4 2/10 L4 2/10 L4 2/10  L4 2/10   TB Abduction L3 2/10 L3 2/10 L3 2/10 L3 2/10  L3 2/10   TB SA Pulldown L5 2/10 L5 2/10 L5 2/10 L5 2/10  L5 2/10   TB IR L4 2/10 L4 2/10 L4 2/10 L4 2/10  L3 2/10   TB ER L4 2/10 L4 2/10 L4 2/10 L4 2/10  L3 2/10   TB Clock         TB OH Press L3 2/10 L3 2/10 L3 2/10 L3 10x     SL ER 3# 2/10 3# 2/10 3# 2/10 3# 2/10  3# 2/10   SL ABD 3# 2/10 3# 2/10 3# 2/10 3# 2/10  3# 2/10   SL Flexion         SL Horiz ABD         FWD/LAT Raises         Ceiling Punches 3# 2/10 3# 2/10 3# 2/10 3# 210  3# 2/10   Hoist Row: S         LAT Pulldown         XO Biceps         XO Triceps         XO Upright Row         XO IR         XO ER         XO SA Pulldowns         Blackburns 3# 10X 1# 10X  3# 10X 3# 10X  3# 10X            Ther Activity         Reaching         Carrying         Lifting         Catching         Throwing                  Modalities         CP         CP/IFC 15' 15'  15' 15' 15'

## 2020-09-03 NOTE — PROGRESS NOTES
PT Discharge    Today's date: 9/3/2020  Patient name: Servando Silva  : 1975  MRN: [id number]  Referring provider: Noel Esquivel MD  Dx:   Encounter Diagnosis     ICD-10-CM    1  Adhesive capsulitis of left shoulder  M75 02          Assessment  Assessment details:   CURRENT FUNCTIONAL STATUS    Dressing tolerance: Fair doffing shirts  Bathing/washing hair tolerance: Fair/Good with left arm  Able to reach overhead level with the left arm with mild discomfort  Able to lift 4 lbs with the left arm  LONG TERM GOALS (DISCHARGE)    Shoulder AROM: F=160 deg, ABD=160 deg, ER=60 deg, IR BTB=Upper thoracic spine -partially met  Shoulder PROM: F=165 deg, ABD=170 deg, ER=75 deg in neutral, IR=45 deg at 45 deg abd -partially met         Shoulder Strength: F=33 lbs, ABD=33 lbs, ER=17 lbs, IR=24 lbs -partially met  Decrease pain to 0-1/10 -partially met  Dressing tolerance: No pain doffing shirts -partially met  Bathing/washing hair tolerance: Good with left arm -partially met      Able to reach overhead with the left arm -partially met                         Able to lift 8 lbs to shoulder level, and 5 lbs overhead with the left arm -partially met            Understanding of Dx/Px/POC: good   Prognosis: good    Goals  See assessment details above  Plan  Planned modality interventions: unattended electrical stimulation and cryotherapy  Planned therapy interventions: manual therapy, therapeutic exercise, therapeutic activities, neuromuscular re-education and self care  Frequency: Daily x 1 week, TIW x 3 weeks  Subjective Evaluation    History of Present Illness  Mechanism of injury: Subjective: I spoke with the patient by phone  She will be getting a second opinion on her shoulder and is discharged from PT at this time    Pain  Current pain ratin  At best pain ratin  At worst pain ratin    Hand dominance: right    Patient Goals  Patient goals for therapy: decreased pain, increased motion, increased strength and return to work          Objective     General Comments:      Shoulder Comments   CURRENT OBJECTIVE MEASUREMENTS    Shoulder AROM: Shoulder AROM: F=155 deg, ABD=175 deg, ER=60 deg, IR BTB=T-L JCT  Shoulder PROM: F=160 deg, ABD=160 deg, ER=65 deg in neutral, IR=65 deg at 45 deg abd  Shoulder Strength: F=29 lbs, ABD=19 lbs, ER=11 lbs, IR=25 lbs

## 2020-09-28 NOTE — PROGRESS NOTES
Assessment/Plan:    No problem-specific Assessment & Plan notes found for this encounter  Diagnoses and all orders for this visit:    Bronchitis  -     azithromycin (ZITHROMAX) 250 mg tablet; Take 2 tablets today then 1 tablet daily x 4 days    Upper respiratory tract infection, unspecified type  -     azithromycin (ZITHROMAX) 250 mg tablet; Take 2 tablets today then 1 tablet daily x 4 days    Other orders  -     VITAMIN D, ERGOCALCIFEROL, PO; Take by mouth  -     Ferrous Gluconate-C-Folic Acid (IRON-C PO); Take by mouth  -     Calcium Carbonate (CALCIUM 600 PO); Take by mouth          [place] has a history of bronchitis and believes this is similar to past episodes  Will start her on Zithromax and she will call if any symptoms change/worsen/persist  Suggested she try using a heating pad on her back to relieve the muscles from coughing spasm  Subjective:      Patient ID: Michael Horne is a 37 y o  female  Cough   This is a new problem  The current episode started in the past 7 days  The problem has been unchanged  The problem occurs every few minutes  The cough is non-productive  Associated symptoms include nasal congestion, postnasal drip, rhinorrhea, shortness of breath (at times) and wheezing (at times, mild)  Pertinent negatives include no chest pain, chills, ear pain, fever, myalgias, rash, sore throat or weight loss  Associated symptoms comments: Upper back pain  Nothing aggravates the symptoms  Her past medical history is significant for bronchitis  The following portions of the patient's history were reviewed and updated as appropriate:   She  has a past medical history of Benign essential hypertension (12/6/2016)    She   Patient Active Problem List    Diagnosis Date Noted    S/P laparoscopic appendectomy 08/01/2017    Benign essential hypertension 12/06/2016    Obstructive sleep apnea, adult 12/06/2016    History of sleeve gastrectomy 12/01/2016     She  has a past surgical history that includes GASTRECTOMY SLEEVE LAPAROSCOPIC (12/2016) and Hysterectomy  Her family history includes No Known Problems in her mother  She  reports that she has never smoked  She has never used smokeless tobacco  She reports that she does not drink alcohol or use drugs  Current Outpatient Prescriptions   Medication Sig Dispense Refill    Calcium Carbonate (CALCIUM 600 PO) Take by mouth      Ferrous Gluconate-C-Folic Acid (IRON-C PO) Take by mouth      multivitamin (THERAGRAN) TABS Take 1 tablet by mouth daily      VITAMIN D, ERGOCALCIFEROL, PO Take by mouth      azithromycin (ZITHROMAX) 250 mg tablet Take 2 tablets today then 1 tablet daily x 4 days 6 tablet 0     No current facility-administered medications for this visit  Current Outpatient Prescriptions on File Prior to Visit   Medication Sig    multivitamin (THERAGRAN) TABS Take 1 tablet by mouth daily     No current facility-administered medications on file prior to visit  She has No Known Allergies       Review of Systems   Constitutional: Negative for chills, fever and weight loss  HENT: Positive for congestion, postnasal drip and rhinorrhea  Negative for ear pain and sore throat  Respiratory: Positive for cough, shortness of breath (at times) and wheezing (at times, mild)  Cardiovascular: Negative for chest pain and palpitations  Musculoskeletal: Positive for back pain (upper back pain)  Negative for myalgias  Skin: Negative for rash  Objective:      /68 (BP Location: Right arm, Patient Position: Sitting)   Temp 98 4 °F (36 9 °C)   Ht 5' 1 5" (1 562 m)   Wt 63 7 kg (140 lb 6 4 oz)   LMP  (LMP Unknown)   BMI 26 10 kg/m²          Physical Exam   Constitutional: She is oriented to person, place, and time  She appears well-developed and well-nourished  No distress  HENT:   Head: Normocephalic and atraumatic     Right Ear: Hearing, tympanic membrane, external ear and ear canal normal    Left Ear: Hearing, tympanic membrane, external ear and ear canal normal    Mouth/Throat: Uvula is midline, oropharynx is clear and moist and mucous membranes are normal    Eyes: Conjunctivae are normal    Neck: Neck supple  No thyromegaly present  Cardiovascular: Normal rate, regular rhythm and normal heart sounds  Pulmonary/Chest: Effort normal  No respiratory distress  She has no wheezes  She exhibits no tenderness  Lymphadenopathy:     She has no cervical adenopathy  Neurological: She is alert and oriented to person, place, and time  Skin: Skin is warm and dry  She is not diaphoretic  Psychiatric: She has a normal mood and affect  Her behavior is normal  Judgment and thought content normal    Vitals reviewed  Hypernatremia

## 2020-10-01 ENCOUNTER — APPOINTMENT (OUTPATIENT)
Dept: LAB | Facility: MEDICAL CENTER | Age: 45
End: 2020-10-01
Payer: OTHER MISCELLANEOUS

## 2020-10-01 ENCOUNTER — TRANSCRIBE ORDERS (OUTPATIENT)
Dept: LAB | Facility: MEDICAL CENTER | Age: 45
End: 2020-10-01

## 2020-10-01 DIAGNOSIS — M75.02 ADHESIVE BURSITIS OF LEFT SHOULDER: Primary | ICD-10-CM

## 2020-10-01 DIAGNOSIS — M75.02 ADHESIVE BURSITIS OF LEFT SHOULDER: ICD-10-CM

## 2020-10-01 LAB
EST. AVERAGE GLUCOSE BLD GHB EST-MCNC: 94 MG/DL
HBA1C MFR BLD: 4.9 %
T4 FREE SERPL-MCNC: 0.86 NG/DL (ref 0.76–1.46)
TSH SERPL DL<=0.05 MIU/L-ACNC: 1.37 UIU/ML (ref 0.36–3.74)

## 2020-10-01 PROCEDURE — 84439 ASSAY OF FREE THYROXINE: CPT

## 2020-10-01 PROCEDURE — 36415 COLL VENOUS BLD VENIPUNCTURE: CPT

## 2020-10-01 PROCEDURE — 83036 HEMOGLOBIN GLYCOSYLATED A1C: CPT

## 2020-10-01 PROCEDURE — 84443 ASSAY THYROID STIM HORMONE: CPT

## 2021-01-08 ENCOUNTER — OFFICE VISIT (OUTPATIENT)
Dept: FAMILY MEDICINE CLINIC | Facility: CLINIC | Age: 46
End: 2021-01-08
Payer: COMMERCIAL

## 2021-01-08 ENCOUNTER — TELEPHONE (OUTPATIENT)
Dept: FAMILY MEDICINE CLINIC | Facility: CLINIC | Age: 46
End: 2021-01-08

## 2021-01-08 VITALS
WEIGHT: 171.6 LBS | TEMPERATURE: 98.1 F | HEART RATE: 79 BPM | SYSTOLIC BLOOD PRESSURE: 132 MMHG | BODY MASS INDEX: 32.4 KG/M2 | OXYGEN SATURATION: 98 % | DIASTOLIC BLOOD PRESSURE: 86 MMHG | HEIGHT: 61 IN

## 2021-01-08 DIAGNOSIS — G89.29 CHRONIC LEFT SHOULDER PAIN: ICD-10-CM

## 2021-01-08 DIAGNOSIS — Z12.31 ENCOUNTER FOR SCREENING MAMMOGRAM FOR MALIGNANT NEOPLASM OF BREAST: Primary | ICD-10-CM

## 2021-01-08 DIAGNOSIS — M75.00 ADHESIVE CAPSULITIS OF SHOULDER, UNSPECIFIED LATERALITY: Primary | ICD-10-CM

## 2021-01-08 DIAGNOSIS — M25.512 CHRONIC LEFT SHOULDER PAIN: ICD-10-CM

## 2021-01-08 DIAGNOSIS — J34.89 NASAL SORE: ICD-10-CM

## 2021-01-08 PROCEDURE — 99214 OFFICE O/P EST MOD 30 MIN: CPT | Performed by: PHYSICIAN ASSISTANT

## 2021-01-08 PROCEDURE — 3008F BODY MASS INDEX DOCD: CPT | Performed by: PHYSICIAN ASSISTANT

## 2021-01-08 PROCEDURE — 1036F TOBACCO NON-USER: CPT | Performed by: PHYSICIAN ASSISTANT

## 2021-01-08 PROCEDURE — 3079F DIAST BP 80-89 MM HG: CPT | Performed by: PHYSICIAN ASSISTANT

## 2021-01-08 PROCEDURE — 3725F SCREEN DEPRESSION PERFORMED: CPT | Performed by: PHYSICIAN ASSISTANT

## 2021-01-08 PROCEDURE — 3075F SYST BP GE 130 - 139MM HG: CPT | Performed by: PHYSICIAN ASSISTANT

## 2021-01-08 NOTE — TELEPHONE ENCOUNTER
She would like you to refer her for a second opinion for her shoulder, she seen dr Laura Castanon at [facility]    She wants to make sure she is on the right path

## 2021-01-08 NOTE — TELEPHONE ENCOUNTER
I put a consult on for her to see Dr Gustavo Lazcano at the [address] he is a shoulder specialist

## 2021-01-08 NOTE — PROGRESS NOTES
Assessment/Plan:    Problem List Items Addressed This Visit     None      Visit Diagnoses     Encounter for screening mammogram for malignant neoplasm of breast    -  Primary    Relevant Orders    Mammo screening bilateral w 3d & cad    Nasal sore        Relevant Medications    mupirocin (BACTROBAN) 2 % ointment    Chronic left shoulder pain        Relevant Orders    Ambulatory referral to Orthopedic Surgery           Diagnoses and all orders for this visit:    Encounter for screening mammogram for malignant neoplasm of breast  -     Mammo screening bilateral w 3d & cad; Future    Nasal sore  -     mupirocin (BACTROBAN) 2 % ointment; Apply topically 3 (three) times a day    Chronic left shoulder pain  -     Ambulatory referral to Orthopedic Surgery; Future        Script for Mupirocin ointment  I advised her to try this for one week and if it does not improve or worsen we will get her in with ENT  For her chronic left shoulder pain, will refer her to orthopedics for second opinion  Subjective:      Patient ID: Carlotta Evans is a 50 y o  female  Sol Mackay is a pleasant 50year old female who is here today complaining of a sore inside her nose  This has been occurring on and off for about one year  She states that it gets painful and scabs  She has been applying neosporin, which does provide some temporary relief  She noticed that it seems like it is distorting the tip of her nose, so she figured it was time to get it looked at  She denies any skin changes on the outside of her nose  She denies any scaling, bleeding, or lesions  She will occasionally get some bleeding when the crust falls off inside her nose  She denies any redness, swelling, fevers, chills, sinus pressure, or sore throat  She is also having left shoulder pain  This is a chronic problem for her  She had a left shoulder arthroscopy and rotator cuff debridement by Dr Moira Ochoa at [facility] in June 2019  In February of 2020 she had decreased ROM   She had a capsular release surgery, which allowed her to regain her ROM  However, she had pain  She went to PT, but she stopped seeing improvement  She has pain in the anterior portion of her shoulder and down the lateral portion of her arm  She did follow up with [facility], and they told her that not much more could be done  She is looking for a second opinion  The following portions of the patient's history were reviewed and updated as appropriate:   She has a past medical history of Benign essential hypertension (12/6/2016), Carpal tunnel syndrome of left wrist, Community acquired pneumonia, and Morbid obesity with BMI of 40 0-44 9, adult ([facility] [address] )  ,  does not have any pertinent problems on file  ,   has a past surgical history that includes GASTRECTOMY SLEEVE LAPAROSCOPIC (12/2016); Hysterectomy; Hand surgery (Bilateral); Hysteroscopy; Neuroplasty / transposition median nerve at carpal tunnel (Right, 2013); Shoulder surgery (Right, 2013); Tonsillectomy and adenoidectomy; and Appendectomy (2017)  ,  family history includes Alzheimer's disease in her paternal grandmother; Asthma in her father; Hypertension in her mother; Other in her maternal aunt  ,   reports that she has never smoked  She has never used smokeless tobacco  She reports previous alcohol use  She reports that she does not use drugs  ,  has No Known Allergies     Current Outpatient Medications   Medication Sig Dispense Refill    Calcium Carbonate (CALCIUM 600 PO) Take by mouth      Ferrous Gluconate-C-Folic Acid (IRON-C PO) Take by mouth      montelukast (SINGULAIR) 10 mg tablet Take 1 tablet (10 mg total) by mouth daily at bedtime 30 tablet 5    multivitamin (THERAGRAN) TABS Take 1 tablet by mouth daily      vitamin B-12 (VITAMIN B-12) 1,000 mcg tablet Take 1,000 mcg by mouth daily      albuterol (PROVENTIL HFA,VENTOLIN HFA) 90 mcg/act inhaler Inhale 2 puffs every 4 (four) hours as needed for wheezing (Patient not taking: Reported on 2/3/2020) 1 Inhaler 0  mupirocin (BACTROBAN) 2 % ointment Apply topically 3 (three) times a day 22 g 0    VITAMIN D, ERGOCALCIFEROL, PO Take by mouth       No current facility-administered medications for this visit  Review of Systems   Constitutional: Negative for chills, diaphoresis, fatigue and fever  HENT: Negative for congestion, ear pain, postnasal drip, rhinorrhea, sneezing, sore throat and trouble swallowing  Nasal sore   Eyes: Negative for pain and visual disturbance  Respiratory: Negative for apnea, cough, shortness of breath and wheezing  Cardiovascular: Negative for chest pain and palpitations  Gastrointestinal: Negative for abdominal pain, constipation, diarrhea, nausea and vomiting  Genitourinary: Negative for dysuria and hematuria  Musculoskeletal: Positive for arthralgias (left shoulder pain)  Negative for gait problem and myalgias  Neurological: Negative for dizziness, syncope, weakness, light-headedness, numbness and headaches  Psychiatric/Behavioral: Negative for suicidal ideas  The patient is not nervous/anxious  Objective:  Vitals:    01/08/21 0824   BP: 132/86   Pulse: 79   Temp: 98 1 °F (36 7 °C)   SpO2: 98%   Weight: 77 8 kg (171 lb 9 6 oz)   Height: 5' 1" (1 549 m)     Body mass index is 32 42 kg/m²  Physical Exam  Vitals signs and nursing note reviewed  Constitutional:       Appearance: She is well-developed  HENT:      Head: Normocephalic and atraumatic  Right Ear: Tympanic membrane, ear canal and external ear normal       Left Ear: Tympanic membrane, ear canal and external ear normal       Nose: Nose normal       Comments: Fissure on inside of nose where cartilage meets mucosa  No drainage, swelling, erythema, or crusting  Very mild distortion of tip of the left side of her nose  Likely from chronic irritation  No crusting, ulceration, erythema, or drainage  No tenderness with palpation of the tip of her nose        Mouth/Throat:      Pharynx: No oropharyngeal exudate or posterior oropharyngeal erythema  Eyes:      Pupils: Pupils are equal, round, and reactive to light  Neck:      Musculoskeletal: Normal range of motion and neck supple  Cardiovascular:      Rate and Rhythm: Normal rate and regular rhythm  Heart sounds: Normal heart sounds  No murmur  No friction rub  No gallop  Pulmonary:      Effort: Pulmonary effort is normal  No respiratory distress  Breath sounds: Normal breath sounds  No wheezing or rales  Abdominal:      General: Bowel sounds are normal       Palpations: Abdomen is soft  Tenderness: There is no abdominal tenderness  There is no guarding or rebound  Musculoskeletal: Normal range of motion  General: Tenderness (anteroir shoulder) present  Left shoulder: She exhibits tenderness and pain  She exhibits normal range of motion, no swelling and no effusion  Lymphadenopathy:      Cervical: No cervical adenopathy  Skin:     General: Skin is warm and dry  Neurological:      Mental Status: She is alert and oriented to person, place, and time  Psychiatric:         Behavior: Behavior normal          Thought Content:  Thought content normal          Judgment: Judgment normal

## 2021-01-13 ENCOUNTER — HOSPITAL ENCOUNTER (OUTPATIENT)
Dept: MAMMOGRAPHY | Facility: HOSPITAL | Age: 46
Discharge: HOME | End: 2021-01-13
Payer: COMMERCIAL

## 2021-01-13 VITALS — BODY MASS INDEX: 32.28 KG/M2 | HEIGHT: 61 IN | WEIGHT: 171 LBS

## 2021-01-13 DIAGNOSIS — Z12.31 ENCOUNTER FOR SCREENING MAMMOGRAM FOR MALIGNANT NEOPLASM OF BREAST: ICD-10-CM

## 2021-01-13 PROCEDURE — 77063 BREAST TOMOSYNTHESIS BI: CPT

## 2021-01-13 PROCEDURE — 77067 SCR MAMMO BI INCL CAD: CPT

## 2021-03-26 DIAGNOSIS — Z23 ENCOUNTER FOR IMMUNIZATION: ICD-10-CM

## 2021-04-05 ENCOUNTER — TELEPHONE (OUTPATIENT)
Dept: FAMILY MEDICINE CLINIC | Facility: CLINIC | Age: 46
End: 2021-04-05

## 2021-04-05 DIAGNOSIS — L50.9 URTICARIA: Primary | ICD-10-CM

## 2021-04-05 NOTE — PROGRESS NOTES
PT Evaluation     Today's date: 2021  Patient name: Romy Hurst  : 1975  MRN: [id number]  Referring provider: Juan Miguel Ramsey MD  Dx:   Encounter Diagnosis     ICD-10-CM    1  Bicipital tendinitis of left shoulder  M75 22                   Assessment  Assessment details:   CURRENT FUNCTIONAL STATUS    Dressing tolerance: Poor doffing shirts  Bathing/washing hair tolerance: Poor with left arm  Able to reach below shoulder level with the left arm  Avoiding lifting with the left arm  SHORT TERM GOALS (2 WEEKS)    Increase shoulder PROM as per protocol  Decrease pain to 0-4/10  Dressing tolerance: Fair doffing shirts  Bathing/washing hair tolerance: Fair with left arm  Able to reach above shoulder level with the left arm  Avoid lifting with the left arm  LONG TERM GOALS (DISCHARGE)    Shoulder AROM: F=160 deg, ABD=160 deg, ER=60 deg, IR BTB=Upper thoracic spine  Shoulder PROM: F=165 deg, ABD=170 deg, ER=75 deg in neutral, IR=45 deg at 45 deg abd  Shoulder Strength: F=33 lbs, ABD=33 lbs, ER=17 lbs, IR=24 lbs  Decrease pain to 0-1/10  Dressing tolerance: No pain doffing shirts  Bathing/washing hair tolerance: Good with left arm  Able to reach overhead with the left arm  Able to lift 8 lbs to shoulder level, and 5 lbs overhead with the left arm  Understanding of Dx/Px/POC: good   Prognosis: good    Plan  Plan details: Romy Hurst is a 50 y o  female presenting to PT with pain, decreased range of motion, decreased strength, and decreased tolerance to activity  This patient would benefit from skilled PT services to address these issues and to maximize function  A home exercise program was provided and all questions were answered   Thank you for the referral       Patient would benefit from: skilled physical therapy  Planned modality interventions: unattended electrical stimulation and cryotherapy  Planned therapy interventions: manual therapy, therapeutic exercise, therapeutic activities, neuromuscular re-education and self care  Frequency: 2x week (Daily x 1 week, TIW x 3 weeks)  Duration in weeks: 4        Subjective Evaluation    History of Present Illness  Mechanism of injury: CC: Left shoulder pain, stiffness, and weakness  HPI: The patient's left shoulder pain began from a work injury which occurred on 2018  She was standing on a chair 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  She lost her balance and grabbed onto a ledge, yanking on her shoulder and twisting it  An MRI revealed a RTC which was repaired on 2019  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 which responded well to a manipulation performed on 6/10/2019  She started to develop shoulder pain again later, and had arthroscopic surgery performed on 2020 to release the posterior capsule  She also had a SAD and bursectomy  She had a course of PT, but continued to have pain  She then had a biceps tenodesis performed on 3/10/2021  She got cellulitis and had additional surgery to clean up the infection on 3/18/2021  She wore a sling for 2 weeks  She states that she is using her arm for daily activities, but she is not ot lift with the arm  She is off of work as a   Pain  Current pain rating: 3  At best pain ratin  At worst pain ratin    Hand dominance: right    Patient Goals  Patient goals for therapy: decreased pain, increased motion, increased strength and return to work          Objective     General Comments:      Shoulder Comments   CURRENT OBJECTIVE MEASUREMENTS    Shoulder AROM: Deferred  Shoulder PROM: F=75 deg, ER=35 deg  Shoulder Strength: Deferred                    Precautions: None      Manuals             PROM: Flex and ER RK                                      Neuro Re-Ed             [place] Stabilization             Bodyblade             PNF                          Ther Ex Pendulums             Pulleys             ER with wand 10x TID HEP            Flexion supine PROM 10x TID HEP            Table Slides             Wall Slides             Shoulder Isometrics             TB High Row             TB Mid Row             TB Low Row             TB Biceps             TB Triceps             TB Upright Row             TB Flexion             TB Abduction             TB SA Pulldown             TB IR             TB ER             TB Clock             SL ER             SL ABD             SL Flexion             SL Horiz ABD             FWD/LAT Raises             Ceiling Punches             Hoist Row: S             LAT Pulldown             XO Biceps             XO Triceps             XO Upright Row             XO IR             XO ER             XO SA Pulldowns             [place]                          Ther Activity             Reaching             Carrying             Lifting             Catching             Throwing                          Modalities             CP 15'

## 2021-04-06 ENCOUNTER — TRANSCRIBE ORDERS (OUTPATIENT)
Dept: PHYSICAL THERAPY | Facility: CLINIC | Age: 46
End: 2021-04-06

## 2021-04-06 ENCOUNTER — EVALUATION (OUTPATIENT)
Dept: PHYSICAL THERAPY | Facility: CLINIC | Age: 46
End: 2021-04-06
Payer: OTHER MISCELLANEOUS

## 2021-04-06 ENCOUNTER — LAB (OUTPATIENT)
Dept: LAB | Facility: MEDICAL CENTER | Age: 46
End: 2021-04-06
Payer: COMMERCIAL

## 2021-04-06 DIAGNOSIS — M75.22 BICIPITAL TENDINITIS OF LEFT SHOULDER: Primary | ICD-10-CM

## 2021-04-06 DIAGNOSIS — L50.9 URTICARIA: ICD-10-CM

## 2021-04-06 LAB — TSH SERPL DL<=0.05 MIU/L-ACNC: 1.28 UIU/ML (ref 0.36–3.74)

## 2021-04-06 PROCEDURE — 36415 COLL VENOUS BLD VENIPUNCTURE: CPT | Performed by: FAMILY MEDICINE

## 2021-04-06 PROCEDURE — 97140 MANUAL THERAPY 1/> REGIONS: CPT | Performed by: PHYSICAL THERAPIST

## 2021-04-06 PROCEDURE — 97010 HOT OR COLD PACKS THERAPY: CPT | Performed by: PHYSICAL THERAPIST

## 2021-04-06 PROCEDURE — 86800 THYROGLOBULIN ANTIBODY: CPT

## 2021-04-06 PROCEDURE — 97535 SELF CARE MNGMENT TRAINING: CPT | Performed by: PHYSICAL THERAPIST

## 2021-04-06 PROCEDURE — 84443 ASSAY THYROID STIM HORMONE: CPT | Performed by: FAMILY MEDICINE

## 2021-04-06 PROCEDURE — 86376 MICROSOMAL ANTIBODY EACH: CPT

## 2021-04-06 PROCEDURE — 97163 PT EVAL HIGH COMPLEX 45 MIN: CPT | Performed by: PHYSICAL THERAPIST

## 2021-04-06 NOTE — LETTER
2021    JUAN Marquez [address] [place]    Patient: Dolores Meier   YOB: 1975   Date of Visit: 2021     Encounter Diagnosis     ICD-10-CM    1  Bicipital tendinitis of left shoulder  M75 22        Dear Dr Wai Marr:    Thank you for your recent referral of Dolores Meire  Please review the attached evaluation summary from Edwin's recent visit  Please verify that you agree with the plan of care by signing the attached order  If you have any questions or concerns, please do not hesitate to call  I sincerely appreciate the opportunity to share in the care of one of your patients and hope to have another opportunity to work with you in the near future  Sincerely,    Lynne Glover, PT      Referring Provider:      I certify that I have read the below Plan of Care and certify the need for these services furnished under this plan of treatment while under my care  JUAN Marquez [address] [facility] [address]  [address] [facility] [address]  Via Fax: [phone]          PT Evaluation     Today's date: 2021  Patient name: Dolores Meier  : 1975  MRN: [id number]  Referring provider: Silvia Bae MD  Dx:   Encounter Diagnosis     ICD-10-CM    1  Bicipital tendinitis of left shoulder  M75 22                   Assessment  Assessment details:   CURRENT FUNCTIONAL STATUS    Dressing tolerance: Poor doffing shirts  Bathing/washing hair tolerance: Poor with left arm  Able to reach below shoulder level with the left arm  Avoiding lifting with the left arm  SHORT TERM GOALS (2 WEEKS)    Increase shoulder PROM as per protocol  Decrease pain to 0-4/10  Dressing tolerance: Fair doffing shirts  Bathing/washing hair tolerance: Fair with left arm  Able to reach above shoulder level with the left arm  Avoid lifting with the left arm        LONG TERM GOALS (DISCHARGE)    Shoulder AROM: F=160 deg, ABD=160 deg, ER=60 deg, IR BTB=Upper thoracic spine  Shoulder PROM: F=165 deg, ABD=170 deg, ER=75 deg in neutral, IR=45 deg at 45 deg abd  Shoulder Strength: F=33 lbs, ABD=33 lbs, ER=17 lbs, IR=24 lbs  Decrease pain to 0-1/10  Dressing tolerance: No pain doffing shirts  Bathing/washing hair tolerance: Good with left arm  Able to reach overhead with the left arm  Able to lift 8 lbs to shoulder level, and 5 lbs overhead with the left arm  Understanding of Dx/Px/POC: good   Prognosis: good    Plan  Plan details: Neelima Hickman is a 50 y o  female presenting to PT with pain, decreased range of motion, decreased strength, and decreased tolerance to activity  This patient would benefit from skilled PT services to address these issues and to maximize function  A home exercise program was provided and all questions were answered  Thank you for the referral       Patient would benefit from: skilled physical therapy  Planned modality interventions: unattended electrical stimulation and cryotherapy  Planned therapy interventions: manual therapy, therapeutic exercise, therapeutic activities, neuromuscular re-education and self care  Frequency: 2x week (Daily x 1 week, TIW x 3 weeks)  Duration in weeks: 4        Subjective Evaluation    History of Present Illness  Mechanism of injury: CC: Left shoulder pain, stiffness, and weakness  HPI: The patient's left shoulder pain began from a work injury which occurred on 8/21/2018  She was standing on a chair 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  She lost her balance and grabbed onto a ledge, yanking on her shoulder and twisting it  An MRI revealed a RTC which was repaired on 2/4/2019  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 which responded well to a manipulation performed on 6/10/2019   She started to develop shoulder pain again later, and had arthroscopic surgery performed on 2020 to release the posterior capsule  She also had a SAD and bursectomy  She had a course of PT, but continued to have pain  She then had a biceps tenodesis performed on 3/10/2021  She got cellulitis and had additional surgery to clean up the infection on 3/18/2021  She wore a sling for 2 weeks  She states that she is using her arm for daily activities, but she is not ot lift with the arm  She is off of work as a   Pain  Current pain rating: 3  At best pain ratin  At worst pain ratin    Hand dominance: right    Patient Goals  Patient goals for therapy: decreased pain, increased motion, increased strength and return to work          Objective     General Comments:      Shoulder Comments   CURRENT OBJECTIVE MEASUREMENTS    Shoulder AROM: Deferred  Shoulder PROM: F=75 deg, ER=35 deg  Shoulder Strength: Deferred                    Precautions: None      Manuals             PROM: Flex and ER RK                                      Neuro Re-Ed             Ball Stabilization             Bodyblade             PNF                          Ther Ex             Pendulums             Pulleys             ER with wand 10x TID HEP            Flexion supine PROM 10x TID HEP            Table Slides             Wall Slides             Shoulder Isometrics             TB High Row             TB Mid Row             TB Low Row             TB Biceps             TB Triceps             TB Upright Row             TB Flexion             TB Abduction             TB SA Pulldown             TB IR             TB ER             TB Clock             SL ER             SL ABD             SL Flexion             SL Horiz ABD             FWD/LAT Raises             Ceiling Punches             Hoist Row: S             LAT Pulldown             XO Biceps             XO Triceps             XO Upright Row             XO IR             XO ER             XO SA Pulldowns             [place] Ther Activity             Reaching             Carrying             Lifting             Catching             Throwing                          Modalities             CP 15'

## 2021-04-07 LAB
THYROGLOB AB SERPL-ACNC: <1 IU/ML (ref 0–0.9)
THYROPEROXIDASE AB SERPL-ACNC: <9 IU/ML (ref 0–34)

## 2021-04-09 ENCOUNTER — OFFICE VISIT (OUTPATIENT)
Dept: PHYSICAL THERAPY | Facility: CLINIC | Age: 46
End: 2021-04-09
Payer: OTHER MISCELLANEOUS

## 2021-04-09 DIAGNOSIS — M75.22 BICIPITAL TENDINITIS OF LEFT SHOULDER: Primary | ICD-10-CM

## 2021-04-09 PROCEDURE — 97010 HOT OR COLD PACKS THERAPY: CPT | Performed by: PHYSICAL THERAPIST

## 2021-04-09 PROCEDURE — 97110 THERAPEUTIC EXERCISES: CPT | Performed by: PHYSICAL THERAPIST

## 2021-04-09 PROCEDURE — 97014 ELECTRIC STIMULATION THERAPY: CPT | Performed by: PHYSICAL THERAPIST

## 2021-04-09 PROCEDURE — 97140 MANUAL THERAPY 1/> REGIONS: CPT | Performed by: PHYSICAL THERAPIST

## 2021-04-09 NOTE — PROGRESS NOTES
Daily Note     Today's date: 2021  Patient name: Wolfgang Cuellar  : 1975  MRN: [id number]  Referring provider: Amber Palacio*  Dx:   Encounter Diagnosis     ICD-10-CM    1  Bicipital tendinitis of left shoulder  M75 22                   Subjective: Patient is compliant with her HEP  The left shoulder is moving better, but it does get sore  Objective: See treatment diary below      Assessment: PROM after MT: F=90 deg, ER= 35 deg  Plan: Progress treament per protocol        Precautions: None      Manuals            PROM: Flex and ER RK RK                                     Neuro Re-Ed             Ball Stabilization             Bodyblade             PNF                          Ther Ex             Pendulums             Pulleys             ER with wand 10x TID HEP 20x           Flexion supine PROM 10x TID HEP 20x           Table Slides             Wall Slides             Shoulder Isometrics             TB High Row             TB Mid Row             TB Low Row             TB Biceps             TB Triceps             TB Upright Row             TB Flexion             TB Abduction             TB SA Pulldown             TB IR             TB ER             TB Clock             SL ER             SL ABD             SL Flexion             SL Horiz ABD             FWD/LAT Raises             Ceiling Punches             Hoist Row: S             LAT Pulldown             XO Biceps             XO Triceps             XO Upright Row             XO IR             XO ER             XO SA Pulldowns             Blackburns                          Ther Activity             Reaching             Carrying             Lifting             Catching             Throwing                          Modalities             CP 15'            CP/IFC  15'

## 2021-04-12 ENCOUNTER — OFFICE VISIT (OUTPATIENT)
Dept: PHYSICAL THERAPY | Facility: CLINIC | Age: 46
End: 2021-04-12
Payer: OTHER MISCELLANEOUS

## 2021-04-12 DIAGNOSIS — M75.22 BICIPITAL TENDINITIS OF LEFT SHOULDER: Primary | ICD-10-CM

## 2021-04-12 PROCEDURE — 97140 MANUAL THERAPY 1/> REGIONS: CPT

## 2021-04-12 PROCEDURE — 97014 ELECTRIC STIMULATION THERAPY: CPT

## 2021-04-12 PROCEDURE — 97110 THERAPEUTIC EXERCISES: CPT

## 2021-04-12 PROCEDURE — 97010 HOT OR COLD PACKS THERAPY: CPT

## 2021-04-12 NOTE — PROGRESS NOTES
Daily Note     Today's date: 2021  Patient name: Bri Lopez  : 1975  MRN: [id number]  Referring provider: Anahy Phillips*  Dx:   Encounter Diagnosis     ICD-10-CM    1  Bicipital tendinitis of left shoulder  M75 22                   Subjective: patient stated that she is feeling sore  Reports continued adherence to HEP, and has noticed improved motion  Objective: See treatment diary below      Assessment: Tolerated treatment well  Patient performed TE in pain free ROM  PROM complete to patient's tolerance  Continues to benefit from CP and IFC for pain management  Patient would benefit from continued PT  Plan: Continue per plan of care         Precautions: None      Manuals           PROM: Flex and ER RK RK MB                                    Neuro Re-Ed             Ball Stabilization             Bodyblade             PNF                          Ther Ex             Pendulums             Pulleys             ER with wand 10x TID HEP 20x 20x          Flexion supine PROM 10x TID HEP 20x 20x          Table Slides             Wall Slides             Shoulder Isometrics             TB High Row             TB Mid Row             TB Low Row             TB Biceps             TB Triceps             TB Upright Row             TB Flexion             TB Abduction             TB SA Pulldown             TB IR             TB ER             TB Clock             SL ER             SL ABD             SL Flexion             SL Horiz ABD             FWD/LAT Raises             Ceiling Punches             Hoist Row: S             LAT Pulldown             XO Biceps             XO Triceps             XO Upright Row             XO IR             XO ER             XO SA Pulldowns             Blackburns                          Ther Activity             Reaching             Carrying             Lifting             Catching             Throwing                          Modalities             CP 15'            CP/IFC  15' 15'

## 2021-04-14 ENCOUNTER — OFFICE VISIT (OUTPATIENT)
Dept: PHYSICAL THERAPY | Facility: CLINIC | Age: 46
End: 2021-04-14
Payer: OTHER MISCELLANEOUS

## 2021-04-14 DIAGNOSIS — M75.22 BICIPITAL TENDINITIS OF LEFT SHOULDER: Primary | ICD-10-CM

## 2021-04-14 PROCEDURE — 97014 ELECTRIC STIMULATION THERAPY: CPT

## 2021-04-14 PROCEDURE — 97010 HOT OR COLD PACKS THERAPY: CPT

## 2021-04-14 PROCEDURE — 97110 THERAPEUTIC EXERCISES: CPT

## 2021-04-14 PROCEDURE — 97140 MANUAL THERAPY 1/> REGIONS: CPT

## 2021-04-14 NOTE — PROGRESS NOTES
Daily Note     Today's date: 2021  Patient name: Servando Silva  : 1975  MRN: [id number]  Referring provider: Alejo Xiao*  Dx:   Encounter Diagnosis     ICD-10-CM    1  Bicipital tendinitis of left shoulder  M75 22                   Subjective: Patient stated that she was sore the after LV, she is feeling much better today  She is noticing an improvement in her shoulder motions, but with some discomfort in her armpit with certain motions  Objective: See treatment diary below      Assessment: Tolerated treatment well  IFC and CP post session with good results for reducing soreness  PROM limited compared to previous sessions due axillary pain  Patient would benefit from continued PT  Plan: Continue per plan of care         Precautions: None      Manuals          PROM: Flex and ER RK RK MB MB                                   Neuro Re-Ed             Ball Stabilization             Bodyblade             PNF                          Ther Ex             Pendulums             Pulleys             ER with wand 10x TID HEP 20x 20x 20x         Flexion supine PROM 10x TID HEP 20x 20x 20x         Table Slides    15X flex, scaption         Wall Slides             Shoulder Isometrics             TB High Row             TB Mid Row             TB Low Row             TB Biceps             TB Triceps             TB Upright Row             TB Flexion             TB Abduction             TB SA Pulldown             TB IR             TB ER             TB Clock             SL ER             SL ABD             SL Flexion             SL Horiz ABD             FWD/LAT Raises             Ceiling Punches             Hoist Row: S             LAT Pulldown             XO Biceps             XO Triceps             XO Upright Row             XO IR             XO ER             XO SA Pulldowns             Blackburns                          Ther Activity             Reaching             Carrying Lifting             Catching             Throwing                          Modalities             CP 15'            CP/IFC  15' 15' 15'

## 2021-04-19 ENCOUNTER — OFFICE VISIT (OUTPATIENT)
Dept: PHYSICAL THERAPY | Facility: CLINIC | Age: 46
End: 2021-04-19
Payer: OTHER MISCELLANEOUS

## 2021-04-19 DIAGNOSIS — M75.22 BICIPITAL TENDINITIS OF LEFT SHOULDER: Primary | ICD-10-CM

## 2021-04-19 PROCEDURE — 97140 MANUAL THERAPY 1/> REGIONS: CPT

## 2021-04-19 PROCEDURE — 97014 ELECTRIC STIMULATION THERAPY: CPT

## 2021-04-19 PROCEDURE — 97110 THERAPEUTIC EXERCISES: CPT

## 2021-04-19 PROCEDURE — 97010 HOT OR COLD PACKS THERAPY: CPT

## 2021-04-19 NOTE — PROGRESS NOTES
Daily Note     Today's date: 2021  Patient name: Shruthi Marroquin  : 1975  MRN: [id number]  Referring provider: Diana Aguirre*  Dx:   Encounter Diagnosis     ICD-10-CM    1  Bicipital tendinitis of left shoulder  M75 22                   Subjective: patient reports increased elbow stiffness, which is new  Pain in posterior shoulder and biceps soreness  Objective: See treatment diary below      Assessment: Tolerated treatment fair  Session focused on pain management and maintaining ROM  CP and IFC post session with good results for reducing posterior shoulder pain, biceps soreness remained  Patient would benefit from continued PT  Plan: Continue per plan of care         Precautions: None      Manuals         PROM: Flex and ER RK RK LEOBARDO BOOTH MB                                  Neuro Re-Ed             Ball Stabilization             Bodyblade             PNF                          Ther Ex             Pendulums             Pulleys             ER with wand 10x TID HEP 20x 20x 20x 25x        Flexion supine PROM 10x TID HEP 20x 20x 20x 25x        Table Slides    15X flex, scaption         Wall Slides             Shoulder Isometrics             TB High Row             TB Mid Row             TB Low Row             TB Biceps             TB Triceps             TB Upright Row             TB Flexion             TB Abduction             TB SA Pulldown             TB IR             TB ER             TB Clock             SL ER             SL ABD             SL Flexion             SL Horiz ABD             FWD/LAT Raises             Ceiling Punches             Hoist Row: S             LAT Pulldown             XO Biceps             XO Triceps             XO Upright Row             XO IR             XO ER             XO SA Pulldowns             Blackburns                          Ther Activity             Reaching             Carrying             Lifting             Catching Throwing                          Modalities             CP 15'            CP/IFC  15' 15' 15' 15'

## 2021-04-21 ENCOUNTER — OFFICE VISIT (OUTPATIENT)
Dept: PHYSICAL THERAPY | Facility: CLINIC | Age: 46
End: 2021-04-21
Payer: OTHER MISCELLANEOUS

## 2021-04-21 DIAGNOSIS — M75.22 BICIPITAL TENDINITIS OF LEFT SHOULDER: Primary | ICD-10-CM

## 2021-04-21 PROCEDURE — 97140 MANUAL THERAPY 1/> REGIONS: CPT

## 2021-04-21 PROCEDURE — 97110 THERAPEUTIC EXERCISES: CPT

## 2021-04-21 PROCEDURE — 97014 ELECTRIC STIMULATION THERAPY: CPT

## 2021-04-21 NOTE — PROGRESS NOTES
Daily Note     Today's date: 2021  Patient name: Melyssa Zavala  : 1975  MRN: [id number]  Referring provider: Ayo Pinto*  Dx:   Encounter Diagnosis     ICD-10-CM    1  Bicipital tendinitis of left shoulder  M75 22                   Subjective: Patient had a follow with surgeon on   Recommended to work more on improving ROM  Objective: See treatment diary below      Assessment: Tolerated treatment well  Improvement in ER and flexion note today, more so in ER  Good tolerance to new tasks, complotting in pain free ROM  SL tasks completed within limited arc of motion due to pain  Patient would benefit from continued PT  Plan: Continue per plan of care         Precautions: None      Manuals        PROM: Flex and ER RK RK MB MB MB MB                                 Neuro Re-Ed             Ball Stabilization             Bodyblade             PNF                          Ther Ex             Pendulums             Pulleys      NV       ER with wand 10x TID HEP 20x 20x 20x 25x 25x       Flexion supine PROM 10x TID HEP 20x 20x 20x 25x 25x       Table Slides    15X flex, scaption  15 x flex, scaption, ER       Wall Slides             Shoulder Isometrics             TB High Row             TB Mid Row      L1 10x       TB Low Row      L1 10x       TB Biceps             TB Triceps      L1 10x       TB Upright Row             TB Flexion             TB Abduction             TB SA Pulldown             TB IR             TB ER             TB Clock             SL ER      10x       SL ABD      10x       SL Flexion      10x       SL Horiz ABD             FWD/LAT Raises             Ceiling Punches      10x       Hoist Row: S             LAT Pulldown             XO Biceps             XO Triceps             XO Upright Row             XO IR             XO ER             XO SA Pulldowns             Blackburns                          Ther Activity             Reaching Carrying             Lifting             Catching             Throwing                          Modalities             CP 15'            CP/IFC  15' 15' 15' 15' 15'

## 2021-04-26 ENCOUNTER — OFFICE VISIT (OUTPATIENT)
Dept: PHYSICAL THERAPY | Facility: CLINIC | Age: 46
End: 2021-04-26
Payer: OTHER MISCELLANEOUS

## 2021-04-26 DIAGNOSIS — M75.22 BICIPITAL TENDINITIS OF LEFT SHOULDER: Primary | ICD-10-CM

## 2021-04-26 PROCEDURE — 97014 ELECTRIC STIMULATION THERAPY: CPT | Performed by: PHYSICAL THERAPIST

## 2021-04-26 PROCEDURE — 97110 THERAPEUTIC EXERCISES: CPT | Performed by: PHYSICAL THERAPIST

## 2021-04-26 PROCEDURE — 97140 MANUAL THERAPY 1/> REGIONS: CPT | Performed by: PHYSICAL THERAPIST

## 2021-04-26 NOTE — PROGRESS NOTES
Daily Note     Today's date: 2021  Patient name: Michael Horne  : 1975  MRN: [id number]  Referring provider: Swati Romo*  Dx:   Encounter Diagnosis     ICD-10-CM    1  Bicipital tendinitis of left shoulder  M75 22                   Subjective: The patient states that she is getting more motion in her shoulder though her shoulder is sore when moving it  Objective: See treatment diary below  Assessment: Added pulleys for flexion and abduction today and patient completed without difficulty  Some verbal cues needed for correct form with completing TE  She remains limited with A/PROM and strength t/o her shoulder  EStim/IFC with CP x 15 minutes to end session  Plan: Continue per plan of care        Precautions: None      Manuals       PROM: Flex and ER RK RK LEOBARDO BOOTH ML                                Neuro Re-Ed             Ball Stabilization             Bodyblade             PNF                          Ther Ex             Pendulums             Pulleys      NV Flex/  Scap 5" 15x each      ER with wand 10x TID HEP 20x 20x 20x 25x 25x 25x      Flexion supine PROM 10x TID HEP 20x 20x 20x 25x 25x 25x      Table Slides    15X flex, scaption  15 x flex, scaption, ER 15x  Flex  Scap  ER       Wall Slides             Shoulder Isometrics             TB High Row             TB Mid Row      L1 10x L1 15x      TB Low Row      L1 10x L1 15x      TB Biceps             TB Triceps      L1 10x L1 15x      TB Upright Row             TB Flexion             TB Abduction             TB SA Pulldown             TB IR             TB ER             TB Clock             SL ER      10x 15x      SL ABD      10x 15x      SL Flexion      10x 15x      SL Horiz ABD             FWD/LAT Raises             Ceiling Punches      10x 15x      Hoist Row: S             LAT Pulldown             XO Biceps             XO Triceps             XO Upright Row             XO IR XO ER             XO SA Pulldowns             Blackburns                          Ther Activity             Reaching             Carrying             Lifting             Catching             Throwing                          Modalities             CP 15'            CP/IFC  15' 15' 15' 15' 15' 15'

## 2021-04-28 ENCOUNTER — OFFICE VISIT (OUTPATIENT)
Dept: PHYSICAL THERAPY | Facility: CLINIC | Age: 46
End: 2021-04-28
Payer: OTHER MISCELLANEOUS

## 2021-04-28 ENCOUNTER — TRANSCRIBE ORDERS (OUTPATIENT)
Dept: PHYSICAL THERAPY | Facility: CLINIC | Age: 46
End: 2021-04-28

## 2021-04-28 ENCOUNTER — APPOINTMENT (OUTPATIENT)
Dept: PHYSICAL THERAPY | Facility: CLINIC | Age: 46
End: 2021-04-28
Payer: OTHER MISCELLANEOUS

## 2021-04-28 DIAGNOSIS — M75.22 BICIPITAL TENDINITIS OF LEFT SHOULDER: Primary | ICD-10-CM

## 2021-04-28 PROCEDURE — 97110 THERAPEUTIC EXERCISES: CPT | Performed by: PHYSICAL THERAPIST

## 2021-04-28 PROCEDURE — 97010 HOT OR COLD PACKS THERAPY: CPT | Performed by: PHYSICAL THERAPIST

## 2021-04-28 PROCEDURE — 97140 MANUAL THERAPY 1/> REGIONS: CPT | Performed by: PHYSICAL THERAPIST

## 2021-04-28 PROCEDURE — 97014 ELECTRIC STIMULATION THERAPY: CPT | Performed by: PHYSICAL THERAPIST

## 2021-04-28 NOTE — LETTER
2021    [facility] [address] [place] [address]    Patient: Nandini Bradley   YOB: 1975   Date of Visit: 2021     Encounter Diagnosis     ICD-10-CM    1  Bicipital tendinitis of left shoulder  M75 22        Dear Dr Rupinder Patel:    Thank you for your recent referral of Nandini Bradley  Please review the attached evaluation summary from Edwin's recent visit  Please verify that you agree with the plan of care by signing the attached order  If you have any questions or concerns, please do not hesitate to call  I sincerely appreciate the opportunity to share in the care of one of your patients and hope to have another opportunity to work with you in the near future  Sincerely,    Anahi Grigsby, PT      Referring Provider:      I certify that I have read the below Plan of Care and certify the need for these services furnished under this plan of treatment while under my care  Luisa Baumann MD  [facility]  Via Fax: [phone]          PT Re-Evaluation     Today's date: 2021  Patient name: Nandini Bradley  : 1975  MRN: [id number]  Referring provider: Gali Sher*  Dx:   Encounter Diagnosis     ICD-10-CM    1  Bicipital tendinitis of left shoulder  M75 22                   Assessment  Assessment details:   CURRENT FUNCTIONAL STATUS    Dressing tolerance: Fair doffing shirts  Bathing/washing hair tolerance: Fair with left arm  Able to reach to shoulder level with the left arm  Able to lift 1 lb with the left arm  SHORT TERM GOALS (2 WEEKS)    Increase shoulder AROM and PROM as per protocol  Decrease pain to 0-4/10  Dressing tolerance: Fair/Good doffing shirts  Bathing/washing hair tolerance: Fair/Good with left arm  Able to reach above shoulder level with the left arm  Able to lift 2-3 lbs with the left arm        LONG TERM GOALS (DISCHARGE)    Shoulder AROM: F=160 deg, ABD=160 deg, ER=60 deg, IR BTB=Upper thoracic spine -partially met  Shoulder PROM: F=165 deg, ABD=170 deg, ER=75 deg in neutral, IR=45 deg at 45 deg abd -partially met          Shoulder Strength: F=33 lbs, ABD=33 lbs, ER=17 lbs, IR=24 lbs  -not met  Decrease pain to 0-1/10 -partially met  Dressing tolerance: No pain doffing shirts -partially met  Bathing/washing hair tolerance: Good with left arm -partially met      Able to reach overhead with the left arm -partially met                     Able to lift 8 lbs to shoulder level, and 5 lbs overhead with the left arm -partially met         Understanding of Dx/Px/POC: good   Prognosis: good    Plan  Plan details: The patient has shown improvement in PT demonstrating decreased pain, increased range of motion, and increased tolerance to activity  The patient continues to present with pain, decreased ROM, decreased strength, and decreased tolerance to activity  The patient would benefit from continued skilled PT services to address these issues and to maximize function  The patient will also continue performing their HEP  Patient would benefit from: skilled physical therapy  Planned modality interventions: unattended electrical stimulation and cryotherapy  Planned therapy interventions: manual therapy, therapeutic exercise, therapeutic activities, neuromuscular re-education and self care  Frequency: 2x week (Daily x 1 week, TIW x 3 weeks)  Duration in weeks: 4        Subjective Evaluation    History of Present Illness  Mechanism of injury: Subjective: The patient's left shoulder pain is intermittent, reaching moderate levels at the end of the day after using her arm for daily activities  She is compliant with her HEP    Pain  Current pain ratin  At best pain ratin  At worst pain ratin    Hand dominance: right    Patient Goals  Patient goals for therapy: decreased pain, increased motion, increased strength and return to work          Objective     General Comments:      Shoulder Comments   CURRENT OBJECTIVE MEASUREMENTS    Shoulder AROM: F=95 deg, ER= deg, HBB=L-S JCT  Shoulder PROM: F=95 deg, ER=45 deg  Shoulder Strength: Deferred                    Precautions: None        Manuals 4/6 4/9 4/12 4/14 4/19 4/21 4/26 4/28       PROM: Flex and ER RK RK MB MB MB MB ML  RK                                                       Neuro Re-Ed                       Ball Stabilization                       Bodyblade                       PNF                                               Ther Ex                       HBB Stretch        10x TID HEP     Horiz Add Stretch        10x TID HEP                            Pulleys           NV Flex/  Scap 5" 15x each  15x ea Flex , Scap       ER with wand 10x TID HEP 20x 20x 20x 25x 25x 25x  25x       Flexion supine PROM 10x TID HEP 20x 20x 20x 25x 25x 25x  25x       Table Slides       15X flex, scaption   15 x flex, scaption, ER 15x  Flex  Scap  ER   15x Flex Scap ER       Wall Slides                       Shoulder Isometrics                       TB High Row                       TB Mid Row           L1 10x L1 15x         TB Low Row           L1 10x L1 15x         TB Biceps                       TB Triceps           L1 10x L1 15x         TB Upright Row                       TB Flexion                       TB Abduction                       TB SA Pulldown                       TB IR                       TB ER                       TB Clock                       SL ER           10x 15x         SL ABD           10x 15x         SL Flexion           10x 15x         SL Horiz ABD                       FWD/LAT Raises                       Ceiling Punches           10x 15x         Hoist Row: S                       LAT Pulldown                       XO Biceps                       XO Triceps                       XO Upright Row                       XO IR                       XO ER                       XO SA Pulldowns                       Blackburns                                               Ther Activity                       Reaching                       Carrying                       Lifting                       Catching                       Throwing                                               Modalities                       CP 13'                     CP/IFC   13' 13' 13' 15' 15' 15'

## 2021-04-28 NOTE — PROGRESS NOTES
PT Re-Evaluation     Today's date: 2021  Patient name: Kourtney Muller  : 1975  MRN: [id number]  Referring provider: Brynn Mcburney*  Dx:   Encounter Diagnosis     ICD-10-CM    1  Bicipital tendinitis of left shoulder  M75 22                   Assessment  Assessment details:   CURRENT FUNCTIONAL STATUS    Dressing tolerance: Fair doffing shirts  Bathing/washing hair tolerance: Fair with left arm  Able to reach to shoulder level with the left arm  Able to lift 1 lb with the left arm  SHORT TERM GOALS (2 WEEKS)    Increase shoulder AROM and PROM as per protocol  Decrease pain to 0-4/10  Dressing tolerance: Fair/Good doffing shirts  Bathing/washing hair tolerance: Fair/Good with left arm  Able to reach above shoulder level with the left arm  Able to lift 2-3 lbs with the left arm  LONG TERM GOALS (DISCHARGE)    Shoulder AROM: F=160 deg, ABD=160 deg, ER=60 deg, IR BTB=Upper thoracic spine -partially met  Shoulder PROM: F=165 deg, ABD=170 deg, ER=75 deg in neutral, IR=45 deg at 45 deg abd -partially met          Shoulder Strength: F=33 lbs, ABD=33 lbs, ER=17 lbs, IR=24 lbs  -not met  Decrease pain to 0-1/10 -partially met  Dressing tolerance: No pain doffing shirts -partially met  Bathing/washing hair tolerance: Good with left arm -partially met      Able to reach overhead with the left arm -partially met                     Able to lift 8 lbs to shoulder level, and 5 lbs overhead with the left arm -partially met         Understanding of Dx/Px/POC: good   Prognosis: good    Plan  Plan details: The patient has shown improvement in PT demonstrating decreased pain, increased range of motion, and increased tolerance to activity  The patient continues to present with pain, decreased ROM, decreased strength, and decreased tolerance to activity    The patient would benefit from continued skilled PT services to address these issues and to maximize function  The patient will also continue performing their HEP  Patient would benefit from: skilled physical therapy  Planned modality interventions: unattended electrical stimulation and cryotherapy  Planned therapy interventions: manual therapy, therapeutic exercise, therapeutic activities, neuromuscular re-education and self care  Frequency: 2x week (Daily x 1 week, TIW x 3 weeks)  Duration in weeks: 4        Subjective Evaluation    History of Present Illness  Mechanism of injury: Subjective: The patient's left shoulder pain is intermittent, reaching moderate levels at the end of the day after using her arm for daily activities  She is compliant with her HEP  Pain  Current pain ratin  At best pain ratin  At worst pain ratin    Hand dominance: right    Patient Goals  Patient goals for therapy: decreased pain, increased motion, increased strength and return to work          Objective     General Comments:      Shoulder Comments   CURRENT OBJECTIVE MEASUREMENTS    Shoulder AROM: F=95 deg, ER=40 deg, HBB=L-S JCT  Shoulder PROM: F=115 deg, ER=45 deg  Shoulder Strength: Deferred                    Precautions: None        Manuals        PROM: Flex and ER RK RK LEOBARDO BOOTH MB MB ML  RK                                                       Neuro Re-Ed                       Ball Stabilization                       Bodyblade                       PNF                                               Ther Ex                       HBB Stretch        10x TID HEP     Horiz Add Stretch        10x TID HEP                            Pulleys           NV Flex/  Scap 5" 15x each  15x ea Flex , Scap       ER with wand 10x TID HEP 20x 20x 20x 25x 25x 25x  25x       Flexion supine PROM 10x TID HEP 20x 20x 20x 25x 25x 25x  25x       Table Slides       15X flex, scaption   15 x flex, scaption, ER 15x  Flex  Scap  ER   15x Flex Scap ER       Wall Slides                     Shoulder Isometrics                       TB High Row                       TB Mid Row           L1 10x L1 15x         TB Low Row           L1 10x L1 15x         TB Biceps                       TB Triceps           L1 10x L1 15x         TB Upright Row                       TB Flexion                       TB Abduction                       TB SA Pulldown                       TB IR                       TB ER                       TB Clock                       SL ER           10x 15x         SL ABD           10x 15x         SL Flexion           10x 15x         SL Horiz ABD                       FWD/LAT Raises                       Ceiling Punches           10x 15x         Hoist Row: S                       LAT Pulldown                       XO Biceps                       XO Triceps                       XO Upright Row                       XO IR                       XO ER                       XO SA Pulldowns                       Blackburns                                               Ther Activity                       Reaching                       Carrying                       Lifting                       Catching                       Throwing                                               Modalities                       CP 13'                     CP/IFC   13' 13' 13' 13' 15' 15'

## 2021-05-03 ENCOUNTER — OFFICE VISIT (OUTPATIENT)
Dept: PHYSICAL THERAPY | Facility: CLINIC | Age: 46
End: 2021-05-03
Payer: OTHER MISCELLANEOUS

## 2021-05-03 DIAGNOSIS — M75.22 BICIPITAL TENDINITIS OF LEFT SHOULDER: Primary | ICD-10-CM

## 2021-05-03 PROCEDURE — 97140 MANUAL THERAPY 1/> REGIONS: CPT

## 2021-05-03 PROCEDURE — 97110 THERAPEUTIC EXERCISES: CPT

## 2021-05-03 PROCEDURE — 97014 ELECTRIC STIMULATION THERAPY: CPT

## 2021-05-03 NOTE — PROGRESS NOTES
Daily Note     Today's date: 5/3/2021  Patient name: Dale Jeronimo  : 1975  MRN: [id number]  Referring provider: Marbella Alatorre*  Dx:   Encounter Diagnosis     ICD-10-CM    1  Bicipital tendinitis of left shoulder  M75 22        Start Time: 1256          Subjective: Pt reports that she is sore  Pt notes that she is able to do more and feels as though she has more motion on in the shoulder  Pt reports that she takes tylenol for the soreness in the shoulder  Objective: See treatment diary below      Assessment: Tolerated treatment well  Patient does well with there ex  Pt to see  tomorrow  Pt with discomfort at end range L shoulder Flexion  Pt with decreased mobility in Flexion  Pt would benefit from continued therapy for strengthening for functional mobility  Plan: Continue per plan of care        Precautions: None        Manuals 4/6 4/9 4/12 4/14 4/19 4/21 4/26  4/28  5/3     PROM: Flex and ER RK RK MB MB MB MB ML  RK  TK                                                     Neuro Re-Ed                       Ball Stabilization                       Bodyblade                       PNF                                               Ther Ex                       HBB Stretch        10x TID HEP 10x    Horiz Add Stretch        10x TID HEP 10x                           Pulleys           NV Flex/  Scap 5" 15x each  15x ea Flex , Scap  15x ea Flex , Scap     ER with wand 10x TID HEP 20x 20x 20x 25x 25x 25x  25x  25x     Flexion supine PROM 10x TID HEP 20x 20x 20x 25x 25x 25x  25x  25x     Table Slides       15X flex, scaption   15 x flex, scaption, ER 15x  Flex  Scap  ER   15x Flex Scap ER     15x Flex Scap ER        Wall Slides                       Shoulder Isometrics                       TB High Row                       TB Mid Row           L1 10x L1 15x    L1 15x     TB Low Row           L1 10x L1 15x    L1 15x     TB Biceps                       TB Triceps           L1 10x L1 15x    L1 15x     TB Upright Row                       TB Flexion                       TB Abduction                       TB SA Pulldown                       TB IR                       TB ER                       TB Clock                       SL ER           10x 15x    15x     SL ABD           10x 15x    15x     SL Flexion           10x 15x    15x     SL Horiz ABD                       FWD/LAT Raises                       Ceiling Punches           10x 15x 15x  15x     Hoist Row: S                       LAT Pulldown                       XO Biceps                       XO Triceps                       XO Upright Row                       XO IR                       XO ER                       XO SA Pulldowns                       Blackburns                                               Ther Activity                       Reaching                       Carrying                       Lifting                       Catching                       Throwing                                               Modalities                       CP 13'                     CP/IFC   13' 13' 13' 13' 15' 15'    15'

## 2021-05-04 ENCOUNTER — OFFICE VISIT (OUTPATIENT)
Dept: PHYSICAL THERAPY | Facility: CLINIC | Age: 46
End: 2021-05-04
Payer: OTHER MISCELLANEOUS

## 2021-05-04 DIAGNOSIS — M75.22 BICIPITAL TENDINITIS OF LEFT SHOULDER: Primary | ICD-10-CM

## 2021-05-04 PROCEDURE — 97110 THERAPEUTIC EXERCISES: CPT | Performed by: PHYSICAL THERAPIST

## 2021-05-04 PROCEDURE — 97010 HOT OR COLD PACKS THERAPY: CPT | Performed by: PHYSICAL THERAPIST

## 2021-05-04 PROCEDURE — 97014 ELECTRIC STIMULATION THERAPY: CPT | Performed by: PHYSICAL THERAPIST

## 2021-05-04 PROCEDURE — 97140 MANUAL THERAPY 1/> REGIONS: CPT | Performed by: PHYSICAL THERAPIST

## 2021-05-04 NOTE — PROGRESS NOTES
Daily Note     Today's date: 2021  Patient name: Humberto Rivera  : 1975  MRN: [id number]  Referring provider: Elvis Montgomery*  Dx:   Encounter Diagnosis     ICD-10-CM    1  Bicipital tendinitis of left shoulder  M75 22                   Subjective: Pain is currently 2/10 in the front of the shoulder  She will see he surgeon later today for follow up  Objective: AROM: Elukpvc=470 deg, Abduction=95 deg  Assessment: Shoulder discomfort was decreased after STM  Firm end feel noted at end range flexion PROM  Plan: Progress as per protocol       Precautions: None        Manuals 5/4 4/9 4/12 4/14 4/19 4/21 4/26  4/28  5/3     PROM: Flex and ER RK RK MB MB MB MB ML  RK  TK      Scar STM RK                                             Neuro Re-Ed                       Ball Stabilization                       Bodyblade                       PNF                                               Ther Ex                       HBB Stretch 10x       10x TID HEP 10x    Horiz Add Stretch 10x       10x TID HEP 10x                           Pulleys  15x ea         NV Flex/  Scap 5" 15x each  15x ea Flex , Scap  15x ea Flex , Scap     ER with wand 25x 20x 20x 20x 25x 25x 25x  25x  25x     Flexion supine PROM 25x 20x 20x 20x 25x 25x 25x  25x  25x     Table Slides  15x Flex, scap, and ER     15X flex, scaption   15 x flex, scaption, ER 15x  Flex  Scap  ER   15x Flex Scap ER     15x Flex Scap ER        Wall Slides                       Shoulder Isometrics                       TB High Row                       TB Mid Row  L1 15x         L1 10x L1 15x    L1 15x     TB Low Row  L1 15x         L1 10x L1 15x    L1 15x     TB Biceps                       TB Triceps  L1 15x         L1 10x L1 15x    L1 15x     TB Upright Row                       TB Flexion                       TB Abduction                       TB SA Pulldown                       TB IR                       TB ER                       TB Clock                       SL ER  15x         10x 15x    15x     SL ABD  15x         10x 15x    15x     SL Flexion  15x         10x 15x    15x     SL Horiz ABD                       FWD/LAT Raises                       Ceiling Punches  15x         10x 15x 15x  15x     Hoist Row: S                       LAT Pulldown                       XO Biceps                       XO Triceps                       XO Upright Row                       XO IR                       XO ER                       XO SA Pulldowns                       Blackburns                                               Ther Activity                       Reaching                       Carrying                       Lifting                       Catching                       Throwing                                               Modalities                       CP 13'                     CP/IFC   13' 13' 13' 13' 15' 15'    15'

## 2021-05-05 ENCOUNTER — APPOINTMENT (OUTPATIENT)
Dept: PHYSICAL THERAPY | Facility: CLINIC | Age: 46
End: 2021-05-05
Payer: OTHER MISCELLANEOUS

## 2021-05-06 ENCOUNTER — OFFICE VISIT (OUTPATIENT)
Dept: PHYSICAL THERAPY | Facility: CLINIC | Age: 46
End: 2021-05-06
Payer: OTHER MISCELLANEOUS

## 2021-05-06 DIAGNOSIS — M75.22 BICIPITAL TENDINITIS OF LEFT SHOULDER: Primary | ICD-10-CM

## 2021-05-06 PROCEDURE — 97140 MANUAL THERAPY 1/> REGIONS: CPT

## 2021-05-06 PROCEDURE — 97110 THERAPEUTIC EXERCISES: CPT

## 2021-05-06 PROCEDURE — 97014 ELECTRIC STIMULATION THERAPY: CPT

## 2021-05-06 NOTE — PROGRESS NOTES
Daily Note     Today's date: 2021  Patient name: Efrain Sung  : 1975  MRN: [id number]  Referring provider: Jan Tijerina*  Dx:   Encounter Diagnosis     ICD-10-CM    1  Bicipital tendinitis of left shoulder  M75 22                   Subjective: Pt  reports she has 2/10 pain in her shoulder  Dr  wants her to keep moving her arm as much as possible  Objective: See treatment diary below      Assessment: Tolerated treatment fair  Patient exhibited good technique with therapeutic exercises and would benefit from continued PT  Pt  presents with muscle guarding secondary to pain  Pt  able to initiate BB without adverse effect  Plan: Progress treatment as tolerated         Precautions: None        Manuals 5/4 5/6 4/12 4/14 4/19 4/21 4/26  4/28  5/3     PROM: Flex and ER RK KR MB MB MB MB ML  RK  TK      Scar STM RK  WN                                           Neuro Re-Ed                       Ball Stabilization                       Bodyblade                       PNF                                               Ther Ex                       HBB Stretch 10x 10x      10x TID HEP 10x    Horiz Add Stretch 10x 10x      10x TID HEP 10x                           Pulleys  15x ea  20x ea       NV Flex/  Scap 5" 15x each  15x ea Flex , Scap  15x ea Flex , Scap     ER with wand 25x 25x 20x 20x 25x 25x 25x  25x  25x     Flexion supine PROM 25x 25x 20x 20x 25x 25x 25x  25x  25x     Table Slides  15x Flex, scap, and ER     15X flex, scaption   15 x flex, scaption, ER 15x  Flex  Scap  ER   15x Flex Scap ER     15x Flex Scap ER        Wall Slides                       BB    10x ea                   TB High Row                       TB Mid Row  L1 15x  L1 15x       L1 10x L1 15x    L1 15x     TB Low Row  L1 15x  L1 15x       L1 10x L1 15x    L1 15x     TB Biceps                       TB Triceps  L1 15x  L1 15x       L1 10x L1 15x    L1 15x     TB Upright Row                       TB Flexion                       TB Abduction                       TB SA Pulldown                       TB IR                       TB ER                       TB Clock                       SL ER  15x  15x       10x 15x    15x     SL ABD  15x  15x       10x 15x    15x     SL Flexion  15x  15x       10x 15x    15x     SL Horiz ABD                       FWD/LAT Raises                       Ceiling Punches  15x  15x       10x 15x 15x  15x     Hoist Row: S                       LAT Pulldown                       XO Biceps                       XO Triceps                       XO Upright Row                       XO IR                       XO ER                       XO SA Pulldowns                       Blackburns                                               Ther Activity                       Reaching                       Carrying                       Lifting                       Catching                       Throwing                                               Modalities                       CP                      CP/IFC   13' 13' 13' 13' 15' 15'    15'

## 2021-05-10 ENCOUNTER — OFFICE VISIT (OUTPATIENT)
Dept: PHYSICAL THERAPY | Facility: CLINIC | Age: 46
End: 2021-05-10
Payer: OTHER MISCELLANEOUS

## 2021-05-10 DIAGNOSIS — M75.22 BICIPITAL TENDINITIS OF LEFT SHOULDER: Primary | ICD-10-CM

## 2021-05-10 PROCEDURE — 97140 MANUAL THERAPY 1/> REGIONS: CPT

## 2021-05-10 PROCEDURE — 97014 ELECTRIC STIMULATION THERAPY: CPT

## 2021-05-10 PROCEDURE — 97110 THERAPEUTIC EXERCISES: CPT

## 2021-05-10 NOTE — PROGRESS NOTES
Daily Note     Today's date: 5/10/2021  Patient name: Eh Crawford  : 1975  MRN: [id number]  Referring provider: Dung Arroyo*  Dx:   Encounter Diagnosis     ICD-10-CM    1  Bicipital tendinitis of left shoulder  M75 22                   Subjective: Patient reports some shoulder pain this AM, but it is tolerable  She is also noticing increased crepitation  Objective: See treatment diary below      Assessment: Tolerated treatment well  Patient continues to be motivated to complete PT  Progressed patient with low level strengthening tasks, and she is able to complete pain free  Continues to benefit form PROM as she is still limited in flex  Plan: Continue per plan of care         Precautions: None        Manuals 5/4 5/6 5/10  4/19 4/21 4/26  4/28  5/3     PROM: Flex and ER RK CRISTIN BOOTH MB ML  RK  TK      Scar STM RK  KR MB                                      Neuro Re-Ed                     Ball Stabilization                     Bodyblade                     PNF                                           Ther Ex                     HBB Stretch 10x 10x 10x     10x TID HEP 10x    Horiz Add Stretch 10x 10x 10x     10x TID HEP 10x                         Pulleys  15x ea  20x ea 20x ea    NV Flex/  Scap 5" 15x each  15x ea Flex , Scap  15x ea Flex , Scap     ER with wand 25x 25x 25x  25x 25x 25x  25x  25x     Flexion supine PROM 25x 25x 25x  25x 25x 25x  25x  25x     Table Slides  15x Flex, scap, and ER       15 x flex, scaption, ER 15x  Flex  Scap  ER   15x Flex Scap ER     15x Flex Scap ER        Wall Slides                     BB    10x ea 10x ea                TB High Row                     TB Mid Row  L1 15x  L1 15x  L1 15x    L1 10x L1 15x    L1 15x     TB Low Row  L1 15x  L1 15x  L1 15x    L1 10x L1 15x    L1 15x     TB Biceps     3x5 L1                TB Triceps  L1 15x  L1 15x  L1 15x    L1 10x L1 15x    L1 15x     TB Upright Row                     TB Flexion     3x5 L1                TB Abduction                     TB SA Pulldown                     TB IR                     TB ER                     TB Clock                     SL ER  15x  15x 15x 1#    10x 15x    15x     SL ABD  15x  15x 15x 1#    10x 15x    15x     SL Flexion  15x  15x 15x 1#    10x 15x    15x     SL Horiz ABD                     FWD/LAT Raises                     Ceiling Punches  15x  15x 15x 1#    10x 15x 15x  15x     Hoist Row: S                     LAT Pulldown                     XO Biceps                     XO Triceps                     XO Upright Row                     XO IR                     XO ER                     XO SA Pulldowns                     Blackburns                                           Ther Activity                     Reaching                     Carrying                     Lifting                     Catching                     Throwing                                           Modalities                     CP                    CP/IFC   13' 13'  13' 15' 15'    15'

## 2021-05-12 ENCOUNTER — APPOINTMENT (OUTPATIENT)
Dept: PHYSICAL THERAPY | Facility: CLINIC | Age: 46
End: 2021-05-12
Payer: OTHER MISCELLANEOUS

## 2021-05-17 ENCOUNTER — OFFICE VISIT (OUTPATIENT)
Dept: PHYSICAL THERAPY | Facility: CLINIC | Age: 46
End: 2021-05-17
Payer: OTHER MISCELLANEOUS

## 2021-05-17 DIAGNOSIS — M75.22 BICIPITAL TENDINITIS OF LEFT SHOULDER: Primary | ICD-10-CM

## 2021-05-17 PROCEDURE — 97110 THERAPEUTIC EXERCISES: CPT

## 2021-05-17 PROCEDURE — 97140 MANUAL THERAPY 1/> REGIONS: CPT

## 2021-05-17 NOTE — PROGRESS NOTES
Daily Note     Today's date: 2021  Patient name: Octavia Arce  : 1975  MRN: [id number]  Referring provider: Tanika Gutierrez*  Dx:   Encounter Diagnosis     ICD-10-CM    1  Bicipital tendinitis of left shoulder  M75 22                   Subjective: Patient stated that she was able to perform HEP daily for last week  She is also noticing less pain overall  Objective: See treatment diary below      Assessment: Tolerated treatment well  Patient is becoming independent with program, completing all tasks with good form  Progressed today with the addition of multiple neuro re-ed tasks, with good tolerance  Limited active ER and painful end range, edu to complete TE in pain free arc with good carryover  Continued PT would be beneficial to improve function           Plan: Continue per plan of care         Precautions: None        Manuals 5/4 5/6 5/10 5/17  4/21 4/26  4/28  5/3     PROM: Flex and ER RK CRISTIN BOOTH ML  RK  TK      Scar STM RK  CRISTIN BOOTH                                   Neuro Re-Ed                    Ball Stabilization      0# 10x ea              Bodyblade                    PNF      Supine 10x ea                                   Ther Ex                    HBB Stretch 10x 10x 10x 10x5"    10x TID HEP 10x    Horiz Add Stretch 10x 10x 10x 10x5"    10x TID HEP 10x                        Pulleys  15x ea  20x ea 20x ea 20x ea  NV Flex/  Scap 5" 15x each  15x ea Flex , Scap  15x ea Flex , Scap     ER with wand 25x 25x 25x 25x  25x 25x  25x  25x     Flexion supine PROM 25x 25x 25x 25x  25x 25x  25x  25x     Table Slides  15x Flex, scap, and ER      15 x flex, scaption, ER 15x  Flex  Scap  ER   15x Flex Scap ER     15x Flex Scap ER        Wall Slides                    Blackburns    10x ea 10x ea 10x ea              TB High Row                    TB Mid Row  L1 15x  L1 15x  L1 15x L2 15x  L1 10x L1 15x    L1 15x     TB Low Row  L1 15x  L1 15x  L1 15x L2 15x  L1 10x L1 15x    L1 15x     TB Biceps     3x5 L1               TB Triceps  L1 15x  L1 15x  L1 15x L2 15  L1 10x L1 15x    L1 15x     TB Upright Row                    TB Flexion     3x5 L1 3x5 L1              TB Abduction                    TB SA Pulldown                    TB IR      10x L1              TB ER      10x L1              TB Clock                    SL ER  15x  15x 15x 1# 15x 1#  10x 15x    15x     SL ABD  15x  15x 15x 1# 15x 1#  10x 15x    15x     SL Flexion  15x  15x 15x 1# 15x 1#  10x 15x    15x     SL Horiz ABD                    FWD/LAT Raises                    Ceiling Punches  15x  15x 15x 1# 15x 1#  10x 15x 15x  15x     Hoist Row: S                    LAT Pulldown                    XO Biceps                    XO Triceps                    XO Upright Row                    XO IR                    XO ER                    XO SA Pulldowns                                                             Ther Activity                    Reaching                    Carrying                    Lifting                    Catching                    Throwing                                         Modalities                    CP                   CP/IFC   13' 13' def  15' 15'    15'

## 2021-05-19 ENCOUNTER — OFFICE VISIT (OUTPATIENT)
Dept: PHYSICAL THERAPY | Facility: CLINIC | Age: 46
End: 2021-05-19
Payer: OTHER MISCELLANEOUS

## 2021-05-19 DIAGNOSIS — M75.22 BICIPITAL TENDINITIS OF LEFT SHOULDER: Primary | ICD-10-CM

## 2021-05-19 PROCEDURE — 97140 MANUAL THERAPY 1/> REGIONS: CPT

## 2021-05-19 PROCEDURE — 97110 THERAPEUTIC EXERCISES: CPT

## 2021-05-19 PROCEDURE — 97014 ELECTRIC STIMULATION THERAPY: CPT

## 2021-05-19 NOTE — PROGRESS NOTES
PT Re-Evaluation     Today's date: 2021  Patient name: Romy Hurts  : 1975  MRN: [id number]  Referring provider: Akhil Morris*  Dx:   Encounter Diagnosis     ICD-10-CM    1  Bicipital tendinitis of left shoulder  M75 22                   Assessment  Assessment details:   CURRENT FUNCTIONAL STATUS    Dressing tolerance: Fair/Good doffing shirts  Bathing/washing hair tolerance: Fair with left arm  Able to reach above shoulder level with the left arm  Able to lift 5 lbs to shoulder level with the left arm  SHORT TERM GOALS (2 WEEKS)    Increase shoulder AROM and PROM as per protocol  Decrease pain to 0-2/10  Dressing tolerance: Good doffing shirts  Bathing/washing hair tolerance: Fair/Good with left arm  Able to reach overhead with the left arm  Able to lift 6-7 lbs to shoulder level with the left arm  LONG TERM GOALS (DISCHARGE)    Shoulder AROM: F=160 deg, ABD=160 deg, ER=60 deg, IR BTB=Upper thoracic spine -partially met  Shoulder PROM: F=165 deg, ABD=170 deg, ER=75 deg in neutral, IR=45 deg at 45 deg abd -partially met          Shoulder Strength: F=33 lbs, ABD=33 lbs, ER=17 lbs, IR=24 lbs  -not met  Decrease pain to 0-1/10 -partially met  Dressing tolerance: No pain doffing shirts -partially met  Bathing/washing hair tolerance: Good with left arm -partially met      Able to reach overhead with the left arm -partially met                     Able to lift 8 lbs to shoulder level, and 5 lbs overhead with the left arm -partially met         Understanding of Dx/Px/POC: good   Prognosis: good    Plan  Plan details: The patient has shown improvement in PT demonstrating decreased pain, increased range of motion, and increased tolerance to activity  The patient continues to present with pain, decreased ROM, decreased strength, and decreased tolerance to activity    The patient would benefit from continued skilled PT services to address these issues and to maximize function  The patient will also continue performing their HEP  Patient would benefit from: skilled physical therapy  Planned modality interventions: unattended electrical stimulation and cryotherapy  Planned therapy interventions: manual therapy, therapeutic exercise, therapeutic activities, neuromuscular re-education and self care  Frequency: 2x week (Daily x 1 week, TIW x 3 weeks)  Duration in weeks: 4        Subjective Evaluation    History of Present Illness  Mechanism of injury: Subjective: The patient's left shoulder pain is intermittent, located in the axilla, biceps, and anterior/lateral shoulder  Cracking in the joint occurs when reaching out to the side  The arm feels tired at the end of the day  Pain  Current pain ratin  At best pain ratin  At worst pain rating: 3    Hand dominance: right    Patient Goals  Patient goals for therapy: decreased pain, increased motion, increased strength and return to work          Objective     General Comments:      Shoulder Comments   CURRENT OBJECTIVE MEASUREMENTS    Shoulder AROM: F=135 deg, ABD=110 deg, ER=50 deg, HBB=T-L JCT  Shoulder PROM: F=145 deg, ABD=150 deg, ER=45 deg, IR=           Shoulder Strength: F=30 lbs, ABD=25 lbs, ER=17 lbs, IR=24 lbs                        Precautions: None        Manuals  5/6 5/10 5/17 5/19 5/28 4/26  4/28  5/3     PROM: Flex and ER RK CRISTIN BOOTH TK RK ML  RK  TK      Scar STM RK  CRISTIN BOOTH TK                                    Neuro Re-Ed                       Ball Stabilization       0# 10x ea 10x ea  0#  10x ea 0#           Bodyblade                       PNF       Supine 10x ea Supine 10x ea                                     Ther Ex                       HBB Stretch 10x 10x 10x 10x5" 10x5"  10x5"   10x TID HEP 10x     Horiz Add Stretch 10x 10x 10x 10x5" 10x5"  10x5"   10x TID HEP 10x                             Pulleys  15x ea  20x ea 20x ea 20x ea 20x ea 20x ea Flex/  Scap 5" 15x each  15x ea Flex , Scap  15x ea Flex , Scap     ER with wand 25x 25x 25x 25x 25x  25x  25x  25x     Flexion supine PROM 25x 25x 25x 25x 25x  25x  25x  25x     Table Slides  15x Flex, scap, and ER          15x  Flex  Scap  ER   15x Flex Scap ER    15x Flex Scap ER        Wall Slides                       Blackburns    10x ea 10x ea 10x ea   1# 10x ea           TB High Row                       TB Mid Row  L1 15x  L1 15x  L1 15x L2 15x L2 30x L2 30x L1 15x    L1 15x     TB Low Row  L1 15x  L1 15x  L1 15x L2 15x L2 30x L2 30x L1 15x    L1 15x     TB Biceps     3x5 L1                 TB Triceps  L1 15x  L1 15x  L1 15x L2 15 L2 30x L2 30x L1 15x    L1 15x     TB Upright Row                       TB Flexion     3x5 L1 3x5 L1 L2 30x  L2 30x           TB Abduction                       TB SA Pulldown                       TB IR       10x L1 L2 30x  L2 30x           TB ER       10x L1 L2 30x  L2 30x           TB Clock                       SL ER  15x  15x 15x 1# 15x 1# 2# 15x 2# 15x 15x    15x     SL ABD  15x  15x 15x 1# 15x 1# 2# 15x 2# 15x 15x    15x     SL Flexion  15x  15x 15x 1# 15x 1# 2# 15x 2# 15x 15x    15x     SL Horiz ABD                       FWD/LAT Raises                       Ceiling Punches  15x  15x 15x 1# 15x 1# 2# 15x 2# 15x 15x 15x  15x     Hoist Row: S                       LAT Pulldown                       XO Biceps                       XO Triceps                       XO Upright Row                       XO IR                       XO ER                       XO SA Pulldowns                                                                       Ther Activity                       Reaching                       Carrying                       Lifting                       Catching                       Throwing                                               Modalities                       CP                       CP/IFC   13' 13' def 15' 15' 15'    15'

## 2021-05-19 NOTE — PROGRESS NOTES
Daily Note     Today's date: 2021  Patient name: Kourtney Muller  : 1975  MRN: [id number]  Referring provider: Brynn Mcburney*  Dx:   Encounter Diagnosis     ICD-10-CM    1  Bicipital tendinitis of left shoulder  M75 [id number]        Start Time: 0804          Subjective: Pt reports that she is feeling better noting that she feels she is doing more not even realizing it  Pt notes that she is not sore at night  Objective: See treatment diary below      Assessment: Tolerated treatment well  Patient with improved ROM in L shoulder  Pt with discomfort in L shoulder at end ranges  Pt is able to increase wt with good tolerance for there ex  Pt would benefit from continued therapy for strengthening for improving functional mobility  Plan: Continue per plan of care        Precautions: None        Manuals 5/4 5/6 5/10 5/17 5/19 4/21 4/26  4/28  5/3     PROM: Flex and ER RK KR MB MB TK MB ML  RK  TK      Scar STM RK  KR MB MB TK                                  Neuro Re-Ed                    Ball Stabilization      0# 10x ea 10x ea  0#             Bodyblade                    PNF      Supine 10x ea Supine 10x ea                                  Ther Ex                    HBB Stretch 10x 10x 10x 10x5" 10x5"   10x TID HEP 10x    Horiz Add Stretch 10x 10x 10x 10x5" 10x5"   10x TID HEP 10x                        Pulleys  15x ea  20x ea 20x ea 20x ea 20x ea NV Flex/  Scap 5" 15x each  15x ea Flex , Scap  15x ea Flex , Scap     ER with wand 25x 25x 25x 25x 25x 25x 25x  25x  25x     Flexion supine PROM 25x 25x 25x 25x 25x 25x 25x  25x  25x     Table Slides  15x Flex, scap, and ER      15 x flex, scaption, ER 15x  Flex  Scap  ER   15x Flex Scap ER     15x Flex Scap ER        Wall Slides                    Blackburns    10x ea 10x ea 10x ea              TB High Row                    TB Mid Row  L1 15x  L1 15x  L1 15x L2 15x L2 30x L1 10x L1 15x    L1 15x     TB Low Row  L1 15x  L1 15x  L1 15x L2 15x L2 30x L1 10x L1 15x    L1 15x     TB Biceps     3x5 L1               TB Triceps  L1 15x  L1 15x  L1 15x L2 15 L2 30x L1 10x L1 15x    L1 15x     TB Upright Row                    TB Flexion     3x5 L1 3x5 L1 L2 30x             TB Abduction                    TB SA Pulldown                    TB IR      10x L1 L2 30x             TB ER      10x L1 L2 30x             TB Clock                    SL ER  15x  15x 15x 1# 15x 1# 2# 15x 10x 15x    15x     SL ABD  15x  15x 15x 1# 15x 1# 2# 15x 10x 15x    15x     SL Flexion  15x  15x 15x 1# 15x 1# 2# 15x 10x 15x    15x     SL Horiz ABD                    FWD/LAT Raises                    Ceiling Punches  15x  15x 15x 1# 15x 1# 2# 15x 10x 15x 15x  15x     Hoist Row: S                    LAT Pulldown                    XO Biceps                    XO Triceps                    XO Upright Row                    XO IR                    XO ER                    XO SA Pulldowns                                                             Ther Activity                    Reaching                    Carrying                    Lifting                    Catching                    Throwing                                         Modalities                    CP                   CP/IFC   13' 13' def 15' 15' 15'    15'

## 2021-05-24 ENCOUNTER — APPOINTMENT (OUTPATIENT)
Dept: PHYSICAL THERAPY | Facility: CLINIC | Age: 46
End: 2021-05-24
Payer: OTHER MISCELLANEOUS

## 2021-05-26 ENCOUNTER — APPOINTMENT (OUTPATIENT)
Dept: PHYSICAL THERAPY | Facility: CLINIC | Age: 46
End: 2021-05-26
Payer: OTHER MISCELLANEOUS

## 2021-05-28 ENCOUNTER — EVALUATION (OUTPATIENT)
Dept: PHYSICAL THERAPY | Facility: CLINIC | Age: 46
End: 2021-05-28
Payer: OTHER MISCELLANEOUS

## 2021-05-28 DIAGNOSIS — M75.22 BICIPITAL TENDINITIS OF LEFT SHOULDER: Primary | ICD-10-CM

## 2021-05-28 PROCEDURE — 97014 ELECTRIC STIMULATION THERAPY: CPT | Performed by: PHYSICAL THERAPIST

## 2021-05-28 PROCEDURE — 97110 THERAPEUTIC EXERCISES: CPT | Performed by: PHYSICAL THERAPIST

## 2021-05-28 PROCEDURE — 97010 HOT OR COLD PACKS THERAPY: CPT | Performed by: PHYSICAL THERAPIST

## 2021-05-28 PROCEDURE — 97140 MANUAL THERAPY 1/> REGIONS: CPT | Performed by: PHYSICAL THERAPIST

## 2021-05-28 NOTE — LETTER
2021    Luisa Baumann MD  [address] [facility] [address]    Patient: Nandini Bradley   YOB: 1975   Date of Visit: 2021     Encounter Diagnosis     ICD-10-CM    1  Bicipital tendinitis of left shoulder  M75 22        Dear Dr Rupinder Patel:    Thank you for your recent referral of Nandini Bradley  Please review the attached evaluation summary from Edwin's recent visit  Please verify that you agree with the plan of care by signing the attached order  If you have any questions or concerns, please do not hesitate to call  I sincerely appreciate the opportunity to share in the care of one of your patients and hope to have another opportunity to work with you in the near future  Sincerely,    Anahi Grigsby, PT      Referring Provider:      I certify that I have read the below Plan of Care and certify the need for these services furnished under this plan of treatment while under my care  Luisa Baumann MD  [address]  Via Fax: [phone]          PT Re-Evaluation     Today's date: 2021  Patient name: Nandini Bradley  : 1975  MRN: [id number]  Referring provider: Gali Sher*  Dx:   Encounter Diagnosis     ICD-10-CM    1  Bicipital tendinitis of left shoulder  M75 22                   Assessment  Assessment details:   CURRENT FUNCTIONAL STATUS    Dressing tolerance: Fair/Good doffing shirts  Bathing/washing hair tolerance: Fair with left arm  Able to reach above shoulder level with the left arm  Able to lift 5 lbs to shoulder level with the left arm  SHORT TERM GOALS (2 WEEKS)    Increase shoulder AROM and PROM as per protocol  Decrease pain to 0-2/10  Dressing tolerance: Good doffing shirts  Bathing/washing hair tolerance: Fair/Good with left arm  Able to reach overhead with the left arm                         Able to lift 6-7 lbs to shoulder level with the left arm  LONG TERM GOALS (DISCHARGE)    Shoulder AROM: F=160 deg, ABD=160 deg, ER=60 deg, IR BTB=Upper thoracic spine -partially met  Shoulder PROM: F=165 deg, ABD=170 deg, ER=75 deg in neutral, IR=45 deg at 45 deg abd -partially met          Shoulder Strength: F=33 lbs, ABD=33 lbs, ER=17 lbs, IR=24 lbs  -not met  Decrease pain to 0-1/10 -partially met  Dressing tolerance: No pain doffing shirts -partially met  Bathing/washing hair tolerance: Good with left arm -partially met      Able to reach overhead with the left arm -partially met                     Able to lift 8 lbs to shoulder level, and 5 lbs overhead with the left arm -partially met         Understanding of Dx/Px/POC: good   Prognosis: good    Plan  Plan details: The patient has shown improvement in PT demonstrating decreased pain, increased range of motion, and increased tolerance to activity  The patient continues to present with pain, decreased ROM, decreased strength, and decreased tolerance to activity  The patient would benefit from continued skilled PT services to address these issues and to maximize function  The patient will also continue performing their HEP  Patient would benefit from: skilled physical therapy  Planned modality interventions: unattended electrical stimulation and cryotherapy  Planned therapy interventions: manual therapy, therapeutic exercise, therapeutic activities, neuromuscular re-education and self care  Frequency: 2x week (Daily x 1 week, TIW x 3 weeks)  Duration in weeks: 4        Subjective Evaluation    History of Present Illness  Mechanism of injury: Subjective: The patient's left shoulder pain is intermittent, located in the axilla, biceps, and anterior/lateral shoulder  Cracking in the joint occurs when reaching out to the side  The arm feels tired at the end of the day      Pain  Current pain ratin  At best pain ratin  At worst pain rating: 3    Hand dominance: right    Patient Goals  Patient goals for therapy: decreased pain, increased motion, increased strength and return to work          Objective     General Comments:      Shoulder Comments   CURRENT OBJECTIVE MEASUREMENTS    Shoulder AROM: F=135 deg, ABD=110 deg, ER=50 deg, HBB=T-L JCT  Shoulder PROM: F=145 deg, ABD=150 deg, ER=45 deg, IR=           Shoulder Strength: F=30 lbs, ABD=25 lbs, ER=17 lbs, IR=24 lbs                        Precautions: None        Manuals 5/4 5/6 5/10 5/17 5/19 5/28 4/26  4/28  5/3     PROM: Flex and ER RK KR MB MB TK RK ML  RK  TK      Scar STM RK  KR MB MB TK                                    Neuro Re-Ed                       Ball Stabilization       0# 10x ea 10x ea  0#  10x ea 0#           Bodyblade                       PNF       Supine 10x ea Supine 10x ea                                     Ther Ex                       HBB Stretch 10x 10x 10x 10x5" 10x5"  10x5"   10x TID HEP 10x     Horiz Add Stretch 10x 10x 10x 10x5" 10x5"  10x5"   10x TID HEP 10x                             Pulleys  15x ea  20x ea 20x ea 20x ea 20x ea 20x ea Flex/  Scap 5" 15x each  15x ea Flex , Scap  15x ea Flex , Scap     ER with wand 25x 25x 25x 25x 25x  25x  25x  25x     Flexion supine PROM 25x 25x 25x 25x 25x  25x  25x  25x     Table Slides  15x Flex, scap, and ER          15x  Flex  Scap  ER   15x Flex Scap ER    15x Flex Scap ER        Wall Slides                       Blackburns    10x ea 10x ea 10x ea   1# 10x ea           TB High Row                       TB Mid Row  L1 15x  L1 15x  L1 15x L2 15x L2 30x L2 30x L1 15x    L1 15x     TB Low Row  L1 15x  L1 15x  L1 15x L2 15x L2 30x L2 30x L1 15x    L1 15x     TB Biceps     3x5 L1                 TB Triceps  L1 15x  L1 15x  L1 15x L2 15 L2 30x L2 30x L1 15x    L1 15x     TB Upright Row                       TB Flexion     3x5 L1 3x5 L1 L2 30x  L2 30x           TB Abduction                       TB SA Pulldown                       TB IR       10x L1 L2 30x  L2 30x           TB ER       10x L1 L2 30x  L2 30x           TB Clock                       SL ER  15x  15x 15x 1# 15x 1# 2# 15x 2# 15x 15x    15x     SL ABD  15x  15x 15x 1# 15x 1# 2# 15x 2# 15x 15x    15x     SL Flexion  15x  15x 15x 1# 15x 1# 2# 15x 2# 15x 15x    15x     SL Horiz ABD                       FWD/LAT Raises                       Ceiling Punches  15x  15x 15x 1# 15x 1# 2# 15x 2# 15x 15x 15x  15x     Hoist Row: S                       LAT Pulldown                       XO Biceps                       XO Triceps                       XO Upright Row                       XO IR                       XO ER                       XO SA Pulldowns                                                                       Ther Activity                       Reaching                       Carrying                       Lifting                       Catching                       Throwing                                               Modalities                       CP                       CP/IFC   13' 13' def 13' 15' 15'    15'

## 2021-06-01 ENCOUNTER — TRANSCRIBE ORDERS (OUTPATIENT)
Dept: PHYSICAL THERAPY | Facility: CLINIC | Age: 46
End: 2021-06-01

## 2021-06-01 DIAGNOSIS — M75.22 BICIPITAL TENDINITIS OF LEFT SHOULDER: Primary | ICD-10-CM

## 2021-06-02 ENCOUNTER — OFFICE VISIT (OUTPATIENT)
Dept: PHYSICAL THERAPY | Facility: CLINIC | Age: 46
End: 2021-06-02
Payer: OTHER MISCELLANEOUS

## 2021-06-02 DIAGNOSIS — M75.22 BICIPITAL TENDINITIS OF LEFT SHOULDER: Primary | ICD-10-CM

## 2021-06-02 PROCEDURE — 97110 THERAPEUTIC EXERCISES: CPT

## 2021-06-02 PROCEDURE — 97140 MANUAL THERAPY 1/> REGIONS: CPT

## 2021-06-02 NOTE — PROGRESS NOTES
Daily Note     Today's date: 2021  Patient name: Alyx Delgado  : 1975  MRN: [id number]  Referring provider: Faye Coffman*  Dx:   Encounter Diagnosis     ICD-10-CM    1  Bicipital tendinitis of left shoulder  M75 22                   Subjective: Patient had a telemedicine F/U yesterday,  is satisfied with progress  She does have some "clicking" with certain motions, but not all the time  Objective: See treatment diary below      Assessment: Tolerated treatment well  Patient continues to make gain in ROM and strength  IR/ER, flex are WNL, limitations noted in abd  Patient demonstrated fatigue post session and would benefit from continued PT           Plan: Continue per plan of care           Precautions: None        Manuals 5/4 5/6 5/10 5/17 5/19 5/28 6/2  4/28  5/3     PROM: Flex and ER RK KR MB MB TK RK MB  RK  TK      Scar STM RK  KR MB MB TK                                  Neuro Re-Ed                      Ball Stabilization       0# 10x ea 10x ea  0#  10x ea 0# 10x ea 0#         Bodyblade                      PNF       Supine 10x ea Supine 10x ea                                   Ther Ex                      HBB Stretch 10x 10x 10x 10x5" 10x5"  10x5" 10x5" 10x TID HEP 10x     Horiz Add Stretch 10x 10x 10x 10x5" 10x5"  10x5" 10x5" 10x TID HEP 10x                            Pulleys  15x ea  20x ea 20x ea 20x ea 20x ea 20x ea 20x ea  15x ea Flex , Scap  15x ea Flex , Scap     ER with wand 25x 25x 25x 25x 25x    25x  25x     Flexion supine PROM 25x 25x 25x 25x 25x    25x  25x     Table Slides  15x Flex, scap, and ER            15x Flex Scap ER    15x Flex Scap ER        Wall Slides                      Blackburns    10x ea 10x ea 10x ea   1# 10x ea 1# 10x ea         TB High Row                      TB Mid Row  L1 15x  L1 15x  L1 15x L2 15x L2 30x L2 30x L2 30x    L1 15x     TB Low Row  L1 15x  L1 15x  L1 15x L2 15x L2 30x L2 30x L2 30x    L1 15x     TB Biceps     3x5 L1              TB Triceps  L1 15x  L1 15x  L1 15x L2 15 L2 30x L2 30x L2 30x    L1 15x     TB Upright Row                      TB Flexion     3x5 L1 3x5 L1 L2 30x  L2 30x L2 30x         TB Abduction                      TB SA Pulldown                      TB IR       10x L1 L2 30x  L2 30x  L2 30x         TB ER       10x L1 L2 30x  L2 30x  L2 30x         TB Clock                      SL ER  15x  15x 15x 1# 15x 1# 2# 15x 2# 15x 2# 15x    15x     SL ABD  15x  15x 15x 1# 15x 1# 2# 15x 2# 15x 2# 15x    15x     SL Flexion  15x  15x 15x 1# 15x 1# 2# 15x 2# 15x 2# 15x    15x     SL Horiz ABD                      FWD/LAT Raises                      Ceiling Punches  15x  15x 15x 1# 15x 1# 2# 15x 2# 15x 2# 15x 15x  15x     Hoist Row: S                      LAT Pulldown                      XO Biceps                      XO Triceps                      XO Upright Row                      XO IR                      XO ER                      XO SA Pulldowns                                                                    Ther Activity                      Reaching                      Carrying                      Lifting                      Catching                      Throwing                                             Modalities                      CP                      CP/IFC   13' 13' def 13' 15' 15'    15'

## 2021-06-07 ENCOUNTER — OFFICE VISIT (OUTPATIENT)
Dept: PHYSICAL THERAPY | Facility: CLINIC | Age: 46
End: 2021-06-07
Payer: OTHER MISCELLANEOUS

## 2021-06-07 DIAGNOSIS — M75.22 BICIPITAL TENDINITIS OF LEFT SHOULDER: Primary | ICD-10-CM

## 2021-06-07 PROCEDURE — 97014 ELECTRIC STIMULATION THERAPY: CPT | Performed by: PHYSICAL THERAPIST

## 2021-06-07 PROCEDURE — 97010 HOT OR COLD PACKS THERAPY: CPT | Performed by: PHYSICAL THERAPIST

## 2021-06-07 PROCEDURE — 97110 THERAPEUTIC EXERCISES: CPT | Performed by: PHYSICAL THERAPIST

## 2021-06-07 PROCEDURE — 97140 MANUAL THERAPY 1/> REGIONS: CPT | Performed by: PHYSICAL THERAPIST

## 2021-06-07 NOTE — PROGRESS NOTES
Daily Note     Today's date: 2021  Patient name: Romy Hurst  : 1975  MRN: [id number]  Referring provider: Akhil Morris*  Dx:   Encounter Diagnosis     ICD-10-CM    1  Bicipital tendinitis of left shoulder  M75 22                   Subjective: Patient notes pain and tenderness in the front of the shoulder the past few days  It is most noted when raising the arm out to the side  Objective: Active flexion WFL, abduction limited at 120 degrees due to anterior shoulder pain and shoulder tightness  Moderate biceps tendon tenderness  Assessment: Abduction AROM was improved after treatment  Patient was able to progress TB resistance with good tolerance         Plan: Progress treatment as tolerated           Precautions: None                    Manuals 5/4 5/6 5/10 5/17 5/19 5/28 6/2 6/7  5/3   PROM: Flex and ER RK KR MB MB TK RK MB  RK  TK    Scar STM RK  KR MB MB TK            Anterior Shoulder STM                RK     Neuro Re-Ed                     Ball Stabilization       0# 10x ea 10x ea  0#  10x ea 0# 10x ea 0#  10x ea 0#     Bodyblade                     PNF       Supine 10x ea Supine 10x ea                                 Ther Ex                     HBB Stretch 10x 10x 10x 10x5" 10x5"  10x5" 10x5" 10x 5" 10x   Horiz Add Stretch 10x 10x 10x 10x5" 10x5"  10x5" 10x5" 10x 5" 10x                         Pulleys  15x ea  20x ea 20x ea 20x ea 20x ea 20x ea 20x ea  20x ea Flex , Scap  15x ea Flex , Scap   ER with wand 25x 25x 25x 25x 25x       25x   Flexion supine PROM 25x 25x 25x 25x 25x       25x   Wall Slides                     Blackburns    10x ea 10x ea 10x ea   1# 10x ea 1# 10x ea  1# 10x     TB High Row                     TB Mid Row  L1 15x  L1 15x  L1 15x L2 15x L2 30x L2 30x L2 30x  L3 20x  L1 15x   TB Low Row  L1 15x  L1 15x  L1 15x L2 15x L2 30x L2 30x L2 30x  L3 20x  L1 15x   TB Biceps     3x5 L1          L3 20x     TB Triceps  L1 15x  L1 15x  L1 15x L2 15 L2 30x L2 30x L2 30x  L3 20x  L1 15x   TB Upright Row                     TB Flexion     3x5 L1 3x5 L1 L2 30x  L2 30x L2 30x  L3 20x     TB Abduction                     TB SA Pulldown                     TB IR       10x L1 L2 30x  L2 30x  L2 30x  L3 20x     TB ER       10x L1 L2 30x  L2 30x  L2 30x L3 20x     TB Clock                     SL ER  15x  15x 15x 1# 15x 1# 2# 15x 2# 15x 2# 15x  2# 15x  15x   SL ABD  15x  15x 15x 1# 15x 1# 2# 15x 2# 15x 2# 15x  2# 15x  15x   SL Flexion  15x  15x 15x 1# 15x 1# 2# 15x 2# 15x 2# 15x  2# 15x  15x   SL Horiz ABD                     FWD/LAT Raises                     Ceiling Punches  15x  15x 15x 1# 15x 1# 2# 15x 2# 15x 2# 15x 2#15x  15x                                               Ther Activity                                           Modalities                     CP                     CP/IFC   13' 13' def 15' 15' 15'  15'  15'

## 2021-06-09 ENCOUNTER — OFFICE VISIT (OUTPATIENT)
Dept: PHYSICAL THERAPY | Facility: CLINIC | Age: 46
End: 2021-06-09
Payer: OTHER MISCELLANEOUS

## 2021-06-09 DIAGNOSIS — M75.22 BICIPITAL TENDINITIS OF LEFT SHOULDER: Primary | ICD-10-CM

## 2021-06-09 PROCEDURE — 97140 MANUAL THERAPY 1/> REGIONS: CPT

## 2021-06-09 PROCEDURE — 97110 THERAPEUTIC EXERCISES: CPT

## 2021-06-09 NOTE — PROGRESS NOTES
Daily Note     Today's date: 2021  Patient name: Efrain Sung  : 1975  MRN: [id number]  Referring provider: Jan Tijerina*  Dx:   Encounter Diagnosis     ICD-10-CM    1  Bicipital tendinitis of left shoulder  M75 22                   Subjective: Pt  reports she has some pain in her shoulder today but is doing ok  Objective: See treatment diary below      Assessment: Tolerated treatment well  Patient exhibited good technique with therapeutic exercises and would benefit from continued PT  Will attempt to increase weight for some TE NV  Tenderness and tightness noted pectoralis  Trialed MHP        Plan: Progress treatment as tolerated           Precautions: None                    Manuals 5/4 5/6 5/10 5/17 5/19 5/28 6/2 6/7  6/9   PROM: Flex and ER RK KR MB MB TK RK MB  RK  KR    Scar STM RK  KR MB MB TK            Anterior Shoulder STM                RK  KR   Neuro Re-Ed                     Ball Stabilization  3#     0# 10x ea 10x ea  0#  10x ea 0# 10x ea 0#  10x ea 0#  10x ea  0#   Bodyblade                     PNF       Supine 10x ea Supine 10x ea        Supine 10x ea                         Ther Ex                     HBB Stretch 10x 10x 10x 10x5" 10x5"  10x5" 10x5" 10x 5" 10x   Horiz Add Stretch 10x 10x 10x 10x5" 10x5"  10x5" 10x5" 10x 5" 10x                         Pulleys  15x ea  20x ea 20x ea 20x ea 20x ea 20x ea 20x ea  20x ea Flex , Scap 20x ea Flex , Scap   ER with wand 25x 25x 25x 25x 25x          Flexion supine PROM 25x 25x 25x 25x 25x          Wall Slides                     Blackburns    10x ea 10x ea 10x ea   1# 10x ea 1# 10x ea  1# 10x  1#  15x ea   TB High Row                     TB Mid Row  L1 15x  L1 15x  L1 15x L2 15x L2 30x L2 30x L2 30x  L3 20x  L3 20x   TB Low Row  L1 15x  L1 15x  L1 15x L2 15x L2 30x L2 30x L2 30x  L3 20x  L3 15x   TB Biceps     3x5 L1          L3 20x  L3 20x   TB Triceps  L1 15x  L1 15x  L1 15x L2 15 L2 30x L2 30x L2 30x  L3 20x  L3 20x   TB Upright Row                     TB Flexion     3x5 L1 3x5 L1 L2 30x  L2 30x L2 30x  L3 20x  L3 20x   TB Abduction                     TB SA Pulldown                     TB IR       10x L1 L2 30x  L2 30x  L2 30x  L3 20x  L3  20x   TB ER       10x L1 L2 30x  L2 30x  L2 30x L3 20x  L3  20x   TB Clock                     SL ER  15x  15x 15x 1# 15x 1# 2# 15x 2# 15x 2# 15x  2# 15x  2# 15x   SL ABD  15x  15x 15x 1# 15x 1# 2# 15x 2# 15x 2# 15x  2# 15x  2# 15x   SL Flexion  15x  15x 15x 1# 15x 1# 2# 15x 2# 15x 2# 15x  2# 15x  2# 15x   SL Horiz ABD                     FWD/LAT Raises                     Ceiling Punches  15x  15x 15x 1# 15x 1# 2# 15x 2# 15x 2# 15x 2#15x  2# 15x                                               Ther Activity                                           Modalities                     CP                     CP/IFC   13' 13' def 15' 15' 15'  15'  10' P

## 2021-06-14 ENCOUNTER — OFFICE VISIT (OUTPATIENT)
Dept: PHYSICAL THERAPY | Facility: CLINIC | Age: 46
End: 2021-06-14
Payer: OTHER MISCELLANEOUS

## 2021-06-14 DIAGNOSIS — M75.22 BICIPITAL TENDINITIS OF LEFT SHOULDER: Primary | ICD-10-CM

## 2021-06-14 PROCEDURE — 97112 NEUROMUSCULAR REEDUCATION: CPT

## 2021-06-14 PROCEDURE — 97110 THERAPEUTIC EXERCISES: CPT

## 2021-06-14 PROCEDURE — 97140 MANUAL THERAPY 1/> REGIONS: CPT

## 2021-06-14 NOTE — PROGRESS NOTES
Daily Note     Today's date: 2021  Patient name: Melyssa Zavala  : 1975  MRN: [id number]  Referring provider: Ayo Pinto*  Dx:   Encounter Diagnosis     ICD-10-CM    1  Bicipital tendinitis of left shoulder  M75 22                   Subjective: Pt  reports she feels her shoulder is improving as she was using it a lot over the weekend  Pt  continue to c/o pain t/o entire shoulder  Objective: See treatment diary below      Assessment: Tolerated treatment well  Patient exhibited good technique with therapeutic exercises and would benefit from continued PT  Pt  without adverse effect with additional TE as/flow sheet        Plan: Progress treatment as tolerated           Precautions: None                    Manuals 6/14 5/6 5/10 5/17 5/19 5/28 6/2 6/7  6/9   PROM: Flex and ER KR KR MB MB TK RK MB  RK  KR    Scar STM   KR MB MB TK            Anterior Shoulder STM                RK  KR   Neuro Re-Ed                     Ball Stabilization  3#10x ea     0# 10x ea 10x ea  0#  10x ea 0# 10x ea 0#  10x ea 0#  10x ea  0#   Bodyblade                     PNF Supine  10x ea     Supine 10x ea Supine 10x ea        Supine 10x ea    No $ supine  L1 10x                   Ther Ex                     HBB Stretch 10x 10x 10x 10x5" 10x5"  10x5" 10x5" 10x 5" 10x   Horiz Add Stretch 10x 10x 10x 10x5" 10x5"  10x5" 10x5" 10x 5" 10x   PB supine Flexion  10x                   Pulleys  20x ea  20x ea 20x ea 20x ea 20x ea 20x ea 20x ea  20x ea Flex , Scap 20x ea Flex , Scap   ER with wand 25x 25x 25x 25x 25x          Flexion supine PROM 25x 25x 25x 25x 25x          Wall Slides                     Blackburns  1#  2/10  10x ea 10x ea 10x ea   1# 10x ea 1# 10x ea  1# 10x  1#  15x ea   TB High Row                     TB Mid Row  L3 20x  L1 15x  L1 15x L2 15x L2 30x L2 30x L2 30x  L3 20x  L3 20x   TB Low Row  L3 20x  L1 15x  L1 15x L2 15x L2 30x L2 30x L2 30x  L3 20x  L3 15x   TB Biceps  L3  20x   3x5 L1          L3 20x  L3 20x   TB Triceps  L3 20x  L1 15x  L1 15x L2 15 L2 30x L2 30x L2 30x  L3 20x  L3 20x   TB Upright Row                     TB Flexion  L3  20x   3x5 L1 3x5 L1 L2 30x  L2 30x L2 30x  L3 20x  L3 20x   TB Abduction                     TB SA Pulldown                     TB IR  L3  20x     10x L1 L2 30x  L2 30x  L2 30x  L3 20x  L3  20x   TB ER  L3  20x     10x L1 L2 30x  L2 30x  L2 30x L3 20x  L3  20x   TB Clock                     SL ER 2#  2/10  15x 15x 1# 15x 1# 2# 15x 2# 15x 2# 15x  2# 15x  2# 15x   SL ABD 2#  2/10  15x 15x 1# 15x 1# 2# 15x 2# 15x 2# 15x  2# 15x  2# 15x   SL Flexion  2#  2/10  15x 15x 1# 15x 1# 2# 15x 2# 15x 2# 15x  2# 15x  2# 15x   SL Horiz ABD  10x                   FWD/LAT Raises  10x ea                   Ceiling Punches 2#  20x  15x 15x 1# 15x 1# 2# 15x 2# 15x 2# 15x 2#15x  2# 15x                                               Ther Activity                                           Modalities                     CP                     CP/IFC 10'MHP 13' 13' def 15' 15' 15'  15'  10' MHP

## 2021-06-16 ENCOUNTER — APPOINTMENT (OUTPATIENT)
Dept: PHYSICAL THERAPY | Facility: CLINIC | Age: 46
End: 2021-06-16
Payer: OTHER MISCELLANEOUS

## 2021-06-17 ENCOUNTER — APPOINTMENT (OUTPATIENT)
Dept: PHYSICAL THERAPY | Facility: CLINIC | Age: 46
End: 2021-06-17
Payer: OTHER MISCELLANEOUS

## 2021-06-18 ENCOUNTER — OFFICE VISIT (OUTPATIENT)
Dept: PHYSICAL THERAPY | Facility: CLINIC | Age: 46
End: 2021-06-18
Payer: OTHER MISCELLANEOUS

## 2021-06-18 DIAGNOSIS — M75.22 BICIPITAL TENDINITIS OF LEFT SHOULDER: Primary | ICD-10-CM

## 2021-06-18 PROCEDURE — 97140 MANUAL THERAPY 1/> REGIONS: CPT

## 2021-06-18 PROCEDURE — 97110 THERAPEUTIC EXERCISES: CPT

## 2021-06-18 PROCEDURE — 97112 NEUROMUSCULAR REEDUCATION: CPT

## 2021-06-18 NOTE — PROGRESS NOTES
Daily Note     Today's date: 2021  Patient name: Alyx Delgado  : 1975  MRN: [id number]  Referring provider: Faye Coffman*  Dx:   Encounter Diagnosis     ICD-10-CM    1  Bicipital tendinitis of left shoulder  M75 22                   Subjective: Pt  reports she still has pain certain ways she moves  Pt  continues to have difficulty sleeping  Pt  To have a Dr Aguilar Pole  In July  Objective: See treatment diary below      Assessment: Tolerated treatment well  Patient exhibited good technique with therapeutic exercises and would benefit from continued PT  Pt  with minimal pain with PNF and pertebations  ROM improving        Plan: Progress treatment as tolerated           Precautions: None                    Manuals 6/14 6/18 5/10 5/17 5/19 5/28 6/2 6/7  6/9   PROM: Flex and ER KR KR MB MB TK RK MB  RK  KR    Scar STM    MB MB TK            Anterior Shoulder STM                RK  KR   Neuro Re-Ed                     Ball Stabilization  3#10x ea  3# 10x ea   0# 10x ea 10x ea  0#  10x ea 0# 10x ea 0#  10x ea 0#  10x ea  0#   Bodyblade                     PNF Supine  10x ea  10x ea   Supine 10x ea Supine 10x ea        Supine 10x ea    No $ supine  L1 10x  L1 10x                 Ther Ex                     HBB Stretch 10x 10x 10x 10x5" 10x5"  10x5" 10x5" 10x 5" 10x   Horiz Add Stretch 10x  10x 10x5" 10x5"  10x5" 10x5" 10x 5" 10x   PB supine Flexion  10x  10x                 Pulleys  20x ea  20x ea 20x ea 20x ea 20x ea 20x ea 20x ea  20x ea Flex , Scap 20x ea Flex , Scap   ER with wand 25x 25x 25x 25x 25x          Flexion supine PROM 25x 25x 25x 25x 25x          Wall Slides                     Kizzy  1#  2/10  1#10x ea 10x ea 10x ea   1# 10x ea 1# 10x ea  1# 10x  1#  15x ea   TB High Row                     TB Mid Row  L3 20x  L3 20x  L1 15x L2 15x L2 30x L2 30x L2 30x  L3 20x  L3 20x   TB Low Row  L3 20x  L3 20x  L1 15x L2 15x L2 30x L2 30x L2 30x  L3 20x  L3 15x   TB Biceps  L3  20x  L3  20x 3x5 L1          L3 20x  L3 20x   TB Triceps  L3 20x  L3 20x  L1 15x L2 15 L2 30x L2 30x L2 30x  L3 20x  L3 20x   TB Upright Row                     TB Flexion  L3  20x  L3 20x 3x5 L1 3x5 L1 L2 30x  L2 30x L2 30x  L3 20x  L3 20x   TB Abduction                     TB SA Pulldown                     TB IR  L3  20x  L3  20x   10x L1 L2 30x  L2 30x  L2 30x  L3 20x  L3  20x   TB ER  L3  20x  L3  20x   10x L1 L2 30x  L2 30x  L2 30x L3 20x  L3  20x   TB Clock                     SL ER 2#  2/10  2#  20x 15x 1# 15x 1# 2# 15x 2# 15x 2# 15x  2# 15x  2# 15x   SL ABD 2#  2/10  2#  20x 15x 1# 15x 1# 2# 15x 2# 15x 2# 15x  2# 15x  2# 15x   SL Flexion  2#  2/10  2#  20x 15x 1# 15x 1# 2# 15x 2# 15x 2# 15x  2# 15x  2# 15x   SL Horiz ABD  10x  10x                 FWD/LAT Raises  10x ea  10x ea                 Ceiling Punches 2#  20x  2#  20x 15x 1# 15x 1# 2# 15x 2# 15x 2# 15x 2#15x  2# 15x                                               Ther Activity                                           Modalities                     CP                     CP/IFC 10'MHP 8' 15' def 15' 15' 15'  15'  10' MHP

## 2021-06-21 ENCOUNTER — OFFICE VISIT (OUTPATIENT)
Dept: PHYSICAL THERAPY | Facility: CLINIC | Age: 46
End: 2021-06-21
Payer: OTHER MISCELLANEOUS

## 2021-06-21 DIAGNOSIS — M75.22 BICIPITAL TENDINITIS OF LEFT SHOULDER: Primary | ICD-10-CM

## 2021-06-21 PROCEDURE — 97140 MANUAL THERAPY 1/> REGIONS: CPT

## 2021-06-21 PROCEDURE — 97110 THERAPEUTIC EXERCISES: CPT

## 2021-06-21 NOTE — PROGRESS NOTES
Daily Note     Today's date: 2021  Patient name: Brittani Steele  : 1975  MRN: [id number]  Referring provider: Piotr Perez*  Dx:   Encounter Diagnosis     ICD-10-CM    1  Bicipital tendinitis of left shoulder  M75 22                   Subjective: Pt  reports her shoulder is stiff and sore today  Pt  was camping this weekend  Objective: See treatment diary below      Assessment: Tolerated treatment well  Patient exhibited good technique with therapeutic exercises and would benefit from continued PT  ROM/strength improving        Plan: Progress treatment as tolerated           Precautions: None                    Manuals    PROM: Flex and ER KR KR KR MB TK RK MB  RK  KR    Scar STM    KR MB TK            Anterior Shoulder STM                RK  KR   Neuro Re-Ed                     Ball Stabilization  3#10x ea  3# 10x ea  3#  10x 0# 10x ea 10x ea  0#  10x ea 0# 10x ea 0#  10x ea 0#  10x ea  0#   Bodyblade                     PNF Supine  10x ea  10x ea Supine 10x Supine 10x ea Supine 10x ea        Supine 10x ea    No $ supine  L1 10x  L1 10x  L1  10x               Ther Ex                     HBB Stretch 10x 10x 10x 10x5" 10x5"  10x5" 10x5" 10x 5" 10x   Horiz Add Stretch 10x  @ home 10x5" 10x5"  10x5" 10x5" 10x 5" 10x   PB supine Flexion  10x  10x  10x               Pulleys  20x ea  20x ea 20x ea 20x ea 20x ea 20x ea 20x ea  20x ea Flex , Scap 20x ea Flex , Scap   ER with wand 25x 25x 25x 25x 25x          Flexion supine PROM 25x 25x 25x 25x 25x          Wall Slides                     Kizzy  1#  2/10  1#10x ea 2#  15x ea 10x ea   1# 10x ea 1# 10x ea  1# 10x  1#  15x ea   TB High Row                     TB Mid Row  L3 20x  L3 20x  L3 20x L2 15x L2 30x L2 30x L2 30x  L3 20x  L3 20x   TB Low Row  L3 20x  L3 20x  L3 20x L2 15x L2 30x L2 30x L2 30x  L3 20x  L3 15x   TB Biceps  L3  20x  L3  20x L3  20x          L3 20x  L3 20x   TB Triceps  L3 20x  L3 20x  L3  20x L2 15 L2 30x L2 30x L2 30x  L3 20x  L3 20x   TB Upright Row                     TB Flexion  L3  20x  L3 20x L3 20x 3x5 L1 L2 30x  L2 30x L2 30x  L3 20x  L3 20x   TB Abduction                     TB SA Pulldown                     TB IR  L3  20x  L3  20x  L3  20x 10x L1 L2 30x  L2 30x  L2 30x  L3 20x  L3  20x   TB ER  L3  20x  L3  20x  L3  20x 10x L1 L2 30x  L2 30x  L2 30x L3 20x  L3  20x   TB Clock                     SL ER 2#  2/10  2#  20x 2#  20x 15x 1# 2# 15x 2# 15x 2# 15x  2# 15x  2# 15x   SL ABD 2#  2/10  2#  20x 2#  20x 15x 1# 2# 15x 2# 15x 2# 15x  2# 15x  2# 15x   SL Flexion  2#  2/10  2#  20x 2#  20x 15x 1# 2# 15x 2# 15x 2# 15x  2# 15x  2# 15x   SL Horiz ABD  10x  10x  10x               FWD/LAT Raises  10x ea  10x ea  1# 10x ea               Ceiling Punches 2#  20x  2#  20x 2#  20x 15x 1# 2# 15x 2# 15x 2# 15x 2#15x  2# 15x                                               Ther Activity                                           Modalities                     CP                     CP/IFC 10'MHP 8' 10' def 15' 15' 15'  15'  10' MHP

## 2021-06-23 ENCOUNTER — APPOINTMENT (OUTPATIENT)
Dept: PHYSICAL THERAPY | Facility: CLINIC | Age: 46
End: 2021-06-23
Payer: OTHER MISCELLANEOUS

## 2021-06-28 ENCOUNTER — APPOINTMENT (OUTPATIENT)
Dept: PHYSICAL THERAPY | Facility: CLINIC | Age: 46
End: 2021-06-28
Payer: OTHER MISCELLANEOUS

## 2021-06-29 NOTE — PROGRESS NOTES
PT Re-Evaluation     Today's date: 2021  Patient name: Katya Palomares  : 1975  MRN: [id number]  Referring provider: Liz Oconnell  Dx:   Encounter Diagnosis     ICD-10-CM    1  Bicipital tendinitis of left shoulder  M75 22                   Assessment  Assessment details:   CURRENT FUNCTIONAL STATUS    Dressing tolerance: Fair/Good doffing shirts  Bathing/washing hair tolerance: Fair/Good with left arm  Able to reach above shoulder level with the left arm  Able to lift 5 lbs to shoulder level with the left arm  SHORT TERM GOALS (2 WEEKS)    Increase shoulder AROM and PROM as per protocol  Decrease pain to 0-2/10  Dressing tolerance: Good doffing shirts  Bathing/washing hair tolerance: Good with left arm  Able to reach overhead with the left arm  Able to lift 6-7 lbs to shoulder level with the left arm  LONG TERM GOALS (DISCHARGE)    Shoulder AROM: F=160 deg, ABD=160 deg, ER=60 deg, IR BTB=Upper thoracic spine -partially met  Shoulder PROM: F=165 deg, ABD=170 deg, ER=75 deg in neutral, IR=45 deg at 45 deg abd -partially met          Shoulder Strength: F=33 lbs, ABD=33 lbs, ER=17 lbs, IR=24 lbs  -not met  Decrease pain to 0-1/10 -partially met  Dressing tolerance: No pain doffing shirts -partially met  Bathing/washing hair tolerance: Good with left arm -partially met      Able to reach overhead with the left arm -partially met                     Able to lift 8 lbs to shoulder level, and 5 lbs overhead with the left arm -partially met         Understanding of Dx/Px/POC: good   Prognosis: good    Plan  Plan details: The patient has shown improvement in PT demonstrating decreased pain, increased range of motion, and increased tolerance to activity  The patient continues to present with pain, decreased ROM, decreased strength, and decreased tolerance to activity    The patient would benefit from continued skilled PT services to address these issues and to maximize function  The patient will also continue performing their HEP  She will have a virtual visit with her surgeon's assistant on 21  Patient would benefit from: skilled physical therapy  Planned modality interventions: unattended electrical stimulation and cryotherapy  Planned therapy interventions: manual therapy, therapeutic exercise, therapeutic activities, neuromuscular re-education and self care  Frequency: 2x week (Daily x 1 week, TIW x 3 weeks)  Duration in weeks: 4        Subjective Evaluation    History of Present Illness  Mechanism of injury: Subjective: The patient's left shoulder pain is intermittent, located in the axilla, biceps, and anterior/lateral shoulder  Her pain has not improved the past month, and it is now waking her up again  Cracking and pain in the joint occurs when reaching out to the side, especially when the elbow is bent  The arm also feels tired at the end of the day  Pain  Current pain ratin  At best pain ratin  At worst pain rating: 3    Hand dominance: right    Patient Goals  Patient goals for therapy: decreased pain, increased motion, increased strength and return to work          Objective     General Comments:      Shoulder Comments   CURRENT OBJECTIVE MEASUREMENTS    Shoulder AROM: F=135 deg, ABD=130 deg, ER=55 deg, HBB=Lower Thoracic Spine  Shoulder PROM: F=145 deg, ABD=160 deg, ER=65 deg, IR=80 deg           Shoulder Strength: F=32 lbs, ABD=30 lbs, ER=17 lbs, IR=24 lbs                        Precautions: None                              Manuals  6 6   PROM: Flex and ER KR KR KR RK TK RK MB  RK  KR    Scar STM     KR RK TK            Anterior Shoulder STM                RK  KR   Neuro Re-Ed                     Ball Stabilization  3#10x ea  3# 10x ea  3#  10x 3# 10x ea 10x ea  0#  10x ea 0# 10x ea 0#  10x ea 0#  10x ea  0#   Bodyblade                     PNF Supine  10x ea  10x ea Supine 10x Supine 10x ea Supine 10x ea        Supine 10x ea    No $ supine  L1 10x  L1 10x  L1  10x  L1 10x             Ther Ex                     HBB Stretch 10x 10x 10x  10x5"  10x5" 10x5" 10x 5" 10x   Horiz Add Stretch 10x   @ home  10x5"  10x5" 10x5" 10x 5" 10x   PB supine Flexion  10x  10x  10x               Pulleys  20x ea  20x ea 20x ea 20x ea 20x ea 20x ea 20x ea  20x ea Flex , Scap 20x ea Flex , Scap   ER with wand 25x 25x 25x 25x 25x           Flexion supine PROM 25x 25x 25x 25x 25x           Wall Slides                     Kizzy  1#  2/10  1#10x ea 2#  15x ea 2# 15x ea   1# 10x ea 1# 10x ea  1# 10x  1#  15x ea   TB High Row                     TB Mid Row  L3 20x  L3 20x  L3 20x L3 20x L2 30x L2 30x L2 30x  L3 20x  L3 20x   TB Low Row  L3 20x  L3 20x  L3 20x L3 20x L2 30x L2 30x L2 30x  L3 20x  L3 15x   TB Biceps  L3  20x  L3  20x L3  20x  L3 20x        L3 20x  L3 20x   TB Triceps  L3 20x  L3 20x  L3  20x L3 20x L2 30x L2 30x L2 30x  L3 20x  L3 20x   TB Upright Row                     TB Flexion  L3  20x  L3 20x L3 20x L3 20x L2 30x  L2 30x L2 30x  L3 20x  L3 20x   TB Abduction                     TB SA Pulldown                     TB IR  L3  20x  L3  20x  L3  20x L3 20x L2 30x  L2 30x  L2 30x  L3 20x  L3  20x   TB ER  L3  20x  L3  20x  L3  20x L3 20x L2 30x  L2 30x  L2 30x L3 20x  L3  20x   TB Clock                     SL ER 2#  2/10  2#  20x 2#  20x 2# 20x 2# 15x 2# 15x 2# 15x  2# 15x  2# 15x   SL ABD 2#  2/10  2#  20x 2#  20x 2# 20x 2# 15x 2# 15x 2# 15x  2# 15x  2# 15x   SL Flexion  2#  2/10  2#  20x 2#  20x 2# 20x 2# 15x 2# 15x 2# 15x  2# 15x  2# 15x   SL Horiz ABD  10x  10x  10x  10x             FWD/LAT Raises  10x ea  10x ea  1# 10x ea  1# 10x ea             Ceiling Punches 2#  20x  2#  20x 2#  20x 2# 20x 2# 15x 2# 15x 2# 15x 2#15x  2# 15x                                               Ther Activity                                           Modalities                     CP                     CP/IFC 10'MHP 8' 10' 10' 15' 15' 15'  15'  10' [facility]

## 2021-06-30 ENCOUNTER — EVALUATION (OUTPATIENT)
Dept: PHYSICAL THERAPY | Facility: CLINIC | Age: 46
End: 2021-06-30
Payer: OTHER MISCELLANEOUS

## 2021-06-30 DIAGNOSIS — M75.22 BICIPITAL TENDINITIS OF LEFT SHOULDER: Primary | ICD-10-CM

## 2021-06-30 PROCEDURE — 97112 NEUROMUSCULAR REEDUCATION: CPT | Performed by: PHYSICAL THERAPIST

## 2021-06-30 PROCEDURE — 97014 ELECTRIC STIMULATION THERAPY: CPT | Performed by: PHYSICAL THERAPIST

## 2021-06-30 PROCEDURE — 97110 THERAPEUTIC EXERCISES: CPT | Performed by: PHYSICAL THERAPIST

## 2021-06-30 PROCEDURE — 97140 MANUAL THERAPY 1/> REGIONS: CPT | Performed by: PHYSICAL THERAPIST

## 2021-06-30 PROCEDURE — 97010 HOT OR COLD PACKS THERAPY: CPT | Performed by: PHYSICAL THERAPIST

## 2021-06-30 NOTE — LETTER
2021    Michele Kirby MD  [address] [place] [id number]    Patient: Neelima Hickman   YOB: 1975   Date of Visit: 2021     Encounter Diagnosis     ICD-10-CM    1  Bicipital tendinitis of left shoulder  M75 22        Dear Dr Monica Thompson:    Thank you for your recent referral of Neelima Hickman  Please review the attached evaluation summary from Edwin's recent visit  Please verify that you agree with the plan of care by signing the attached order  If you have any questions or concerns, please do not hesitate to call  I sincerely appreciate the opportunity to share in the care of one of your patients and hope to have another opportunity to work with you in the near future  Sincerely,    Steffany Londono, PT      Referring Provider:      I certify that I have read the below Plan of Care and certify the need for these services furnished under this plan of treatment while under my care  Michele Kirby MD  [address] [place] [address]  Via Fax: [phone]          PT Re-Evaluation     Today's date: 2021  Patient name: Neelima Hickman  : 1975  MRN: [id number]  Referring provider: Trav Polk*  Dx:   Encounter Diagnosis     ICD-10-CM    1  Bicipital tendinitis of left shoulder  M75 22                   Assessment  Assessment details:   CURRENT FUNCTIONAL STATUS    Dressing tolerance: Fair/Good doffing shirts  Bathing/washing hair tolerance: Fair/Good with left arm  Able to reach above shoulder level with the left arm  Able to lift 5 lbs to shoulder level with the left arm  SHORT TERM GOALS (2 WEEKS)    Increase shoulder AROM and PROM as per protocol  Decrease pain to 0-2/10  Dressing tolerance: Good doffing shirts  Bathing/washing hair tolerance: Good with left arm  Able to reach overhead with the left arm                         Able to lift 6-7 lbs to shoulder level with the left arm  LONG TERM GOALS (DISCHARGE)    Shoulder AROM: F=160 deg, ABD=160 deg, ER=60 deg, IR BTB=Upper thoracic spine -partially met  Shoulder PROM: F=165 deg, ABD=170 deg, ER=75 deg in neutral, IR=45 deg at 45 deg abd -partially met          Shoulder Strength: F=33 lbs, ABD=33 lbs, ER=17 lbs, IR=24 lbs  -not met  Decrease pain to 0-1/10 -partially met  Dressing tolerance: No pain doffing shirts -partially met  Bathing/washing hair tolerance: Good with left arm -partially met      Able to reach overhead with the left arm -partially met                     Able to lift 8 lbs to shoulder level, and 5 lbs overhead with the left arm -partially met         Understanding of Dx/Px/POC: good   Prognosis: good    Plan  Plan details: The patient has shown improvement in PT demonstrating decreased pain, increased range of motion, and increased tolerance to activity  The patient continues to present with pain, decreased ROM, decreased strength, and decreased tolerance to activity  The patient would benefit from continued skilled PT services to address these issues and to maximize function  The patient will also continue performing their HEP  She will have a virtual visit with her surgeon's assistant on 7/13/21  Patient would benefit from: skilled physical therapy  Planned modality interventions: unattended electrical stimulation and cryotherapy  Planned therapy interventions: manual therapy, therapeutic exercise, therapeutic activities, neuromuscular re-education and self care  Frequency: 2x week (Daily x 1 week, TIW x 3 weeks)  Duration in weeks: 4        Subjective Evaluation    History of Present Illness  Mechanism of injury: Subjective: The patient's left shoulder pain is intermittent, located in the axilla, biceps, and anterior/lateral shoulder  Her pain has not improved the past month, and it is now waking her up again   Cracking and pain in the joint occurs when reaching out to the side, especially when the elbow is bent  The arm also feels tired at the end of the day  Pain  Current pain ratin  At best pain ratin  At worst pain rating: 3    Hand dominance: right    Patient Goals  Patient goals for therapy: decreased pain, increased motion, increased strength and return to work          Objective     General Comments:      Shoulder Comments   CURRENT OBJECTIVE MEASUREMENTS    Shoulder AROM: F=135 deg, ABD=130 deg, ER=55 deg, HBB=Lower Thoracic Spine  Shoulder PROM: F=145 deg, ABD=160 deg, ER=65 deg, IR=80 deg           Shoulder Strength: F=32 lbs, ABD=30 lbs, ER=17 lbs, IR=24 lbs                        Precautions: None                              Manuals    PROM: Flex and ER KR KR KR RK TK RK MB  RK  KR    Scar STM     KR RK TK            Anterior Shoulder STM                RK  KR   Neuro Re-Ed                     Ball Stabilization  3#10x ea  3# 10x ea  3#  10x 3# 10x ea 10x ea  0#  10x ea 0# 10x ea 0#  10x ea 0#  10x ea  0#   Bodyblade                     PNF Supine  10x ea  10x ea Supine 10x Supine 10x ea Supine 10x ea        Supine 10x ea    No $ supine  L1 10x  L1 10x  L1  10x  L1 10x             Ther Ex                     HBB Stretch 10x 10x 10x  10x5"  10x5" 10x5" 10x 5" 10x   Horiz Add Stretch 10x   @ home  10x5"  10x5" 10x5" 10x 5" 10x   PB supine Flexion  10x  10x  10x               Pulleys  20x ea  20x ea 20x ea 20x ea 20x ea 20x ea 20x ea  20x ea Flex , Scap 20x ea Flex , Scap   ER with wand 25x 25x 25x 25x 25x           Flexion supine PROM 25x 25x 25x 25x 25x           Wall Slides                     Kizzy  1#  2/10  1#10x ea 2#  15x ea 2# 15x ea   1# 10x ea 1# 10x ea  1# 10x  1#  15x ea   TB High Row                     TB Mid Row  L3 20x  L3 20x  L3 20x L3 20x L2 30x L2 30x L2 30x  L3 20x  L3 20x   TB Low Row  L3 20x  L3 20x  L3 20x L3 20x L2 30x L2 30x L2 30x  L3 20x  L3 15x   TB Biceps  L3  20x  L3  20x L3  20x  L3 20x        L3 20x  L3 20x TB Triceps  L3 20x  L3 20x  L3  20x L3 20x L2 30x L2 30x L2 30x  L3 20x  L3 20x   TB Upright Row                     TB Flexion  L3  20x  L3 20x L3 20x L3 20x L2 30x  L2 30x L2 30x  L3 20x  L3 20x   TB Abduction                     TB SA Pulldown                     TB IR  L3  20x  L3  20x  L3  20x L3 20x L2 30x  L2 30x  L2 30x  L3 20x  L3  20x   TB ER  L3  20x  L3  20x  L3  20x L3 20x L2 30x  L2 30x  L2 30x L3 20x  L3  20x   TB Clock                     SL ER 2#  2/10  2#  20x 2#  20x 2# 20x 2# 15x 2# 15x 2# 15x  2# 15x  2# 15x   SL ABD 2#  2/10  2#  20x 2#  20x 2# 20x 2# 15x 2# 15x 2# 15x  2# 15x  2# 15x   SL Flexion  2#  2/10  2#  20x 2#  20x 2# 20x 2# 15x 2# 15x 2# 15x  2# 15x  2# 15x   SL Horiz ABD  10x  10x  10x  10x             FWD/LAT Raises  10x ea  10x ea  1# 10x ea  1# 10x ea             Ceiling Punches 2#  20x  2#  20x 2#  20x 2# 20x 2# 15x 2# 15x 2# 15x 2#15x  2# 15x                                               Ther Activity                                           Modalities                     CP                     CP/IFC 10'MHP 8' 10' 10' 15' 15' 15'  15'  10' MHP

## 2021-07-02 ENCOUNTER — OFFICE VISIT (OUTPATIENT)
Dept: PHYSICAL THERAPY | Facility: CLINIC | Age: 46
End: 2021-07-02
Payer: OTHER MISCELLANEOUS

## 2021-07-02 DIAGNOSIS — M75.22 BICIPITAL TENDINITIS OF LEFT SHOULDER: Primary | ICD-10-CM

## 2021-07-02 PROCEDURE — 97140 MANUAL THERAPY 1/> REGIONS: CPT | Performed by: PHYSICAL THERAPIST

## 2021-07-02 PROCEDURE — 97110 THERAPEUTIC EXERCISES: CPT | Performed by: PHYSICAL THERAPIST

## 2021-07-02 NOTE — PROGRESS NOTES
Daily Note     Today's date: 2021  Patient name: Sherry Gutierrez  : 1975  MRN: [id number]  Referring provider: Cindy Durant*  Dx:   Encounter Diagnosis     ICD-10-CM    1  Bicipital tendinitis of left shoulder  M75 22                   Subjective: Patient notes pinching in the shoulder at times when raising and lowering the arm  Objective: See treatment diary below      Assessment: Mild tightness was noted at end range of all planes of PROM, occasional impingement noted during pre's        Plan: Progress resistance of pre's at next session         Precautions: None                              Manuals    PROM: Flex and ER KR KR KR RK RK RK MB  RK  KR    Scar STM     KR RK             Anterior Shoulder STM                RK  KR   Neuro Re-Ed                     Ball Stabilization  3#10x ea  3# 10x ea  3#  10x 3# 10x ea 3# 10x ea  10x ea 0# 10x ea 0#  10x ea 0#  10x ea  0#   Bodyblade                     PNF Supine  10x ea  10x ea Supine 10x Supine 10x ea         Supine 10x ea    No $ supine  L1 10x  L1 10x  L1  10x  L1 10x             Ther Ex                     HBB Stretch 10x 10x 10x  10x5"  10x5" 10x5" 10x 5" 10x   Horiz Add Stretch 10x   @ home  10x5"  10x5" 10x5" 10x 5" 10x   PB supine Flexion  10x  10x  10x               Pulleys  20x ea  20x ea 20x ea 20x ea 20x ea 20x ea 20x ea  20x ea Flex , Scap 20x ea Flex , Scap   ER with wand 25x 25x 25x 25x 25x           Flexion supine PROM 25x 25x 25x 25x 25x           Wall Slides                     Kizzy  1#  2/10  1#10x ea 2#  15x ea 2# 15x ea  2# 15x ea 1# 10x ea 1# 10x ea  1# 10x  1#  15x ea   TB High Row                     TB Mid Row  L3 20x  L3 20x  L3 20x L3 20x L3 30x L2 30x L2 30x  L3 20x  L3 20x   TB Low Row  L3 20x  L3 20x  L3 20x L3 20x L3 30x L2 30x L2 30x  L3 20x  L3 15x   TB Biceps  L3  20x  L3  20x L3  20x  L3 20x  L3 20x      L3 20x  L3 20x   TB Triceps  L3 20x  L3 20x  L3  20x L3 20x L3 30x L2 30x L2 30x  L3 20x  L3 20x   TB Upright Row                     TB Flexion  L3  20x  L3 20x L3 20x L3 20x L3 30x  L2 30x L2 30x  L3 20x  L3 20x   TB Abduction                     TB SA Pulldown                     TB IR  L3  20x  L3  20x  L3  20x L3 20x L3 30x  L2 30x  L2 30x  L3 20x  L3  20x   TB ER  L3  20x  L3  20x  L3  20x L3 20x L3 30x  L2 30x  L2 30x L3 20x  L3  20x   TB Clock                     SL ER 2#  2/10  2#  20x 2#  20x 2# 20x 2# 20x 2# 15x 2# 15x  2# 15x  2# 15x   SL ABD 2#  2/10  2#  20x 2#  20x 2# 20x 2# 20x 2# 15x 2# 15x  2# 15x  2# 15x   SL Flexion  2#  2/10  2#  20x 2#  20x 2# 20x 2# 20x 2# 15x 2# 15x  2# 15x  2# 15x   SL Horiz ABD  10x  10x  10x  10x  10x           FWD/LAT Raises  10x ea  10x ea  1# 10x ea  1# 10x ea  1# 10x ea           Ceiling Punches 2#  20x  2#  20x 2#  20x 2# 20x 2# 20x 2# 15x 2# 15x 2#15x  2# 15x                                               Ther Activity                                           Modalities                     CP                     CP/IFC 10'MHP 8' 10' 10'  15' 15'  15'  10' MHP

## 2021-07-06 ENCOUNTER — OFFICE VISIT (OUTPATIENT)
Dept: PHYSICAL THERAPY | Facility: CLINIC | Age: 46
End: 2021-07-06
Payer: OTHER MISCELLANEOUS

## 2021-07-06 DIAGNOSIS — M75.22 BICIPITAL TENDINITIS OF LEFT SHOULDER: Primary | ICD-10-CM

## 2021-07-06 PROCEDURE — 97110 THERAPEUTIC EXERCISES: CPT | Performed by: PHYSICAL THERAPIST

## 2021-07-06 PROCEDURE — 97010 HOT OR COLD PACKS THERAPY: CPT | Performed by: PHYSICAL THERAPIST

## 2021-07-06 PROCEDURE — 97140 MANUAL THERAPY 1/> REGIONS: CPT | Performed by: PHYSICAL THERAPIST

## 2021-07-06 PROCEDURE — 97014 ELECTRIC STIMULATION THERAPY: CPT | Performed by: PHYSICAL THERAPIST

## 2021-07-06 NOTE — PROGRESS NOTES
Daily Note     Today's date: 2021  Patient name: Félix Cadena  : 1975  MRN: [id number]  Referring provider: Elke Morin*  Dx:   Encounter Diagnosis     ICD-10-CM    1  Bicipital tendinitis of left shoulder  M75 22                   Subjective: No new complaints, pain and tightness continue  Objective: See treatment diary below      Assessment: Occasional pinches of mild shoulder discomfort were noted upon release of end range stretch during PROM      Plan: Continue per plan of care          Precautions: None                              Manuals    PROM: Flex and ER KR KR KR RK RK RK MB  RK  KR    Scar STM     KR RK             Anterior Shoulder STM                RK  KR   Neuro Re-Ed                     Ball Stabilization  3#10x ea  3# 10x ea  3#  10x 3# 10x ea 3# 10x ea  10x ea 3# 10x ea 0#  10x ea 0#  10x ea  0#   Bodyblade                     PNF Supine  10x ea  10x ea Supine 10x Supine 10x ea   Supine 10x      Supine 10x ea    No $ supine  L1 10x  L1 10x  L1  10x  L1 10x             Ther Ex                     HBB Stretch 10x 10x 10x  10x5"  10x5" 10x5" 10x 5" 10x   Horiz Add Stretch 10x   @ home  10x5"  10x5" 10x5" 10x 5" 10x   PB supine Flexion  10x  10x  10x               Pulleys  20x ea  20x ea 20x ea 20x ea 20x ea 20x ea 20x ea  20x ea Flex , Scap 20x ea Flex , Scap   ER with wand 25x 25x 25x 25x 25x  25x         Flexion supine PROM 25x 25x 25x 25x 25x  25x         Wall Slides                     Kizzy  1#  2/10  1#10x ea 2#  15x ea 2# 15x ea  2# 15x ea 2# 15x ea 1# 10x ea  1# 10x  1#  15x ea   TB High Row                     TB Mid Row  L3 20x  L3 20x  L3 20x L3 20x L3 30x L3 30x L2 30x  L3 20x  L3 20x   TB Low Row  L3 20x  L3 20x  L3 20x L3 20x L3 30x L3 30x L2 30x  L3 20x  L3 15x   TB Biceps  L3  20x  L3  20x L3  20x  L3 20x  L3 20x  L3 30x    L3 20x  L3 20x   TB Triceps  L3 20x  L3 20x  L3  20x L3 20x L3 30x L3 30x L2 30x  L3 20x  L3 20x   TB Upright Row                     TB Flexion  L3  20x  L3 20x L3 20x L3 20x L3 30x  L3 30x L2 30x  L3 20x  L3 20x   TB Abduction                     TB SA Pulldown                     TB IR  L3  20x  L3  20x  L3  20x L3 20x L3 30x  L3 30x  L2 30x  L3 20x  L3  20x   TB ER  L3  20x  L3  20x  L3  20x L3 20x L3 30x  L3 30x  L2 30x L3 20x  L3  20x   TB Clock                     SL ER 2#  2/10  2#  20x 2#  20x 2# 20x 2# 20x 2# 20x 2# 15x  2# 15x  2# 15x   SL ABD 2#  2/10  2#  20x 2#  20x 2# 20x 2# 20x 2# 20x 2# 15x  2# 15x  2# 15x   SL Flexion  2#  2/10  2#  20x 2#  20x 2# 20x 2# 20x 2# 20x 2# 15x  2# 15x  2# 15x   SL Horiz ABD  10x  10x  10x  10x  10x  10x         FWD/LAT Raises  10x ea  10x ea  1# 10x ea  1# 10x ea  1# 10x ea  1# 10x ea         Ceiling Punches 2#  20x  2#  20x 2#  20x 2# 20x 2# 20x 2# 20x 2# 15x 2#15x  2# 15x                                               Ther Activity                                           Modalities                     CP                     CP/IFC 10'MHP 8' 10' 10' 15' 15' 15'  15'  10' MHP

## 2021-07-08 ENCOUNTER — OFFICE VISIT (OUTPATIENT)
Dept: PHYSICAL THERAPY | Facility: CLINIC | Age: 46
End: 2021-07-08
Payer: OTHER MISCELLANEOUS

## 2021-07-08 DIAGNOSIS — M75.22 BICIPITAL TENDINITIS OF LEFT SHOULDER: Primary | ICD-10-CM

## 2021-07-08 PROCEDURE — 97014 ELECTRIC STIMULATION THERAPY: CPT | Performed by: PHYSICAL THERAPIST

## 2021-07-08 PROCEDURE — 97010 HOT OR COLD PACKS THERAPY: CPT | Performed by: PHYSICAL THERAPIST

## 2021-07-08 PROCEDURE — 97110 THERAPEUTIC EXERCISES: CPT | Performed by: PHYSICAL THERAPIST

## 2021-07-08 PROCEDURE — 97140 MANUAL THERAPY 1/> REGIONS: CPT | Performed by: PHYSICAL THERAPIST

## 2021-07-08 NOTE — PROGRESS NOTES
Daily Note     Today's date: 2021  Patient name: Kelly Hernandez  : 1975  MRN: [id number]  Referring provider: Baltazar Isidro*  Dx:   Encounter Diagnosis     ICD-10-CM    1  Bicipital tendinitis of left shoulder  M75 22                    Subjective: No new complaints  Patient will have virtual visit next week with ir  Objective: See treatment diary below  Assessment: Tolerated treatment well  Patient exhibited good technique with therapeutic exercise   progressions      Plan: Progress treatment as tolerated           Precautions: None                              Manuals    PROM: Flex and ER KR KR KR RK RK RK RK  RK  KR    Scar STM     KR RK             Anterior Shoulder STM                RK  KR   Neuro Re-Ed                     Ball Stabilization  3#10x ea  3# 10x ea  3#  10x 3# 10x ea 3# 10x ea  10x ea 3# 10x ea 3#  10x ea 0#  10x ea  0#   Bodyblade                     PNF Supine  10x ea  10x ea Supine 10x Supine 10x ea   Supine 10x      Supine 10x ea    No $ supine  L1 10x  L1 10x  L1  10x  L1 10x             Ther Ex                     HBB Stretch 10x 10x 10x  10x5"  10x5" 10x5" 10x 5" 10x   Horiz Add Stretch 10x   @ home  10x5"  10x5" 10x5" 10x 5" 10x   PB supine Flexion  10x  10x  10x               Pulleys  20x ea  20x ea 20x ea 20x ea 20x ea 20x ea 20x ea  20x ea Flex , Scap 20x ea Flex , Scap   ER with wand 25x 25x 25x 25x 25x  25x  25x       Flexion supine PROM 25x 25x 25x 25x 25x  25x  25x       Wall Slides                     Kizzy  1#  2/10  1#10x ea 2#  15x ea 2# 15x ea  2# 15x ea 2# 15x ea 3# 15x ea  1# 10x  1#  15x ea   TB High Row                     TB Mid Row  L3 20x  L3 20x  L3 20x L3 20x L3 30x L3 30x L3 30x  L3 20x  L3 20x   TB Low Row  L3 20x  L3 20x  L3 20x L3 20x L3 30x L3 30x L3 30x  L3 20x  L3 15x   TB Biceps  L3  20x  L3  20x L3  20x  L3 20x  L3 20x  L3 30x L3 30x  L3 20x  L3 20x   TB Triceps  L3 20x  L3 20x  L3  20x L3 20x L3 30x L3 30x L3 30x  L3 20x  L3 20x   TB Upright Row                     TB Flexion  L3  20x  L3 20x L3 20x L3 20x L3 30x  L3 30x L3 30x  L3 20x  L3 20x   TB Abduction                     TB SA Pulldown                     TB IR  L3  20x  L3  20x  L3  20x L3 20x L3 30x  L3 30x  L3 30x  L3 20x  L3  20x   TB ER  L3  20x  L3  20x  L3  20x L3 20x L3 30x  L3 30x  L3 30x L3 20x  L3  20x   TB Clock                     SL ER 2#  2/10  2#  20x 2#  20x 2# 20x 2# 20x 2# 20x 3# 20x  2# 15x  2# 15x   SL ABD 2#  2/10  2#  20x 2#  20x 2# 20x 2# 20x 2# 20x 3# 20x  2# 15x  2# 15x   SL Flexion  2#  2/10  2#  20x 2#  20x 2# 20x 2# 20x 2# 20x 3# 20x  2# 15x  2# 15x   SL Horiz ABD  10x  10x  10x  10x  10x  10x  3# 20x       FWD/LAT Raises  10x ea  10x ea  1# 10x ea  1# 10x ea  1# 10x ea  1# 10x ea  1# 10x ea       Ceiling Punches 2#  20x  2#  20x 2#  20x 2# 20x 2# 20x 2# 20x 2# 20x 2#15x  2# 15x                                               Ther Activity                                           Modalities                     CP                     CP/IFC 10'MHP 8' 10' 10' 15' 15' 15'  15'  10' MHP

## 2021-07-13 ENCOUNTER — OFFICE VISIT (OUTPATIENT)
Dept: PHYSICAL THERAPY | Facility: CLINIC | Age: 46
End: 2021-07-13
Payer: OTHER MISCELLANEOUS

## 2021-07-13 DIAGNOSIS — M75.22 BICIPITAL TENDINITIS OF LEFT SHOULDER: Primary | ICD-10-CM

## 2021-07-13 PROCEDURE — 97140 MANUAL THERAPY 1/> REGIONS: CPT | Performed by: PHYSICAL THERAPIST

## 2021-07-13 PROCEDURE — 97014 ELECTRIC STIMULATION THERAPY: CPT

## 2021-07-13 PROCEDURE — 97110 THERAPEUTIC EXERCISES: CPT

## 2021-07-13 NOTE — PROGRESS NOTES
Daily Note     Today's date: 2021  Patient name: Yamini Rogers  : 1975  MRN: [id number]  Referring provider: Roro Morgan*  Dx:   Encounter Diagnosis     ICD-10-CM    1  Bicipital tendinitis of left shoulder  M75 22                   Subjective: Patient stated that she has been noticing improvement in her strength  She is still experiencing anterior shoulder pain, and usually gets worse by the end of the day  Objective: See treatment diary below      Assessment: Tolerated treatment well  Patient was progressed with TB strengthening tasks, well tolerated and completed with good form  She is most challenged with stability tasks   Continued PT would be beneficial to improve function           Plan: Continue per plan of care           Precautions: None                             Manuals    PROM: Flex and ER KR KR KR RK RK RK RK RK Tamiko Japanese    Scar STM     KR RK            Anterior Shoulder STM                 KR   Neuro Re-Ed                    Ball Stabilization  3#10x ea  3# 10x ea  3#  10x 3# 10x ea 3# 10x ea  10x ea 3# 10x ea 3# 10x ea 3#  10x ea  0#   Bodyblade                    PNF Supine  10x ea  10x ea Supine 10x Supine 10x ea   Supine 10x     Supine 10x ea    No $ supine  L1 10x  L1 10x  L1  10x  L1 10x            Ther Ex                    HBB Stretch 10x 10x 10x  10x5"  10x5" 10x5" HEP 10x   Horiz Add Stretch 10x   @ home  10x5"  10x5" 10x5" HEP 10x   PB supine Flexion  10x  10x  10x              Pulleys  20x ea  20x ea 20x ea 20x ea 20x ea 20x ea 20x ea 20x ea 20x ea Flex , Scap   ER with wand 25x 25x 25x 25x 25x  25x  25x HEP     Flexion supine PROM 25x 25x 25x 25x 25x  25x  25x HEP     Wall Slides                    Blackburns  1#  2/10  1#10x ea 2#  15x ea 2# 15x ea  2# 15x ea 2# 15x ea 3# 15x ea 3# 15x ea  1#  15x ea   TB High Row                    TB Mid Row  L3 20x  L3 20x  L3 20x L3 20x L3 30x L3 30x L3 30x L4 30x  L3 20x   TB Low Row  L3 20x  L3 20x  L3 20x L3 20x L3 30x L3 30x L3 30x L4 30x  L3 15x   TB Biceps  L3  20x  L3  20x L3  20x  L3 20x  L3 20x  L3 30x L3 30x L4 30x  L3 20x   TB Triceps  L3 20x  L3 20x  L3  20x L3 20x L3 30x L3 30x L3 30x L4 30x  L3 20x   TB Upright Row                    TB Flexion  L3  20x  L3 20x L3 20x L3 20x L3 30x  L3 30x L3 30x L3 30x  L3 20x   TB Abduction                    TB SA Pulldown                    TB IR  L3  20x  L3  20x  L3  20x L3 20x L3 30x  L3 30x  L3 30x L4 30x  L3  20x   TB ER  L3  20x  L3  20x  L3  20x L3 20x L3 30x  L3 30x  L3 30x L4 30x  L3  20x   TB Clock                    SL ER 2#  2/10  2#  20x 2#  20x 2# 20x 2# 20x 2# 20x 3# 20x  3# 20x  2# 15x   SL ABD 2#  2/10  2#  20x 2#  20x 2# 20x 2# 20x 2# 20x 3# 20x  3# 20x  2# 15x   SL Flexion  2#  2/10  2#  20x 2#  20x 2# 20x 2# 20x 2# 20x 3# 20x  3# 20x  2# 15x   SL Horiz ABD  10x  10x  10x  10x  10x  10x  3# 20x  3# 20x     FWD/LAT Raises  10x ea  10x ea  1# 10x ea  1# 10x ea  1# 10x ea  1# 10x ea  1# 10x ea 1# 10x ea     Ceiling Punches 2#  20x  2#  20x 2#  20x 2# 20x 2# 20x 2# 20x 2# 20x  3# 20x  2# 15x                                             Ther Activity                                         Modalities                    CP                    CP/IFC 10'MHP 8' 10' 10' 15' 15' 15' 15'  10' MHP

## 2021-07-15 ENCOUNTER — OFFICE VISIT (OUTPATIENT)
Dept: PHYSICAL THERAPY | Facility: CLINIC | Age: 46
End: 2021-07-15
Payer: OTHER MISCELLANEOUS

## 2021-07-15 DIAGNOSIS — M75.22 BICIPITAL TENDINITIS OF LEFT SHOULDER: Primary | ICD-10-CM

## 2021-07-15 PROCEDURE — 97110 THERAPEUTIC EXERCISES: CPT | Performed by: PHYSICAL THERAPIST

## 2021-07-15 PROCEDURE — 97014 ELECTRIC STIMULATION THERAPY: CPT | Performed by: PHYSICAL THERAPIST

## 2021-07-15 PROCEDURE — 97010 HOT OR COLD PACKS THERAPY: CPT | Performed by: PHYSICAL THERAPIST

## 2021-07-15 PROCEDURE — 97140 MANUAL THERAPY 1/> REGIONS: CPT | Performed by: PHYSICAL THERAPIST

## 2021-07-15 NOTE — PROGRESS NOTES
Daily Note     Today's date: 7/15/2021  Patient name: Aren Carmona  : 1975  MRN: [id number]  Referring provider: Sher Carlos*  Dx:   Encounter Diagnosis     ICD-10-CM    1  Bicipital tendinitis of left shoulder  M75 22                   Subjective: Patient had her virtual visit with her surgeon's PA 21  She ordered an MRI scheduled for 21  She notes more pain in the shoulder today  Objective: See treatment diary below      Assessment: PROM limited in elevation by pain and tightness        Plan: Progress treatment as tolerated           Precautions: None                             Manuals 6/14 6/18 6/21 6/30 7/2 7/6 7/8 7/13  7/15   PROM: Flex and ER KR KR KR RK RK RK RK RK  RK    Scar STM     KR RK            Anterior Shoulder STM                    Neuro Re-Ed                    Ball Stabilization  3#10x ea  3# 10x ea  3#  10x 3# 10x ea 3# 10x ea  10x ea 3# 10x ea 3# 10x ea 3#  10x ea  3#   Bodyblade                    PNF Supine  10x ea  10x ea Supine 10x Supine 10x ea   Supine 10x         No $ supine  L1 10x  L1 10x  L1  10x  L1 10x            Ther Ex                    HBB Stretch 10x 10x 10x  10x5"  10x5" 10x5" HEP    Horiz Add Stretch 10x   @ home  10x5"  10x5" 10x5" HEP    PB supine Flexion  10x  10x  10x              Pulleys  20x ea  20x ea 20x ea 20x ea 20x ea 20x ea 20x ea 20x ea 20x ea Flex , Scap   ER with wand 25x 25x 25x 25x 25x  25x  25x HEP     Flexion supine PROM 25x 25x 25x 25x 25x  25x  25x HEP     Wall Slides                    Blackburns  1#  2/10  1#10x ea 2#  15x ea 2# 15x ea  2# 15x ea 2# 15x ea 3# 15x ea 3# 15x ea  3#  15x ea   TB High Row                    TB Mid Row  L3 20x  L3 20x  L3 20x L3 20x L3 30x L3 30x L3 30x L4 30x  L4 30x   TB Low Row  L3 20x  L3 20x  L3 20x L3 20x L3 30x L3 30x L3 30x L4 30x  L4 30x   TB Biceps  L3  20x  L3  20x L3  20x  L3 20x  L3 20x  L3 30x L3 30x L4 30x  L4 30x   TB Triceps  L3 20x  L3 20x  L3  20x L3 20x L3 30x L3 30x L3 30x L4 30x  L4 30x   TB Upright Row                    TB Flexion  L3  20x  L3 20x L3 20x L3 20x L3 30x  L3 30x L3 30x L3 30x  L4 30x   TB Abduction                    TB SA Pulldown                    TB IR  L3  20x  L3  20x  L3  20x L3 20x L3 30x  L3 30x  L3 30x L4 30x  L4 30x   TB ER  L3  20x  L3  20x  L3  20x L3 20x L3 30x  L3 30x  L3 30x L4 30x  L4 30x   TB Clock                    SL ER 2#  2/10  2#  20x 2#  20x 2# 20x 2# 20x 2# 20x 3# 20x  3# 20x  3# 20xx   SL ABD 2#  2/10  2#  20x 2#  20x 2# 20x 2# 20x 2# 20x 3# 20x  3# 20x  3# 20x   SL Flexion  2#  2/10  2#  20x 2#  20x 2# 20x 2# 20x 2# 20x 3# 20x  3# 20x  3# 20x   SL Horiz ABD  10x  10x  10x  10x  10x  10x  3# 20x  3# 20x  3# 20x   FWD/LAT Raises  10x ea  10x ea  1# 10x ea  1# 10x ea  1# 10x ea  1# 10x ea  1# 10x ea 1# 10x ea  1# 10x   Ceiling Punches 2#  20x  2#  20x 2#  20x 2# 20x 2# 20x 2# 20x 2# 20x  3# 20x  3# 20x                                             Ther Activity                                         Modalities                    CP                    CP/IFC 10'MHP 8' 10' 10' 15' 15' 15' 15'  15'

## 2021-07-19 ENCOUNTER — OFFICE VISIT (OUTPATIENT)
Dept: PHYSICAL THERAPY | Facility: CLINIC | Age: 46
End: 2021-07-19
Payer: OTHER MISCELLANEOUS

## 2021-07-19 DIAGNOSIS — M75.22 BICIPITAL TENDINITIS OF LEFT SHOULDER: Primary | ICD-10-CM

## 2021-07-19 PROCEDURE — 97140 MANUAL THERAPY 1/> REGIONS: CPT | Performed by: PHYSICAL THERAPIST

## 2021-07-19 PROCEDURE — 97110 THERAPEUTIC EXERCISES: CPT | Performed by: PHYSICAL THERAPIST

## 2021-07-19 NOTE — PROGRESS NOTES
Daily Note     Today's date: 2021  Patient name: Carmen Gauthier  : 1975  MRN: [id number]  Referring provider: Meredith Hernandez*  Dx:   Encounter Diagnosis     ICD-10-CM    1  Bicipital tendinitis of left shoulder  M75 22                   Subjective: Patient tried sweeping her floor with a broom and had shoulder pain  Objective: See treatment diary below      Assessment: Tolerated treatment fair, having mild pain during stretching and some strengthening exercises   Patient demonstrated fatigue post treatment      Plan: Progress treament per protocol          Precautions: None                             Manuals  7/2 7/6 7/8 7/13  7/15   PROM: Flex and ER RK KR KR RK RK RK RK RK  RK    Scar STM     KR RK            Anterior Shoulder STM                    Neuro Re-Ed                    Ball Stabilization  3#10x ea  3# 10x ea  3#  10x 3# 10x ea 3# 10x ea  10x ea 3# 10x ea 3# 10x ea 3#  10x ea  3#   Bodyblade                    PNF   10x ea Supine 10x Supine 10x ea   Supine 10x         No $ supine   L1 10x  L1  10x  L1 10x            Ther Ex                    HBB Stretch  10x 10x  10x5"  10x5" 10x5" HEP    Horiz Add Stretch    @ home  10x5"  10x5" 10x5" HEP    PB supine Flexion   10x  10x              Pulleys  20x ea  20x ea 20x ea 20x ea 20x ea 20x ea 20x ea 20x ea 20x ea Flex , Scap   ER with wand  25x 25x 25x 25x  25x  25x HEP     Flexion supine PROM  25x 25x 25x 25x  25x  25x HEP     Wall Slides                    Blackburns  3#  15x ea  1#10x ea 2#  15x ea 2# 15x ea  2# 15x ea 2# 15x ea 3# 15x ea 3# 15x ea  3#  15x ea   TB High Row                    TB Mid Row  L4 30x  L3 20x  L3 20x L3 20x L3 30x L3 30x L3 30x L4 30x  L4 30x   TB Low Row  L4 30x  L3 20x  L3 20x L3 20x L3 30x L3 30x L3 30x L4 30x  L4 30x   TB Biceps  L4 30x  L3  20x L3  20x  L3 20x  L3 20x  L3 30x L3 30x L4 30x  L4 30x   TB Triceps  L4 30x  L3 20x  L3  20x L3 20x L3 30x L3 30x L3 30x L4 30x  L4 30x   TB Upright Row                    TB Flexion  L4 30x  L3 20x L3 20x L3 20x L3 30x  L3 30x L3 30x L3 30x  L4 30x   TB Abduction                    TB SA Pulldown                    TB IR  L4  20x  L3  20x  L3  20x L3 20x L3 30x  L3 30x  L3 30x L4 30x  L4 30x   TB ER  L4  20x  L3  20x  L3  20x L3 20x L3 30x  L3 30x  L3 30x L4 30x  L4 30x   TB Clock                    SL ER 3#  2/10  2#  20x 2#  20x 2# 20x 2# 20x 2# 20x 3# 20x  3# 20x  3# 20xx   SL ABD 3#  2/10  2#  20x 2#  20x 2# 20x 2# 20x 2# 20x 3# 20x  3# 20x  3# 20x   SL Flexion 3#  2/10  2#  20x 2#  20x 2# 20x 2# 20x 2# 20x 3# 20x  3# 20x  3# 20x   SL Horiz ABD  3# 20x  10x  10x  10x  10x  10x  3# 20x  3# 20x  3# 20x   FWD/LAT Raises 2# 10x ea  10x ea  1# 10x ea  1# 10x ea  1# 10x ea  1# 10x ea  1# 10x ea 1# 10x ea  1# 10x   Ceiling Punches 3#  20x  2#  20x 2#  20x 2# 20x 2# 20x 2# 20x 2# 20x  3# 20x  3# 20x                                             Ther Activity                                         Modalities                    CP                    CP/IFC  8' 10' 10' 15' 15' 15' 15'  15'

## 2021-07-21 ENCOUNTER — APPOINTMENT (OUTPATIENT)
Dept: PHYSICAL THERAPY | Facility: CLINIC | Age: 46
End: 2021-07-21
Payer: OTHER MISCELLANEOUS

## 2021-07-21 NOTE — PROGRESS NOTES
Daily Note     Today's date: 2021  Patient name: Maranda Berman  : 1975  MRN: [id number]  Referring provider: Wilbur Perez*  Dx: No diagnosis found  Subjective: ***      Objective: See treatment diary below      Assessment: Tolerated treatment {Tolerated treatment :[id number]}   Patient {assessment:[id number]}      Plan: {PLAN:[id number]}        Precautions: None                             Manuals 7/19 7/21 6/21 6/30 7/2 7/6 7/8 7/13  7/15   PROM: Flex and ER RK RK KR RK RK RK RK RK  RK    Scar STM     KR RK            Anterior Shoulder STM                    Neuro Re-Ed                    Ball Stabilization  3#10x ea  3# 10x ea  3#  10x 3# 10x ea 3# 10x ea  10x ea 3# 10x ea 3# 10x ea 3#  10x ea  3#   Bodyblade                    PNF   Supine 10x Supine 10x ea   Supine 10x         No $ supine    L1  10x  L1 10x            Ther Ex                   HBB Stretch   10x  10x5"  10x5" 10x5" HEP    Horiz Add Stretch    @ home  10x5"  10x5" 10x5" HEP    PB supine Flexion     10x              Pulleys  20x ea  20x ea 20x ea 20x ea 20x ea 20x ea 20x ea 20x ea 20x ea Flex , Scap   ER with wand   25x 25x 25x  25x  25x HEP     Flexion supine PROM   25x 25x 25x  25x  25x HEP     Wall Slides                    Blackburns  3#  15x ea  3# 15x ea 2#  15x ea 2# 15x ea  2# 15x ea 2# 15x ea 3# 15x ea 3# 15x ea  3#  15x ea   TB High Row                    TB Mid Row  L4 30x  L4 30x  L3 20x L3 20x L3 30x L3 30x L3 30x L4 30x  L4 30x   TB Low Row  L4 30x  L4 30x  L3 20x L3 20x L3 30x L3 30x L3 30x L4 30x  L4 30x   TB Biceps  L4 30x  L4 30x L3  20x  L3 20x  L3 20x  L3 30x L3 30x L4 30x  L4 30x   TB Triceps  L4 30x  L4 30x  L3  20x L3 20x L3 30x L3 30x L3 30x L4 30x  L4 30x   TB Upright Row                    TB Flexion  L4 30x  L4 30x L3 20x L3 20x L3 30x  L3 30x L3 30x L3 30x  L4 30x   TB Abduction                    TB SA Pulldown                    TB IR  L4  20x  L4  20x  L3  20x L3 20x L3 30x  L3 30x  L3 30x L4 30x  L4 30x   TB ER  L4  20x  L4  20x  L3  20x L3 20x L3 30x  L3 30x  L3 30x L4 30x  L4 30x   TB Clock                    SL ER 3#  2/10  3#  20x 2#  20x 2# 20x 2# 20x 2# 20x 3# 20x  3# 20x  3# 20xx   SL ABD 3#  2/10  3#  20x 2#  20x 2# 20x 2# 20x 2# 20x 3# 20x  3# 20x  3# 20x   SL Flexion 3#  2/10  3#  20x 2#  20x 2# 20x 2# 20x 2# 20x 3# 20x  3# 20x  3# 20x   SL Horiz ABD  3# 20x  3# 20x  10x  10x  10x  10x  3# 20x  3# 20x  3# 20x   FWD/LAT Raises 2# 10x ea  23 10x ea  1# 10x ea  1# 10x ea  1# 10x ea  1# 10x ea  1# 10x ea 1# 10x ea  1# 10x   Ceiling Punches 3#  20x  3#  20x 2#  20x 2# 20x 2# 20x 2# 20x 2# 20x  3# 20x  3# 20x                                             Ther Activity                                         Modalities                    CP                    CP/IFC  8' 10' 10' 15' 15' 15' 15'  15'

## 2021-07-26 ENCOUNTER — APPOINTMENT (OUTPATIENT)
Dept: PHYSICAL THERAPY | Facility: CLINIC | Age: 46
End: 2021-07-26
Payer: OTHER MISCELLANEOUS

## 2021-07-28 ENCOUNTER — OFFICE VISIT (OUTPATIENT)
Dept: PHYSICAL THERAPY | Facility: CLINIC | Age: 46
End: 2021-07-28
Payer: OTHER MISCELLANEOUS

## 2021-07-28 DIAGNOSIS — M75.22 BICIPITAL TENDINITIS OF LEFT SHOULDER: Primary | ICD-10-CM

## 2021-07-28 PROCEDURE — 97014 ELECTRIC STIMULATION THERAPY: CPT

## 2021-07-28 PROCEDURE — 97110 THERAPEUTIC EXERCISES: CPT

## 2021-07-28 PROCEDURE — 97140 MANUAL THERAPY 1/> REGIONS: CPT

## 2021-07-28 NOTE — PROGRESS NOTES
Daily Note     Today's date: 2021  Patient name: Tracey Perez  : 1975  MRN: [id number]  Referring provider: Sandra Herr*  Dx:   Encounter Diagnosis     ICD-10-CM    1  Bicipital tendinitis of left shoulder  M75 22                   Subjective: Patient reports she still gets pain in her L shoulder  Had MRI 28 and results are pending  She RTD   Objective: See treatment diary below      Assessment: Tolerated treatment well  Patient demonstrated fatigue post treatment, exhibited good technique with therapeutic exercises and would benefit from continued PT to improve strength and ROM in L shoulder  She has limited abd at this time, STM to area and was able to achieve approx 5 more degrees      Plan: Continue per plan of care          Precautions: None        Manuals 7/19 7-28 6/21 6/30 7/2 7/6 7/8 7/13  7/15   PROM: Flex and ER RK CM KR RK RK RK RK RK  RK    Scar STM    KR RK            Anterior Shoulder STM                   Neuro Re-Ed                   Ball Stabilization  3#10x ea 3# 10x   3#  10x 3# 10x ea 3# 10x ea  10x ea 3# 10x ea 3# 10x ea 3#  10x ea  3#   Bodyblade                   PNF   Supine 10x Supine 10x ea   Supine 10x         No $ supine    L1  10x  L1 10x            Ther Ex                   HBB Stretch   10x  10x5"  10x5" 10x5" HEP    Horiz Add Stretch   @ home  10x5"  10x5" 10x5" HEP    PB supine Flexion    10x              Pulleys  20x ea 20x ea 20x ea 20x ea 20x ea 20x ea 20x ea 20x ea 20x ea Flex , Scap   ER with wand   25x 25x 25x  25x  25x HEP     Flexion supine PROM   25x 25x 25x  25x  25x HEP     Wall Slides                   Blackburns  3#  15x ea 3# 2x10 ea 2#  15x ea 2# 15x ea  2# 15x ea 2# 15x ea 3# 15x ea 3# 15x ea  3#  15x ea   TB High Row                   TB Mid Row  L4 30x L4 30x  L3 20x L3 20x L3 30x L3 30x L3 30x L4 30x  L4 30x   TB Low Row  L4 30x L4 30x   L3 20x L3 20x L3 30x L3 30x L3 30x L4 30x  L4 30x   TB Biceps  L4 30x L4 30x  L3  20x  L3 20x  L3 20x  L3 30x L3 30x L4 30x  L4 30x   TB Triceps  L4 30x L4 30x   L3  20x L3 20x L3 30x L3 30x L3 30x L4 30x  L4 30x   TB Upright Row                   TB Flexion  L4 30x L4 30x  L3 20x L3 20x L3 30x  L3 30x L3 30x L3 30x  L4 30x   TB Abduction                   TB SA Pulldown                   TB IR  L4  20x L4 20x   L3  20x L3 20x L3 30x  L3 30x  L3 30x L4 30x  L4 30x   TB ER  L4  20x L4 20x   L3  20x L3 20x L3 30x  L3 30x  L3 30x L4 30x  L4 30x   TB Clock                   SL ER 3#  2/10 3# 2x10 2#  20x 2# 20x 2# 20x 2# 20x 3# 20x  3# 20x  3# 20xx   SL ABD 3#  2/10 3# 2x10 2#  20x 2# 20x 2# 20x 2# 20x 3# 20x  3# 20x  3# 20x   SL Flexion 3#  2/10 3# 2x10 2#  20x 2# 20x 2# 20x 2# 20x 3# 20x  3# 20x  3# 20x   SL Horiz ABD  3# 20x 3# 20x   10x  10x  10x  10x  3# 20x  3# 20x  3# 20x   FWD/LAT Raises 2# 10x ea 2# 2x10  1# 10x ea  1# 10x ea  1# 10x ea  1# 10x ea  1# 10x ea 1# 10x ea  1# 10x   Ceiling Punches 3#  20x 3# 20x  2#  20x 2# 20x 2# 20x 2# 20x 2# 20x  3# 20x  3# 20x                                           Ther Activity                                       Modalities                   CP                   CP/IFC  13' 10' 10' 15' 15' 15' 15'  15'

## 2021-08-03 NOTE — PROGRESS NOTES
PT Re-Evaluation     Today's date: 2021  Patient name: Jose Juan Love  : 1975  MRN: [id number]  Referring provider: Kathleen Garcia  Dx:   Encounter Diagnosis     ICD-10-CM    1  Bicipital tendinitis of left shoulder  M[id number]                   Assessment  Assessment details:   CURRENT FUNCTIONAL STATUS    Dressing tolerance: Fair/Good doffing shirts  Bathing/washing hair tolerance: Fair/Good with left arm  Able to reach above shoulder level with the left arm  Able to lift 3-4 lbs to shoulder level with the left arm  SHORT TERM GOALS (2 WEEKS)    Increase shoulder AROM and PROM as per protocol  Decrease pain to 0-2/10  Dressing tolerance: Good doffing shirts  Bathing/washing hair tolerance: Good with left arm  Able to reach overhead with the left arm  Able to lift 6-7 lbs to shoulder level with the left arm  LONG TERM GOALS (DISCHARGE)    Shoulder AROM: F=160 deg, ABD=160 deg, ER=60 deg, IR BTB=Upper thoracic spine -partially met  Shoulder PROM: F=165 deg, ABD=170 deg, ER=75 deg in neutral, IR=45 deg at 45 deg abd -partially met          Shoulder Strength: F=33 lbs, ABD=33 lbs, ER=17 lbs, IR=24 lbs  -not met  Decrease pain to 0-1/10 -partially met  Dressing tolerance: No pain doffing shirts -partially met  Bathing/washing hair tolerance: Good with left arm -partially met      Able to reach overhead with the left arm -partially met                     Able to lift 8 lbs to shoulder level, and 5 lbs overhead with the left arm -partially met         Understanding of Dx/Px/POC: good   Prognosis: good    Plan  Plan details: The patient has shown limited mprovement in PT the past month  She continues to present with pain, decreased ROM, decreased strength, and decreased tolerance to activity  She had a recent MRI and will see her surgeon for a reevalaution on 8/10/21        Patient would benefit from: skilled physical therapy  Planned modality interventions: unattended electrical stimulation and cryotherapy  Planned therapy interventions: manual therapy, therapeutic exercise, therapeutic activities, neuromuscular re-education and self care  Frequency: 2x week (Daily x 1 week, TIW x 3 weeks)  Duration in weeks: 4        Subjective Evaluation    History of Present Illness  Mechanism of injury: Subjective: The patient's left shoulder pain is intermittent, located in the axilla, biceps, and anterior/lateral shoulder  Her pain has not improved the past 2 months  It continues to wake her up at night  Cracking and pain in the joint also continues to occur when reaching out to the side, especially when the elbow is bent  The arm also feels tired at the end of the day  Pain  Current pain ratin  At best pain ratin  At worst pain ratin    Hand dominance: right    Patient Goals  Patient goals for therapy: decreased pain, increased motion, increased strength and return to work          Objective     General Comments:      Shoulder Comments   CURRENT OBJECTIVE MEASUREMENTS    Shoulder AROM: F=140 deg, ABD=130 deg, ER=55 deg, HBB=Mid Thoracic Spine  Shoulder PROM: F=145 deg, ABD=160 deg, ER=65 deg, IR=80 deg           Shoulder Strength: F=32 lbs, ABD=30 lbs with pain, ER=19 lbs, IR=24 lbs with pain                        Precautions: None        Manuals  7- 7/2 7/6 7/8 7/13  7/15   PROM: Flex and ER RK CM RK RK RK RK RK RK  RK    Scar STM      RK              Anterior Shoulder STM                     Neuro Re-Ed                     Ball Stabilization  3#10x ea 3# 10x   3#  10x 3# 10x ea 3# 10x ea  10x ea 3# 10x ea 3# 10x ea 3#  10x ea  3#   Bodyblade                     PNF      Supine 10x ea    Supine 10x          No $ supine       L1 10x             Ther Ex                    HBB Stretch        10x5"  10x5" 10x5" HEP     Horiz Add Stretch        10x5"  10x5" 10x5" HEP     PB supine Flexion                     Pulleys  20x ea 20x ea 20x ea 20x ea 20x ea 20x ea 20x ea 20x ea 20x ea Flex , Scap   ER with wand      25x 25x  25x  25x HEP     Flexion supine PROM      25x 25x  25x  25x HEP     Wall Slides                     Blackburns  3#  15x ea 3# 2x10 ea 3#  2/10 2# 15x ea  2# 15x ea 2# 15x ea 3# 15x ea 3# 15x ea  3#  15x ea   TB High Row                     TB Mid Row  L4 30x L4 30x  L4 30x L3 20x L3 30x L3 30x L3 30x L4 30x  L4 30x   TB Low Row  L4 30x L4 30x   L4 30x L3 20x L3 30x L3 30x L3 30x L4 30x  L4 30x   TB Biceps  L4 30x L4 30x  L4 30x  L3 20x  L3 20x  L3 30x L3 30x L4 30x  L4 30x   TB Triceps  L4 30x L4 30x   L4 30x L3 20x L3 30x L3 30x L3 30x L4 30x  L4 30x   TB Upright Row                     TB Flexion  L4 30x L4 30x  L4 30x L3 20x L3 30x  L3 30x L3 30x L3 30x  L4 30x   TB Abduction                     TB SA Pulldown                     TB IR  L4  20x L4 20x   L4  20x L3 20x L3 30x  L3 30x  L3 30x L4 30x  L4 30x   TB ER  L4  20x L4 20x   L4  20x L3 20x L3 30x  L3 30x  L3 30x L4 30x  L4 30x   TB Clock                     SL ER 3#  2/10 3# 2x10 3#  20x 2# 20x 2# 20x 2# 20x 3# 20x  3# 20x  3# 20xx   SL ABD 3#  2/10 3# 2x10 3#  20x 2# 20x 2# 20x 2# 20x 3# 20x  3# 20x  3# 20x   SL Flexion 3#  2/10 3# 2x10 3#  20x 2# 20x 2# 20x 2# 20x 3# 20x  3# 20x  3# 20x   SL Horiz ABD  3# 20x 3# 20x   3# 20x  10x  10x  10x  3# 20x  3# 20x  3# 20x   FWD/LAT Raises 2# 10x ea 2# 2x10  2# 2/10x ea  1# 10x ea  1# 10x ea  1# 10x ea  1# 10x ea 1# 10x ea  1# 10x   Ceiling Punches 3#  20x 3# 20x  3#  20x 2# 20x 2# 20x 2# 20x 2# 20x  3# 20x  3# 20x                                               Ther Activity                                           Modalities                     CP                     CP/IFC   13' 13' 10' 15' 15' 15' 15'  15'

## 2021-08-04 ENCOUNTER — EVALUATION (OUTPATIENT)
Dept: PHYSICAL THERAPY | Facility: CLINIC | Age: 46
End: 2021-08-04
Payer: OTHER MISCELLANEOUS

## 2021-08-04 DIAGNOSIS — M75.22 BICIPITAL TENDINITIS OF LEFT SHOULDER: Primary | ICD-10-CM

## 2021-08-04 PROCEDURE — 97140 MANUAL THERAPY 1/> REGIONS: CPT | Performed by: PHYSICAL THERAPIST

## 2021-08-04 PROCEDURE — 97010 HOT OR COLD PACKS THERAPY: CPT | Performed by: PHYSICAL THERAPIST

## 2021-08-04 PROCEDURE — 97110 THERAPEUTIC EXERCISES: CPT | Performed by: PHYSICAL THERAPIST

## 2021-08-04 PROCEDURE — 97014 ELECTRIC STIMULATION THERAPY: CPT | Performed by: PHYSICAL THERAPIST

## 2021-08-04 NOTE — LETTER
2021    Mary Moon MD  [address] [place] [id number]    Patient: Kiara Rosado   YOB: 1975   Date of Visit: 2021     Encounter Diagnosis     ICD-10-CM    1  Bicipital tendinitis of left shoulder  M75 22        Dear Dr Kaylah Parker:    Thank you for your recent referral of Kiara Rosado  Please review the attached evaluation summary from Edwin's recent visit  Please verify that you agree with the plan of care by signing the attached order  If you have any questions or concerns, please do not hesitate to call  I sincerely appreciate the opportunity to share in the care of one of your patients and hope to have another opportunity to work with you in the near future  Sincerely,    Delgado Godinez, PT      Referring Provider:      I certify that I have read the below Plan of Care and certify the need for these services furnished under this plan of treatment while under my care  Mary Moon MD  [address] [place] [address] Rd  Via Fax: [phone]          PT Re-Evaluation     Today's date: 2021  Patient name: Kiara Rosado  : 1975  MRN: [id number]  Referring provider: Cynthia Young*  Dx:   Encounter Diagnosis     ICD-10-CM    1  Bicipital tendinitis of left shoulder  M75 22                   Assessment  Assessment details:   CURRENT FUNCTIONAL STATUS    Dressing tolerance: Fair/Good doffing shirts  Bathing/washing hair tolerance: Fair/Good with left arm  Able to reach above shoulder level with the left arm  Able to lift 3-4 lbs to shoulder level with the left arm  SHORT TERM GOALS (2 WEEKS)    Increase shoulder AROM and PROM as per protocol  Decrease pain to 0-2/10  Dressing tolerance: Good doffing shirts  Bathing/washing hair tolerance: Good with left arm  Able to reach overhead with the left arm                         Able to lift 6-7 lbs to shoulder level with the left arm  LONG TERM GOALS (DISCHARGE)    Shoulder AROM: F=160 deg, ABD=160 deg, ER=60 deg, IR BTB=Upper thoracic spine -partially met  Shoulder PROM: F=165 deg, ABD=170 deg, ER=75 deg in neutral, IR=45 deg at 45 deg abd -partially met          Shoulder Strength: F=33 lbs, ABD=33 lbs, ER=17 lbs, IR=24 lbs  -not met  Decrease pain to 0-1/10 -partially met  Dressing tolerance: No pain doffing shirts -partially met  Bathing/washing hair tolerance: Good with left arm -partially met      Able to reach overhead with the left arm -partially met                     Able to lift 8 lbs to shoulder level, and 5 lbs overhead with the left arm -partially met         Understanding of Dx/Px/POC: good   Prognosis: good    Plan  Plan details: The patient has shown limited mprovement in PT the past month  She continues to present with pain, decreased ROM, decreased strength, and decreased tolerance to activity  She had a recent MRI and will see her surgeon for a reevalaution on 8/10/21  Patient would benefit from: skilled physical therapy  Planned modality interventions: unattended electrical stimulation and cryotherapy  Planned therapy interventions: manual therapy, therapeutic exercise, therapeutic activities, neuromuscular re-education and self care  Frequency: 2x week (Daily x 1 week, TIW x 3 weeks)  Duration in weeks: 4        Subjective Evaluation    History of Present Illness  Mechanism of injury: Subjective: The patient's left shoulder pain is intermittent, located in the axilla, biceps, and anterior/lateral shoulder  Her pain has not improved the past 2 months  It continues to wake her up at night  Cracking and pain in the joint also continues to occur when reaching out to the side, especially when the elbow is bent  The arm also feels tired at the end of the day      Pain  Current pain ratin  At best pain ratin  At worst pain ratin    Hand dominance: right    Patient Goals  Patient goals for therapy: decreased pain, increased motion, increased strength and return to work          Objective     General Comments:      Shoulder Comments   CURRENT OBJECTIVE MEASUREMENTS    Shoulder AROM: F=140 deg, ABD=130 deg, ER=55 deg, HBB=Mid Thoracic Spine  Shoulder PROM: F=145 deg, ABD=160 deg, ER=65 deg, IR=80 deg           Shoulder Strength: F=32 lbs, ABD=30 lbs with pain, ER=19 lbs, IR=24 lbs with pain                        Precautions: None        Manuals 7/19 7-28 8/4 6/30 7/2 7/6 7/8 7/13  7/15   PROM: Flex and ER RK CM RK RK RK RK RK RK  RK    Scar STM      RK              Anterior Shoulder STM                     Neuro Re-Ed                     Ball Stabilization  3#10x ea 3# 10x   3#  10x 3# 10x ea 3# 10x ea  10x ea 3# 10x ea 3# 10x ea 3#  10x ea  3#   Bodyblade                     PNF      Supine 10x ea    Supine 10x          No $ supine       L1 10x             Ther Ex                    HBB Stretch        10x5"  10x5" 10x5" HEP     Horiz Add Stretch        10x5"  10x5" 10x5" HEP     PB supine Flexion                     Pulleys  20x ea 20x ea 20x ea 20x ea 20x ea 20x ea 20x ea 20x ea 20x ea Flex , Scap   ER with wand      25x 25x  25x  25x HEP     Flexion supine PROM      25x 25x  25x  25x HEP     Wall Slides                     Blackburns  3#  15x ea 3# 2x10 ea 3#  2/10 2# 15x ea  2# 15x ea 2# 15x ea 3# 15x ea 3# 15x ea  3#  15x ea   TB High Row                     TB Mid Row  L4 30x L4 30x  L4 30x L3 20x L3 30x L3 30x L3 30x L4 30x  L4 30x   TB Low Row  L4 30x L4 30x   L4 30x L3 20x L3 30x L3 30x L3 30x L4 30x  L4 30x   TB Biceps  L4 30x L4 30x  L4 30x  L3 20x  L3 20x  L3 30x L3 30x L4 30x  L4 30x   TB Triceps  L4 30x L4 30x   L4 30x L3 20x L3 30x L3 30x L3 30x L4 30x  L4 30x   TB Upright Row                     TB Flexion  L4 30x L4 30x  L4 30x L3 20x L3 30x  L3 30x L3 30x L3 30x  L4 30x   TB Abduction                     TB SA Pulldown                     TB IR  L4  20x L4 20x   L4  20x L3 20x L3 30x  L3 30x  L3 30x L4 30x  L4 30x   TB ER  L4  20x L4 20x   L4  20x L3 20x L3 30x  L3 30x  L3 30x L4 30x  L4 30x   TB Clock                     SL ER 3#  2/10 3# 2x10 3#  20x 2# 20x 2# 20x 2# 20x 3# 20x  3# 20x  3# 20xx   SL ABD 3#  2/10 3# 2x10 3#  20x 2# 20x 2# 20x 2# 20x 3# 20x  3# 20x  3# 20x   SL Flexion 3#  2/10 3# 2x10 3#  20x 2# 20x 2# 20x 2# 20x 3# 20x  3# 20x  3# 20x   SL Horiz ABD  3# 20x 3# 20x   3# 20x  10x  10x  10x  3# 20x  3# 20x  3# 20x   FWD/LAT Raises 2# 10x ea 2# 2x10  2# 2/10x ea  1# 10x ea  1# 10x ea  1# 10x ea  1# 10x ea 1# 10x ea  1# 10x   Ceiling Punches 3#  20x 3# 20x  3#  20x 2# 20x 2# 20x 2# 20x 2# 20x  3# 20x  3# 20x                                               Ther Activity                                           Modalities                     CP                     CP/IFC   13' 13' 10' 15' 15' 15' 15'  15'

## 2021-08-06 ENCOUNTER — APPOINTMENT (OUTPATIENT)
Dept: PHYSICAL THERAPY | Facility: CLINIC | Age: 46
End: 2021-08-06
Payer: OTHER MISCELLANEOUS

## 2021-08-09 ENCOUNTER — OFFICE VISIT (OUTPATIENT)
Dept: PHYSICAL THERAPY | Facility: CLINIC | Age: 46
End: 2021-08-09
Payer: OTHER MISCELLANEOUS

## 2021-08-09 DIAGNOSIS — M75.22 BICIPITAL TENDINITIS OF LEFT SHOULDER: Primary | ICD-10-CM

## 2021-08-09 PROCEDURE — 97110 THERAPEUTIC EXERCISES: CPT | Performed by: PHYSICAL THERAPIST

## 2021-08-09 PROCEDURE — 97140 MANUAL THERAPY 1/> REGIONS: CPT | Performed by: PHYSICAL THERAPIST

## 2021-08-09 PROCEDURE — 97014 ELECTRIC STIMULATION THERAPY: CPT | Performed by: PHYSICAL THERAPIST

## 2021-08-09 PROCEDURE — 97010 HOT OR COLD PACKS THERAPY: CPT | Performed by: PHYSICAL THERAPIST

## 2021-08-09 NOTE — PROGRESS NOTES
Daily Note     Today's date: 2021  Patient name: Flora Banegas  : 1975  MRN: [id number]  Referring provider: Gerardo Saab*  Dx:   Encounter Diagnosis     ICD-10-CM    1  Bicipital tendinitis of left shoulder  M75 22                   Subjective: Patient folded laundry over the weekend, and her shoulder is more stiff and sore  It happens every time she does the folding  She will see her surgeon tomorrow for follow up  Objective: Flexion and abduction ROM are mildly decreased due to pain and tightness        Assessment: Tightness was decreased after MT, patient was able to complete entire exercise program       Plan: Await results of surgeon's reevaluation tomorrow         Precautions: None        Manuals -28 8/4 8/6 8/9 7/6 7/8 7/13  7/15   PROM: Flex and ER RK CM RK RK RK RK RK RK  RK    Scar STM                    Anterior Shoulder STM                     Neuro Re-Ed                     Ball Stabilization  3#10x ea 3# 10x   3#  10x 3# 10x ea 3# 10x ea  10x ea 3# 10x ea 3# 10x ea 3#  10x ea  3#   Bodyblade                     PNF          Supine 10x          No $ supine                   Ther Ex                    HBB Stretch          10x5" 10x5" HEP     Horiz Add Stretch          10x5" 10x5" HEP     PB supine Flexion                     Pulleys  20x ea 20x ea 20x ea 20x ea 20x ea 20x ea 20x ea 20x ea 20x ea Flex , Scap   ER with wand         25x  25x HEP     Flexion supine PROM         25x  25x HEP     Wall Slides                     Blackburns  3#  15x ea 3# 2x10 ea 3#  2/10 3# 2/10 ea  3# 15x ea 2# 15x ea 3# 15x ea 3# 15x ea  3#  15x ea   TB High Row                     TB Mid Row  L4 30x L4 30x  L4 30x L4 30x L4 30x L3 30x L3 30x L4 30x  L4 30x   TB Low Row  L4 30x L4 30x   L4 30x L4 30x L4 30x L3 30x L3 30x L4 30x  L4 30x   TB Biceps  L4 30x L4 30x  L4 30x  L4 30x L4 30x  L3 30x L3 30x L4 30x  L4 30x   TB Triceps  L4 30x L4 30x   L4 30x L4 30x L4 30x L3 30x L3 30x L4 30x  L4 30x   TB Upright Row                     TB Flexion  L4 30x L4 30x  L4 30x L4 30x L4 30x  L3 30x L3 30x L3 30x  L4 30x   TB Abduction                     TB SA Pulldown                     TB IR  L4  20x L4 20x   L4  20x L4 30x L4 30x  L3 30x  L3 30x L4 30x  L4 30x   TB ER  L4  20x L4 20x   L4  20x L4 30x L4 30x  L3 30x  L3 30x L4 30x  L4 30x   TB Clock                     SL ER 3#  2/10 3# 2x10 3#  20x 3# 20x 3# 20x 2# 20x 3# 20x  3# 20x  3# 20xx   SL ABD 3#  2/10 3# 2x10 3#  20x 3# 20x 3# 20x 2# 20x 3# 20x  3# 20x  3# 20x   SL Flexion 3#  2/10 3# 2x10 3#  20x 3# 20x 3# 20x 2# 20x 3# 20x  3# 20x  3# 20x   SL Horiz ABD  3# 20x 3# 20x   3# 20x  3# 20x  3# 20x  10x  3# 20x  3# 20x  3# 20x   FWD/LAT Raises 2# 10x ea 2# 2x10  2# 2/10x ea  2# 2/10 ea  2# 2/10x ea  1# 10x ea  1# 10x ea 1# 10x ea  1# 10x   Ceiling Punches 3#  20x 3# 20x  3#  20x 3# 20x 2# 20x 2# 20x 2# 20x  3# 20x  3# 20x                                               Ther Activity                                           Modalities                     CP                     CP/IFC   13' 13' 15' 15' 15' 15' 15'  15'

## 2021-08-11 ENCOUNTER — APPOINTMENT (OUTPATIENT)
Dept: PHYSICAL THERAPY | Facility: CLINIC | Age: 46
End: 2021-08-11
Payer: OTHER MISCELLANEOUS

## 2021-08-11 NOTE — PROGRESS NOTES
PT Discharge    Today's date: 2021  Patient name: Elizabeth Mathew  : 1975  MRN: [id number]  Referring provider: Martina Latif  Dx:   Encounter Diagnosis     ICD-10-CM    1  Bicipital tendinitis of left shoulder  M75 22        Start Time: 905          Assessment  Assessment details:   CURRENT FUNCTIONAL STATUS    Dressing tolerance: Fair/Good doffing shirts  Bathing/washing hair tolerance: Fair/Good with left arm  Able to reach above shoulder level with the left arm  Able to lift 3-4 lbs to shoulder level with the left arm  LONG TERM GOALS (DISCHARGE)    Shoulder AROM: F=160 deg, ABD=160 deg, ER=60 deg, IR BTB=Upper thoracic spine -partially met  Shoulder PROM: F=165 deg, ABD=170 deg, ER=75 deg in neutral, IR=45 deg at 45 deg abd -partially met          Shoulder Strength: F=33 lbs, ABD=33 lbs, ER=17 lbs, IR=24 lbs  -not met  Decrease pain to 0-1/10 -partially met  Dressing tolerance: No pain doffing shirts -partially met  Bathing/washing hair tolerance: Good with left arm -partially met      Able to reach overhead with the left arm -partially met                     Able to lift 8 lbs to shoulder level, and 5 lbs overhead with the left arm -partially met         Understanding of Dx/Px/POC: good   Prognosis: good    Plan  Plan details: The patient saw her surgeon on 8/10/21  He instructed her to discontinue therapy  She is therefore discharged from our care  Planned modality interventions: unattended electrical stimulation and cryotherapy  Planned therapy interventions: manual therapy, therapeutic exercise, therapeutic activities, neuromuscular re-education and self care  Frequency: Daily x 1 week, TIW x 3 weeks          Subjective Evaluation    Pain  Current pain ratin  At best pain ratin  At worst pain ratin    Hand dominance: right    Patient Goals  Patient goals for therapy: decreased pain, increased motion, increased strength and return to work          Objective General Comments:      Shoulder Comments   CURRENT OBJECTIVE MEASUREMENTS    Shoulder AROM: F=140 deg, ABD=130 deg, ER=55 deg, HBB=Mid Thoracic Spine  Shoulder PROM: F=145 deg, ABD=160 deg, ER=65 deg, IR=80 deg  Shoulder Strength: F=32 lbs, ABD=30 lbs with pain, ER=19 lbs, IR=24 lbs with pain               Flowsheet Rows      Most Recent Value   PT/OT G-Codes   Current Score  60   Projected Score  59

## 2021-08-16 ENCOUNTER — APPOINTMENT (OUTPATIENT)
Dept: PHYSICAL THERAPY | Facility: CLINIC | Age: 46
End: 2021-08-16
Payer: OTHER MISCELLANEOUS

## 2021-08-18 ENCOUNTER — APPOINTMENT (OUTPATIENT)
Dept: PHYSICAL THERAPY | Facility: CLINIC | Age: 46
End: 2021-08-18
Payer: OTHER MISCELLANEOUS

## 2021-08-23 ENCOUNTER — APPOINTMENT (OUTPATIENT)
Dept: PHYSICAL THERAPY | Facility: CLINIC | Age: 46
End: 2021-08-23
Payer: OTHER MISCELLANEOUS

## 2021-08-25 ENCOUNTER — APPOINTMENT (OUTPATIENT)
Dept: PHYSICAL THERAPY | Facility: CLINIC | Age: 46
End: 2021-08-25
Payer: OTHER MISCELLANEOUS

## 2021-08-30 ENCOUNTER — APPOINTMENT (OUTPATIENT)
Dept: PHYSICAL THERAPY | Facility: CLINIC | Age: 46
End: 2021-08-30
Payer: OTHER MISCELLANEOUS

## 2021-10-27 DIAGNOSIS — B34.9 VIRAL INFECTION, UNSPECIFIED: ICD-10-CM

## 2021-10-27 DIAGNOSIS — Z20.822 EXPOSURE TO COVID-19 VIRUS: ICD-10-CM

## 2021-10-27 PROCEDURE — U0005 INFEC AGEN DETEC AMPLI PROBE: HCPCS | Performed by: FAMILY MEDICINE

## 2021-10-27 PROCEDURE — U0003 INFECTIOUS AGENT DETECTION BY NUCLEIC ACID (DNA OR RNA); SEVERE ACUTE RESPIRATORY SYNDROME CORONAVIRUS 2 (SARS-COV-2) (CORONAVIRUS DISEASE [COVID-19]), AMPLIFIED PROBE TECHNIQUE, MAKING USE OF HIGH THROUGHPUT TECHNOLOGIES AS DESCRIBED BY CMS-2020-01-R: HCPCS | Performed by: FAMILY MEDICINE

## 2021-11-02 ENCOUNTER — TELEPHONE (OUTPATIENT)
Dept: FAMILY MEDICINE CLINIC | Facility: CLINIC | Age: 46
End: 2021-11-02

## 2021-11-03 ENCOUNTER — TELEMEDICINE (OUTPATIENT)
Dept: FAMILY MEDICINE CLINIC | Facility: CLINIC | Age: 46
End: 2021-11-03
Payer: COMMERCIAL

## 2021-11-03 VITALS — HEIGHT: 61 IN | TEMPERATURE: 97.6 F | WEIGHT: 171 LBS | BODY MASS INDEX: 32.28 KG/M2

## 2021-11-03 DIAGNOSIS — U07.1 COVID-19: Primary | ICD-10-CM

## 2021-11-03 DIAGNOSIS — Z20.822 EXPOSURE TO COVID-19 VIRUS: Primary | ICD-10-CM

## 2021-11-03 PROCEDURE — 99213 OFFICE O/P EST LOW 20 MIN: CPT | Performed by: PHYSICIAN ASSISTANT

## 2021-11-27 ENCOUNTER — OFFICE VISIT (OUTPATIENT)
Dept: URGENT CARE | Facility: CLINIC | Age: 46
End: 2021-11-27
Payer: COMMERCIAL

## 2021-11-27 VITALS
BODY MASS INDEX: 32.31 KG/M2 | DIASTOLIC BLOOD PRESSURE: 73 MMHG | OXYGEN SATURATION: 100 % | WEIGHT: 171 LBS | SYSTOLIC BLOOD PRESSURE: 139 MMHG | TEMPERATURE: 97.1 F | HEART RATE: 64 BPM | RESPIRATION RATE: 18 BRPM

## 2021-11-27 DIAGNOSIS — M62.838 MUSCLE SPASM: Primary | ICD-10-CM

## 2021-11-27 PROCEDURE — G0383 LEV 4 HOSP TYPE B ED VISIT: HCPCS | Performed by: PHYSICIAN ASSISTANT

## 2021-11-27 PROCEDURE — S9083 URGENT CARE CENTER GLOBAL: HCPCS | Performed by: PHYSICIAN ASSISTANT

## 2021-11-27 RX ORDER — METHOCARBAMOL 500 MG/1
500 TABLET, FILM COATED ORAL 3 TIMES DAILY PRN
Qty: 20 TABLET | Refills: 0 | Status: SHIPPED | OUTPATIENT
Start: 2021-11-27 | End: 2021-11-27 | Stop reason: SDUPTHER

## 2021-11-27 RX ORDER — METHOCARBAMOL 500 MG/1
500 TABLET, FILM COATED ORAL 3 TIMES DAILY PRN
Qty: 20 TABLET | Refills: 0 | Status: SHIPPED | OUTPATIENT
Start: 2021-11-27

## 2021-11-29 ENCOUNTER — OFFICE VISIT (OUTPATIENT)
Dept: FAMILY MEDICINE CLINIC | Facility: CLINIC | Age: 46
End: 2021-11-29
Payer: COMMERCIAL

## 2021-11-29 VITALS
WEIGHT: 174.9 LBS | DIASTOLIC BLOOD PRESSURE: 66 MMHG | SYSTOLIC BLOOD PRESSURE: 124 MMHG | HEIGHT: 61 IN | HEART RATE: 71 BPM | BODY MASS INDEX: 33.02 KG/M2 | TEMPERATURE: 97.9 F | OXYGEN SATURATION: 99 %

## 2021-11-29 DIAGNOSIS — M62.830 BACK MUSCLE SPASM: Primary | ICD-10-CM

## 2021-11-29 DIAGNOSIS — M25.512 LEFT SUBSCAPULAR PAIN: ICD-10-CM

## 2021-11-29 PROCEDURE — 99214 OFFICE O/P EST MOD 30 MIN: CPT | Performed by: PHYSICIAN ASSISTANT

## 2021-11-29 PROCEDURE — 3008F BODY MASS INDEX DOCD: CPT | Performed by: PHYSICIAN ASSISTANT

## 2021-11-29 PROCEDURE — 1036F TOBACCO NON-USER: CPT | Performed by: PHYSICIAN ASSISTANT

## 2021-12-28 ENCOUNTER — TELEMEDICINE (OUTPATIENT)
Dept: FAMILY MEDICINE CLINIC | Facility: CLINIC | Age: 46
End: 2021-12-28
Payer: COMMERCIAL

## 2021-12-28 VITALS — HEIGHT: 61 IN | WEIGHT: 174 LBS | BODY MASS INDEX: 32.85 KG/M2

## 2021-12-28 DIAGNOSIS — J40 BRONCHITIS: Primary | ICD-10-CM

## 2021-12-28 PROCEDURE — 3008F BODY MASS INDEX DOCD: CPT | Performed by: PHYSICIAN ASSISTANT

## 2021-12-28 PROCEDURE — 99213 OFFICE O/P EST LOW 20 MIN: CPT | Performed by: PHYSICIAN ASSISTANT

## 2021-12-28 PROCEDURE — 3725F SCREEN DEPRESSION PERFORMED: CPT | Performed by: PHYSICIAN ASSISTANT

## 2021-12-28 PROCEDURE — 1036F TOBACCO NON-USER: CPT | Performed by: PHYSICIAN ASSISTANT

## 2021-12-28 RX ORDER — BENZONATATE 100 MG/1
100 CAPSULE ORAL 3 TIMES DAILY PRN
Qty: 20 CAPSULE | Refills: 0 | Status: SHIPPED | OUTPATIENT
Start: 2021-12-28

## 2021-12-28 RX ORDER — AZITHROMYCIN 250 MG/1
TABLET, FILM COATED ORAL
Qty: 6 TABLET | Refills: 0 | Status: SHIPPED | OUTPATIENT
Start: 2021-12-28 | End: 2022-01-01

## 2021-12-28 RX ORDER — METHYLPREDNISOLONE 4 MG/1
TABLET ORAL
Qty: 21 EACH | Refills: 0 | Status: SHIPPED | OUTPATIENT
Start: 2021-12-28

## 2022-08-19 ENCOUNTER — OFFICE VISIT (OUTPATIENT)
Dept: FAMILY MEDICINE CLINIC | Facility: CLINIC | Age: 47
End: 2022-08-19
Payer: COMMERCIAL

## 2022-08-19 ENCOUNTER — TELEPHONE (OUTPATIENT)
Dept: FAMILY MEDICINE CLINIC | Facility: CLINIC | Age: 47
End: 2022-08-19

## 2022-08-19 ENCOUNTER — APPOINTMENT (OUTPATIENT)
Dept: LAB | Facility: MEDICAL CENTER | Age: 47
End: 2022-08-19
Payer: COMMERCIAL

## 2022-08-19 VITALS
SYSTOLIC BLOOD PRESSURE: 120 MMHG | HEIGHT: 61 IN | BODY MASS INDEX: 31.72 KG/M2 | DIASTOLIC BLOOD PRESSURE: 84 MMHG | WEIGHT: 168 LBS | OXYGEN SATURATION: 99 % | HEART RATE: 75 BPM | TEMPERATURE: 97.9 F

## 2022-08-19 DIAGNOSIS — Z12.31 ENCOUNTER FOR SCREENING MAMMOGRAM FOR MALIGNANT NEOPLASM OF BREAST: Primary | ICD-10-CM

## 2022-08-19 DIAGNOSIS — Z78.0 MENOPAUSE: ICD-10-CM

## 2022-08-19 DIAGNOSIS — F32.2 CURRENT SEVERE EPISODE OF MAJOR DEPRESSIVE DISORDER WITHOUT PSYCHOTIC FEATURES WITHOUT PRIOR EPISODE (HCC): ICD-10-CM

## 2022-08-19 DIAGNOSIS — R53.83 FATIGUE AFTER SEVERE ACUTE RESPIRATORY SYNDROME CORONAVIRUS 2 (SARS-COV-2) VACCINATION: Primary | ICD-10-CM

## 2022-08-19 DIAGNOSIS — R53.83 FATIGUE AFTER SEVERE ACUTE RESPIRATORY SYNDROME CORONAVIRUS 2 (SARS-COV-2) VACCINATION: ICD-10-CM

## 2022-08-19 DIAGNOSIS — T50.B95A FATIGUE AFTER SEVERE ACUTE RESPIRATORY SYNDROME CORONAVIRUS 2 (SARS-COV-2) VACCINATION: ICD-10-CM

## 2022-08-19 DIAGNOSIS — T50.B95A FATIGUE AFTER SEVERE ACUTE RESPIRATORY SYNDROME CORONAVIRUS 2 (SARS-COV-2) VACCINATION: Primary | ICD-10-CM

## 2022-08-19 LAB
25(OH)D3 SERPL-MCNC: 14.2 NG/ML (ref 30–100)
ALBUMIN SERPL BCP-MCNC: 4 G/DL (ref 3.5–5)
ALP SERPL-CCNC: 38 U/L (ref 46–116)
ALT SERPL W P-5'-P-CCNC: 20 U/L (ref 12–78)
ANION GAP SERPL CALCULATED.3IONS-SCNC: 5 MMOL/L (ref 4–13)
AST SERPL W P-5'-P-CCNC: 16 U/L (ref 5–45)
BASOPHILS # BLD AUTO: 0.05 THOUSANDS/ΜL (ref 0–0.1)
BASOPHILS NFR BLD AUTO: 1 % (ref 0–1)
BILIRUB SERPL-MCNC: 0.44 MG/DL (ref 0.2–1)
BUN SERPL-MCNC: 11 MG/DL (ref 5–25)
CALCIUM SERPL-MCNC: 9.2 MG/DL (ref 8.3–10.1)
CHLORIDE SERPL-SCNC: 106 MMOL/L (ref 96–108)
CO2 SERPL-SCNC: 27 MMOL/L (ref 21–32)
CREAT SERPL-MCNC: 0.68 MG/DL (ref 0.6–1.3)
EOSINOPHIL # BLD AUTO: 0.16 THOUSAND/ΜL (ref 0–0.61)
EOSINOPHIL NFR BLD AUTO: 4 % (ref 0–6)
ERYTHROCYTE [DISTWIDTH] IN BLOOD BY AUTOMATED COUNT: 11.9 % (ref 11.6–15.1)
ESTRADIOL SERPL-MCNC: 202 PG/ML
FERRITIN SERPL-MCNC: 66 NG/ML (ref 8–388)
FSH SERPL-ACNC: 21.4 MIU/ML
GFR SERPL CREATININE-BSD FRML MDRD: 103 ML/MIN/1.73SQ M
GLUCOSE P FAST SERPL-MCNC: 90 MG/DL (ref 65–99)
HCT VFR BLD AUTO: 45.7 % (ref 34.8–46.1)
HGB BLD-MCNC: 14.1 G/DL (ref 11.5–15.4)
IMM GRANULOCYTES # BLD AUTO: 0.01 THOUSAND/UL (ref 0–0.2)
IMM GRANULOCYTES NFR BLD AUTO: 0 % (ref 0–2)
IRON SATN MFR SERPL: 44 % (ref 15–50)
IRON SERPL-MCNC: 130 UG/DL (ref 50–170)
LH SERPL-ACNC: 20.6 MIU/ML
LYMPHOCYTES # BLD AUTO: 1.61 THOUSANDS/ΜL (ref 0.6–4.47)
LYMPHOCYTES NFR BLD AUTO: 41 % (ref 14–44)
MCH RBC QN AUTO: 29.8 PG (ref 26.8–34.3)
MCHC RBC AUTO-ENTMCNC: 30.9 G/DL (ref 31.4–37.4)
MCV RBC AUTO: 97 FL (ref 82–98)
MONOCYTES # BLD AUTO: 0.3 THOUSAND/ΜL (ref 0.17–1.22)
MONOCYTES NFR BLD AUTO: 8 % (ref 4–12)
NEUTROPHILS # BLD AUTO: 1.83 THOUSANDS/ΜL (ref 1.85–7.62)
NEUTS SEG NFR BLD AUTO: 46 % (ref 43–75)
NRBC BLD AUTO-RTO: 0 /100 WBCS
PLATELET # BLD AUTO: 236 THOUSANDS/UL (ref 149–390)
PMV BLD AUTO: 10.6 FL (ref 8.9–12.7)
POTASSIUM SERPL-SCNC: 4.6 MMOL/L (ref 3.5–5.3)
PROT SERPL-MCNC: 7.5 G/DL (ref 6.4–8.4)
RBC # BLD AUTO: 4.73 MILLION/UL (ref 3.81–5.12)
SODIUM SERPL-SCNC: 138 MMOL/L (ref 135–147)
TIBC SERPL-MCNC: 297 UG/DL (ref 250–450)
TSH SERPL DL<=0.05 MIU/L-ACNC: 2.56 UIU/ML (ref 0.45–4.5)
WBC # BLD AUTO: 3.96 THOUSAND/UL (ref 4.31–10.16)

## 2022-08-19 PROCEDURE — 82670 ASSAY OF TOTAL ESTRADIOL: CPT

## 2022-08-19 PROCEDURE — 83001 ASSAY OF GONADOTROPIN (FSH): CPT

## 2022-08-19 PROCEDURE — 82728 ASSAY OF FERRITIN: CPT

## 2022-08-19 PROCEDURE — 83550 IRON BINDING TEST: CPT

## 2022-08-19 PROCEDURE — 3725F SCREEN DEPRESSION PERFORMED: CPT | Performed by: FAMILY MEDICINE

## 2022-08-19 PROCEDURE — 36415 COLL VENOUS BLD VENIPUNCTURE: CPT

## 2022-08-19 PROCEDURE — 99214 OFFICE O/P EST MOD 30 MIN: CPT | Performed by: FAMILY MEDICINE

## 2022-08-19 PROCEDURE — 83540 ASSAY OF IRON: CPT

## 2022-08-19 PROCEDURE — 84443 ASSAY THYROID STIM HORMONE: CPT | Performed by: FAMILY MEDICINE

## 2022-08-19 PROCEDURE — 82306 VITAMIN D 25 HYDROXY: CPT

## 2022-08-19 PROCEDURE — 83002 ASSAY OF GONADOTROPIN (LH): CPT

## 2022-08-19 PROCEDURE — 80053 COMPREHEN METABOLIC PANEL: CPT

## 2022-08-19 PROCEDURE — 85025 COMPLETE CBC W/AUTO DIFF WBC: CPT

## 2022-08-19 RX ORDER — BUPROPION HYDROCHLORIDE 150 MG/1
TABLET, EXTENDED RELEASE ORAL
Qty: 60 TABLET | Refills: 3 | Status: SHIPPED | OUTPATIENT
Start: 2022-08-19

## 2022-08-19 NOTE — PROGRESS NOTES
BMI Counseling: Body mass index is 31 74 kg/m²  The BMI is above normal  Nutrition recommendations include decreasing portion sizes, encouraging healthy choices of fruits and vegetables, moderation in carbohydrate intake and increasing intake of lean protein  Exercise recommendations include moderate physical activity 150 minutes/week  Rationale for BMI follow-up plan is due to patient being overweight or obese  Depression Screening and Follow-up Plan: Patient's depression screening was positive with a PHQ-2 score of 6  Their PHQ-9 score was 21  Assessment/Plan:  I have ordered lab work to investigate fatigue and depression I have also ordered sex hormone levels to see if the patient is going through menopause she will know that because she had a hysterectomy when she was 23 also patient's depression has been addressed with the labs and also we have made a referral to [facility] for counseling and I have started her on bupropion we will see her in 6 weeks    Problem List Items Addressed This Visit    None     Visit Diagnoses     Encounter for screening mammogram for malignant neoplasm of breast    -  Primary    Relevant Orders    Mammo screening bilateral w 3d & cad    Fatigue after severe acute respiratory syndrome coronavirus 2 (SARS-CoV-2) vaccination               Diagnoses and all orders for this visit:    Encounter for screening mammogram for malignant neoplasm of breast  -     Mammo screening bilateral w 3d & cad; Future    Fatigue after severe acute respiratory syndrome coronavirus 2 (SARS-CoV-2) vaccination        No problem-specific Assessment & Plan notes found for this encounter  Subjective:      Patient ID: Abdulkadir Maria is a 50 y o  female      Severe intractable fatigue since having covid      The following portions of the patient's history were reviewed and updated as appropriate:   She has a past medical history of Benign essential hypertension (12/6/2016), Carpal tunnel syndrome of left wrist, Community acquired pneumonia, and Morbid obesity with BMI of 40 0-44 9, adult ([facility] [address] )  ,  does not have any pertinent problems on file  ,   has a past surgical history that includes GASTRECTOMY SLEEVE LAPAROSCOPIC (12/2016); Hysterectomy; Hand surgery (Bilateral); Hysteroscopy; Neuroplasty / transposition median nerve at carpal tunnel (Right, 2013); Shoulder surgery (Right, 2013); Tonsillectomy and adenoidectomy; and Appendectomy (2017)  ,  family history includes Alzheimer's disease in her paternal grandmother; Asthma in her father; Hypertension in her mother; No Known Problems in her daughter, daughter, maternal grandfather, maternal grandmother, paternal aunt, paternal aunt, paternal grandfather, and sister; Ovarian cancer in her maternal aunt; Uterine cancer in her maternal aunt  ,   reports that she has never smoked  She has never used smokeless tobacco  She reports previous alcohol use  She reports that she does not use drugs  ,  has No Known Allergies     Current Outpatient Medications   Medication Sig Dispense Refill    Calcium Carbonate (CALCIUM 600 PO) Take by mouth        multivitamin (THERAGRAN) TABS Take 1 tablet by mouth daily        Ferrous Gluconate-C-Folic Acid (IRON-C PO) Take by mouth   (Patient not taking: Reported on 8/19/2022)      montelukast (SINGULAIR) 10 mg tablet Take 1 tablet (10 mg total) by mouth daily at bedtime (Patient not taking: Reported on 8/19/2022) 30 tablet 5    mupirocin (BACTROBAN) 2 % ointment Apply topically 3 (three) times a day (Patient not taking: Reported on 11/3/2021) 22 g 0     No current facility-administered medications for this visit  Review of Systems   Constitutional: Positive for fatigue  Negative for activity change, appetite change, diaphoresis and fever  HENT: Negative  Eyes: Negative  Respiratory: Negative for apnea, cough, chest tightness, shortness of breath and wheezing      Cardiovascular: Negative for chest pain, palpitations and leg swelling  Gastrointestinal: Negative for abdominal distention, abdominal pain, anal bleeding, constipation, diarrhea, nausea and vomiting  Endocrine: Negative for cold intolerance, heat intolerance, polydipsia, polyphagia and polyuria  Genitourinary: Negative for difficulty urinating, dysuria, flank pain, hematuria and urgency  Musculoskeletal: Negative for arthralgias, back pain, gait problem, joint swelling and myalgias  Skin: Negative for color change, rash and wound  Allergic/Immunologic: Negative for environmental allergies, food allergies and immunocompromised state  Neurological: Negative for dizziness, seizures, syncope, speech difficulty, numbness and headaches  Hematological: Negative for adenopathy  Does not bruise/bleed easily  Psychiatric/Behavioral: Negative for agitation, behavioral problems, hallucinations, sleep disturbance and suicidal ideas  Objective:  Vitals:    08/19/22 0723   BP: 120/84   BP Location: Left arm   Patient Position: Sitting   Cuff Size: Standard   Pulse: 75   Temp: 97 9 °F (36 6 °C)   TempSrc: Temporal   SpO2: 99%   Weight: 76 2 kg (168 lb)   Height: 5' 1" (1 549 m)     Body mass index is 31 74 kg/m²  Physical Exam  Constitutional:       General: She is not in acute distress  Appearance: She is well-developed  She is obese  She is not diaphoretic  HENT:      Head: Normocephalic  Right Ear: External ear normal       Left Ear: External ear normal       Nose: Nose normal    Eyes:      General: No scleral icterus  Right eye: No discharge  Left eye: No discharge  Conjunctiva/sclera: Conjunctivae normal       Pupils: Pupils are equal, round, and reactive to light  Neck:      Thyroid: No thyromegaly  Trachea: No tracheal deviation  Cardiovascular:      Rate and Rhythm: Normal rate and regular rhythm  Heart sounds: Normal heart sounds  No murmur heard  No friction rub  No gallop  Pulmonary:      Effort: Pulmonary effort is normal  No respiratory distress  Breath sounds: Normal breath sounds  No wheezing  Abdominal:      General: Bowel sounds are normal       Palpations: Abdomen is soft  There is no mass  Tenderness: There is no abdominal tenderness  There is no guarding  Musculoskeletal:         General: No deformity  Cervical back: Normal range of motion  Lymphadenopathy:      Cervical: No cervical adenopathy  Skin:     General: Skin is warm and dry  Findings: No erythema or rash  Neurological:      Mental Status: She is alert and oriented to person, place, and time  Cranial Nerves: No cranial nerve deficit  Psychiatric:         Thought Content:  Thought content normal       Comments: Depression   Not suicidal  Unable to carry out activities of daily living

## 2022-08-19 NOTE — TELEPHONE ENCOUNTER
Ask patient to get us a list of providers that are participating with her insurance so that we can refer her tell her she we were interested in a licensed social worker for counseling or a clinical psychologist she does not need a psychiatrist

## 2022-08-22 DIAGNOSIS — E55.9 VITAMIN D DEFICIENCY: Primary | ICD-10-CM

## 2022-08-22 RX ORDER — ERGOCALCIFEROL (VITAMIN D2) 1250 MCG
50000 CAPSULE ORAL WEEKLY
Qty: 12 CAPSULE | Refills: 0 | Status: SHIPPED | OUTPATIENT
Start: 2022-08-22

## 2022-08-22 NOTE — RESULT ENCOUNTER NOTE
Call patient to notify normal results iron and thyroid levels are good the sex hormone levels look like she is still ovulating although were follicle-stimulating hormone was up a little bit so she could be early perimenopause

## 2022-08-25 NOTE — PROGRESS NOTES
PT Evaluation     Today's date: 2022  Patient name: Neelima Hickman  : 1975  MRN: [id number]  Referring provider: Nellie Glass DPM  Dx:   Encounter Diagnosis     ICD-10-CM    1  Other specified disorders of synovium and tendon, unspecified ankle and foot  M67 879                   Assessment  Assessment details: The patient is a 51 y/o female who presents to PT with diagnosis of B insertional Achilles tendinopathy     She has complaints of intermittent pain, L Achilles > R Achilles  She demonstrates deficits with slight decreased ROM and strength, decreased flexibility, decreased balance and proprioception, complaints of stiffness with initial steps and pain with completing her ADLs and tasks at home  She remains I with all her ADLs though she has pain with completing them  More pain noted with increased time on her feet or if wearing improper shoes  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  She can go up and down the steps with reciprocal gait pattern though with pain at times  No TTP is noted along B Achilles though small nodules palpable along L Achilles  The patient would benefit from continued PT to address deficits and improve function  Tx to include ROM, stretching, strengthening, modalities, HEP, pt education, postural ed, lifting/body mechanics, neuro re-ed, balance/proprioception Te, MT and equipment  Impairments: abnormal or restricted ROM, activity intolerance, impaired balance, impaired physical strength, lacks appropriate home exercise program, pain with function and weight-bearing intolerance  Other impairment: decreased flexibility  Functional limitations: pain with stair negotiation Understanding of Dx/Px/POC: good   Prognosis: good    Goals  STGs:  1  Initiate and complete HEP with verbal cues  2   Improve B ankle strength by 1/2 grade in 4 weeks  3   Decrease B ankle pain by > 25% in 4 weeks  LTGs:  1  Patient to be I with HEP in 8 weeks  2   Improve B ankle ROM to WNL t/o in 8 weeks to improve function  3   Improve B ankle strength to 5/5 t/o in 8 weeks to improve function  4   Decrease B ankle pain to < or = to 1-2/10 with activity in 8 weeks to improve function  5   Stair negotiation is improved to PLOF in 8 weeks  6   Recreational performance is improved to PLOF in 8 weeks  7   ADL performance is improved to PLOF in 8 weeks  8   Work performance is improved to maximal level of function in 8 weeks  Plan  Plan details: Modalities and therapy interventions prn  Patient would benefit from: skilled physical therapy  Planned modality interventions: cryotherapy and thermotherapy: hydrocollator packs  Other planned modality interventions: IASTM  Planned therapy interventions: manual therapy, balance, balance/weight bearing training, neuromuscular re-education, patient education, postural training, self care, strengthening, stretching, therapeutic activities, therapeutic exercise, flexibility, gait training and home exercise program  Frequency: 1x week (Secondary to patient request   )  Duration in weeks: 8  Plan of Care beginning date: 8/31/2022  Plan of Care expiration date: 10/26/2022  Treatment plan discussed with: patient        Subjective Evaluation    History of Present Illness  Mechanism of injury: The patient states that about two months ago she had developed stiffness in her ankles  Then about one month ago she had developed increased ankle pain  She had gone to see Dr Kartik Mazariegos on 8/16/22  She did have x-rays completed, per patient they were okay  She was referred to OPPT and she now presents for her evaluation  She will be going back to see him for another appointment, unsure of the date, but about six weeks from her last appointment  The patient states that she has ankle pain, L > R side  She notes that all her pain is in her Achilles  She denies any heel pain or arch pain    She denies any N&T into her feet  She notes stiffness in her ankles when getting out of bed in the morning  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  Pain also depends on the type of shoe worn and patient has been watching her shoes and wearing good supports  Pain  At best pain ratin  At worst pain ratin  Location: B Ankle - L > R Achilles   Quality: dull ache and burning  Relieving factors: rest  Aggravating factors: standing and walking    Social Support  Steps to enter house: yes  Stairs in house: yes   Lives in: multiple-level home  Lives with: spouse    Employment status: working (Full time teacher )    Diagnostic Tests  X-ray: normal  Patient Goals  Patient goals for therapy: increased motion, decreased pain and increased strength  Patient goal: "To help decrease the pain in my ankles  To work on stretching my ankle "          Objective     Tenderness   Left Ankle/Foot   No tenderness  No tenderness in the Achilles insertion  Right Ankle/Foot   No tenderness  No tenderness in the Achilles insertion       Neurological Testing     Sensation     Ankle/Foot   Left Ankle/Foot   Intact: light touch    Right Ankle/Foot   Intact: light touch     Active Range of Motion   Left Ankle/Foot   Dorsiflexion (ke): 0 degrees   Plantar flexion: 70 degrees   Inversion: 40 degrees   Eversion: 20 degrees     Right Ankle/Foot   Dorsiflexion (ke): 2 degrees   Plantar flexion: 70 degrees   Inversion: 40 degrees   Eversion: 20 degrees     Strength/Myotome Testing     Left Ankle/Foot   Dorsiflexion: 4  Plantar flexion: 4  Inversion: 4  Eversion: 4    Right Ankle/Foot   Dorsiflexion: 4+  Plantar flexion: 4+  Inversion: 4+  Eversion: 4+    Ambulation     Ambulation: Level Surfaces   Ambulation without assistive device: independent    Ambulation: Stairs   Ascend stairs: independent  Pattern: reciprocal  Descend stairs: independent  Pattern: reciprocal    Observational Gait   Gait: within functional limits                 Precautions: None       Manuals 8/31       IASTM  B Achilles  ML                               Neuro Re-Ed         SLS        Tandem Stance        BOSU Lunges        Tandem Amb        Sidestepping        BAPS Board                Ther Ex        NuStep        HR/TR        Ankle ROM        Ankle Circles        Self Calf Stretch w/towel HEP       Runners Stretch HEP       Ankle TBand        Ankle Isometrics                                        Ther Activity        Stepups F/L        Stepdowns        Gait Training                        Modalities        CP prn                           Access Code: [email or web address]  URL: [email or web address]  Date: 08/31/2022  Prepared by: Mallory Quinonez    Exercises  · Gastroc Stretch on Wall - 1 x daily - 7 x weekly - 1 sets - 5 reps - 20" hold  · Long Sitting Calf Stretch with Strap - 1 x daily - 7 x weekly - 1 sets - 5 reps - 20" hold

## 2022-08-31 ENCOUNTER — EVALUATION (OUTPATIENT)
Dept: PHYSICAL THERAPY | Facility: CLINIC | Age: 47
End: 2022-08-31
Payer: COMMERCIAL

## 2022-08-31 DIAGNOSIS — M67.879 OTHER SPECIFIED DISORDERS OF SYNOVIUM AND TENDON, UNSPECIFIED ANKLE AND FOOT: Primary | ICD-10-CM

## 2022-08-31 PROCEDURE — 97535 SELF CARE MNGMENT TRAINING: CPT | Performed by: PHYSICAL THERAPIST

## 2022-08-31 PROCEDURE — 97161 PT EVAL LOW COMPLEX 20 MIN: CPT | Performed by: PHYSICAL THERAPIST

## 2022-08-31 PROCEDURE — 97140 MANUAL THERAPY 1/> REGIONS: CPT | Performed by: PHYSICAL THERAPIST

## 2022-08-31 NOTE — LETTER
2022    Mahnaz Shipman [address]    Patient: Efrain Sung   YOB: 1975   Date of Visit: 2022     Encounter Diagnosis     ICD-10-CM    1  Other specified disorders of synovium and tendon, unspecified ankle and foot  M67 879        Dear Dr Gregorio Pen:    Thank you for your recent referral of Efrain Sung  Please review the attached evaluation summary from Edwin's recent visit  Please verify that you agree with the plan of care by signing the attached order  If you have any questions or concerns, please do not hesitate to call  I sincerely appreciate the opportunity to share in the care of one of your patients and hope to have another opportunity to work with you in the near future  Sincerely,    Shukri Alexander, PT      Referring Provider:      I certify that I have read the below Plan of Care and certify the need for these services furnished under this plan of treatment while under my care  Mahnaz Shipman DPM  [address]  Via Fax: [phone]          PT Evaluation     Today's date: 2022  Patient name: Efrain Sung  : 1975  MRN: [id number]  Referring provider: Meghna Alvarado DPM  Dx:   Encounter Diagnosis     ICD-10-CM    1  Other specified disorders of synovium and tendon, unspecified ankle and foot  M67 879                   Assessment  Assessment details: The patient is a 49 y/o female who presents to PT with diagnosis of B insertional Achilles tendinopathy     She has complaints of intermittent pain, L Achilles > R Achilles  She demonstrates deficits with slight decreased ROM and strength, decreased flexibility, decreased balance and proprioception, complaints of stiffness with initial steps and pain with completing her ADLs and tasks at home  She remains I with all her ADLs though she has pain with completing them    More pain noted with increased time on her feet or if wearing improper shoes  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  She can go up and down the steps with reciprocal gait pattern though with pain at times  No TTP is noted along B Achilles though small nodules palpable along L Achilles  The patient would benefit from continued PT to address deficits and improve function  Tx to include ROM, stretching, strengthening, modalities, HEP, pt education, postural ed, lifting/body mechanics, neuro re-ed, balance/proprioception Te, MT and equipment  Impairments: abnormal or restricted ROM, activity intolerance, impaired balance, impaired physical strength, lacks appropriate home exercise program, pain with function and weight-bearing intolerance  Other impairment: decreased flexibility  Functional limitations: pain with stair negotiation Understanding of Dx/Px/POC: good   Prognosis: good    Goals  STGs:  1  Initiate and complete HEP with verbal cues  2   Improve B ankle strength by 1/2 grade in 4 weeks  3   Decrease B ankle pain by > 25% in 4 weeks  LTGs:  1  Patient to be I with HEP in 8 weeks  2   Improve B ankle ROM to WNL t/o in 8 weeks to improve function  3   Improve B ankle strength to 5/5 t/o in 8 weeks to improve function  4   Decrease B ankle pain to < or = to 1-2/10 with activity in 8 weeks to improve function  5   Stair negotiation is improved to PLOF in 8 weeks  6   Recreational performance is improved to PLOF in 8 weeks  7   ADL performance is improved to PLOF in 8 weeks  8   Work performance is improved to maximal level of function in 8 weeks  Plan  Plan details: Modalities and therapy interventions prn      Patient would benefit from: skilled physical therapy  Planned modality interventions: cryotherapy and thermotherapy: hydrocollator packs  Other planned modality interventions: IASTM  Planned therapy interventions: manual therapy, balance, balance/weight bearing training, neuromuscular re-education, patient education, postural training, self care, strengthening, stretching, therapeutic activities, therapeutic exercise, flexibility, gait training and home exercise program  Frequency: 1x week (Secondary to patient request   )  Duration in weeks: 8  Plan of Care beginning date: 2022  Plan of Care expiration date: 10/26/2022  Treatment plan discussed with: patient        Subjective Evaluation    History of Present Illness  Mechanism of injury: The patient states that about two months ago she had developed stiffness in her ankles  Then about one month ago she had developed increased ankle pain  She had gone to see Dr Blade Che on 22  She did have x-rays completed, per patient they were okay  She was referred to OPPT and she now presents for her evaluation  She will be going back to see him for another appointment, unsure of the date, but about six weeks from her last appointment  The patient states that she has ankle pain, L > R side  She notes that all her pain is in her Achilles  She denies any heel pain or arch pain  She denies any N&T into her feet  She notes stiffness in her ankles when getting out of bed in the morning  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  Pain also depends on the type of shoe worn and patient has been watching her shoes and wearing good supports      Pain  At best pain ratin  At worst pain ratin  Location: B Ankle - L > R Achilles   Quality: dull ache and burning  Relieving factors: rest  Aggravating factors: standing and walking    Social Support  Steps to enter house: yes  Stairs in house: yes   Lives in: multiple-level home  Lives with: spouse    Employment status: working (Full time teacher )    Diagnostic Tests  X-ray: normal  Patient Goals  Patient goals for therapy: increased motion, decreased pain and increased strength  Patient goal: "To help decrease the pain in my ankles  To work on stretching my ankle "          Objective     Tenderness   Left Ankle/Foot   No tenderness  No tenderness in the Achilles insertion  Right Ankle/Foot   No tenderness  No tenderness in the Achilles insertion  Neurological Testing     Sensation     Ankle/Foot   Left Ankle/Foot   Intact: light touch    Right Ankle/Foot   Intact: light touch     Active Range of Motion   Left Ankle/Foot   Dorsiflexion (ke): 0 degrees   Plantar flexion: 70 degrees   Inversion: 40 degrees   Eversion: 20 degrees     Right Ankle/Foot   Dorsiflexion (ke): 2 degrees   Plantar flexion: 70 degrees   Inversion: 40 degrees   Eversion: 20 degrees     Strength/Myotome Testing     Left Ankle/Foot   Dorsiflexion: 4  Plantar flexion: 4  Inversion: 4  Eversion: 4    Right Ankle/Foot   Dorsiflexion: 4+  Plantar flexion: 4+  Inversion: 4+  Eversion: 4+    Ambulation     Ambulation: Level Surfaces   Ambulation without assistive device: independent    Ambulation: Stairs   Ascend stairs: independent  Pattern: reciprocal  Descend stairs: independent  Pattern: reciprocal    Observational Gait   Gait: within functional limits                 Precautions: None       Manuals 8/31       IASTM  B Achilles  ML                               Neuro Re-Ed         SLS        Tandem Stance        BOSU Lunges        Tandem Amb        Sidestepping        BAPS Board                Ther Ex        NuStep        HR/TR        Ankle ROM        Ankle Circles        Self Calf Stretch w/towel HEP       Runners Stretch HEP       Ankle TBand        Ankle Isometrics                                        Ther Activity        Stepups F/L        Stepdowns        Gait Training                        Modalities        CP prn                           Access Code: [email or web address]  URL: [email or web address]  Date: 08/31/2022  Prepared by: Bess Bend    Exercises  · Gastroc Stretch on Wall - 1 x daily - 7 x weekly - 1 sets - 5 reps - 20" hold  · Long Sitting Calf Stretch with Strap - 1 x daily - 7 x weekly - 1 sets - 5 reps - 20" hold

## 2022-09-12 ENCOUNTER — OFFICE VISIT (OUTPATIENT)
Dept: PHYSICAL THERAPY | Facility: CLINIC | Age: 47
End: 2022-09-12
Payer: COMMERCIAL

## 2022-09-12 DIAGNOSIS — M67.879 OTHER SPECIFIED DISORDERS OF SYNOVIUM AND TENDON, UNSPECIFIED ANKLE AND FOOT: Primary | ICD-10-CM

## 2022-09-12 PROCEDURE — 97140 MANUAL THERAPY 1/> REGIONS: CPT

## 2022-09-12 PROCEDURE — 97112 NEUROMUSCULAR REEDUCATION: CPT

## 2022-09-12 PROCEDURE — 97110 THERAPEUTIC EXERCISES: CPT

## 2022-09-12 NOTE — PROGRESS NOTES
Daily Note     Today's date: 2022  Patient name: Yoseph Camargo  : 1975  MRN: [id number]  Referring provider: Miryam Berger DPM  Dx:   Encounter Diagnosis     ICD-10-CM    1  Other specified disorders of synovium and tendon, unspecified ankle and foot  M67 879                   Subjective: Patient feels her B heel pain is present but have improved since her initial evaluation with her HEP  She would like to transition to HEP due to schedule with work and after work activities  Objective: See treatment diary below  Updated HEP with TB x4, SLS, and soleus stretch      Assessment: Tolerated treatment well  Patient had good understanding to updated HEP, if she has quiestions she is to call clinic  She had decreased in pain symptoms post IASTM to B Achilles  Plan: DC as per PT          Precautions: None       Manuals       IASTM  B Achilles  ML B Achilles- CM                              Neuro Re-Ed         SLS  airex 30"x3      Tandem Stance        BOSU Lunges        Tandem Amb        Sidestepping        BAPS Board                Ther Ex        NuStep  Bike L3 10'      HR/TR        Ankle ROM        Ankle Circles        Self Calf Stretch w/towel HEP       Runners Stretch HEP Wall soleus/gastroc 30"x3 ea      Ankle TBand  otb 3x10 all ways      Ankle Isometrics                                        Ther Activity        Stepups F/L        Stepdowns        Gait Training                        Modalities        CP prn

## 2022-09-14 NOTE — PROGRESS NOTES
The patient had her PT IE on 8/31/22 and she was seen in PT for one more visit on 9/12/22  At that time she had wished to transition to I HEP secondary to work schedule and after work activities  She had demonstrated understanding of HEP  Refer to her PT IE for her final assessment  D/C PT secondary to patient request   D/C PT

## 2022-10-21 ENCOUNTER — OFFICE VISIT (OUTPATIENT)
Dept: FAMILY MEDICINE CLINIC | Facility: CLINIC | Age: 47
End: 2022-10-21
Payer: COMMERCIAL

## 2022-10-21 VITALS
HEART RATE: 78 BPM | DIASTOLIC BLOOD PRESSURE: 60 MMHG | OXYGEN SATURATION: 98 % | SYSTOLIC BLOOD PRESSURE: 116 MMHG | BODY MASS INDEX: 31.72 KG/M2 | HEIGHT: 61 IN | WEIGHT: 168 LBS | TEMPERATURE: 98.1 F

## 2022-10-21 DIAGNOSIS — J04.0 LARYNGITIS: ICD-10-CM

## 2022-10-21 DIAGNOSIS — J40 BRONCHITIS: Primary | ICD-10-CM

## 2022-10-21 PROCEDURE — 99213 OFFICE O/P EST LOW 20 MIN: CPT | Performed by: PHYSICIAN ASSISTANT

## 2022-10-21 RX ORDER — AZITHROMYCIN 250 MG/1
TABLET, FILM COATED ORAL
Qty: 6 TABLET | Refills: 0 | Status: SHIPPED | OUTPATIENT
Start: 2022-10-21 | End: 2022-10-25

## 2022-10-21 NOTE — PROGRESS NOTES
Assessment/Plan:    Problem List Items Addressed This Visit    None     Visit Diagnoses     Bronchitis    -  Primary    Relevant Medications    azithromycin (ZITHROMAX) 250 mg tablet    Laryngitis               Diagnoses and all orders for this visit:    Bronchitis  -     azithromycin (ZITHROMAX) 250 mg tablet; Take 2 tablets today then 1 tablet daily x 4 days    Laryngitis      Script for zithromax  Recommended that she continue symptomatic relief  She will notify us of any new or worsening symptoms    Subjective:      Patient ID: Yousif Sher is a 50 y o  female  Dakotah Mom is a 50year old female who is here today complaining of cold like symptoms  She started with congestion, runny nose, sore throat, and fever last week  She has been trying OTC medication, which is providing some relief  These symptoms have mostly resolved  She still has a dry cough, dry throat, and developed a hoarse voice  The fevers have resolved  She took 3 COVID tests that were all negative  She denies any shortness of breath or chest pains  The following portions of the patient's history were reviewed and updated as appropriate:   She has a past medical history of Benign essential hypertension (12/6/2016), Carpal tunnel syndrome of left wrist, Community acquired pneumonia, and Morbid obesity with BMI of 40 0-44 9, adult ([facility] [address] )  ,  does not have any pertinent problems on file  ,   has a past surgical history that includes GASTRECTOMY SLEEVE LAPAROSCOPIC (12/2016); Hysterectomy; Hand surgery (Bilateral); Hysteroscopy; Neuroplasty / transposition median nerve at carpal tunnel (Right, 2013); Shoulder surgery (Right, 2013); Tonsillectomy and adenoidectomy; and Appendectomy (2017)  ,  family history includes Alzheimer's disease in her paternal grandmother; Asthma in her father; Hypertension in her mother; No Known Problems in her daughter, daughter, maternal grandfather, maternal grandmother, paternal aunt, paternal aunt, paternal grandfather, and sister; Ovarian cancer in her maternal aunt; Uterine cancer in her maternal aunt  ,   reports that she has never smoked  She has never used smokeless tobacco  She reports previous alcohol use  She reports that she does not use drugs  ,  has No Known Allergies     Current Outpatient Medications   Medication Sig Dispense Refill   • azithromycin (ZITHROMAX) 250 mg tablet Take 2 tablets today then 1 tablet daily x 4 days 6 tablet 0   • buPROPion (Wellbutrin SR) 150 mg 12 hr tablet Take 1st dose at 6:00 a m  upon awakening and 2nd dose at 2:00 p m  60 tablet 3   • Calcium Carbonate (CALCIUM 600 PO) Take by mouth       • ergocalciferol (ERGOCALCIFEROL) 1 25 MG (45388 UT) capsule Take 1 capsule (50,000 Units total) by mouth once a week 12 capsule 0   • multivitamin (THERAGRAN) TABS Take 1 tablet by mouth daily       • Ferrous Gluconate-C-Folic Acid (IRON-C PO) Take by mouth   (Patient not taking: No sig reported)     • montelukast (SINGULAIR) 10 mg tablet Take 1 tablet (10 mg total) by mouth daily at bedtime (Patient not taking: No sig reported) 30 tablet 5   • mupirocin (BACTROBAN) 2 % ointment Apply topically 3 (three) times a day (Patient not taking: No sig reported) 22 g 0     No current facility-administered medications for this visit  Review of Systems   Constitutional: Negative for chills, diaphoresis, fatigue and fever  HENT: Positive for sore throat and voice change  Negative for congestion, ear pain, postnasal drip, rhinorrhea, sneezing and trouble swallowing  Eyes: Negative for pain and visual disturbance  Respiratory: Positive for cough  Negative for apnea, shortness of breath and wheezing  Cardiovascular: Negative for chest pain and palpitations  Gastrointestinal: Negative for abdominal pain, constipation, diarrhea, nausea and vomiting  Genitourinary: Negative for dysuria and hematuria  Musculoskeletal: Negative for arthralgias, gait problem and myalgias     Neurological: Negative for dizziness, syncope, weakness, light-headedness, numbness and headaches  Psychiatric/Behavioral: Negative for suicidal ideas  The patient is not nervous/anxious  Objective:  Vitals:    10/21/22 1036   BP: 116/60   Pulse: 78   Temp: 98 1 °F (36 7 °C)   SpO2: 98%   Weight: 76 2 kg (168 lb)   Height: 5' 1" (1 549 m)     Body mass index is 31 74 kg/m²  Physical Exam  Vitals and nursing note reviewed  Constitutional:       Appearance: She is well-developed  HENT:      Head: Normocephalic and atraumatic  Comments: Hoarse voice     Right Ear: Tympanic membrane, ear canal and external ear normal       Left Ear: Tympanic membrane, ear canal and external ear normal       Nose: Nose normal       Mouth/Throat:      Pharynx: Posterior oropharyngeal erythema (mild) present  No oropharyngeal exudate  Eyes:      Extraocular Movements: Extraocular movements intact  Cardiovascular:      Rate and Rhythm: Normal rate and regular rhythm  Heart sounds: Normal heart sounds  No murmur heard  No friction rub  No gallop  Pulmonary:      Effort: Pulmonary effort is normal  No respiratory distress  Breath sounds: Normal breath sounds  No wheezing or rales  Musculoskeletal:         General: Normal range of motion  Cervical back: Normal range of motion and neck supple  Lymphadenopathy:      Cervical: No cervical adenopathy  Skin:     General: Skin is warm and dry  Neurological:      Mental Status: She is alert and oriented to person, place, and time  Psychiatric:         Behavior: Behavior normal          Thought Content:  Thought content normal          Judgment: Judgment normal

## 2022-10-25 ENCOUNTER — TELEPHONE (OUTPATIENT)
Dept: FAMILY MEDICINE CLINIC | Facility: CLINIC | Age: 47
End: 2022-10-25

## 2022-10-25 NOTE — TELEPHONE ENCOUNTER
Zithromax will last 10 days even though it is only taken for 5  I would recommend she give it more time  Continue symptomatic relief

## 2022-12-02 ENCOUNTER — TELEPHONE (OUTPATIENT)
Dept: FAMILY MEDICINE CLINIC | Facility: CLINIC | Age: 47
End: 2022-12-02

## 2022-12-02 DIAGNOSIS — J22 ACUTE LOWER RESPIRATORY TRACT INFECTION: Primary | ICD-10-CM

## 2022-12-02 RX ORDER — CEFUROXIME AXETIL 500 MG/1
500 TABLET ORAL EVERY 12 HOURS SCHEDULED
Qty: 14 TABLET | Refills: 0 | Status: SHIPPED | OUTPATIENT
Start: 2022-12-02 | End: 2022-12-09

## 2022-12-02 NOTE — TELEPHONE ENCOUNTER
C/o sore throat, earache, cough, sinus drip  Tested negative for covid  Can you rx an antibiotic for her so it does not get worse?

## 2023-01-27 ENCOUNTER — TELEPHONE (OUTPATIENT)
Dept: PSYCHIATRY | Facility: CLINIC | Age: 48
End: 2023-01-27

## 2023-04-25 ENCOUNTER — LAB (OUTPATIENT)
Dept: LAB | Facility: MEDICAL CENTER | Age: 48
End: 2023-04-25

## 2023-04-25 DIAGNOSIS — K91.2 INTESTINAL MALABSORPTION FOLLOWING GASTRECTOMY: ICD-10-CM

## 2023-04-25 DIAGNOSIS — E55.9 VITAMIN D DEFICIENCY: ICD-10-CM

## 2023-04-25 DIAGNOSIS — R53.83 FATIGUE AFTER SEVERE ACUTE RESPIRATORY SYNDROME CORONAVIRUS 2 (SARS-COV-2) VACCINATION: Primary | ICD-10-CM

## 2023-04-25 DIAGNOSIS — R63.5 WEIGHT GAIN STATUS POST GASTRIC BYPASS: ICD-10-CM

## 2023-04-25 DIAGNOSIS — Z90.3 INTESTINAL MALABSORPTION FOLLOWING GASTRECTOMY: ICD-10-CM

## 2023-04-25 DIAGNOSIS — E66.9 CLASS 1 OBESITY WITH BODY MASS INDEX (BMI) OF 33.0 TO 33.9 IN ADULT, UNSPECIFIED OBESITY TYPE, UNSPECIFIED WHETHER SERIOUS COMORBIDITY PRESENT: ICD-10-CM

## 2023-04-25 DIAGNOSIS — T50.B95A FATIGUE AFTER SEVERE ACUTE RESPIRATORY SYNDROME CORONAVIRUS 2 (SARS-COV-2) VACCINATION: Primary | ICD-10-CM

## 2023-04-25 DIAGNOSIS — Z78.0 MENOPAUSE: ICD-10-CM

## 2023-04-25 DIAGNOSIS — Z98.84 BARIATRIC SURGERY STATUS: ICD-10-CM

## 2023-04-25 DIAGNOSIS — Z98.84 WEIGHT GAIN STATUS POST GASTRIC BYPASS: ICD-10-CM

## 2023-04-25 DIAGNOSIS — Z09 SURGERY FOLLOW-UP: ICD-10-CM

## 2023-04-25 LAB
25(OH)D3 SERPL-MCNC: 13 NG/ML (ref 30–100)
ALBUMIN SERPL BCP-MCNC: 3.7 G/DL (ref 3.5–5)
ALP SERPL-CCNC: 43 U/L (ref 46–116)
ALT SERPL W P-5'-P-CCNC: 20 U/L (ref 12–78)
ANION GAP SERPL CALCULATED.3IONS-SCNC: 4 MMOL/L (ref 4–13)
AST SERPL W P-5'-P-CCNC: 16 U/L (ref 5–45)
BASOPHILS # BLD AUTO: 0.04 THOUSANDS/ΜL (ref 0–0.1)
BASOPHILS NFR BLD AUTO: 1 % (ref 0–1)
BILIRUB SERPL-MCNC: 0.36 MG/DL (ref 0.2–1)
BUN SERPL-MCNC: 10 MG/DL (ref 5–25)
CALCIUM SERPL-MCNC: 9.2 MG/DL (ref 8.3–10.1)
CHLORIDE SERPL-SCNC: 106 MMOL/L (ref 96–108)
CO2 SERPL-SCNC: 25 MMOL/L (ref 21–32)
CREAT SERPL-MCNC: 0.61 MG/DL (ref 0.6–1.3)
EOSINOPHIL # BLD AUTO: 0.14 THOUSAND/ΜL (ref 0–0.61)
EOSINOPHIL NFR BLD AUTO: 4 % (ref 0–6)
ERYTHROCYTE [DISTWIDTH] IN BLOOD BY AUTOMATED COUNT: 11.9 % (ref 11.6–15.1)
FERRITIN SERPL-MCNC: 50 NG/ML (ref 8–388)
FOLATE SERPL-MCNC: 3.2 NG/ML (ref 3.1–17.5)
GFR SERPL CREATININE-BSD FRML MDRD: 106 ML/MIN/1.73SQ M
GLUCOSE P FAST SERPL-MCNC: 92 MG/DL (ref 65–99)
HCT VFR BLD AUTO: 41 % (ref 34.8–46.1)
HGB BLD-MCNC: 13.5 G/DL (ref 11.5–15.4)
IMM GRANULOCYTES # BLD AUTO: 0.01 THOUSAND/UL (ref 0–0.2)
IMM GRANULOCYTES NFR BLD AUTO: 0 % (ref 0–2)
IRON SERPL-MCNC: 97 UG/DL (ref 50–170)
LYMPHOCYTES # BLD AUTO: 1.43 THOUSANDS/ΜL (ref 0.6–4.47)
LYMPHOCYTES NFR BLD AUTO: 37 % (ref 14–44)
MCH RBC QN AUTO: 31 PG (ref 26.8–34.3)
MCHC RBC AUTO-ENTMCNC: 32.9 G/DL (ref 31.4–37.4)
MCV RBC AUTO: 94 FL (ref 82–98)
MONOCYTES # BLD AUTO: 0.32 THOUSAND/ΜL (ref 0.17–1.22)
MONOCYTES NFR BLD AUTO: 8 % (ref 4–12)
NEUTROPHILS # BLD AUTO: 1.96 THOUSANDS/ΜL (ref 1.85–7.62)
NEUTS SEG NFR BLD AUTO: 50 % (ref 43–75)
NRBC BLD AUTO-RTO: 0 /100 WBCS
PLATELET # BLD AUTO: 253 THOUSANDS/UL (ref 149–390)
PMV BLD AUTO: 10.2 FL (ref 8.9–12.7)
POTASSIUM SERPL-SCNC: 4 MMOL/L (ref 3.5–5.3)
PROT SERPL-MCNC: 6.9 G/DL (ref 6.4–8.4)
PTH-INTACT SERPL-MCNC: 76.1 PG/ML (ref 18.4–80.1)
RBC # BLD AUTO: 4.36 MILLION/UL (ref 3.81–5.12)
SODIUM SERPL-SCNC: 135 MMOL/L (ref 135–147)
VIT B12 SERPL-MCNC: 313 PG/ML (ref 100–900)
WBC # BLD AUTO: 3.9 THOUSAND/UL (ref 4.31–10.16)

## 2023-04-26 NOTE — RESULT ENCOUNTER NOTE
Please call the patient regarding her abnormal result    Vitamin D level is low patient wants to contact her bariatric doctor about vitamin D supplementation unless they defer it to me in which case I would recommend Drisdol 50,000 units once a week for 12 weeks and then recheck the vitamin D level

## 2023-04-27 LAB
VIT B1 BLD-SCNC: 131.1 NMOL/L (ref 66.5–200)
ZINC SERPL-MCNC: 80 UG/DL (ref 44–115)

## 2023-04-28 LAB — VIT B6 SERPL-MCNC: 10.8 UG/L (ref 3.4–65.2)

## 2023-04-29 LAB — VIT A SERPL-MCNC: 37.8 UG/DL (ref 20.1–62)

## 2023-05-01 ENCOUNTER — OFFICE VISIT (OUTPATIENT)
Dept: FAMILY MEDICINE CLINIC | Facility: CLINIC | Age: 48
End: 2023-05-01

## 2023-05-01 VITALS
OXYGEN SATURATION: 99 % | DIASTOLIC BLOOD PRESSURE: 50 MMHG | BODY MASS INDEX: 33.76 KG/M2 | WEIGHT: 178.8 LBS | SYSTOLIC BLOOD PRESSURE: 130 MMHG | HEIGHT: 61 IN | TEMPERATURE: 97.9 F | RESPIRATION RATE: 18 BRPM | HEART RATE: 99 BPM

## 2023-05-01 DIAGNOSIS — Z13.6 ENCOUNTER FOR SCREENING FOR CARDIOVASCULAR DISORDERS: ICD-10-CM

## 2023-05-01 DIAGNOSIS — Z71.3 ENCOUNTER FOR WEIGHT LOSS COUNSELING: Primary | ICD-10-CM

## 2023-05-01 DIAGNOSIS — Z12.31 ENCOUNTER FOR SCREENING MAMMOGRAM FOR BREAST CANCER: ICD-10-CM

## 2023-05-01 DIAGNOSIS — Z90.3 HISTORY OF SLEEVE GASTRECTOMY: ICD-10-CM

## 2023-05-01 DIAGNOSIS — Z11.4 ENCOUNTER FOR SCREENING FOR HIV: ICD-10-CM

## 2023-05-01 DIAGNOSIS — Z12.11 SCREEN FOR COLON CANCER: ICD-10-CM

## 2023-05-01 DIAGNOSIS — Z23 ENCOUNTER FOR IMMUNIZATION: ICD-10-CM

## 2023-05-01 DIAGNOSIS — E55.9 VITAMIN D DEFICIENCY: ICD-10-CM

## 2023-05-01 DIAGNOSIS — Z76.89 ENCOUNTER TO ESTABLISH CARE: ICD-10-CM

## 2023-05-01 DIAGNOSIS — Z11.59 NEED FOR HEPATITIS C SCREENING TEST: ICD-10-CM

## 2023-05-01 RX ORDER — ERGOCALCIFEROL 1.25 MG/1
50000 CAPSULE ORAL WEEKLY
Qty: 8 CAPSULE | Refills: 0 | Status: SHIPPED | OUTPATIENT
Start: 2023-05-01

## 2023-05-01 RX ORDER — LIDOCAINE 50 MG/G
OINTMENT TOPICAL
COMMUNITY
Start: 2023-04-04 | End: 2023-05-01 | Stop reason: ALTCHOICE

## 2023-05-01 NOTE — PROGRESS NOTES
Assessment/Plan     Diagnoses and all orders for this visit:    Encounter for weight loss counseling  -     Semaglutide-Weight Management (WEGOVY) 0 25 MG/0 5ML; Inject 0 5 mL (0 25 mg total) under the skin once a week    History of sleeve gastrectomy    Encounter to establish care    Adult BMI 33 0-33 9 kg/sq m  -     Hemoglobin A1C; Future  -     Semaglutide-Weight Management (WEGOVY) 0 25 MG/0 5ML; Inject 0 5 mL (0 25 mg total) under the skin once a week    Vitamin D deficiency  -     ergocalciferol (VITAMIN D2) 50,000 units; Take 1 capsule (50,000 Units total) by mouth once a week    Encounter for screening for cardiovascular disorders  -     Lipid Panel with Direct LDL reflex; Future    Encounter for screening for HIV  -     HIV 1/2 AG/AB w Reflex [facility] for 2 yr old and above; Future    Need for hepatitis C screening test  -     Hepatitis C antibody; Future    Encounter for immunization  -     TDAP VACCINE GREATER THAN OR EQUAL TO 8YO IM    Encounter for screening mammogram for breast cancer  -     Mammo screening bilateral w 3d & cad; Future    Screen for colon cancer  -     Cologuard    Other orders  -     Discontinue: lidocaine (XYLOCAINE) 5 % ointment; APPLY 5-7 FINGERTIP UNITS TO THE AFFECTED AREA 2-4 TIMES DAILY AS NEEDED (2 FINGERTIP UNITS = 1 GRAM)      Plan:    -Doing well today, no concerns on exam  -Reviewed recent labs with patient  -Counseled patient on weight loss  -[employer] weekly  -Check additional labs as above   -Tdap given today in office  -Screening mammogram and cologuard ordered today  -Patient in agreement with above plan  -Follow-up in 1 month    Subjective     Patient Id: Payton Montoya is a 46 y o  female    HPI    Patient is here today as a new patient to establish care  Previously being seen by Dr Malka Toribio  She has a past medical history of hypertension, obesity, FREDA  She has a hx of bariatric surgery (sleeve gastrectomy)   She expresses some concern with weight gain over the last few months  Has reached out to her bariatric team and they recommended she go through the weight management program but they cannot get her in for several months  She is interested in starting medication for weight loss  She recently had labs performed that showed vit d deficiency  She is taking an otc MV and vit d supplement  She is otherwise doing well and no concerns today  Review of Systems   Constitutional: Negative for chills and fever  Respiratory: Negative for shortness of breath  Cardiovascular: Negative for chest pain  Gastrointestinal: Negative for abdominal pain, constipation, diarrhea, nausea and vomiting  Genitourinary: Negative for dysuria  Musculoskeletal: Negative for arthralgias and myalgias  Skin: Negative for rash  Neurological: Negative for headaches  Psychiatric/Behavioral: Negative for dysphoric mood  All other systems reviewed and are negative        Past Medical History:   Diagnosis Date    Benign essential hypertension 12/6/2016    Carpal tunnel syndrome of left wrist     last assessed 1/23/15     Community acquired pneumonia     last assessed 11/7/16    Morbid obesity with BMI of 40 0-44 9, adult ([facility] [address] )     last assessed 12/6/16        Past Surgical History:   Procedure Laterality Date    APPENDECTOMY  2017    GASTRECTOMY SLEEVE LAPAROSCOPIC  12/2016    HAND SURGERY Bilateral     Carpal tunnel     HYSTERECTOMY      HYSTEROSCOPY      NEUROPLASTY / TRANSPOSITION MEDIAN NERVE AT CARPAL TUNNEL Right 2013    decompression     SHOULDER SURGERY Right 2013    TONSILLECTOMY AND ADENOIDECTOMY         Family History   Problem Relation Age of Onset    Hypertension Mother     Alzheimer's disease Paternal Grandmother     Ovarian cancer Maternal Aunt     Uterine cancer Maternal Aunt     Asthma Father     No Known Problems Sister     No Known Problems Daughter     No Known Problems Maternal Grandmother     No Known Problems Maternal Grandfather     No Known "Problems Paternal Grandfather     No Known Problems Daughter     No Known Problems Paternal Aunt     No Known Problems Paternal Aunt        Social History     Socioeconomic History    Marital status: /Civil Union     Spouse name: None    Number of children: None    Years of education: None    Highest education level: None   Occupational History    None   Tobacco Use    Smoking status: Never    Smokeless tobacco: Never   Substance and Sexual Activity    Alcohol use: Not Currently    Drug use: No    Sexual activity: Yes     Partners: Male     Comment:    Other Topics Concern    None   Social History Narrative    None     Social Determinants of Health     Financial Resource Strain: Not on file   Food Insecurity: Not on file   Transportation Needs: Not on file   Physical Activity: Not on file   Stress: Not on file   Social Connections: Not on file   Intimate Partner Violence: Not on file   Housing Stability: Not on file       No Known Allergies      Current Outpatient Medications:     ergocalciferol (VITAMIN D2) 50,000 units, Take 1 capsule (50,000 Units total) by mouth once a week, Disp: 8 capsule, Rfl: 0    Semaglutide-Weight Management (WEGOVY) 0 25 MG/0 5ML, Inject 0 5 mL (0 25 mg total) under the skin once a week, Disp: 2 mL, Rfl: 0    Objective     Vitals:    05/01/23 0810   BP: 130/50   BP Location: Right arm   Patient Position: Sitting   Cuff Size: Standard   Pulse: 99   Resp: 18   Temp: 97 9 °F (36 6 °C)   SpO2: 99%   Weight: 81 1 kg (178 lb 12 8 oz)   Height: 5' 1\" (1 549 m)       Physical Exam  Vitals and nursing note reviewed  Constitutional:       General: She is not in acute distress  Appearance: She is well-developed  She is not ill-appearing or toxic-appearing  HENT:      Head: Normocephalic and atraumatic  Eyes:      General: No scleral icterus  Extraocular Movements: Extraocular movements intact        Conjunctiva/sclera: Conjunctivae normal    Neck:      " Thyroid: No thyromegaly  Cardiovascular:      Rate and Rhythm: Normal rate and regular rhythm  Heart sounds: Normal heart sounds  No murmur heard  Pulmonary:      Effort: Pulmonary effort is normal  No respiratory distress  Breath sounds: Normal breath sounds  No wheezing, rhonchi or rales  Genitourinary:     Comments: Exam deferred  Musculoskeletal:      Cervical back: Neck supple  Right lower leg: No edema  Left lower leg: No edema  Skin:     General: Skin is warm  Findings: No rash  Neurological:      Mental Status: She is alert and oriented to person, place, and time     Psychiatric:         Mood and Affect: Mood normal          Behavior: Behavior normal          Stephanie Hawkins

## 2023-05-11 ENCOUNTER — APPOINTMENT (OUTPATIENT)
Dept: LAB | Facility: MEDICAL CENTER | Age: 48
End: 2023-05-11

## 2023-05-11 DIAGNOSIS — Z11.59 NEED FOR HEPATITIS C SCREENING TEST: ICD-10-CM

## 2023-05-11 DIAGNOSIS — Z13.6 ENCOUNTER FOR SCREENING FOR CARDIOVASCULAR DISORDERS: ICD-10-CM

## 2023-05-11 DIAGNOSIS — Z11.4 ENCOUNTER FOR SCREENING FOR HIV: ICD-10-CM

## 2023-05-11 LAB
CHOLEST SERPL-MCNC: 192 MG/DL
EST. AVERAGE GLUCOSE BLD GHB EST-MCNC: 94 MG/DL
HBA1C MFR BLD: 4.9 %
HCV AB SER QL: NORMAL
HDLC SERPL-MCNC: 66 MG/DL
HIV 1+2 AB+HIV1 P24 AG SERPL QL IA: NORMAL
HIV 2 AB SERPL QL IA: NORMAL
HIV1 AB SERPL QL IA: NORMAL
HIV1 P24 AG SERPL QL IA: NORMAL
LDLC SERPL CALC-MCNC: 118 MG/DL (ref 0–100)
TRIGL SERPL-MCNC: 38 MG/DL

## 2023-05-23 ENCOUNTER — RA CDI HCC (OUTPATIENT)
Dept: OTHER | Facility: HOSPITAL | Age: 48
End: 2023-05-23

## 2023-05-23 NOTE — PROGRESS NOTES
[facility]  coding opportunities       Chart reviewed, no opportunity found: CHART REVIEWED, NO OPPORTUNITY FOUND        Patients Insurance        Commercial Insurance: [address] [place] [address]

## 2023-05-31 DIAGNOSIS — Z71.3 ENCOUNTER FOR WEIGHT LOSS COUNSELING: ICD-10-CM

## 2023-06-01 ENCOUNTER — OFFICE VISIT (OUTPATIENT)
Dept: FAMILY MEDICINE CLINIC | Facility: CLINIC | Age: 48
End: 2023-06-01

## 2023-06-01 VITALS
HEIGHT: 61 IN | TEMPERATURE: 98 F | WEIGHT: 170.4 LBS | OXYGEN SATURATION: 98 % | DIASTOLIC BLOOD PRESSURE: 82 MMHG | HEART RATE: 78 BPM | RESPIRATION RATE: 18 BRPM | SYSTOLIC BLOOD PRESSURE: 110 MMHG | BODY MASS INDEX: 32.17 KG/M2

## 2023-06-01 DIAGNOSIS — H65.03 NON-RECURRENT ACUTE SEROUS OTITIS MEDIA OF BOTH EARS: ICD-10-CM

## 2023-06-01 DIAGNOSIS — Z71.3 ENCOUNTER FOR WEIGHT LOSS COUNSELING: ICD-10-CM

## 2023-06-01 DIAGNOSIS — Z00.00 ANNUAL PHYSICAL EXAM: Primary | ICD-10-CM

## 2023-06-01 RX ORDER — DULOXETIN HYDROCHLORIDE 20 MG/1
20 CAPSULE, DELAYED RELEASE ORAL DAILY
COMMUNITY
Start: 2023-05-11

## 2023-06-01 RX ORDER — PREGABALIN 50 MG/1
50 CAPSULE ORAL 2 TIMES DAILY
COMMUNITY
Start: 2023-05-11

## 2023-06-01 RX ORDER — LIDOCAINE 50 MG/G
OINTMENT TOPICAL
COMMUNITY
Start: 2023-05-11

## 2023-06-01 RX ORDER — TRAMADOL HYDROCHLORIDE 50 MG/1
50-100 TABLET ORAL
COMMUNITY
Start: 2023-05-11

## 2023-06-01 NOTE — PATIENT INSTRUCTIONS
Wellness Visit for Adults   AMBULATORY CARE:   A wellness visit  is when you see your healthcare provider to get screened for health problems  Your healthcare provider will also give you advice on how to stay healthy  Write down your questions so you remember to ask them  Ask your healthcare provider how often you should have a wellness visit  What happens at a wellness visit:  Your healthcare provider will ask about your health, and your family history of health problems  This includes high blood pressure, heart disease, and cancer  He or she will ask if you have symptoms that concern you, if you smoke, and about your mood  You may also be asked about your intake of medicines, supplements, food, and alcohol  Any of the following may be done:  • Your weight  will be checked  Your height may also be checked so your body mass index (BMI) can be calculated  Your BMI shows if you are at a healthy weight  • Your blood pressure  and heart rate will be checked  Your temperature may also be checked  • Blood and urine tests  may be done  Blood tests may be done to check your cholesterol levels  Abnormal cholesterol levels increase your risk for heart disease and stroke  You may also need a blood or urine test to check for diabetes if you are at increased risk  Urine tests may be done to look for signs of an infection or kidney disease  • A physical exam  includes checking your heartbeat and lungs with a stethoscope  Your healthcare provider may also check your skin to look for sun damage  • Screening tests  may be recommended  A screening test is done to check for diseases that may not cause symptoms  The screening tests you may need depend on your age, gender, family history, and lifestyle habits  For example, colorectal screening may be recommended if you are 48years old or older  Screening tests you need if you are a woman:   • A Pap smear  is used to screen for cervical cancer   Pap smears are usually done every 3 to 5 years depending on your age  You may need them more often if you have had abnormal Pap smear test results in the past  Ask your healthcare provider how often you should have a Pap smear  • A mammogram  is an x-ray of your breasts to screen for breast cancer  Experts recommend mammograms every 2 years starting at age 48 years  You may need a mammogram at age 52 years or younger if you have an increased risk for breast cancer  Talk to your healthcare provider about when you should start having mammograms and how often you need them  Vaccines you may need:   • Get an influenza vaccine  every year  The influenza vaccine protects you from the flu  Several types of viruses cause the flu  The viruses change over time, so new vaccines are made each year  • Get a tetanus-diphtheria (Td) booster vaccine  every 10 years  This vaccine protects you against tetanus and diphtheria  Tetanus is a severe infection that may cause painful muscle spasms and lockjaw  Diphtheria is a severe bacterial infection that causes a thick covering in the back of your mouth and throat  • Get a human papillomavirus (HPV) vaccine  if you are female and aged 23 to 32 or male 23 to 24 and never received it  This vaccine protects you from HPV infection  HPV is the most common infection spread by sexual contact  HPV may also cause vaginal, penile, and anal cancers  • Get a pneumococcal vaccine  if you are aged 72 years or older  The pneumococcal vaccine is an injection given to protect you from pneumococcal disease  Pneumococcal disease is an infection caused by pneumococcal bacteria  The infection may cause pneumonia, meningitis, or an ear infection  • Get a shingles vaccine  if you are 60 or older, even if you have had shingles before  The shingles vaccine is an injection to protect you from the varicella-zoster virus  This is the same virus that causes chickenpox   Shingles is a painful rash that develops in people who had chickenpox or have been exposed to the virus  How to eat healthy:  My Plate is a model for planning healthy meals  It shows the types and amounts of foods that should go on your plate  Fruits and vegetables make up about half of your plate, and grains and protein make up the other half  A serving of dairy is included on the side of your plate  The amount of calories and serving sizes you need depends on your age, gender, weight, and height  Examples of healthy foods are listed below:  • Eat a variety of vegetables  such as dark green, red, and orange vegetables  You can also include canned vegetables low in sodium (salt) and frozen vegetables without added butter or sauces  • Eat a variety of fresh fruits , canned fruit in 100% juice, frozen fruit, and dried fruit  • Include whole grains  At least half of the grains you eat should be whole grains  Examples include whole-wheat bread, wheat pasta, brown rice, and whole-grain cereals such as oatmeal     • Eat a variety of protein foods such as seafood (fish and shellfish), lean meat, and poultry without skin (turkey and chicken)  Examples of lean meats include pork leg, shoulder, or tenderloin, and beef round, sirloin, tenderloin, and extra lean ground beef  Other protein foods include eggs and egg substitutes, beans, peas, soy products, nuts, and seeds  • Choose low-fat dairy products such as skim or 1% milk or low-fat yogurt, cheese, and cottage cheese  • Limit unhealthy fats  such as butter, hard margarine, and shortening  Exercise:  Exercise at least 30 minutes per day on most days of the week  Some examples of exercise include walking, biking, dancing, and swimming  You can also fit in more physical activity by taking the stairs instead of the elevator or parking farther away from stores  Include muscle strengthening activities 2 days each week  Regular exercise provides many health benefits   It helps you manage your weight, and decreases your risk for type 2 diabetes, heart disease, stroke, and high blood pressure  Exercise can also help improve your mood  Ask your healthcare provider about the best exercise plan for you  General health and safety guidelines:   • Do not smoke  Nicotine and other chemicals in cigarettes and cigars can cause lung damage  Ask your healthcare provider for information if you currently smoke and need help to quit  E-cigarettes or smokeless tobacco still contain nicotine  Talk to your healthcare provider before you use these products  • Limit alcohol  A drink of alcohol is 12 ounces of beer, 5 ounces of wine, or 1½ ounces of liquor  • Lose weight, if needed  Being overweight increases your risk of certain health conditions  These include heart disease, high blood pressure, type 2 diabetes, and certain types of cancer  • Protect your skin  Do not sunbathe or use tanning beds  Use sunscreen with a SPF 15 or higher  Apply sunscreen at least 15 minutes before you go outside  Reapply sunscreen every 2 hours  Wear protective clothing, hats, and sunglasses when you are outside  • Drive safely  Always wear your seatbelt  Make sure everyone in your car wears a seatbelt  A seatbelt can save your life if you are in an accident  Do not use your cell phone when you are driving  This could distract you and cause an accident  Pull over if you need to make a call or send a text message  • Practice safe sex  Use latex condoms if are sexually active and have more than one partner  Your healthcare provider may recommend screening tests for sexually transmitted infections (STIs)  • Wear helmets, lifejackets, and protective gear  Always wear a helmet when you ride a bike or motorcycle, go skiing, or play sports that could cause a head injury  Wear protective equipment when you play sports  Wear a lifejacket when you are on a boat or doing water sports      © Copyright [employer] 2022 Information is for End User's use only and may not be sold, redistributed or otherwise used for commercial purposes  The above information is an  only  It is not intended as medical advice for individual conditions or treatments  Talk to your doctor, nurse or pharmacist before following any medical regimen to see if it is safe and effective for you  Weight Management   AMBULATORY CARE:   Why it is important to manage your weight:  Being overweight increases your risk of health conditions such as heart disease, high blood pressure, type 2 diabetes, and certain types of cancer  It can also increase your risk for osteoarthritis, sleep apnea, and other respiratory problems  Aim for a slow, steady weight loss  Even a small amount of weight loss can lower your risk of health problems  Risks of being overweight:  Extra weight can cause many health problems, including the following:  • Diabetes (high blood sugar level)    • High blood pressure or high cholesterol    • Heart disease    • Stroke    • Gallbladder or liver disease    • Cancer of the colon, breast, prostate, liver, or kidney    • Sleep apnea    • Arthritis or gout    Screening  is done to check for health conditions before you have signs or symptoms  If you are 28to 79years old, your blood sugar level may be checked every 3 years for signs of prediabetes or diabetes  Your healthcare provider will check your blood pressure at each visit  High blood pressure can lead to a stroke or other problems  Your provider may check for signs of heart disease, cancer, or other health problems  How to lose weight safely:  A safe and healthy way to lose weight is to eat fewer calories and get regular exercise  • You can lose up about 1 pound a week by decreasing the number of calories you eat by 500 calories each day  You can decrease calories by eating smaller portion sizes or by cutting out high-calorie foods   Read labels to find out how many calories are in the foods you eat          • You can also burn calories with exercise such as walking, swimming, or biking  You will be more likely to keep weight off if you make these changes part of your lifestyle  Exercise at least 30 minutes per day on most days of the week  You can also fit in more physical activity by taking the stairs instead of the elevator or parking farther away from stores  Ask your healthcare provider about the best exercise plan for you  Healthy meal plan for weight management:  A healthy meal plan includes a variety of foods, contains fewer calories, and helps you stay healthy  A healthy meal plan includes the following:     • Eat whole-grain foods more often  A healthy meal plan should contain fiber  Fiber is the part of grains, fruits, and vegetables that is not broken down by your body  Whole-grain foods are healthy and provide extra fiber in your diet  Some examples of whole-grain foods are whole-wheat breads and pastas, oatmeal, brown rice, and bulgur  • Eat a variety of vegetables every day  Include dark, leafy greens such as spinach, kale, cory greens, and mustard greens  Eat yellow and orange vegetables such as carrots, sweet potatoes, and winter squash  • Eat a variety of fruits every day  Choose fresh or canned fruit (canned in its own juice or light syrup) instead of juice  Fruit juice has very little or no fiber  • Eat low-fat dairy foods  Drink fat-free (skim) milk or 1% milk  Eat fat-free yogurt and low-fat cottage cheese  Try low-fat cheeses such as mozzarella and other reduced-fat cheeses  • Choose meat and other protein foods that are low in fat  Choose beans or other legumes such as split peas or lentils  Choose fish, skinless poultry (chicken or turkey), or lean cuts of red meat (beef or pork)  Before you cook meat or poultry, cut off any visible fat  • Use less fat and oil  Try baking foods instead of frying them   Add less fat, such as margarine, sour cream, regular salad dressing and mayonnaise to foods  Eat fewer high-fat foods  Some examples of high-fat foods include french fries, doughnuts, ice cream, and cakes  • Eat fewer sweets  Limit foods and drinks that are high in sugar  This includes candy, cookies, regular soda, and sweetened drinks  Ways to decrease calories:   • Eat smaller portions  ? Use a small plate with smaller servings  ? Do not eat second helpings  ? When you eat at a restaurant, ask for a box and place half of your meal in the box before you eat  ? Share an entrée with someone else  • Replace high-calorie snacks with healthy, low-calorie snacks  ? Choose fresh fruit, vegetables, fat-free rice cakes, or air-popped popcorn instead of potato chips, nuts, or chocolate  ? Choose water or calorie-free drinks instead of soda or sweetened drinks  • Do not shop for groceries when you are hungry  You may be more likely to make unhealthy food choices  Take a grocery list of healthy foods and shop after you have eaten  • Eat regular meals  Do not skip meals  Skipping meals can lead to overeating later in the day  This can make it harder for you to lose weight  Eat a healthy snack in place of a meal if you do not have time to eat a regular meal  Talk with a dietitian to help you create a meal plan and schedule that is right for you  Other things to consider as you try to lose weight:   • Be aware of situations that may give you the urge to overeat, such as eating while watching television  Find ways to avoid these situations  For example, read a book, go for a walk, or do crafts  • Meet with a weight loss support group or friends who are also trying to lose weight  This may help you stay motivated to continue working on your weight loss goals  © Copyright [employer] 2022 Information is for End User's use only and may not be sold, redistributed or otherwise used for commercial purposes    The above information is an  only  It is not intended as medical advice for individual conditions or treatments  Talk to your doctor, nurse or pharmacist before following any medical regimen to see if it is safe and effective for you

## 2023-06-01 NOTE — PROGRESS NOTES
[address] [address]    NAME: Lamin Foy  AGE: 46 y o  SEX: female  : 1975     DATE: 2023     Assessment and Plan:     Problem List Items Addressed This Visit    None  Visit Diagnoses     Annual physical exam    -  Primary    BMI 32 0-32 9,adult        Non-recurrent acute serous otitis media of both ears        Encounter for weight loss counseling            Plan:    Annual physical  -Exam nonconcerning today  -Discussed preventative measures with patient today and reviewed recent labs    Weight loss/BMI 32  -Patient presents today with 8 pound weight loss from previous visit  -Continue semaglutide at this time, recently increased dose to 0 5 mg  -Discussed dietary changes and regular exercise with patient today  -Return in 1 month for weight loss appointment    Acute serous otitis media  -Mild  -Recommended 2 to 3 days of NSAIDs plus Flonase  -Return if symptoms not improved    Immunizations and preventive care screenings were discussed with patient today  Appropriate education was printed on patient's after visit summary  Counseling:  Alcohol/drug use: discussed moderation in alcohol intake, the recommendations for healthy alcohol use, and avoidance of illicit drug use  Dental Health: discussed importance of regular tooth brushing, flossing, and dental visits  Injury prevention: discussed safety/seat belts, safety helmets, smoke detectors, carbon dioxide detectors, and smoking near bedding or upholstery  Sexual health: discussed sexually transmitted diseases, partner selection, use of condoms, avoidance of unintended pregnancy, and contraceptive alternatives  · Exercise: the importance of regular exercise/physical activity was discussed  Recommend exercise 3-5 times per week for at least 30 minutes  BMI Counseling: Body mass index is 32 2 kg/m²   The BMI is above normal  Nutrition recommendations include reducing portion sizes, decreasing overall calorie intake, 3-5 servings of fruits/vegetables daily, reducing fast food intake, consuming healthier snacks, decreasing soda and/or juice intake, moderation in carbohydrate intake, increasing intake of lean protein, reducing intake of saturated fat and trans fat and reducing intake of cholesterol  Exercise recommendations include moderate aerobic physical activity for 150 minutes/week, exercising 3-5 times per week and strength training exercises  Pharmacotherapy was ordered for patient to aid in weight loss  Return in 4 weeks (on 6/29/2023) for Next scheduled follow up  Chief Complaint:     Chief Complaint   Patient presents with   • Annual Exam     Patient also stated she has a sore throat and earache      History of Present Illness:     Adult Annual Physical   Patient here for a comprehensive physical exam  The patient reports 4 to 5-day history of right ear fullness and occasional pain with associated sore throat as well  No fever  She is swallowing okay  Taking her usual allergy medications  Also would like to discuss her weight loss medications today  Tolerating well without side effects  No further concerns or complaints today       Diet and Physical Activity  · Diet/Nutrition: well balanced diet  · Exercise: walking and walks 2-3 times per week, has toddler grandson she keeps as well  Depression Screening  PHQ-2/9 Depression Screening    Little interest or pleasure in doing things: 1 - several days  Feeling down, depressed, or hopeless: 1 - several days  PHQ-2 Score: 2  PHQ-2 Interpretation: Negative depression screen       General Health  · Sleep: sleeps poorly and gets 7-8 hours of sleep on average  Reports chronic shoulder pain as reason she has sleep issues  · Hearing: normal - bilateral   · Vision: goes for regular eye exams and wears glasses and contacts     · Dental: regular dental visits, brushes teeth twice daily and flosses teeth occasionally  /GYN Health  · Patient is: perimenopausal, notes some symptoms occasionally  · Last menstrual period: s/p hysterectomy at age 32  · Contraceptive method: hysterectomy  Review of Systems:     Review of Systems   Constitutional: Negative for chills and fever  HENT: Positive for ear pain and sore throat  Negative for tinnitus  Respiratory: Negative for cough, chest tightness, shortness of breath and wheezing  Cardiovascular: Negative for chest pain  Gastrointestinal: Negative for abdominal pain, constipation, diarrhea, nausea and vomiting  Genitourinary: Negative for dysuria  Musculoskeletal: Negative for arthralgias and myalgias  Skin: Negative for rash  Neurological: Negative for headaches  Psychiatric/Behavioral: Negative for dysphoric mood  All other systems reviewed and are negative       Past Medical History:     Past Medical History:   Diagnosis Date   • Benign essential hypertension 12/6/2016   • Carpal tunnel syndrome of left wrist     last assessed 1/23/15    • Community acquired pneumonia     last assessed 11/7/16   • Morbid obesity with BMI of 40 0-44 9, [facility] )     last assessed 12/6/16       Past Surgical History:     Past Surgical History:   Procedure Laterality Date   • APPENDECTOMY  2017   • GASTRECTOMY SLEEVE LAPAROSCOPIC  12/2016   • HAND SURGERY Bilateral     Carpal tunnel    • HYSTERECTOMY     • HYSTEROSCOPY     • NEUROPLASTY / TRANSPOSITION MEDIAN NERVE AT CARPAL TUNNEL Right 2013    decompression    • SHOULDER SURGERY Right 2013   • TONSILLECTOMY AND ADENOIDECTOMY        Social History:     Social History     Socioeconomic History   • Marital status: /Civil Union     Spouse name: None   • Number of children: None   • Years of education: None   • Highest education level: None   Occupational History   • None   Tobacco Use   • Smoking status: Never   • Smokeless tobacco: Never   Substance and Sexual Activity   • Alcohol use: Not Currently   • Drug use: No   • Sexual activity: Yes     Partners: Male     Comment:    Other Topics Concern   • None   Social History Narrative   • None     Social Determinants of Health     Financial Resource Strain: Not on file   Food Insecurity: Not on file   Transportation Needs: Not on file   Physical Activity: Not on file   Stress: Not on file   Social Connections: Not on file   Intimate Partner Violence: Not on file   Housing Stability: Not on file      Family History:     Family History   Problem Relation Age of Onset   • Hypertension Mother    • Alzheimer's disease Paternal Grandmother    • Ovarian cancer Maternal Aunt    • Uterine cancer Maternal Aunt    • Asthma Father    • No Known Problems Sister    • No Known Problems Daughter    • No Known Problems Maternal Grandmother    • No Known Problems Maternal Grandfather    • No Known Problems Paternal Grandfather    • No Known Problems Daughter    • No Known Problems Paternal Aunt    • No Known Problems Paternal Aunt       Current Medications:     Current Outpatient Medications   Medication Sig Dispense Refill   • DULoxetine (CYMBALTA) 20 mg capsule Take 20 mg by mouth daily     • ergocalciferol (VITAMIN D2) 50,000 units Take 1 capsule (50,000 Units total) by mouth once a week 8 capsule 0   • lidocaine (XYLOCAINE) 5 % ointment APPLY 5-7 FINGERTIP UNITS TO THE AFFECTED AREA 3 TIMES DAILY AS NEEDED (2 FINGERTIP UNITS = 1 GRAM)     • pregabalin (LYRICA) 50 mg capsule Take 50 mg by mouth 2 (two) times a day     • Semaglutide-Weight Management (WEGOVY) 0 5 MG/0 5ML Inject 0 5 mL (0 5 mg total) under the skin once a week 2 mL 0   • traMADol (ULTRAM) 50 mg tablet Take  mg by mouth daily at bedtime as needed       No current facility-administered medications for this visit        Allergies:     No Known Allergies   Physical Exam:     /82 (BP Location: Left arm, Patient Position: Sitting, Cuff Size: Standard)   Pulse 78   Temp 98 °F (36 7 °C)   Resp 18   Ht 5' "1\" (1 549 m)   Wt 77 3 kg (170 lb 6 4 oz)   LMP  (LMP Unknown)   SpO2 98%   BMI 32 20 kg/m²     Physical Exam  Vitals and nursing note reviewed  Constitutional:       General: She is not in acute distress  Appearance: She is well-developed  She is not ill-appearing or toxic-appearing  HENT:      Head: Normocephalic and atraumatic  Right Ear: Ear canal and external ear normal       Left Ear: Tympanic membrane, ear canal and external ear normal       Ears:      Comments: Left ear: Small amount of clear serous appearing fluid in the middle ear    Right ear: Small amount of clear serous appearing fluid in the middle ear, 4-5 small areas of blood on the TM without evidence of perforation  No blood in the canal     Nose: Nose normal  No congestion or rhinorrhea  Mouth/Throat:      Mouth: Mucous membranes are moist       Pharynx: Posterior oropharyngeal erythema present  No oropharyngeal exudate  Eyes:      Extraocular Movements: Extraocular movements intact  Conjunctiva/sclera: Conjunctivae normal       Pupils: Pupils are equal, round, and reactive to light  Neck:      Thyroid: No thyromegaly  Cardiovascular:      Rate and Rhythm: Normal rate and regular rhythm  Heart sounds: Normal heart sounds  No murmur heard  Pulmonary:      Effort: Pulmonary effort is normal  No respiratory distress  Breath sounds: Normal breath sounds  No wheezing, rhonchi or rales  Abdominal:      General: Bowel sounds are normal  There is no distension  Palpations: Abdomen is soft  There is no mass  Tenderness: There is no abdominal tenderness  There is no guarding or rebound  Hernia: No hernia is present  Genitourinary:     Comments: Exam deferred  Musculoskeletal:      Cervical back: Neck supple  No tenderness  Right lower leg: No edema  Left lower leg: No edema  Lymphadenopathy:      Cervical: No cervical adenopathy  Skin:     General: Skin is warm     Neurological:    " General: No focal deficit present  Mental Status: She is alert and oriented to person, place, and time  Psychiatric:         Mood and Affect: Mood normal          Behavior: Behavior normal          Thought Content:  Thought content normal          Judgment: Judgment normal           Terrie Farris, DO  [address]

## 2023-06-06 DIAGNOSIS — H65.03 NON-RECURRENT ACUTE SEROUS OTITIS MEDIA OF BOTH EARS: Primary | ICD-10-CM

## 2023-06-06 RX ORDER — PREDNISONE 20 MG/1
40 TABLET ORAL DAILY
Qty: 10 TABLET | Refills: 0 | Status: SHIPPED | OUTPATIENT
Start: 2023-06-06 | End: 2023-06-11

## 2023-06-23 ENCOUNTER — APPOINTMENT (OUTPATIENT)
Dept: RADIOLOGY | Facility: MEDICAL CENTER | Age: 48
End: 2023-06-23
Payer: COMMERCIAL

## 2023-06-23 ENCOUNTER — OFFICE VISIT (OUTPATIENT)
Dept: URGENT CARE | Facility: MEDICAL CENTER | Age: 48
End: 2023-06-23
Payer: COMMERCIAL

## 2023-06-23 VITALS
SYSTOLIC BLOOD PRESSURE: 140 MMHG | DIASTOLIC BLOOD PRESSURE: 98 MMHG | HEART RATE: 79 BPM | OXYGEN SATURATION: 98 % | WEIGHT: 171 LBS | BODY MASS INDEX: 32.28 KG/M2 | RESPIRATION RATE: 20 BRPM | HEIGHT: 61 IN | TEMPERATURE: 97.7 F

## 2023-06-23 DIAGNOSIS — S96.912A STRAIN OF LEFT ANKLE, INITIAL ENCOUNTER: ICD-10-CM

## 2023-06-23 DIAGNOSIS — M25.572 ACUTE LEFT ANKLE PAIN: Primary | ICD-10-CM

## 2023-06-23 DIAGNOSIS — M25.572 ACUTE LEFT ANKLE PAIN: ICD-10-CM

## 2023-06-23 PROCEDURE — 99212 OFFICE O/P EST SF 10 MIN: CPT

## 2023-06-23 PROCEDURE — 73610 X-RAY EXAM OF ANKLE: CPT

## 2023-06-23 PROCEDURE — 29515 APPLICATION SHORT LEG SPLINT: CPT

## 2023-06-23 NOTE — PROGRESS NOTES
[address] [facility]        NAME: Wil Winchester is a 46 y o  female  : 1975    MRN: [id number]  DATE: 2023  TIME: 7:57 PM    Assessment and Plan   Acute left ankle pain [M25 572]  1  Acute left ankle pain  XR ankle 3+ vw left    Orthopedic injury treatment      2  Strain of left ankle, initial encounter  Orthopedic injury treatment            Patient Instructions       Follow up with PCP in 3-5 days  Proceed to  ER if symptoms worsen  Chief Complaint     Chief Complaint   Patient presents with   • Ankle Pain     Left ankle pain suddenly 1 hour ago  No injury         History of Present Illness       Approximately 1 hour PTA, patient was sitting doing class work and when she got up she had a sudden sharp pain in her left ankle  She denies any knowledge of specific injury  She denies any recent trauma  Pain is located in the lateral aspect of the left ankle  Slight swelling noted over the lateral malleolus  Pain with flexion of the ankle minimal with extension  She did take tylenol at the time with minimal relief  Pain worse with ambulation  Review of Systems   Review of Systems   Constitutional: Negative for chills, fatigue and fever  Respiratory: Negative for cough and shortness of breath  Cardiovascular: Negative for chest pain and palpitations  Gastrointestinal: Negative for abdominal pain, constipation, diarrhea, nausea and vomiting  Musculoskeletal: Positive for arthralgias, gait problem and joint swelling  Negative for back pain  Skin: Negative for color change and rash  Neurological: Negative for dizziness, seizures, syncope, light-headedness and headaches  All other systems reviewed and are negative          Current Medications       Current Outpatient Medications:   •  DULoxetine (CYMBALTA) 20 mg capsule, Take 20 mg by mouth daily, Disp: , Rfl:   •  ergocalciferol (VITAMIN D2) 50,000 units, Take 1 capsule (50,000 Units total) by mouth once a week, Disp: 8 capsule, Rfl: 0  •  lidocaine (XYLOCAINE) 5 % ointment, APPLY 5-7 FINGERTIP UNITS TO THE AFFECTED AREA 3 TIMES DAILY AS NEEDED (2 FINGERTIP UNITS = 1 GRAM), Disp: , Rfl:   •  pregabalin (LYRICA) 50 mg capsule, Take 50 mg by mouth 2 (two) times a day, Disp: , Rfl:   •  traMADol (ULTRAM) 50 mg tablet, Take  mg by mouth daily at bedtime as needed, Disp: , Rfl:   •  Semaglutide-Weight Management (WEGOVY) 0 5 MG/0 5ML, Inject 0 5 mL (0 5 mg total) under the skin once a week (Patient not taking: Reported on 6/23/2023), Disp: 2 mL, Rfl: 0    Current Allergies     Allergies as of 06/23/2023   • (No Known Allergies)            The following portions of the patient's history were reviewed and updated as appropriate: allergies, current medications, past family history, past medical history, past social history, past surgical history and problem list      Past Medical History:   Diagnosis Date   • Benign essential hypertension 12/6/2016   • Carpal tunnel syndrome of left wrist     last assessed 1/23/15    • Community acquired pneumonia     last assessed 11/7/16   • Morbid obesity with BMI of 40 0-44 9, adult ([facility] )     last assessed 12/6/16        Past Surgical History:   Procedure Laterality Date   • APPENDECTOMY  2017   • GASTRECTOMY SLEEVE LAPAROSCOPIC  12/2016   • HAND SURGERY Bilateral     Carpal tunnel    • HYSTERECTOMY     • HYSTEROSCOPY     • NEUROPLASTY / TRANSPOSITION MEDIAN NERVE AT CARPAL TUNNEL Right 2013    decompression    • SHOULDER SURGERY Right 2013   • TONSILLECTOMY AND ADENOIDECTOMY         Family History   Problem Relation Age of Onset   • Hypertension Mother    • Alzheimer's disease Paternal Grandmother    • Ovarian cancer Maternal Aunt    • Uterine cancer Maternal Aunt    • Asthma Father    • No Known Problems Sister    • No Known Problems Daughter    • No Known Problems Maternal Grandmother    • No Known Problems Maternal Grandfather    • No Known Problems Paternal Grandfather    • No Known Problems Daughter "   • No Known Problems Paternal Aunt    • No Known Problems Paternal Aunt          Medications have been verified  Objective   /98   Pulse 79   Temp 97 7 °F (36 5 °C)   Resp 20   Ht 5' 1\" (1 549 m)   Wt 77 6 kg (171 lb)   LMP  (LMP Unknown)   SpO2 98%   BMI 32 31 kg/m²        Physical Exam     Physical Exam  Vitals and nursing note reviewed  Constitutional:       General: She is not in acute distress  Appearance: Normal appearance  She is normal weight  She is not ill-appearing  HENT:      Head: Normocephalic and atraumatic  Cardiovascular:      Rate and Rhythm: Normal rate and regular rhythm  Pulses: Normal pulses  Heart sounds: Normal heart sounds  Pulmonary:      Effort: Pulmonary effort is normal       Breath sounds: Normal breath sounds  Musculoskeletal:         General: Swelling and tenderness present  Normal range of motion  Cervical back: Normal range of motion and neck supple  Feet:       Comments: Provider Radiology Interpretation (preliminary)   Final results will be as per official Radiology Report when available:   No acute fracture/dislocation  Patient placed in commercial speedpro ankle splint with improved pain levels with ambulation  Skin:     General: Skin is warm and dry  Capillary Refill: Capillary refill takes less than 2 seconds  Neurological:      General: No focal deficit present  Mental Status: She is alert and oriented to person, place, and time  Psychiatric:         Mood and Affect: Mood normal          Behavior: Behavior normal        Orthopedic injury treatment    Date/Time: 6/23/2023 7:35 PM    Performed by: KENIA Barboza  Authorized by: KENIA Barboza    Patient Location:  [facility]  [place] Protocol:  Procedure performed by: Gualberto Grandchild)  Consent: Verbal consent obtained    Risks and benefits: risks, benefits and alternatives were discussed  Consent given by: patient  Time out: " "Immediately prior to procedure a \"time out\" was called to verify the correct patient, procedure, equipment, support staff and site/side marked as required  Patient understanding: patient states understanding of the procedure being performed  Patient consent: the patient's understanding of the procedure matches consent given  Procedure consent: procedure consent matches procedure scheduled  Patient identity confirmed: verbally with patient      Injury location:  Ankle  Location details:  Left ankle  Injury type:   Soft tissue  Neurovascular status: Neurovascularly intact    Distal perfusion: normal    Neurological function: normal    Range of motion: reduced    Immobilization:  Splint  Splint type:  Short leg (Commercial SpeedPro Splint)  Neurovascular status: Neurovascularly intact    Distal perfusion: normal    Neurological function: normal    Range of motion: unchanged    Range of motion comment:  Limted due to splint  Patient tolerance:  Patient tolerated the procedure well with no immediate complications              "

## 2023-07-01 DIAGNOSIS — E55.9 VITAMIN D DEFICIENCY: ICD-10-CM

## 2023-07-01 RX ORDER — ERGOCALCIFEROL 1.25 MG/1
CAPSULE ORAL
Qty: 8 CAPSULE | Refills: 0 | Status: SHIPPED | OUTPATIENT
Start: 2023-07-01

## 2023-07-10 DIAGNOSIS — H65.23 BILATERAL CHRONIC SEROUS OTITIS MEDIA: Primary | ICD-10-CM

## 2023-07-23 LAB — COLOGUARD RESULT REPORTABLE: NEGATIVE

## 2023-07-24 ENCOUNTER — TELEPHONE (OUTPATIENT)
Dept: FAMILY MEDICINE CLINIC | Facility: CLINIC | Age: 48
End: 2023-07-24

## 2023-07-24 NOTE — TELEPHONE ENCOUNTER
----- Message from Carole Reese DO sent at 7/24/2023  8:03 AM EDT -----  Patient's Cologuard was negative. No further testing indicated at this time. Repeat Cologuard in 3 years per current guidelines. Please inform the patient. Thank you.

## 2023-08-01 ENCOUNTER — HOSPITAL ENCOUNTER (OUTPATIENT)
Dept: MAMMOGRAPHY | Facility: HOSPITAL | Age: 48
Discharge: HOME | End: 2023-08-01
Payer: COMMERCIAL

## 2023-08-01 DIAGNOSIS — Z12.31 ENCOUNTER FOR SCREENING MAMMOGRAM FOR BREAST CANCER: ICD-10-CM

## 2023-08-01 PROCEDURE — 77067 SCR MAMMO BI INCL CAD: CPT

## 2023-08-01 PROCEDURE — 77063 BREAST TOMOSYNTHESIS BI: CPT

## 2023-08-07 ENCOUNTER — TELEPHONE (OUTPATIENT)
Dept: FAMILY MEDICINE CLINIC | Facility: CLINIC | Age: 48
End: 2023-08-07

## 2023-08-07 NOTE — TELEPHONE ENCOUNTER
----- Message from Nancy Galarza DO sent at 8/6/2023 10:49 AM EDT -----  Mammogram is normal. Repeat in 1 year per current guidelines. Please inform the patient. Thanks!

## 2023-08-08 NOTE — PROGRESS NOTES
Assessment/Plan:    No problem-specific Assessment & Plan notes found for this encounter  Diagnoses and all orders for this visit:    Pleuritic chest pain  -     D-dimer, quantitative; Future  -     CBC and differential; Future  -     XR chest pa & lateral; Future          Subjective:      Patient ID: Kate Iglesias is a 37 y o  female  Patient with the fatigue nonproductive cough right posterior pleuritic chest pain has completed 2 courses of antibiotics with no relief in her symptoms denies calf tenderness on the fatigue is that and and shortness of breath is the predominant limiting symptom        The following portions of the patient's history were reviewed and updated as appropriate:   She  has a past medical history of Benign essential hypertension (12/6/2016)  She   Patient Active Problem List    Diagnosis Date Noted    S/P laparoscopic appendectomy 08/01/2017    Benign essential hypertension 12/06/2016    Obstructive sleep apnea, adult 12/06/2016    History of sleeve gastrectomy 12/01/2016     She  has a past surgical history that includes GASTRECTOMY SLEEVE LAPAROSCOPIC (12/2016); Hysterectomy; and Hand surgery (Bilateral)  Her family history includes No Known Problems in her mother  She  reports that she has never smoked  She has never used smokeless tobacco  She reports that she does not drink alcohol or use drugs  Current Outpatient Prescriptions   Medication Sig Dispense Refill    albuterol (PROVENTIL HFA,VENTOLIN HFA) 90 mcg/act inhaler Inhale 2 puffs every 4 (four) hours as needed for wheezing 1 Inhaler 0    Calcium Carbonate (CALCIUM 600 PO) Take by mouth      Ferrous Gluconate-C-Folic Acid (IRON-C PO) Take by mouth      multivitamin (THERAGRAN) TABS Take 1 tablet by mouth daily      VITAMIN D, ERGOCALCIFEROL, PO Take by mouth       No current facility-administered medications for this visit        Current Outpatient Prescriptions on File Prior to Visit   Medication Sig    albuterol (PROVENTIL HFA,VENTOLIN HFA) 90 mcg/act inhaler Inhale 2 puffs every 4 (four) hours as needed for wheezing    Calcium Carbonate (CALCIUM 600 PO) Take by mouth    Ferrous Gluconate-C-Folic Acid (IRON-C PO) Take by mouth    multivitamin (THERAGRAN) TABS Take 1 tablet by mouth daily    VITAMIN D, ERGOCALCIFEROL, PO Take by mouth     No current facility-administered medications on file prior to visit  She has No Known Allergies       Review of Systems   Constitutional: Negative for activity change, appetite change, diaphoresis, fatigue and fever  HENT: Negative  Eyes: Negative  Respiratory: Positive for cough and shortness of breath  Negative for apnea, chest tightness and wheezing  Cardiovascular: Negative for chest pain, palpitations and leg swelling  Gastrointestinal: Negative for abdominal distention, abdominal pain, anal bleeding, constipation, diarrhea, nausea and vomiting  Endocrine: Negative for cold intolerance, heat intolerance, polydipsia, polyphagia and polyuria  Genitourinary: Negative for difficulty urinating, dysuria, flank pain, hematuria and urgency  Musculoskeletal: Negative for arthralgias, back pain, gait problem, joint swelling and myalgias  Skin: Negative for color change, rash and wound  Allergic/Immunologic: Negative for environmental allergies, food allergies and immunocompromised state  Neurological: Negative for dizziness, seizures, syncope, speech difficulty, numbness and headaches  Hematological: Negative for adenopathy  Does not bruise/bleed easily  Psychiatric/Behavioral: Negative for agitation, behavioral problems, hallucinations, sleep disturbance and suicidal ideas           Objective:      /62 (BP Location: Left arm, Patient Position: Sitting, Cuff Size: Standard)   Temp 97 7 °F (36 5 °C) (Temporal)   Ht 5' 1 5" (1 562 m)   Wt 63 9 kg (140 lb 12 8 oz)   LMP  (LMP Unknown)   BMI 26 17 kg/m²          Physical Exam   Constitutional: She is oriented to person, place, and time  She appears well-developed and well-nourished  No distress  HENT:   Head: Normocephalic  Right Ear: External ear normal    Left Ear: External ear normal    Nose: Nose normal    Mouth/Throat: Oropharynx is clear and moist    Eyes: Conjunctivae and EOM are normal  Pupils are equal, round, and reactive to light  Right eye exhibits no discharge  Left eye exhibits no discharge  No scleral icterus  Neck: Normal range of motion  No tracheal deviation present  No thyromegaly present  Cardiovascular: Normal rate, regular rhythm and normal heart sounds  Exam reveals no gallop and no friction rub  No murmur heard  Pulmonary/Chest: Effort normal and breath sounds normal  No respiratory distress  She has no wheezes  Abdominal: Soft  Bowel sounds are normal  She exhibits no mass  There is no tenderness  There is no guarding  Musculoskeletal: She exhibits no edema or deformity  Lymphadenopathy:     She has no cervical adenopathy  Neurological: She is alert and oriented to person, place, and time  No cranial nerve deficit  Skin: Skin is warm and dry  No rash noted  She is not diaphoretic  No erythema  Psychiatric: She has a normal mood and affect   Thought content normal  done

## 2023-08-17 ENCOUNTER — OFFICE VISIT (OUTPATIENT)
Dept: FAMILY MEDICINE CLINIC | Facility: CLINIC | Age: 48
End: 2023-08-17
Payer: COMMERCIAL

## 2023-08-17 VITALS
RESPIRATION RATE: 18 BRPM | HEIGHT: 61 IN | WEIGHT: 163.4 LBS | BODY MASS INDEX: 30.85 KG/M2 | OXYGEN SATURATION: 99 % | HEART RATE: 79 BPM | TEMPERATURE: 98.3 F | SYSTOLIC BLOOD PRESSURE: 124 MMHG | DIASTOLIC BLOOD PRESSURE: 82 MMHG

## 2023-08-17 DIAGNOSIS — Z71.3 WEIGHT LOSS COUNSELING, ENCOUNTER FOR: ICD-10-CM

## 2023-08-17 PROCEDURE — 99213 OFFICE O/P EST LOW 20 MIN: CPT | Performed by: FAMILY MEDICINE

## 2023-08-17 RX ORDER — CEFUROXIME AXETIL 500 MG/1
TABLET ORAL
COMMUNITY
End: 2023-08-17

## 2023-08-17 RX ORDER — BUPROPION HYDROCHLORIDE 150 MG/1
TABLET, EXTENDED RELEASE ORAL
COMMUNITY

## 2023-08-18 NOTE — PROGRESS NOTES
Assessment/Plan     Diagnoses and all orders for this visit:    Adult BMI 30.0-30.9 kg/sq m  -     semaglutide, 1 mg/dose, (Ozempic, 1 MG/DOSE,) 4 mg/3 mL injection pen; Inject 0.75 mL (1 mg total) under the skin every 7 days    Weight loss counseling, encounter for  -     semaglutide, 1 mg/dose, (Ozempic, 1 MG/DOSE,) 4 mg/3 mL injection pen; Inject 0.75 mL (1 mg total) under the skin every 7 days    Other orders  -     buPROPion (WELLBUTRIN SR) 150 mg 12 hr tablet; take FIRST STARTING DOSE AT 6AM and SECOND dose AT 2PM (Patient not taking: Reported on 8/17/2023)  -     cefuroxime (CEFTIN) 500 mg tablet; take 1 tablet by mouth every 12 hours for 7 days (Patient not taking: Reported on 8/17/2023)  -     mupirocin (BACTROBAN) 2 % ointment; apply to affected area three times a day (Patient not taking: Reported on 8/17/2023)      Plan:    Increase Ozempic to 1 mg weekly with plans to titrate further as tolerated in order to reach her goal weight. Patient in agreement with plan  Follow-up in 3 months. Subjective     Patient Id: Cheyenne Echavarria is a 46 y.o. female    HPI    Patient here today for follow-up for weight management. Patient overall doing well today without complaints. Currently on Ozempic 0.5 mg weekly. Was previously on Wegovy but was having difficulty with obtaining from pharmacy. She has continued to reduce her weight and is down 8 pounds since last visit. She is eating well. She feels well and has normal energy level. She denies any side effects from the medication. No other concerns at this time. Review of Systems   Constitutional: Negative for chills and fever. Respiratory: Negative for shortness of breath. Cardiovascular: Negative for chest pain. Gastrointestinal: Negative for abdominal pain, constipation, diarrhea, nausea and vomiting. Genitourinary: Negative for dysuria. Musculoskeletal: Negative for arthralgias and myalgias. Skin: Negative for rash.    Neurological: Negative for headaches. Psychiatric/Behavioral: Negative for dysphoric mood. All other systems reviewed and are negative.       Past Medical History:   Diagnosis Date   • Carpal tunnel syndrome of left wrist     last assessed 1/23/15    • Morbid obesity with BMI of 40.0-44.9, adult ([address])     last assessed 12/6/16    • Obesity        Past Surgical History:   Procedure Laterality Date   • APPENDECTOMY  2017   • GASTRECTOMY SLEEVE LAPAROSCOPIC  12/2016   • HAND SURGERY Bilateral     Carpal tunnel    • HYSTERECTOMY     • HYSTEROSCOPY     • NEUROPLASTY / TRANSPOSITION MEDIAN NERVE AT CARPAL TUNNEL Right 2013    decompression    • SHOULDER SURGERY Right 2013   • TONSILLECTOMY AND ADENOIDECTOMY         Family History   Problem Relation Age of Onset   • Hypertension Mother    • Alzheimer's disease Paternal Grandmother    • Ovarian cancer Maternal Aunt    • Uterine cancer Maternal Aunt    • Asthma Father    • No Known Problems Sister    • No Known Problems Daughter    • No Known Problems Maternal Grandmother    • No Known Problems Maternal Grandfather    • No Known Problems Paternal Grandfather    • No Known Problems Daughter    • No Known Problems Paternal Aunt    • No Known Problems Paternal Aunt        Social History     Socioeconomic History   • Marital status: /Civil Union     Spouse name: None   • Number of children: None   • Years of education: None   • Highest education level: None   Occupational History   • None   Tobacco Use   • Smoking status: Never     Passive exposure: Never   • Smokeless tobacco: Never   Vaping Use   • Vaping Use: Never used   Substance and Sexual Activity   • Alcohol use: Not Currently   • Drug use: No   • Sexual activity: Yes     Partners: Male     Comment:    Other Topics Concern   • None   Social History Narrative   • None     Social Determinants of Health     Financial Resource Strain: Not on file   Food Insecurity: Not on file   Transportation Needs: Not on file Physical Activity: Not on file   Stress: Not on file   Social Connections: Not on file   Intimate Partner Violence: Not on file   Housing Stability: Not on file       No Known Allergies      Current Outpatient Medications:   •  DULoxetine (CYMBALTA) 20 mg capsule, Take 20 mg by mouth daily, Disp: , Rfl:   •  ergocalciferol (VITAMIN D2) 50,000 units, TAKE 1 CAPSULE BY MOUTH ONCE WEEKLY, Disp: 8 capsule, Rfl: 0  •  lidocaine (XYLOCAINE) 5 % ointment, APPLY 5-7 FINGERTIP UNITS TO THE AFFECTED AREA 3 TIMES DAILY AS NEEDED (2 FINGERTIP UNITS = 1 GRAM), Disp: , Rfl:   •  pregabalin (LYRICA) 50 mg capsule, Take 50 mg by mouth 2 (two) times a day, Disp: , Rfl:   •  semaglutide, 1 mg/dose, (Ozempic, 1 MG/DOSE,) 4 mg/3 mL injection pen, Inject 0.75 mL (1 mg total) under the skin every 7 days, Disp: 3 mL, Rfl: 2  •  traMADol (ULTRAM) 50 mg tablet, Take  mg by mouth daily at bedtime as needed, Disp: , Rfl:   •  buPROPion (WELLBUTRIN SR) 150 mg 12 hr tablet, take FIRST STARTING DOSE AT 6AM and SECOND dose AT 2PM (Patient not taking: Reported on 8/17/2023), Disp: , Rfl:   •  cefuroxime (CEFTIN) 500 mg tablet, take 1 tablet by mouth every 12 hours for 7 days (Patient not taking: Reported on 8/17/2023), Disp: , Rfl:   •  mupirocin (BACTROBAN) 2 % ointment, apply to affected area three times a day (Patient not taking: Reported on 8/17/2023), Disp: , Rfl:     Objective     Vitals:    08/17/23 1506   BP: 124/82   BP Location: Left arm   Patient Position: Sitting   Cuff Size: Standard   Pulse: 79   Resp: 18   Temp: 98.3 °F (36.8 °C)   SpO2: 99%   Weight: 74.1 kg (163 lb 6.4 oz)   Height: 5' 1" (1.549 m)       Physical Exam  Vitals and nursing note reviewed. Constitutional:       General: She is not in acute distress. Appearance: She is well-developed. She is not ill-appearing or toxic-appearing. HENT:      Head: Normocephalic and atraumatic. Neck:      Thyroid: No thyromegaly.    Cardiovascular:      Rate and Rhythm: Normal rate and regular rhythm. Heart sounds: Normal heart sounds. No murmur heard. Pulmonary:      Effort: Pulmonary effort is normal. No respiratory distress. Breath sounds: Normal breath sounds. No wheezing. Genitourinary:     Comments: Exam deferred  Musculoskeletal:      Cervical back: Neck supple. Right lower leg: No edema. Left lower leg: No edema. Skin:     General: Skin is warm. Neurological:      Mental Status: She is alert and oriented to person, place, and time.    Psychiatric:         Mood and Affect: Mood normal.         Behavior: Behavior normal.         Melita Bains, [address] [employer] [facility], [address]

## 2023-09-05 DIAGNOSIS — E55.9 VITAMIN D DEFICIENCY: ICD-10-CM

## 2023-09-05 RX ORDER — ERGOCALCIFEROL 1.25 MG/1
CAPSULE ORAL
Qty: 8 CAPSULE | Refills: 0 | Status: SHIPPED | OUTPATIENT
Start: 2023-09-05

## 2023-09-15 ENCOUNTER — TELEPHONE (OUTPATIENT)
Dept: FAMILY MEDICINE CLINIC | Facility: CLINIC | Age: 48
End: 2023-09-15

## 2023-09-15 NOTE — TELEPHONE ENCOUNTER
Hi, my name is Vidal Akins. My phone number is [phone] My birthday is 1975. I'm calling because Doctor Mayra Manzano has me on Ozempic. At this moment I'm on the one point O but I'm supposed to go up next week on to the next level. I was wondering if it's possible to go back to the [facility] because I believe that the 1.7 Will Duane Mccallum is available at this time at the pharmacy we he put me on the Ozempic prior because the [facility] was not available. If I can get that I greatly appreciate it. Would be sent to [facility] in [facility]. My phone number again is [phone]. Thank you so much. Have a good day.  Anita.

## 2023-09-15 NOTE — TELEPHONE ENCOUNTER
Patient called stating she is on the ozempic but looking to back on the wegovy if possible as that is available at her pharmacy now. She saw Claudene Coral last in August and has a 3 mo f/u appt with you in November.

## 2023-10-05 ENCOUNTER — EVALUATION (OUTPATIENT)
Dept: PHYSICAL THERAPY | Facility: CLINIC | Age: 48
End: 2023-10-05
Payer: OTHER MISCELLANEOUS

## 2023-10-05 DIAGNOSIS — M67.912 TENDINOPATHY OF ROTATOR CUFF, LEFT: Primary | ICD-10-CM

## 2023-10-05 PROCEDURE — 97140 MANUAL THERAPY 1/> REGIONS: CPT

## 2023-10-05 PROCEDURE — 97161 PT EVAL LOW COMPLEX 20 MIN: CPT

## 2023-10-05 NOTE — LETTER
2023    MD Kaiden Fung[address]    Patient: Mark Vaughn   YOB: 1975   Date of Visit: 10/5/2023     Encounter Diagnosis     ICD-10-CM    1. Tendinopathy of rotator cuff, left  M67.912           Dear Dr. Mira Torrez: Thank you for your recent referral of Mark Vaughn. Please review the attached evaluation summary from Edwin's recent visit. Please verify that you agree with the plan of care by signing the attached order. If you have any questions or concerns, please do not hesitate to call. I sincerely appreciate the opportunity to share in the care of one of your patients and hope to have another opportunity to work with you in the near future. Sincerely,    Carolin Councilman, PT      Referring Provider:      I certify that I have read the below Plan of Care and certify the need for these services furnished under this plan of treatment while under my care. MD Kaiden Fung [place] [address]  Via Fax: [phone]          PT Evaluation     Today's date: 10/5/2023  Patient name: Mark Vaughn  : 1975  MRN: [id number]  Referring provider: Melvi Guerra MD  Dx:   Encounter Diagnosis     ICD-10-CM    1. Tendinopathy of rotator cuff, left  M67.912           Start Time: 0100  Stop Time: 0150  Total time in clinic (min): 50 minutes    Assessment  Assessment details: Assessment details: The patient is a 45 yo female who presents to physical therapy with signs and symptoms consistent with left tendinopathy of rotator cuff s/p post tenotomy with PRP. Deficits are noted in left shoulder ROM and strength as well as functional deficits.  She would benefit from a course of skilled physical therapy to address deficits in ROM, strength, stability and functional status.       Impairments: abnormal or restricted ROM, abnormal movement, impaired physical strength and pain with function  Functional limitations: Difficulty with UE dressing, donning bra, doing laundry, and cooking  Symptom irritability: moderateUnderstanding of Dx/Px/POC: excellent   Prognosis: good    Goals  STG(6 weeks):             1. Independent with HEP            4. Decrease pain to 2/10 at worst with functional activities            3. Increase AROM L shoulder by 10-15 degrees in all planes            4. Increase strength LUE by 1/3 MMT grade     LTG(12 weeks):             3.  Decrease pain to 1/10 at worst with functional activities             2. Increase LUE strength to 4+ to 5/5             3. L shoulder AROM WFL             4. Return to PLOF      Plan  Plan details: Progress per protocol  Patient would benefit from: skilled physical therapy  Referral necessary: No  Planned modality interventions: cryotherapy, TENS and electrical stimulation/Russian stimulation  Planned therapy interventions: IASTM, joint mobilization, ADL training, manual therapy, neuromuscular re-education, therapeutic activities, therapeutic exercise and patient education  Frequency: 1-2x week. Duration in weeks: 6  Plan of Care beginning date: 10/5/2023  Plan of Care expiration date: 11/16/2023  Treatment plan discussed with: patient        Subjective Evaluation    History of Present Illness  Date of onset: 9/26/2023  Mechanism of injury: The patient's left shoulder pain began from a work injury which occurred on 8/21/2018. She was standing on a chair 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. She lost her balance and grabbed onto a ledge, yanking on her shoulder and twisting it. An MRI revealed a RTC which was repaired on 2/4/2019. 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 She started to develop shoulder pain again later, and had arthroscopic surgery performed on 5/29/2020 to release the posterior capsule. She also had a SAD and bursectomy.  She had a course of PT, but continued to have pain. She then had a biceps tenodesis performed on 3/10/2021. She got cellulitis and had additional surgery to clean up the infection on 3/18/2021 and was in a sling for 2 weeks. She had another course of PT for 5 months and hit a state of plateau and was discharged to [facility]. She states that her left shoulder pain continued to increase and she requested a platelet-rich plasma (PRP) injection. She is status post L rotator cuff tenotomy with PRP on 2023. She states that her pain increased to 9-10/10 the evening after procedure. She states that she was not prescribed pain meds and is currently taking OTC Tylenol. She is currently taking Tramadol for another health issue. She states that her pain level is slightly improving. 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 Quality of life: fair    Patient Goals  Patient goals for therapy: increased strength, decreased pain, increased motion and independence with ADLs/IADLs    Pain  Current pain ratin  At best pain ratin  At worst pain ratin  Quality: dull ache, radiating, cramping and squeezing  Relieving factors: ice, medications, relaxation, support and rest  Aggravating factors: walking and overhead activity  Progression: no change    Social Support  Steps to enter house: yes  Stairs in house: yes   Lives in: multiple-level home  Lives with: spouse and adult children    Employment status: working  Hand dominance: right    Treatments  Previous treatment: injection treatment, medication, physical therapy and home therapy        Objective     Postural Observations  Seated posture: good  Standing posture: good        Observations   Left Shoulder   Negative for edema. Palpation   Left   Tenderness of the anterior deltoid, biceps, middle deltoid, supraspinatus and upper trapezius. Tenderness     Left Shoulder   Tenderness in the [facility] joint and biceps tendon (proximal). Additional Tenderness Details  Patient has significant tenderness in left axillary region.     Cervical/Thoracic Screen   Cervical range of motion within normal limits    Neurological Testing     Sensation     Shoulder   Left Shoulder   Intact: light touch    Right Shoulder   Intact: light touch    Active Range of Motion   Left Shoulder   Flexion: 70 degrees   Extension: 40 degrees   Abduction: 65 degrees   External rotation 0°: 55 degrees   External rotation 90°: 20 degrees   Internal rotation 0°: 70 degrees   Internal rotation BTB: L5     Right Shoulder   Normal active range of motion    Left Elbow   Normal active range of motion    Right Elbow   Normal active range of motion    Strength/Myotome Testing     Additional Strength Details  Not assessed today            Precautions: None  Reviewed HEP - Pendulums       Manuals 10/5            PROM L Shld 10'                                                   Neuro Re-Ed                                                                                                        Ther Ex                                                                                                                     Ther Activity                                       Gait Training                                       Modalities             CP to left shld and axillary region nv

## 2023-10-05 NOTE — PROGRESS NOTES
PT Evaluation     Today's date: 10/5/2023  Patient name: Julia Elizabeth  : 1975  MRN: [id number]  Referring provider: Uri Licea MD  Dx:   Encounter Diagnosis     ICD-10-CM    1. Tendinopathy of rotator cuff, left  M67.912           Start Time: 0100  Stop Time: 0150  Total time in clinic (min): 50 minutes    Assessment  Assessment details: Assessment details: The patient is a 45 yo female who presents to physical therapy with signs and symptoms consistent with left tendinopathy of rotator cuff s/p post tenotomy with PRP. Deficits are noted in left shoulder ROM and strength as well as functional deficits. She would benefit from a course of skilled physical therapy to address deficits in ROM, strength, stability and functional status.       Impairments: abnormal or restricted ROM, abnormal movement, impaired physical strength and pain with function  Functional limitations: Difficulty with UE dressing, donning bra, doing laundry, and cooking  Symptom irritability: moderateUnderstanding of Dx/Px/POC: excellent   Prognosis: good    Goals  STG(6 weeks):             1. Independent with HEP            1. Decrease pain to 2/10 at worst with functional activities            3. Increase AROM L shoulder by 10-15 degrees in all planes            4. Increase strength LUE by 1/3 MMT grade     LTG(12 weeks):             5.  Decrease pain to 1/10 at worst with functional activities             2.  Increase LUE strength to 4+ to 5/5             3. L shoulder AROM WFL             4. Return to PLOF      Plan  Plan details: Progress per protocol  Patient would benefit from: skilled physical therapy  Referral necessary: No  Planned modality interventions: cryotherapy, TENS and electrical stimulation/Russian stimulation  Planned therapy interventions: IASTM, joint mobilization, ADL training, manual therapy, neuromuscular re-education, therapeutic activities, therapeutic exercise and patient education  Frequency: 1-2x week.  Duration in weeks: 6  Plan of Care beginning date: 10/5/2023  Plan of Care expiration date: 2023  Treatment plan discussed with: patient        Subjective Evaluation    History of Present Illness  Date of onset: 2023  Mechanism of injury: The patient's left shoulder pain began from a work injury which occurred on 2018. She was standing on a chair 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. She lost her balance and grabbed onto a ledge, yanking on her shoulder and twisting it. An MRI revealed a RTC which was repaired on 2019. 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 She started to develop shoulder pain again later, and had arthroscopic surgery performed on 2020 to release the posterior capsule. She also had a SAD and bursectomy. She had a course of PT, but continued to have pain. She then had a biceps tenodesis performed on 3/10/2021. She got cellulitis and had additional surgery to clean up the infection on 3/18/2021 and was in a sling for 2 weeks. She had another course of PT for 5 months and hit a state of plateau and was discharged to [facility]. She states that her left shoulder pain continued to increase and she requested a platelet-rich plasma (PRP) injection. She is status post L rotator cuff tenotomy with PRP on 2023. She states that her pain increased to 9-10/10 the evening after procedure. She states that she was not prescribed pain meds and is currently taking OTC Tylenol. She is currently taking Tramadol for another health issue. She states that her pain level is slightly improving. 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   Quality of life: fair    Patient Goals  Patient goals for therapy: increased strength, decreased pain, increased motion and independence with ADLs/IADLs    Pain  Current pain ratin  At best pain ratin  At worst pain ratin  Quality: dull ache, radiating, cramping and squeezing  Relieving factors: ice, medications, relaxation, support and rest  Aggravating factors: walking and overhead activity  Progression: no change    Social Support  Steps to enter house: yes  Stairs in house: yes   Lives in: multiple-level home  Lives with: spouse and adult children    Employment status: working  Hand dominance: right    Treatments  Previous treatment: injection treatment, medication, physical therapy and home therapy        Objective     Postural Observations  Seated posture: good  Standing posture: good        Observations   Left Shoulder   Negative for edema. Palpation   Left   Tenderness of the anterior deltoid, biceps, middle deltoid, supraspinatus and upper trapezius. Tenderness     Left Shoulder   Tenderness in the [facility] joint and biceps tendon (proximal). Additional Tenderness Details  Patient has significant tenderness in left axillary region.     Cervical/Thoracic Screen   Cervical range of motion within normal limits    Neurological Testing     Sensation     Shoulder   Left Shoulder   Intact: light touch    Right Shoulder   Intact: light touch    Active Range of Motion   Left Shoulder   Flexion: 70 degrees   Extension: 40 degrees   Abduction: 65 degrees   External rotation 0°: 55 degrees   External rotation 90°: 20 degrees   Internal rotation 0°: 70 degrees   Internal rotation BTB: L5     Right Shoulder   Normal active range of motion    Left Elbow   Normal active range of motion    Right Elbow   Normal active range of motion    Strength/Myotome Testing     Additional Strength Details  Not assessed today             Precautions: None  Reviewed HEP - Pendulums       Manuals 10/            PROM L Shld 10'                                                   Neuro Re-Ed                                                                                                        Ther Ex Ther Activity                                       Gait Training                                       Modalities             CP to left shld and axillary region nv

## 2023-10-11 ENCOUNTER — OFFICE VISIT (OUTPATIENT)
Dept: PHYSICAL THERAPY | Facility: CLINIC | Age: 48
End: 2023-10-11
Payer: OTHER MISCELLANEOUS

## 2023-10-11 DIAGNOSIS — M67.912 TENDINOPATHY OF ROTATOR CUFF, LEFT: Primary | ICD-10-CM

## 2023-10-11 PROCEDURE — 97140 MANUAL THERAPY 1/> REGIONS: CPT | Performed by: PHYSICAL THERAPIST

## 2023-10-11 PROCEDURE — 97535 SELF CARE MNGMENT TRAINING: CPT | Performed by: PHYSICAL THERAPIST

## 2023-10-11 PROCEDURE — 97112 NEUROMUSCULAR REEDUCATION: CPT | Performed by: PHYSICAL THERAPIST

## 2023-10-11 NOTE — PROGRESS NOTES
Daily Note     Today's date: 10/11/2023  Patient name: Mark Vaughn  : 1975  MRN: [id number]  Referring provider: Melvi Guerra MD  Dx:   Encounter Diagnosis     ICD-10-CM    1. Tendinopathy of rotator cuff, left  M67.912                      Subjective: Pt reports no change in status thus far. Hoping to start seeing some progress. Objective: See treatment diary below      Assessment: Tolerated treatment well. Patient demonstrated fatigue post treatment, exhibited good technique with therapeutic exercises, and would benefit from continued PT      Plan: Continue per plan of care. Progress treatment as tolerated.          Precautions: None    HEP - Handout provided and discussed      Manuals 10/11       ART L Shoulder x25'       PROM/Stretch        STM                Neuro Re-Ed        Supine [place] 2x10       AROM Abd with scap retract and with rot at 90 degrees 2x10       AROM Scaption with scap retract 2x10                                       Ther Ex        TB No $  This session      TB 70 degree Shoulder Abd  This session      SL ER  This session      SL Abd  This session      Prone Ys, Ts   This session                             Ther Activity        Retro UBE                Gait Training                        Modalities        CP to left shld Supine x10'       MHP L Shoulder

## 2023-10-12 ENCOUNTER — APPOINTMENT (OUTPATIENT)
Dept: PHYSICAL THERAPY | Facility: CLINIC | Age: 48
End: 2023-10-12
Payer: OTHER MISCELLANEOUS

## 2023-10-17 ENCOUNTER — OFFICE VISIT (OUTPATIENT)
Dept: PHYSICAL THERAPY | Facility: CLINIC | Age: 48
End: 2023-10-17
Payer: OTHER MISCELLANEOUS

## 2023-10-17 DIAGNOSIS — M67.912 TENDINOPATHY OF ROTATOR CUFF, LEFT: Primary | ICD-10-CM

## 2023-10-17 PROCEDURE — 97110 THERAPEUTIC EXERCISES: CPT | Performed by: PHYSICAL THERAPIST

## 2023-10-17 PROCEDURE — 97112 NEUROMUSCULAR REEDUCATION: CPT | Performed by: PHYSICAL THERAPIST

## 2023-10-17 PROCEDURE — 97140 MANUAL THERAPY 1/> REGIONS: CPT | Performed by: PHYSICAL THERAPIST

## 2023-10-19 ENCOUNTER — OFFICE VISIT (OUTPATIENT)
Dept: PHYSICAL THERAPY | Facility: CLINIC | Age: 48
End: 2023-10-19
Payer: OTHER MISCELLANEOUS

## 2023-10-19 DIAGNOSIS — M67.912 TENDINOPATHY OF ROTATOR CUFF, LEFT: Primary | ICD-10-CM

## 2023-10-19 PROCEDURE — 97110 THERAPEUTIC EXERCISES: CPT | Performed by: PHYSICAL THERAPIST

## 2023-10-19 PROCEDURE — 97112 NEUROMUSCULAR REEDUCATION: CPT | Performed by: PHYSICAL THERAPIST

## 2023-10-19 PROCEDURE — 97140 MANUAL THERAPY 1/> REGIONS: CPT | Performed by: PHYSICAL THERAPIST

## 2023-10-19 NOTE — PROGRESS NOTES
Daily Note     Today's date: 10/19/2023  Patient name: Chitra Del Cid  : 1975  MRN: [id number]  Referring provider: Christos Geiger MD  Dx:   Encounter Diagnosis     ICD-10-CM    1. Tendinopathy of rotator cuff, left  M67.912                      Subjective: Pt reports HEP going well. Appropriate recovery soreness experienced. Anxious to continue making progress. Objective: See treatment diary below      Assessment: Tolerated treatment well. Patient demonstrated fatigue post treatment, exhibited good technique with therapeutic exercises, and would benefit from continued PT      Plan: Continue per plan of care. Progress treatment as tolerated.          Precautions: None    HEP - Handout provided and discussed      Manuals 10/11 10/17/23 10/19/23     ART L Shoulder x25' x25' x20'     PROM/Stretch        STM                Neuro Re-Ed        Supine [place] 2x10 2x10 2x10     AROM Abd with scap retract and with rot at 90 degrees 2x10 2x10 2x10     AROM Scaption with scap retract with stick and wall finish 2x10 2x10 2x10     TRX 3 way Stretch  4x20'' ea 4x20'' ea                             Ther Ex        TB No $  2x10 L2 3x10 L2     TB 70 degree Shoulder Abd  2x10 L2 2x10 L2     SL ER  3x10 3x10 2#     SL Abd  3x10 3x10     Prone Ys, Ts  2x10 ea 3x10 ea                             Ther Activity        Retro UBE                Gait Training                        Modalities        CP to left shld Supine x10'       MHP L Shoulder  x10' to start session x10'

## 2023-10-24 ENCOUNTER — OFFICE VISIT (OUTPATIENT)
Dept: PHYSICAL THERAPY | Facility: CLINIC | Age: 48
End: 2023-10-24
Payer: OTHER MISCELLANEOUS

## 2023-10-24 DIAGNOSIS — M67.912 TENDINOPATHY OF ROTATOR CUFF, LEFT: Primary | ICD-10-CM

## 2023-10-24 PROCEDURE — 97140 MANUAL THERAPY 1/> REGIONS: CPT | Performed by: PHYSICAL THERAPIST

## 2023-10-24 PROCEDURE — 97110 THERAPEUTIC EXERCISES: CPT | Performed by: PHYSICAL THERAPIST

## 2023-10-24 PROCEDURE — 97112 NEUROMUSCULAR REEDUCATION: CPT | Performed by: PHYSICAL THERAPIST

## 2023-10-24 NOTE — PROGRESS NOTES
Daily Note     Today's date: 10/24/2023  Patient name: Jose R Palm  : 1975  MRN: [id number]  Referring provider: Tate Oropeza MD  Dx:   Encounter Diagnosis     ICD-10-CM    1. Tendinopathy of rotator cuff, left  M67.912                      Subjective: Pt reports HEP going well. Appropriate recovery soreness experienced. Anxious to continue making progress. Objective: See treatment diary below      Assessment: Tolerated treatment well. Patient demonstrated fatigue post treatment, exhibited good technique with therapeutic exercises, and would benefit from continued PT      Plan: Continue per plan of care. Progress treatment as tolerated.          Precautions: None    HEP - Handout provided and discussed      Manuals 10/11 10/17/23 10/19/23 10/24/23    ART L Shoulder x25' x25' x20' X20'    PROM/Stretch        STM                Neuro Re-Ed        Supine [place] 2x10 2x10 2x10 2x10    AROM Abd with scap retract and with rot at 90 degrees 2x10 2x10 2x10 2x10    AROM Scaption with scap retract with stick and wall finish 2x10 2x10 2x10 2x10    TRX 3 way Stretch  4x20'' ea 4x20'' ea 4x20'' ea                            Ther Ex        TB No $  2x10 L2 3x10 L2 3x10 L2    TB 70 degree Shoulder Abd  2x10 L2 2x10 L2 3x10 L2    SL ER  3x10 3x10 2# 3x10 2#    SL Abd  3x10 3x10 3x10    Prone Ys, Ts  2x10 ea 3x10 ea 3x10 ea                            Ther Activity        Retro UBE                Gait Training                        Modalities        CP to left shld Supine x10'       MHP L Shoulder  x10' to start session x10' x10'

## 2023-10-26 ENCOUNTER — APPOINTMENT (OUTPATIENT)
Dept: PHYSICAL THERAPY | Facility: CLINIC | Age: 48
End: 2023-10-26
Payer: OTHER MISCELLANEOUS

## 2023-10-30 NOTE — PROGRESS NOTES
Daily Note     Today's date: 2021  Patient name: Comfort Sanchez  : 1975  MRN: [id number]  Referring provider: Marco Antonio Bernal*  Dx: No diagnosis found  Subjective: ***      Objective: See treatment diary below      Assessment: Tolerated treatment {Tolerated treatment :2556984390}   Patient {assessment:6031380198}      Plan: {PLAN:0986011721}        Precautions: None        Manuals 7/19 7-28 8/4 8/6 7/2 7/6 7/8 7/13  7/15   PROM: Flex and ER RK CM RK RK RK RK RK RK  RK    Scar STM                    Anterior Shoulder STM                     Neuro Re-Ed                     Ball Stabilization  3#10x ea 3# 10x   3#  10x 3# 10x ea 3# 10x ea  10x ea 3# 10x ea 3# 10x ea 3#  10x ea  3#   Bodyblade                     PNF          Supine 10x          No $ supine                   Ther Ex                    HBB Stretch        10x5"  10x5" 10x5" HEP     Horiz Add Stretch        10x5"  10x5" 10x5" HEP     PB supine Flexion                     Pulleys  20x ea 20x ea 20x ea 20x ea 20x ea 20x ea 20x ea 20x ea 20x ea Flex , Scap   ER with wand       25x  25x  25x HEP     Flexion supine PROM       25x  25x  25x HEP     Wall Slides                     [place]  3#  15x ea 3# 2x10 ea 3#  2/10 3# 2/10 ea  2# 15x ea 2# 15x ea 3# 15x ea 3# 15x ea  3#  15x ea   TB High Row                     TB Mid Row  L4 30x L4 30x  L4 30x L4 30x L3 30x L3 30x L3 30x L4 30x  L4 30x   TB Low Row  L4 30x L4 30x   L4 30x L4 30x L3 30x L3 30x L3 30x L4 30x  L4 30x   TB Biceps  L4 30x L4 30x  L4 30x  L4 30x  L3 20x  L3 30x L3 30x L4 30x  L4 30x   TB Triceps  L4 30x L4 30x   L4 30x L4 30x L3 30x L3 30x L3 30x L4 30x  L4 30x   TB Upright Row                     TB Flexion  L4 30x L4 30x  L4 30x L4 30x L3 30x  L3 30x L3 30x L3 30x  L4 30x   TB Abduction                     TB SA Pulldown                     TB IR  L4  20x L4 20x   L4  20x L4 30x L3 30x  L3 30x  L3 30x L4 30x  L4 30x   TB ER  L4  20x L4 20x   L4  20x L4 30x L3 30x  L3 30x  L3 30x L4 30x  L4 30x   TB Clock                     SL ER 3#  2/10 3# 2x10 3#  20x 3# 20x 2# 20x 2# 20x 3# 20x  3# 20x  3# 20xx   SL ABD 3#  2/10 3# 2x10 3#  20x 3# 20x 2# 20x 2# 20x 3# 20x  3# 20x  3# 20x   SL Flexion 3#  2/10 3# 2x10 3#  20x 3# 20x 2# 20x 2# 20x 3# 20x  3# 20x  3# 20x   SL Horiz ABD  3# 20x 3# 20x   3# 20x  3# 20x  10x  10x  3# 20x  3# 20x  3# 20x   FWD/LAT Raises 2# 10x ea 2# 2x10  2# 2/10x ea  2# 2/10 ea  1# 10x ea  1# 10x ea  1# 10x ea 1# 10x ea  1# 10x   Ceiling Punches 3#  20x 3# 20x  3#  20x 3# 20x 2# 20x 2# 20x 2# 20x  3# 20x  3# 20x                                               Ther Activity                                           Modalities                     CP                     CP/IFC   13' 13' 15' 15' 15' 15' 15'  15'               To go home

## 2023-10-31 ENCOUNTER — OFFICE VISIT (OUTPATIENT)
Dept: PHYSICAL THERAPY | Facility: CLINIC | Age: 48
End: 2023-10-31
Payer: OTHER MISCELLANEOUS

## 2023-10-31 DIAGNOSIS — M67.912 TENDINOPATHY OF ROTATOR CUFF, LEFT: Primary | ICD-10-CM

## 2023-10-31 PROCEDURE — 97112 NEUROMUSCULAR REEDUCATION: CPT | Performed by: PHYSICAL THERAPIST

## 2023-10-31 PROCEDURE — 97140 MANUAL THERAPY 1/> REGIONS: CPT | Performed by: PHYSICAL THERAPIST

## 2023-10-31 NOTE — PROGRESS NOTES
Daily Note     Today's date: 10/31/2023  Patient name: Cleve Cortes  : 1975  MRN: [id number]  Referring provider: Wayne Zheng MD  Dx:   Encounter Diagnosis     ICD-10-CM    1. Tendinopathy of rotator cuff, left  M67.912                      Subjective: Pt reports having a tough time this week with pain. Going to speak with MD regarding any suggestions, as she feels the exercises help with ROM and strength and wants to be able to continue doing them. Objective: See treatment diary below      Assessment: Tolerated treatment well. Patient demonstrated fatigue post treatment, exhibited good technique with therapeutic exercises, and would benefit from continued PT      Plan: Continue per plan of care. Progress treatment as tolerated.          Precautions: None    HEP - Handout provided and discussed      Manuals 10/11 10/17/23 10/19/23 10/24/23 10/31/23   ART L Shoulder x25' x25' x20' X20' x25'   PROM/Stretch        STM                Neuro Re-Ed        Supine [place] 2x10 2x10 2x10 2x10    AROM Abd with scap retract and with rot at 90 degrees 2x10 2x10 2x10 2x10    AROM Scaption with scap retract with stick and wall finish 2x10 2x10 2x10 2x10    TRX 3 way Stretch  4x20'' ea 4x20'' ea 4x20'' ea    Pulleys     3x10   Stool roll backs     20x10''           Ther Ex        TB No $  2x10 L2 3x10 L2 3x10 L2    TB 70 degree Shoulder Abd  2x10 L2 2x10 L2 3x10 L2    SL ER  3x10 3x10 2# 3x10 2#    SL Abd  3x10 3x10 3x10    Prone Ys, Ts  2x10 ea 3x10 ea 3x10 ea    Supine Flex with PB     2x10                   Ther Activity        Retro UBE                Gait Training                        Modalities        CP to left shld Supine x10'       MHP L Shoulder  x10' to start session x10' x10' x10'

## 2023-11-02 ENCOUNTER — OFFICE VISIT (OUTPATIENT)
Dept: PHYSICAL THERAPY | Facility: CLINIC | Age: 48
End: 2023-11-02
Payer: OTHER MISCELLANEOUS

## 2023-11-02 DIAGNOSIS — M67.912 TENDINOPATHY OF ROTATOR CUFF, LEFT: Primary | ICD-10-CM

## 2023-11-02 PROCEDURE — 97112 NEUROMUSCULAR REEDUCATION: CPT | Performed by: PHYSICAL THERAPIST

## 2023-11-02 PROCEDURE — 97140 MANUAL THERAPY 1/> REGIONS: CPT | Performed by: PHYSICAL THERAPIST

## 2023-11-02 PROCEDURE — 97110 THERAPEUTIC EXERCISES: CPT | Performed by: PHYSICAL THERAPIST

## 2023-11-02 NOTE — PROGRESS NOTES
Daily Note     Today's date: 2023  Patient name: Finesse Dockery  : 1975  MRN: [id number]  Referring provider: Meenakshi Patton MD  Dx:   Encounter Diagnosis     ICD-10-CM    1. Tendinopathy of rotator cuff, left  M67.912                      Subjective: Pt reports having a tough time this week with pain. Going to speak with MD regarding any suggestions, as she feels the exercises help with ROM and strength and wants to be able to continue doing them. Objective: See treatment diary below      Assessment: Tolerated treatment well. Patient demonstrated fatigue post treatment, exhibited good technique with therapeutic exercises, and would benefit from continued PT      Plan: Continue per plan of care. Progress treatment as tolerated.          Precautions: None    HEP - Handout provided and discussed      Manuals 11/2 10/17/23 10/19/23 10/24/23 10/31/23   ART L Shoulder x25' x25' x20' X20' x25'   PROM/Stretch        STM                Neuro Re-Ed        Supine [place] 2x10 2x10 2x10 2x10    Standing Wall [place] 2x10       AROM Abd with scap retract and with rot at 90 degrees 2x10 2x10 2x10 2x10    AROM Scaption with scap retract with stick and wall finish 2x10 2x10 2x10 2x10    TRX 3 way Stretch 4x20''ea 4x20'' ea 4x20'' ea 4x20'' ea    Pulleys     3x10   Stool roll backs     20x10''           Ther Ex        TB No $ 3x10 L2 2x10 L2 3x10 L2 3x10 L2    TB 70 degree Shoulder Abd 3x10 L2 2x10 L2 2x10 L2 3x10 L2    SL ER 3x10 L2 3x10 3x10 2# 3x10 2#    SL Abd 3x10 3x10 3x10 3x10    Prone Ys, Ts 3x10 ea 2x10 ea 3x10 ea 3x10 ea    Supine Flex with PB 2x10    2x10                   Ther Activity        Retro UBE                Gait Training                        Modalities        CP to left shld Supine x10'       MHP L Shoulder  x10' to start session x10' x10' x10'

## 2023-11-07 ENCOUNTER — OFFICE VISIT (OUTPATIENT)
Dept: PHYSICAL THERAPY | Facility: CLINIC | Age: 48
End: 2023-11-07
Payer: OTHER MISCELLANEOUS

## 2023-11-07 DIAGNOSIS — M67.912 TENDINOPATHY OF ROTATOR CUFF, LEFT: Primary | ICD-10-CM

## 2023-11-07 PROCEDURE — 97140 MANUAL THERAPY 1/> REGIONS: CPT | Performed by: PHYSICAL THERAPIST

## 2023-11-07 PROCEDURE — 97110 THERAPEUTIC EXERCISES: CPT | Performed by: PHYSICAL THERAPIST

## 2023-11-07 PROCEDURE — 97112 NEUROMUSCULAR REEDUCATION: CPT | Performed by: PHYSICAL THERAPIST

## 2023-11-07 NOTE — PROGRESS NOTES
Daily Note     Today's date: 2023  Patient name: Antony Cordova  : 1975  MRN: [id number]  Referring provider: Lorie Rodriguez MD  Dx:   Encounter Diagnosis     ICD-10-CM    1. Tendinopathy of rotator cuff, left  M67.912                      Subjective: Pt reports anxious to continue with physical therapy. Hard to say much improvement yet, but trying to remain optimistic. Will likely get MRI in December if no improvements by then. Objective: See treatment diary below      Assessment: Tolerated treatment well. Patient demonstrated fatigue post treatment, exhibited good technique with therapeutic exercises, and would benefit from continued PT      Plan: Continue per plan of care. Progress treatment as tolerated.          Precautions: None    HEP - Handout provided and discussed      Manuals 11/2 11/7/23 10/19/23 10/24/23 10/31/23   ART L Shoulder x25' x25' x20' X20' x25'   PROM/Stretch        STM                Neuro Re-Ed        Supine [place] 2x10 2x10 2x10 2x10    Standing Wall [place] 2x10       AROM Abd with scap retract and with rot at 90 degrees 2x10 2x10 2x10 2x10    AROM Scaption with scap retract with stick and wall finish 2x10 2x10 2x10 2x10    TRX 3 way Stretch 4x20''ea 4x20'' ea 4x20'' ea 4x20'' ea    Pulleys     3x10   Stool roll backs     20x10''           Ther Ex        TB No $ 3x10 L2 2x10 L2 3x10 L2 3x10 L2    TB 70 degree Shoulder Abd 3x10 L2 2x10 L2 2x10 L2 3x10 L2    SL ER 3x10 2# 3x10 2# 3x10 2# 3x10 2#    SL Abd 3x10 3x10 2# 3x10 3x10    Prone Ys, Ts 3x10 ea 2x10 ea 2# 3x10 ea 3x10 ea    Supine Flex with PB 2x10 2x10   2x10   Supine D2 Flex  3x10 2#              Ther Activity        Retro UBE                Gait Training                        Modalities        CP to left shld Supine x10'       MHP L Shoulder  x10' to start session x10' x10' x10'

## 2023-11-14 ENCOUNTER — OFFICE VISIT (OUTPATIENT)
Dept: PHYSICAL THERAPY | Facility: CLINIC | Age: 48
End: 2023-11-14
Payer: OTHER MISCELLANEOUS

## 2023-11-14 DIAGNOSIS — M67.912 TENDINOPATHY OF ROTATOR CUFF, LEFT: Primary | ICD-10-CM

## 2023-11-14 PROCEDURE — 97140 MANUAL THERAPY 1/> REGIONS: CPT | Performed by: PHYSICAL THERAPIST

## 2023-11-14 PROCEDURE — 97110 THERAPEUTIC EXERCISES: CPT | Performed by: PHYSICAL THERAPIST

## 2023-11-14 PROCEDURE — 97112 NEUROMUSCULAR REEDUCATION: CPT | Performed by: PHYSICAL THERAPIST

## 2023-11-14 NOTE — PROGRESS NOTES
Daily Note     Today's date: 2023  Patient name: Ishan Aguirre  : 1975  MRN: [id number]  Referring provider: Jose Lee MD  Dx:   Encounter Diagnosis     ICD-10-CM    1. Tendinopathy of rotator cuff, left  M67.912                      Subjective: Pt HEP going well. Reports some improvement with subscap pain, but still battling continued tightness and pain overall. Reports continues to see benefits from physical therapy at this time. Anxious to continue making progress. Objective: See treatment diary below      Assessment: Tolerated treatment well. Patient demonstrated fatigue post treatment, exhibited good technique with therapeutic exercises, and would benefit from continued PT      Plan: Continue per plan of care. Progress treatment as tolerated.          Precautions: None    HEP - Handout provided and discussed      Manuals 11/2 11/7/23 11/14/23 10/24/23 10/31/23   ART L Shoulder x25' x25' x20' X20' x25'   PROM/Stretch        STM                Neuro Re-Ed        Supine [place] 2x10 2x10 2x10 2x10 2x10   Standing Wall [place] 2x10       AROM Abd with scap retract and with rot at 90 degrees 2x10 2x10 2x10 2x10 2x10   AROM Scaption with scap retract with stick and wall finish 2x10 2x10 2x10 2x10 2x10   TRX 3 way Stretch 4x20''ea 4x20'' ea 4x20'' ea 4x20'' ea 4x20'' ea   Pulleys     3x10   Stool roll backs     20x10''           Ther Ex        TB No $ 3x10 L2 2x10 L2 3x10 L2 3x10 L2 3x10 L2   TB 70 degree Shoulder Abd 3x10 L2 2x10 L2 2x10 L2 3x10 L2 3x10 L2   SL ER 3x10 2# 3x10 2# 3x10 2# 3x10 2# 3x10 2#   SL Abd 3x10 3x10 2# 3x10 2# 3x10 2# 3x10 2#   Prone Ys, Ts 3x10 ea 2x10 ea 2# 3x10 ea 3x10 ea 3x10 ea2#   Supine Flex with PB 2x10 2x10      Supine D2 Flex  3x10 2#   3x10 2#           Ther Activity        Retro UBE                Gait Training                        Modalities        CP to left shld Supine x10'       MHP L Shoulder  x10' to start session x10' x10' x10'

## 2023-11-16 ENCOUNTER — OFFICE VISIT (OUTPATIENT)
Dept: PHYSICAL THERAPY | Facility: CLINIC | Age: 48
End: 2023-11-16
Payer: OTHER MISCELLANEOUS

## 2023-11-16 DIAGNOSIS — M67.912 TENDINOPATHY OF ROTATOR CUFF, LEFT: Primary | ICD-10-CM

## 2023-11-16 PROCEDURE — 97110 THERAPEUTIC EXERCISES: CPT | Performed by: PHYSICAL THERAPIST

## 2023-11-16 PROCEDURE — 97140 MANUAL THERAPY 1/> REGIONS: CPT | Performed by: PHYSICAL THERAPIST

## 2023-11-16 PROCEDURE — 97112 NEUROMUSCULAR REEDUCATION: CPT | Performed by: PHYSICAL THERAPIST

## 2023-11-16 NOTE — PROGRESS NOTES
Daily Note     Today's date: 2023  Patient name: Raliegh Severance  : 1975  MRN: [id number]  Referring provider: Vickie Henry MD  Dx:   Encounter Diagnosis     ICD-10-CM    1. Tendinopathy of rotator cuff, left  M67.912                      Subjective: Pt HEP going well. Reports some improvement with subscap pain, but still battling continued tightness and pain overall. Reports continues to see benefits from physical therapy at this time. Anxious to continue making progress. Objective: See treatment diary below      Assessment: Tolerated treatment well. Patient demonstrated fatigue post treatment, exhibited good technique with therapeutic exercises, and would benefit from continued PT      Plan: Continue per plan of care. Progress treatment as tolerated.          Precautions: None    HEP - Handout provided and discussed      Manuals 11/2 11/7/23 11/14/23 11/16/23 10/31/23   ART L Shoulder x25' x25' x20' X20' x25'   PROM/Stretch        STM                Neuro Re-Ed        Supine [place] 2x10 2x10 2x10 2x10 2x10   Standing Wall [place] 2x10       AROM Abd with scap retract and with rot at 90 degrees 2x10 2x10 2x10 2x10 2x10   AROM Scaption with scap retract with stick and wall finish 2x10 2x10 2x10 2x10 2x10   TRX 3 way Stretch 4x20''ea 4x20'' ea 4x20'' ea 4x20'' ea 4x20'' ea   Pulleys    3x10 3x10   Stool roll backs    20x10'' 20x10''           Ther Ex        TB No $ 3x10 L2 2x10 L2 3x10 L2 3x10 L2 3x10 L2   TB 70 degree Shoulder Abd 3x10 L2 2x10 L2 2x10 L2 3x10 L2 3x10 L2   SL ER 3x10 2# 3x10 2# 3x10 2# 3x10 2# 3x10 2#   SL Abd 3x10 3x10 2# 3x10 2# 3x10 2# 3x10 2#   Prone Ys, Ts 3x10 ea 2x10 ea 2# 3x10 ea 3x10 ea 2# 3x10 ea2#   Supine Flex with PB 2x10 2x10      Supine D2 Flex  3x10 2#  3x10 2# 3x10 2#           Ther Activity        Retro UBE                Gait Training                        Modalities        CP to left shld Supine x10'       MHP L Shoulder  x10' to start session x10' x10' x10'

## 2023-11-21 ENCOUNTER — OFFICE VISIT (OUTPATIENT)
Dept: PHYSICAL THERAPY | Facility: CLINIC | Age: 48
End: 2023-11-21
Payer: OTHER MISCELLANEOUS

## 2023-11-21 ENCOUNTER — OFFICE VISIT (OUTPATIENT)
Dept: FAMILY MEDICINE CLINIC | Facility: CLINIC | Age: 48
End: 2023-11-21
Payer: COMMERCIAL

## 2023-11-21 VITALS
OXYGEN SATURATION: 99 % | HEIGHT: 61 IN | TEMPERATURE: 98 F | HEART RATE: 71 BPM | SYSTOLIC BLOOD PRESSURE: 122 MMHG | RESPIRATION RATE: 18 BRPM | BODY MASS INDEX: 27.19 KG/M2 | DIASTOLIC BLOOD PRESSURE: 82 MMHG | WEIGHT: 144 LBS

## 2023-11-21 DIAGNOSIS — M67.912 TENDINOPATHY OF ROTATOR CUFF, LEFT: Primary | ICD-10-CM

## 2023-11-21 DIAGNOSIS — I10 BENIGN ESSENTIAL HYPERTENSION: ICD-10-CM

## 2023-11-21 DIAGNOSIS — Z76.89 ENCOUNTER FOR WEIGHT MANAGEMENT: Primary | ICD-10-CM

## 2023-11-21 PROCEDURE — 99214 OFFICE O/P EST MOD 30 MIN: CPT

## 2023-11-21 PROCEDURE — 97140 MANUAL THERAPY 1/> REGIONS: CPT | Performed by: PHYSICAL THERAPIST

## 2023-11-21 PROCEDURE — 97112 NEUROMUSCULAR REEDUCATION: CPT | Performed by: PHYSICAL THERAPIST

## 2023-11-21 PROCEDURE — 97110 THERAPEUTIC EXERCISES: CPT | Performed by: PHYSICAL THERAPIST

## 2023-11-21 RX ORDER — SEMAGLUTIDE 1.7 MG/.75ML
INJECTION, SOLUTION SUBCUTANEOUS
COMMUNITY
Start: 2023-10-30 | End: 2023-11-21 | Stop reason: SDUPTHER

## 2023-11-21 RX ORDER — SEMAGLUTIDE 1.7 MG/.75ML
1.7 INJECTION, SOLUTION SUBCUTANEOUS WEEKLY
Qty: 9 ML | Refills: 0 | Status: SHIPPED | OUTPATIENT
Start: 2023-11-21 | End: 2024-02-19

## 2023-11-21 NOTE — PROGRESS NOTES
PT Evaluation     Today's date: 2023  Patient name: Romeo Cottrell  : 1975  MRN: [id number]  Referring provider: Zbigniew Ponce MD  Dx:   Encounter Diagnosis     ICD-10-CM    1. Tendinopathy of rotator cuff, left  M67.912                      Assessment  Understanding of Dx/Px/POC: good   Prognosis: good    Goals  STGs: To be complete within 4 weeks (not met)  - Decrease pain to < 2/10 at worst  - Increase AROM to WNL  - Increase strength to > 4+/5  - Improve postural awareness capacity to > 60min before deficit    LTGs: To be complete within 6 weeks (not met)  - Able to repetitively complete all overhead activity without pain or limitation for increased safety and functional capacity with ADLs and work-related duty  - Able to repetitively complete all reaching activity without pain or limitation for increased safety and functional capacity with ADLs and work-related duty  - Able to repetitively complete all pushing/pulling activity without pain or limitation for increased safety and functional capacity with ADLs and work-related duty  - Able to complete all lifting/carrying activity without pain or limitation for increased safety and functional capacity with ADLs and work-related duty    Plan  Planned therapy interventions: manual therapy, neuromuscular re-education, postural training, patient education, self care, therapeutic activities, therapeutic exercise and home exercise program  Frequency: 2x week  Duration in weeks: 6       Upon completion of today's re-evaluation, as evidenced by present objective and subjective measures, Halima's sx remain consistent with continued, slow-paced progress from L Shoulder RTC tendinopathy and subacromial impingement. Patient will benefit from continued skilled physical therapy to address current deficits.          Subjective Evaluation    Patient Goals  Patient goals for therapy: increased strength, decreased pain and increased motion    Pain  Current pain ratin  At best pain ratin  At worst pain ratin  Location: L Shoulder         Pt reports minimal overall changes at this time, but does feel that the exercises help for short periods of time. Reports pain is the biggest setback between sessions. Hoping to start to see more consistent improvement.         Objective Pain level ranges 2-8/10  AROM: L Shoulder Flex 120 degrees, Abd 110 degrees, ER 70 degrees, IR 50 degrees; R Shoulder WNL  PROM: L Shoulder Flex 165 degrees, Abd 155 dgrees, IR 55 degrees, ER 80 degrees; R Shoulder WNL  Strength: L Shoulder 4/5 t/o all planes; R Shoulder 5/5 t/o all planes  Postural Awareness: Fair (rounded shoulders, forward head)  FOTO: 34; GOAL: 58  Unable to complete overhead activity without pain and limitation  Unable to complete pushing/pulling activity without pain and limitation  Unable to complete lifting/carrying activity without pain and limitation  Unable to complete cross body/behind the back reaching activity without pain and limitation              Precautions: None    HEP - Handout provided and discussed      Manuals 23   ART L Shoulder x25' x25' x20' X20' x25'   PROM/Stretch        STM                Neuro Re-Ed        Supine [place] 2x10 2x10 2x10 2x10 2x10   Standing Wall [place] 2x10       AROM Abd with scap retract and with rot at 90 degrees 2x10 2x10 2x10 2x10 2x10   AROM Scaption with scap retract with stick and wall finish 2x10 2x10 2x10 2x10 2x10   TRX 3 way Stretch 4x20''ea 4x20'' ea 4x20'' ea 4x20'' ea 4x20'' ea   Pulleys    3x10 3x10   Stool roll backs    20x10'' 20x10''           Ther Ex        TB No $ 3x10 L2 2x10 L2 3x10 L2 3x10 L2 3x10 L2   TB 70 degree Shoulder Abd 3x10 L2 2x10 L2 2x10 L2 3x10 L2 3x10 L2   SL ER 3x10 2# 3x10 2# 3x10 2# 3x10 2# 3x10 2#   SL Abd 3x10 3x10 2# 3x10 2# 3x10 2# 3x10 2#   Prone Ys, Ts 3x10 ea 2x10 ea 2# 3x10 ea 3x10 ea 2# 3x10 ea2#   Supine Flex with PB 2x10 2x10      Supine D2 Flex 3x10 2#  3x10 2# 3x10 2#           Ther Activity        Retro UBE                Gait Training                        Modalities        CP to left shld Supine x10'       MHP L Shoulder  x10' to start session x10' x10' x10'

## 2023-11-21 NOTE — LETTER
2023    MD Kaiden Martinez[address]    Patient: Michelle Wallis   YOB: 1975   Date of Visit: 2023     Encounter Diagnosis     ICD-10-CM    1. Tendinopathy of rotator cuff, left  M67.912           Dear Dr. Wendy Castaneda: Thank you for your recent referral of Michelle Wallis. Please review the attached evaluation summary from Edwin's recent visit. Please verify that you agree with the plan of care by signing the attached order. If you have any questions or concerns, please do not hesitate to call. I sincerely appreciate the opportunity to share in the care of one of your patients and hope to have another opportunity to work with you in the near future. Sincerely,    Mckay Armstrong, PT, DPT, ATC, ART      Referring Provider:      I certify that I have read the below Plan of Care and certify the need for these services furnished under this plan of treatment while under my care. [facility] [address]  Via Fax: [phone]          PT Evaluation     Today's date: 2023  Patient name: Michelle Wallis  : 1975  MRN: [id number]  Referring provider: Marco Lopez MD  Dx:   Encounter Diagnosis     ICD-10-CM    1.  Tendinopathy of rotator cuff, left  M67.912                      Assessment  Understanding of Dx/Px/POC: good   Prognosis: good    Goals  STGs: To be complete within 4 weeks (not met)  - Decrease pain to < 2/10 at worst  - Increase AROM to WNL  - Increase strength to > 4+/5  - Improve postural awareness capacity to > 60min before deficit    LTGs: To be complete within 6 weeks (not met)  - Able to repetitively complete all overhead activity without pain or limitation for increased safety and functional capacity with ADLs and work-related duty  - Able to repetitively complete all reaching activity without pain or limitation for increased safety and functional capacity with ADLs and work-related duty  - Able to repetitively complete all pushing/pulling activity without pain or limitation for increased safety and functional capacity with ADLs and work-related duty  - Able to complete all lifting/carrying activity without pain or limitation for increased safety and functional capacity with ADLs and work-related duty    Plan  Planned therapy interventions: manual therapy, neuromuscular re-education, postural training, patient education, self care, therapeutic activities, therapeutic exercise and home exercise program  Frequency: 2x week  Duration in weeks: 6       Upon completion of today's re-evaluation, as evidenced by present objective and subjective measures, Halima's sx remain consistent with continued, slow-paced progress from L Shoulder RTC tendinopathy and subacromial impingement. Patient will benefit from continued skilled physical therapy to address current deficits. Subjective Evaluation    Patient Goals  Patient goals for therapy: increased strength, decreased pain and increased motion    Pain  Current pain ratin  At best pain ratin  At worst pain ratin  Location: L Shoulder         Pt reports minimal overall changes at this time, but does feel that the exercises help for short periods of time. Reports pain is the biggest setback between sessions. Hoping to start to see more consistent improvement.         Objective Pain level ranges 2-8/10  AROM: L Shoulder Flex 120 degrees, Abd 110 degrees, ER 70 degrees, IR 50 degrees; R Shoulder WNL  PROM: L Shoulder Flex 165 degrees, Abd 155 dgrees, IR 55 degrees, ER 80 degrees; R Shoulder WNL  Strength: L Shoulder 4/5 t/o all planes; R Shoulder 5/5 t/o all planes  Postural Awareness: Fair (rounded shoulders, forward head)  FOTO: 34; GOAL: 58  Unable to complete overhead activity without pain and limitation  Unable to complete pushing/pulling activity without pain and limitation  Unable to complete lifting/carrying activity without pain and limitation  Unable to complete cross body/behind the back reaching activity without pain and limitation              Precautions: None    HEP - Handout provided and discussed      Manuals 11/2 11/7/23 11/14/23 11/16/23 11/21/23   ART L Shoulder x25' x25' x20' X20' x25'   PROM/Stretch        STM                Neuro Re-Ed        Supine [place] 2x10 2x10 2x10 2x10 2x10   Standing Wall [place] 2x10       AROM Abd with scap retract and with rot at 90 degrees 2x10 2x10 2x10 2x10 2x10   AROM Scaption with scap retract with stick and wall finish 2x10 2x10 2x10 2x10 2x10   TRX 3 way Stretch 4x20''ea 4x20'' ea 4x20'' ea 4x20'' ea 4x20'' ea   Pulleys    3x10 3x10   Stool roll backs    20x10'' 20x10''           Ther Ex        TB No $ 3x10 L2 2x10 L2 3x10 L2 3x10 L2 3x10 L2   TB 70 degree Shoulder Abd 3x10 L2 2x10 L2 2x10 L2 3x10 L2 3x10 L2   SL ER 3x10 2# 3x10 2# 3x10 2# 3x10 2# 3x10 2#   SL Abd 3x10 3x10 2# 3x10 2# 3x10 2# 3x10 2#   Prone Ys, Ts 3x10 ea 2x10 ea 2# 3x10 ea 3x10 ea 2# 3x10 ea2#   Supine Flex with PB 2x10 2x10      Supine D2 Flex  3x10 2#  3x10 2# 3x10 2#           Ther Activity        Retro UBE                Gait Training                        Modalities        CP to left shld Supine x10'       MHP L Shoulder  x10' to start session x10' x10' x10'

## 2023-11-21 NOTE — PROGRESS NOTES
Assessment/Plan:     Diagnoses and all orders for this visit:    Encounter for weight management  Comments:  patient was taking ozempic 1mg and switched to wegovy 1.7mg weekly on 10/30/23. No issues with new medication. lost ~20lbs. Patient's goal is 135-140lbs    - continue same dosage Wegovy 1.7mg/wk   - follow up in 3 mos, recommended patient to keep track of her weight weekly. Orders:  -     Wegovy 1.7 MG/0.75ML; Inject 0.75 mL (1.7 mg total) under the skin once a week    Benign essential hypertension  Comments:  Patient's BP has been stable ~120/80 after weight loss from weight management. Patient denied HA, dizziness. Not on medications. On Wegovy for weight management     - continue monitor BP  - continue weight management medication      Subjective:     TIANA Elizabeth is a 47 yo female with PMH morbid obesity s/p gastrectomy in 2016. Patient was initially on Ozempic 1mg weekly for 3-4 months and made a switch to [facility]FORT DALIA 1.7mg weekly. Within the past three months she lost about 20 lbs. Patient's goal is 135-140lbs. She has no issues with the medications. Denied abdominal pain, diarrhea, constipation, N/V. She has been cutting down carbs and eating mainly vegetables/fruits and some deer meat. The following portions of the patient's history were reviewed and updated as appropriate: allergies, current medications, past family history, past medical history, past social history, past surgical history, and problem list.    Review of Systems   Constitutional:  Negative for appetite change, chills, fatigue, fever and unexpected weight change. HENT:  Negative for sore throat. Eyes:  Negative for pain and visual disturbance. Respiratory:  Negative for cough and shortness of breath. Cardiovascular:  Negative for chest pain and palpitations. Gastrointestinal:  Negative for abdominal pain and vomiting. Genitourinary:  Negative for dysuria and hematuria.    Musculoskeletal:  Negative for arthralgias and back pain. Skin:  Negative for color change and rash. Neurological:  Negative for seizures and syncope. All other systems reviewed and are negative. Objective:      /82 (BP Location: Right arm, Patient Position: Sitting, Cuff Size: Standard)   Pulse 71   Temp 98 °F (36.7 °C)   Resp 18   Ht 5' 1" (1.549 m)   Wt 65.3 kg (144 lb)   LMP  (LMP Unknown)   SpO2 99%   BMI 27.21 kg/m²          Physical Exam  Constitutional:       General: She is not in acute distress. Appearance: Normal appearance. HENT:      Head: Normocephalic and atraumatic. Right Ear: External ear normal.      Left Ear: External ear normal.      Nose: Nose normal. No congestion. Mouth/Throat:      Mouth: Mucous membranes are moist.      Pharynx: Oropharynx is clear. No oropharyngeal exudate or posterior oropharyngeal erythema. Eyes:      General:         Right eye: No discharge. Left eye: No discharge. Conjunctiva/sclera: Conjunctivae normal.      Pupils: Pupils are equal, round, and reactive to light. Cardiovascular:      Rate and Rhythm: Normal rate and regular rhythm. Pulses: Normal pulses. Heart sounds: Normal heart sounds. No murmur heard. Pulmonary:      Effort: Pulmonary effort is normal. No respiratory distress. Breath sounds: Normal breath sounds. No wheezing. Abdominal:      General: Abdomen is flat. Bowel sounds are normal. There is no distension. Palpations: Abdomen is soft. Tenderness: There is no abdominal tenderness. Musculoskeletal:         General: Normal range of motion. Cervical back: Normal range of motion and neck supple. Right lower leg: No edema. Left lower leg: No edema. Skin:     General: Skin is warm. Capillary Refill: Capillary refill takes less than 2 seconds. Neurological:      General: No focal deficit present. Mental Status: She is alert and oriented to person, place, and time. Psychiatric:         Mood and Affect: Mood normal.           Laya Rivera (Jakie Dupont), , Family Medicine PGY-1, Date and Time: 11/21/23 8:48 AM

## 2023-11-28 ENCOUNTER — OFFICE VISIT (OUTPATIENT)
Dept: PHYSICAL THERAPY | Facility: CLINIC | Age: 48
End: 2023-11-28
Payer: OTHER MISCELLANEOUS

## 2023-11-28 DIAGNOSIS — M67.912 TENDINOPATHY OF ROTATOR CUFF, LEFT: Primary | ICD-10-CM

## 2023-11-28 PROCEDURE — 97112 NEUROMUSCULAR REEDUCATION: CPT | Performed by: PHYSICAL THERAPIST

## 2023-11-28 PROCEDURE — 97140 MANUAL THERAPY 1/> REGIONS: CPT | Performed by: PHYSICAL THERAPIST

## 2023-11-28 PROCEDURE — 97110 THERAPEUTIC EXERCISES: CPT | Performed by: PHYSICAL THERAPIST

## 2023-11-28 NOTE — PROGRESS NOTES
Daily Note     Today's date: 2023  Patient name: Finesse Dockery  : 1975  MRN: [id number]  Referring provider: Meenakshi Patton MD  Dx:   Encounter Diagnosis     ICD-10-CM    1. Tendinopathy of rotator cuff, left  M67.912                      Subjective: Pt HEP going well. Reports some improvement with subscap pain, but still battling continued tightness and pain overall. Reports continues to see benefits from physical therapy at this time. Anxious to continue making progress. Objective: See treatment diary below      Assessment: Tolerated treatment well. Patient demonstrated fatigue post treatment, exhibited good technique with therapeutic exercises, and would benefit from continued PT      Plan: Continue per plan of care. Progress treatment as tolerated.            Precautions: None    HEP - Handout provided and discussed      Manuals 23   ART L Shoulder x20' x25' x20' X20' x25'   PROM/Stretch        STM                Neuro Re-Ed        Supine [place] 2x10 2x10 2x10 2x10 2x10   Standing Wall [place] 2x10       AROM Abd with scap retract and with rot at 90 degrees 2x10 2x10 2x10 2x10 2x10   AROM Scaption with scap retract with stick and wall finish 2x10 2x10 2x10 2x10 2x10   TRX 3 way Stretch 4x20''ea 4x20'' ea 4x20'' ea 4x20'' ea 4x20'' ea   Pulleys 3x10   3x10 3x10   Stool roll backs    20x10'' 20x10''           Ther Ex        TB No $ 3x10 L2 2x10 L2 3x10 L2 3x10 L2 3x10 L2   TB 70 degree Shoulder Abd 3x10 L2 2x10 L2 2x10 L2 3x10 L2 3x10 L2   SL ER 3x10 2# 3x10 2# 3x10 2# 3x10 2# 3x10 2#   SL Abd 3x10 2# 3x10 2# 3x10 2# 3x10 2# 3x10 2#   Prone Ys, Ts 3x10 ea 2# 2x10 ea 2# 3x10 ea 3x10 ea 2# 3x10 ea2#   Supine Flex with PB 2x10 with PT Shoulder blade overpressure 2x10      Supine D2 Flex 3x10 1# 3x10 2#  3x10 2# 3x10 2#           Ther Activity        Retro UBE                Gait Training                        Modalities        CP to left shld Supine x10' VIVEKP L Shoulder  x10' to start session x10' x10' x10'

## 2023-11-30 ENCOUNTER — APPOINTMENT (OUTPATIENT)
Dept: PHYSICAL THERAPY | Facility: CLINIC | Age: 48
End: 2023-11-30
Payer: OTHER MISCELLANEOUS

## 2023-12-05 ENCOUNTER — OFFICE VISIT (OUTPATIENT)
Dept: PHYSICAL THERAPY | Facility: CLINIC | Age: 48
End: 2023-12-05
Payer: OTHER MISCELLANEOUS

## 2023-12-05 DIAGNOSIS — M67.912 TENDINOPATHY OF ROTATOR CUFF, LEFT: Primary | ICD-10-CM

## 2023-12-05 PROCEDURE — 97140 MANUAL THERAPY 1/> REGIONS: CPT | Performed by: PHYSICAL THERAPIST

## 2023-12-05 PROCEDURE — 97110 THERAPEUTIC EXERCISES: CPT | Performed by: PHYSICAL THERAPIST

## 2023-12-05 PROCEDURE — 97112 NEUROMUSCULAR REEDUCATION: CPT | Performed by: PHYSICAL THERAPIST

## 2023-12-05 NOTE — PROGRESS NOTES
Daily Note     Today's date: 2023  Patient name: Berto Johansen  : 1975  MRN: [id number]  Referring provider: Rashaad Rodriguez MD  Dx:   Encounter Diagnosis     ICD-10-CM    1. Tendinopathy of rotator cuff, left  M67.912                      Subjective: Pt HEP going well. Reports some improvement with subscap pain, but still battling continued tightness and pain overall. Reports continues to see benefits from physical therapy at this time. Anxious to continue making progress. Objective: See treatment diary below      Assessment: Tolerated treatment well. Patient demonstrated fatigue post treatment, exhibited good technique with therapeutic exercises, and would benefit from continued PT      Plan: Continue per plan of care. Progress treatment as tolerated.            Precautions: None    HEP - Handout provided and discussed      Manuals 23   ART L Shoulder x20' x25' x20' X20' x25'   PROM/Stretch        STM                Neuro Re-Ed        Supine [place] 2x10 2x10 2x10 2x10 2x10   Standing Wall [place] 2x10       AROM Abd with scap retract and with rot at 90 degrees 2x10 2x10 2x10 2x10 2x10   AROM Scaption with scap retract with stick and wall finish 2x10 2x10 2x10 2x10 2x10   TRX 3 way Stretch 4x20''ea 4x20'' ea 4x20'' ea 4x20'' ea 4x20'' ea   Pulleys 3x10 3x10  3x10 3x10   Stool roll backs  20x10''  20x10'' 20x10''           Ther Ex        TB No $ 3x10 L2 2x10 L2 3x10 L2 3x10 L2 3x10 L2   TB 70 degree Shoulder Abd 3x10 L2 2x10 L2 2x10 L2 3x10 L2 3x10 L2   SL ER 3x10 2# 3x10 2# 3x10 2# 3x10 2# 3x10 2#   SL Abd 3x10 2# 3x10 2# 3x10 2# 3x10 2# 3x10 2#   Prone Ys, Ts 3x10 ea 2# 2x10 ea 2# 3x10 ea 3x10 ea 2# 3x10 ea2#   Supine Flex with PB 2x10 with PT Shoulder blade overpressure       Supine D2 Flex 3x10 1# 3x10 2#  3x10 2# 3x10 2#           Ther Activity        Retro UBE                Gait Training                        Modalities        CP to left shld Supine x10'       MHP L Shoulder  x10' to start session x10' x10' x10'

## 2023-12-07 ENCOUNTER — APPOINTMENT (OUTPATIENT)
Dept: PHYSICAL THERAPY | Facility: CLINIC | Age: 48
End: 2023-12-07
Payer: OTHER MISCELLANEOUS

## 2023-12-12 ENCOUNTER — APPOINTMENT (OUTPATIENT)
Dept: PHYSICAL THERAPY | Facility: CLINIC | Age: 48
End: 2023-12-12
Payer: OTHER MISCELLANEOUS

## 2023-12-14 ENCOUNTER — APPOINTMENT (OUTPATIENT)
Dept: PHYSICAL THERAPY | Facility: CLINIC | Age: 48
End: 2023-12-14
Payer: OTHER MISCELLANEOUS

## 2023-12-19 ENCOUNTER — APPOINTMENT (OUTPATIENT)
Dept: PHYSICAL THERAPY | Facility: CLINIC | Age: 48
End: 2023-12-19
Payer: OTHER MISCELLANEOUS

## 2023-12-21 ENCOUNTER — OFFICE VISIT (OUTPATIENT)
Dept: PHYSICAL THERAPY | Facility: CLINIC | Age: 48
End: 2023-12-21
Payer: OTHER MISCELLANEOUS

## 2023-12-21 DIAGNOSIS — M67.912 TENDINOPATHY OF ROTATOR CUFF, LEFT: Primary | ICD-10-CM

## 2023-12-21 PROCEDURE — 97110 THERAPEUTIC EXERCISES: CPT | Performed by: PHYSICAL THERAPIST

## 2023-12-21 PROCEDURE — 97112 NEUROMUSCULAR REEDUCATION: CPT | Performed by: PHYSICAL THERAPIST

## 2023-12-21 PROCEDURE — 97140 MANUAL THERAPY 1/> REGIONS: CPT | Performed by: PHYSICAL THERAPIST

## 2023-12-21 NOTE — PROGRESS NOTES
Daily Note     Today's date: 2023  Patient name: Edwin Childs  : 1975  MRN: [id number]  Referring provider: Ford Mendoza MD  Dx:   Encounter Diagnosis     ICD-10-CM    1. Tendinopathy of rotator cuff, left  M67.912                      Subjective: Pt HEP going well. Reports some improvement with subscap pain, but still battling continued tightness and pain overall. Reports continues to see benefits from physical therapy at this time. Anxious to continue making progress.      Objective: See treatment diary below      Assessment: Tolerated treatment well. Patient demonstrated fatigue post treatment, exhibited good technique with therapeutic exercises, and would benefit from continued PT      Plan: Continue per plan of care.  Progress treatment as tolerated.           Precautions: None    HEP - Handout provided and discussed      Manuals 23   ART L Shoulder x20' x25' x20' X20' x25'   PROM/Stretch        STM                Neuro Re-Ed        Supine [place] 2x10 2x10 2x10 2x10 2x10   Standing Wall [place] 2x10       AROM Abd with scap retract and with rot at 90 degrees 2x10 2x10 2x10 2x10 2x10   AROM Scaption with scap retract with stick and wall finish 2x10 2x10 2x10 2x10 2x10   TRX 3 way Stretch 4x20''ea 4x20'' ea 4x20'' ea 4x20'' ea 4x20'' ea   Pulleys 3x10 3x10 3x10 3x10 3x10   Stool roll backs  20x10'' 20x10'' 20x10'' 20x10''           Ther Ex        TB No $ 3x10 L2 2x10 L2 3x10 L2 3x10 L2 3x10 L2   TB 70 degree Shoulder Abd 3x10 L2 2x10 L2 2x10 L2 3x10 L2 3x10 L2   SL ER 3x10 2# 3x10 2# 3x10 2# 3x10 2# 3x10 2#   SL Abd 3x10 2# 3x10 2# 3x10 2# 3x10 2# 3x10 2#   Prone Ys, Ts 3x10 ea 2# 2x10 ea 2# 3x10 ea 3x10 ea 2# 3x10 ea2#   Supine Flex with PB 2x10 with PT Shoulder blade overpressure       Supine D2 Flex 3x10 1# 3x10 2# 3x10 2# 3x10 2# 3x10 2#           Ther Activity        Retro UBE                Gait Training                        Modalities        CP  to left shld Supine x10'       MHP L Shoulder  x10' to start session x10' x10' x10'

## 2023-12-26 ENCOUNTER — APPOINTMENT (OUTPATIENT)
Dept: PHYSICAL THERAPY | Facility: CLINIC | Age: 48
End: 2023-12-26
Payer: OTHER MISCELLANEOUS

## 2023-12-28 ENCOUNTER — APPOINTMENT (OUTPATIENT)
Dept: PHYSICAL THERAPY | Facility: CLINIC | Age: 48
End: 2023-12-28
Payer: OTHER MISCELLANEOUS

## 2024-01-02 ENCOUNTER — OFFICE VISIT (OUTPATIENT)
Dept: PHYSICAL THERAPY | Facility: CLINIC | Age: 49
End: 2024-01-02
Payer: OTHER MISCELLANEOUS

## 2024-01-02 DIAGNOSIS — M67.912 TENDINOPATHY OF ROTATOR CUFF, LEFT: Primary | ICD-10-CM

## 2024-01-02 PROCEDURE — 97110 THERAPEUTIC EXERCISES: CPT | Performed by: PHYSICAL THERAPIST

## 2024-01-02 PROCEDURE — 97140 MANUAL THERAPY 1/> REGIONS: CPT | Performed by: PHYSICAL THERAPIST

## 2024-01-02 PROCEDURE — 97112 NEUROMUSCULAR REEDUCATION: CPT | Performed by: PHYSICAL THERAPIST

## 2024-01-02 NOTE — PROGRESS NOTES
PT Re-Evaluation     Today's date: 2024  Patient name: Edwin Childs  : 1975  MRN: [id number]  Referring provider: Ford Mendoza MD  Dx:   Encounter Diagnosis     ICD-10-CM    1. Tendinopathy of rotator cuff, left  M67.912                      Assessment  Assessment details: Assessment details: The patient is a 47 yo female who has been completing physical therapy with minimal progress from signs and symptoms consistent with left tendinopathy of rotator cuff s/p post tenotomy with PRP. Deficits are noted in left shoulder ROM and strength as well as functional deficits. She would benefit from continued skilled physical therapy to address deficits in ROM, strength, stability and functional status.       Impairments: abnormal or restricted ROM, abnormal movement, impaired physical strength and pain with function  Functional limitations: Difficulty with UE dressing, donning bra, doing laundry, and cooking  Symptom irritability: moderateUnderstanding of Dx/Px/POC: excellent   Prognosis: good    Goals  STG(4 weeks) not met:             1. Independent with HEP            2. Decrease pain to 2/10 at worst with functional activities            3. Increase AROM L shoulder by 10-15 degrees in all planes            4. Increase strength LUE by 1/3 MMT grade     LTG(12 weeks) not met:             1.  Decrease pain to 1/10 at worst with functional activities             2. Increase LUE strength to 4+ to 5/5             3. L shoulder AROM WFL             4. Return to PLOF      Plan  Plan details: Progress per protocol  Patient would benefit from: skilled physical therapy  Referral necessary: No  Planned modality interventions: cryotherapy, TENS and electrical stimulation/Russian stimulation  Planned therapy interventions: IASTM, joint mobilization, ADL training, manual therapy, neuromuscular re-education, therapeutic activities, therapeutic exercise and patient education  Frequency: 2x week (1-2x week)  Duration in  weeks: 6  Treatment plan discussed with: patient        Subjective Evaluation    History of Present Illness  Date of onset: 2023  Mechanism of injury: The patient's left shoulder pain began from a work injury which occurred on 2018. She was standing on a chair 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. She lost her balance and grabbed onto a ledge, yanking on her shoulder and twisting it. An MRI revealed a RTC which was repaired on 2019. 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 She started to develop shoulder pain again later, and had arthroscopic surgery performed on 2020 to release the posterior capsule. She also had a SAD and bursectomy. She had a course of PT, but continued to have pain. She then had a biceps tenodesis performed on 3/10/2021. She got cellulitis and had additional surgery to clean up the infection on 3/18/2021 and was in a sling for 2 weeks. She had another course of PT for 5 months and hit a state of plateau and was discharged to [facility]. She states that her left shoulder pain continued to increase and she requested a platelet-rich plasma (PRP) injection. She is status post L rotator cuff tenotomy with PRP on 2023. She states that her pain increased to 9-10/10 the evening after procedure. She states that she was not prescribed pain meds and is currently taking OTC Tylenol. She is currently taking Tramadol for another health issue. She states that her pain level is slightly improving. 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  Update: 24: Reports not much change overall. Undergoing FCE in 2 weeks. Hoping to get and MRI after that.  Quality of life: fair    Patient Goals  Patient goals for therapy: increased strength, decreased pain, increased motion and independence with ADLs/IADLs    Pain  Current pain ratin  At best pain  ratin  At worst pain ratin  Location: L Shoulder  Quality: dull ache, radiating, cramping and squeezing  Relieving factors: ice, medications, relaxation, support and rest  Aggravating factors: walking and overhead activity  Progression: no change    Social Support  Steps to enter house: yes  Stairs in house: yes   Lives in: multiple-level home  Lives with: spouse and adult children    Employment status: working  Hand dominance: right    Treatments  Previous treatment: injection treatment, medication, physical therapy and home therapy        Objective     Postural Observations  Seated posture: good  Standing posture: good      Observations   Left Shoulder   Negative for edema.     Palpation   Left   No palpable tenderness to the biceps.   Tenderness of the anterior deltoid, biceps, middle deltoid, supraspinatus and upper trapezius.     Tenderness     Left Shoulder   Tenderness in the AC joint and biceps tendon (proximal).     Additional Tenderness Details  Patient has significant tenderness in left axillary region.    Cervical/Thoracic Screen   Cervical range of motion within normal limits    Neurological Testing     Sensation     Shoulder   Left Shoulder   Intact: light touch    Right Shoulder   Intact: Light touch    Active Range of Motion   Left Shoulder   Flexion: 105 degrees   Extension: 45 degrees   Abduction: 95 degrees   External rotation 90°: 75 degrees   Internal rotation 0°: 70 degrees   Internal rotation BTB: L5     Right Shoulder   Normal active range of motion    Left Elbow   Normal active range of motion    Right Elbow   Normal active range of motion    Strength/Myotome Testing     Additional Strength Details  L RTC 4/5 t/o all planes              Precautions: None    HEP - Handout provided and discussed      Manuals 23   ART L Shoulder x20' x25' x20' X20' x25'   PROM/Stretch        STM                Neuro Re-Ed        Supine [place] 2x10 2x10 2x10 2x10  2x10   Standing Wall [place] 2x10       AROM Abd with scap retract and with rot at 90 degrees 2x10 2x10 2x10 2x10 2x10   AROM Scaption with scap retract with stick and wall finish 2x10 2x10 2x10 2x10 2x10   TRX 3 way Stretch 4x20''ea 4x20'' ea 4x20'' ea 4x20'' ea 4x20'' ea   Pulleys 3x10 3x10 3x10 3x10 3x10   Stool roll backs  20x10'' 20x10'' 20x10'' 20x10''   Isometrics    2 way, 3x10 5''                            Ther Ex        TB No $ 3x10 L2 2x10 L2 3x10 L2 3x10 L2 3x10 L2   TB 70 degree Shoulder Abd 3x10 L2 2x10 L2 2x10 L2 3x10 L2 3x10 L2   SL ER 3x10 2# 3x10 2# 3x10 2# 3x10 2# 3x10 2#   SL Abd 3x10 2# 3x10 2# 3x10 2# 3x10 2# 3x10 2#   Prone Ys, Ts 3x10 ea 2# 2x10 ea 2# 3x10 ea  3x10 ea2#   Supine Flex with PB 2x10 with PT Shoulder blade overpressure       Supine D2 Flex 3x10 1# 3x10 2# 3x10 2#  3x10 2#   Supine Serratus Punch    3x10 4#            Ther Activity        Retro UBE                Gait Training                        Modalities        CP to left shld Supine x10'       MHP L Shoulder  x10' to start session x10' x10' x10'

## 2024-01-04 ENCOUNTER — OFFICE VISIT (OUTPATIENT)
Dept: PHYSICAL THERAPY | Facility: CLINIC | Age: 49
End: 2024-01-04
Payer: OTHER MISCELLANEOUS

## 2024-01-04 DIAGNOSIS — M67.912 TENDINOPATHY OF ROTATOR CUFF, LEFT: Primary | ICD-10-CM

## 2024-01-04 PROCEDURE — 97140 MANUAL THERAPY 1/> REGIONS: CPT | Performed by: PHYSICAL THERAPIST

## 2024-01-04 PROCEDURE — 97110 THERAPEUTIC EXERCISES: CPT | Performed by: PHYSICAL THERAPIST

## 2024-01-04 PROCEDURE — 97112 NEUROMUSCULAR REEDUCATION: CPT | Performed by: PHYSICAL THERAPIST

## 2024-01-04 NOTE — PROGRESS NOTES
Daily Note     Today's date: 2024  Patient name: Edwin Childs  : 1975  MRN: [id number]  Referring provider: Ford Mendoza MD  Dx:   Encounter Diagnosis     ICD-10-CM    1. Tendinopathy of rotator cuff, left  M67.912           Start Time: 1620  Stop Time: 1730  Total time in clinic (min): 70 minutes    Subjective: Pt HEP going well. Reports some improvement with subscap pain, but still battling continued tightness and pain overall. Reports continues to see benefits from physical therapy at this time. Anxious to continue making progress.      Objective: See treatment diary below      Assessment: Tolerated treatment well. Patient demonstrated fatigue post treatment, exhibited good technique with therapeutic exercises, and would benefit from continued PT      Plan: Continue per plan of care.  Progress treatment as tolerated.           Precautions: None    HEP - Handout provided and discussed      Manuals 23   ART L Shoulder x20' x25' x20' X20' x15'   PROM/Stretch        STM                Neuro Re-Ed        Supine [place] 2x10 2x10 2x10 2x10 2x10   Standing Wall [place] 2x10    2x10   AROM Abd with scap retract and with rot at 90 degrees 2x10 2x10 2x10 2x10 2x10   AROM Scaption with scap retract with stick and wall finish 2x10 2x10 2x10 2x10 2x10   TRX 3 way Stretch 4x20''ea 4x20'' ea 4x20'' ea 4x20'' ea 4x20'' ea   Pulleys 3x10 3x10 3x10 3x10 3x10   Stool roll backs  20x10'' 20x10'' 20x10'' 20x10''   Isometrics    2 way, 3x10 5'' 2 way 3x10 5''                           Ther Ex        TB No $ 3x10 L2 2x10 L2 3x10 L2 3x10 L2 3x10 L2   TB 70 degree Shoulder Abd 3x10 L2 2x10 L2 2x10 L2 3x10 L2 3x10 L2   SL ER 3x10 2# 3x10 2# 3x10 2# 3x10 2# 3x10 2#   SL Abd 3x10 2# 3x10 2# 3x10 2# 3x10 2# 3x10 2#   Prone Ys, Ts 3x10 ea 2# 2x10 ea 2# 3x10 ea     Supine Flex with PB 2x10 with PT Shoulder blade overpressure    3x10   Supine D2 Flex 3x10 1# 3x10 2# 3x10 2#     Supine  Serratus Punch    3x10 4# 3x10 4#           Ther Activity        Retro UBE                Gait Training                        Modalities        CP to left shld Supine x10'       MHP L Shoulder  x10' to start session x10' x10' x10'

## 2024-01-09 ENCOUNTER — OFFICE VISIT (OUTPATIENT)
Dept: PHYSICAL THERAPY | Facility: CLINIC | Age: 49
End: 2024-01-09
Payer: OTHER MISCELLANEOUS

## 2024-01-09 DIAGNOSIS — M67.912 TENDINOPATHY OF ROTATOR CUFF, LEFT: Primary | ICD-10-CM

## 2024-01-09 PROCEDURE — 97110 THERAPEUTIC EXERCISES: CPT | Performed by: PHYSICAL THERAPIST

## 2024-01-09 PROCEDURE — 97112 NEUROMUSCULAR REEDUCATION: CPT | Performed by: PHYSICAL THERAPIST

## 2024-01-09 PROCEDURE — 97140 MANUAL THERAPY 1/> REGIONS: CPT | Performed by: PHYSICAL THERAPIST

## 2024-01-09 NOTE — PROGRESS NOTES
Daily Note     Today's date: 2024  Patient name: Edwin Childs  : 1975  MRN: [id number]  Referring provider: Ford Mendoza MD  Dx:   Encounter Diagnosis     ICD-10-CM    1. Tendinopathy of rotator cuff, left  M67.912                      Subjective: Pt HEP going well. Reports some improvement with subscap pain, but still battling continued tightness and pain overall. Reports continues to see benefits from physical therapy at this time. Anxious to continue making progress.      Objective: See treatment diary below      Assessment: Tolerated treatment well. Patient demonstrated fatigue post treatment, exhibited good technique with therapeutic exercises, and would benefit from continued PT      Plan: Continue per plan of care.  Progress treatment as tolerated.           Precautions: None    HEP - Handout provided and discussed      Manuals 23   ART L Shoulder x20' x25' x20' X20' x15'   PROM/Stretch        STM                Neuro Re-Ed        Supine [place] 2x10 2x10 2x10 2x10 2x10   Standing Wall [place] 2x10    2x10   AROM Abd with scap retract and with rot at 90 degrees 2x10 2x10 2x10 2x10 2x10   AROM Scaption with scap retract with stick and wall finish 2x10 2x10 2x10 2x10 2x10   TRX 3 way Stretch 4x20''ea 4x20'' ea 4x20'' ea 4x20'' ea 4x20'' ea   Pulleys 3x10 3x10 3x10 3x10 3x10   Stool roll backs 20x10'' 20x10'' 20x10'' 20x10'' 20x10''   Isometrics 2 way 3x10 5''   2 way, 3x10 5'' 2 way 3x10 5''                           Ther Ex        TB No $ 3x10 L2 2x10 L2 3x10 L2 3x10 L2 3x10 L2   TB 70 degree Shoulder Abd 3x10 L2 2x10 L2 2x10 L2 3x10 L2 3x10 L2   SL ER 3x10 2# 3x10 2# 3x10 2# 3x10 2# 3x10 2#   SL Abd 3x10 2# 3x10 2# 3x10 2# 3x10 2# 3x10 2#   Prone Ys, Ts  2x10 ea 2# 3x10 ea     Supine Flex with PB 2x10 with PT Shoulder blade overpressure    3x10   Supine D2 Flex 3x10 1# 3x10 2# 3x10 2#     Supine Serratus Punch 3x10 5#   3x10 4# 3x10 4#           Ther Activity         Retro UBE                Gait Training                        Modalities        CP to left shld Supine x10'       MHP L Shoulder  x10' to start session x10' x10' x10'

## 2024-01-11 ENCOUNTER — OFFICE VISIT (OUTPATIENT)
Dept: PHYSICAL THERAPY | Facility: CLINIC | Age: 49
End: 2024-01-11
Payer: OTHER MISCELLANEOUS

## 2024-01-11 DIAGNOSIS — M67.912 TENDINOPATHY OF ROTATOR CUFF, LEFT: Primary | ICD-10-CM

## 2024-01-11 PROCEDURE — 97140 MANUAL THERAPY 1/> REGIONS: CPT | Performed by: PHYSICAL THERAPIST

## 2024-01-11 PROCEDURE — 97110 THERAPEUTIC EXERCISES: CPT | Performed by: PHYSICAL THERAPIST

## 2024-01-11 PROCEDURE — 97112 NEUROMUSCULAR REEDUCATION: CPT | Performed by: PHYSICAL THERAPIST

## 2024-01-11 NOTE — PROGRESS NOTES
Daily Note     Today's date: 2024  Patient name: Edwin Childs  : 1975  MRN: [id number]  Referring provider: Ford Mendoza MD  Dx:   Encounter Diagnosis     ICD-10-CM    1. Tendinopathy of rotator cuff, left  M67.912                      Subjective: Pt HEP going well. Reports some improvement with subscap pain, but still battling continued tightness and pain overall. Reports continues to see benefits from physical therapy at this time. Anxious to continue making progress.      Objective: See treatment diary below      Assessment: Tolerated treatment well. Patient demonstrated fatigue post treatment, exhibited good technique with therapeutic exercises, and would benefit from continued PT      Plan: Continue per plan of care.  Progress treatment as tolerated.           Precautions: None    HEP - Handout provided and discussed      Manuals 23   ART L Shoulder x20' X20' x20' X20' x15'   PROM/Stretch        STM                Neuro Re-Ed        Supine [place] 2x10 2x10 2x10 2x10 2x10   Standing Wall [place] 2x10 2x10   2x10   AROM Abd with scap retract and with rot at 90 degrees 2x10 2x10 2x10 2x10 2x10   AROM Scaption with scap retract with stick and wall finish 2x10 2x10 2x10 2x10 2x10   TRX 3 way Stretch 4x20''ea 4x20'' ea 4x20'' ea 4x20'' ea 4x20'' ea   Pulleys 3x10 3x10 3x10 3x10 3x10   Stool roll backs 20x10'' 20x10'' 20x10'' 20x10'' 20x10''   Isometrics 2 way 3x10 5'' 2 way 3x10 5''  2 way, 3x10 5'' 2 way 3x10 5''                           Ther Ex        TB No $ 3x10 L2 2x10 L2 3x10 L2 3x10 L2 3x10 L2   TB 70 degree Shoulder Abd 3x10 L2 2x10 L2 2x10 L2 3x10 L2 3x10 L2   SL ER 3x10 2# 3x10 2# 3x10 2# 3x10 2# 3x10 2#   SL Abd 3x10 2# 3x10 2# 3x10 2# 3x10 2# 3x10 2#   Prone Ys, Ts  2x10 ea 2# 3x10 ea     Supine Flex with PB 2x10 with PT Shoulder blade overpressure 2x10   3x10   Supine D2 Flex 3x10 1# 3x10 2# 3x10 2#     Supine Serratus Punch 3x10 5# 3x10 5#  3x10 4#  3x10 4#           Ther Activity        Retro UBE                Gait Training                        Modalities        CP to left shld Supine x10'       MHP L Shoulder  x10' to start session x10' x10' x10'

## 2024-01-16 ENCOUNTER — APPOINTMENT (OUTPATIENT)
Dept: PHYSICAL THERAPY | Facility: CLINIC | Age: 49
End: 2024-01-16
Payer: OTHER MISCELLANEOUS

## 2024-01-18 ENCOUNTER — OFFICE VISIT (OUTPATIENT)
Dept: PHYSICAL THERAPY | Facility: CLINIC | Age: 49
End: 2024-01-18
Payer: OTHER MISCELLANEOUS

## 2024-01-18 DIAGNOSIS — M67.912 TENDINOPATHY OF ROTATOR CUFF, LEFT: Primary | ICD-10-CM

## 2024-01-18 PROCEDURE — 97110 THERAPEUTIC EXERCISES: CPT | Performed by: PHYSICAL THERAPIST

## 2024-01-18 PROCEDURE — 97112 NEUROMUSCULAR REEDUCATION: CPT | Performed by: PHYSICAL THERAPIST

## 2024-01-18 PROCEDURE — 97140 MANUAL THERAPY 1/> REGIONS: CPT | Performed by: PHYSICAL THERAPIST

## 2024-01-18 NOTE — PROGRESS NOTES
Daily Note     Today's date: 2024  Patient name: Edwin Childs  : 1975  MRN: [id number]  Referring provider: Ford Mendoza MD  Dx:   Encounter Diagnosis     ICD-10-CM    1. Tendinopathy of rotator cuff, left  M67.912                      Subjective: Pt HEP going well. Reports some improvement with subscap pain, but still battling continued tightness and pain overall. Reports continues to see benefits from physical therapy at this time. Anxious to continue making progress.      Objective: See treatment diary below      Assessment: Tolerated treatment well. Patient demonstrated fatigue post treatment, exhibited good technique with therapeutic exercises, and would benefit from continued PT      Plan: Continue per plan of care.  Progress treatment as tolerated.           Precautions: None    HEP - Handout provided and discussed      Manuals 24   ART L Shoulder x20' X20' x20' X20' x15'   PROM/Stretch        STM                Neuro Re-Ed        Supine [place] 2x10 2x10 2x10 2x10 2x10   Standing Wall [place] 2x10 2x10 2x10  2x10   AROM Abd with scap retract and with rot at 90 degrees 2x10 2x10 2x10 2x10 2x10   AROM Scaption with scap retract with stick and wall finish 2x10 2x10 2x10 2x10 2x10   TRX 3 way Stretch 4x20''ea 4x20'' ea 4x20'' ea 4x20'' ea 4x20'' ea   Pulleys 3x10 3x10 3x10 3x10 3x10   Stool roll backs 20x10'' 20x10'' 20x10'' 20x10'' 20x10''   Isometrics 2 way 3x10 5'' 2 way 3x10 5'' 2 way 3x10 5'' 2 way, 3x10 5'' 2 way 3x10 5''                           Ther Ex        TB No $ 3x10 L2 2x10 L2 3x10 L2 3x10 L2 3x10 L2   TB 70 degree Shoulder Abd 3x10 L2 2x10 L2 2x10 L2 3x10 L2 3x10 L2   SL ER 3x10 2# 3x10 2# 3x10 2# 3x10 2# 3x10 2#   SL Abd 3x10 2# 3x10 2# 3x10 2# 3x10 2# 3x10 2#   Prone Ys, Ts  2x10 ea 2# 3x10 ea 2#     Supine Flex with PB 2x10 with PT Shoulder blade overpressure 2x10 2x10  3x10   Supine D2 Flex 3x10 1# 3x10 2# 3x10 2#     Supine Serratus Punch  3x10 5# 3x10 5# 3x10 5# 3x10 4# 3x10 4#           Ther Activity        Retro UBE                Gait Training                        Modalities        CP to left shld Supine x10'       MHP L Shoulder  x10' to start session x10' x10' x10'

## 2024-01-23 ENCOUNTER — OFFICE VISIT (OUTPATIENT)
Dept: PHYSICAL THERAPY | Facility: CLINIC | Age: 49
End: 2024-01-23
Payer: OTHER MISCELLANEOUS

## 2024-01-23 DIAGNOSIS — M67.912 TENDINOPATHY OF ROTATOR CUFF, LEFT: Primary | ICD-10-CM

## 2024-01-23 PROCEDURE — 97110 THERAPEUTIC EXERCISES: CPT | Performed by: PHYSICAL THERAPIST

## 2024-01-23 PROCEDURE — 97140 MANUAL THERAPY 1/> REGIONS: CPT | Performed by: PHYSICAL THERAPIST

## 2024-01-23 NOTE — PROGRESS NOTES
Daily Note     Today's date: 2024  Patient name: Edwin Childs  : 1975  MRN: [id number]  Referring provider: Ford Mendoza MD  Dx:   Encounter Diagnosis     ICD-10-CM    1. Tendinopathy of rotator cuff, left  M67.912                      Subjective: Pt HEP going well. Reports some improvement with subscap pain, but still battling continued tightness and pain overall. Reports continues to see benefits from physical therapy at this time. Anxious to continue making progress.      Objective: See treatment diary below      Assessment: Tolerated treatment well. Patient demonstrated fatigue post treatment, exhibited good technique with therapeutic exercises, and would benefit from continued PT      Plan: Continue per plan of care.  Progress treatment as tolerated.           Precautions: None    HEP - Handout provided and discussed      Manuals 24   ART L Shoulder x20' X20' x20' X20' x15'   PROM/Stretch        STM                Neuro Re-Ed        Supine [place] 2x10 2x10 2x10 2x10 2x10   Standing Wall [place] 2x10 2x10 2x10 2x10 2x10   AROM Abd with scap retract and with rot at 90 degrees 2x10 2x10 2x10 2x10 2x10   AROM Scaption with scap retract with stick and wall finish 2x10 2x10 2x10 2x10 2x10   TRX 3 way Stretch 4x20''ea 4x20'' ea 4x20'' ea 4x20'' ea 4x20'' ea   Pulleys 3x10 3x10 3x10 3x10 3x10   Stool roll backs 20x10'' 20x10'' 20x10'' 20x10'' 20x10''   Isometrics 2 way 3x10 5'' 2 way 3x10 5'' 2 way 3x10 5'' 2 way, 3x10 5'' 2 way 3x10 5''                           Ther Ex        TB No $ 3x10 L2 2x10 L2 3x10 L2 3x10 L2 3x10 L2   TB 70 degree Shoulder Abd 3x10 L2 2x10 L2 2x10 L2 3x10 L2 3x10 L2   SL ER 3x10 2# 3x10 2# 3x10 2# 3x10 2# 3x10 2#   SL Abd 3x10 2# 3x10 2# 3x10 2# 3x10 2# 3x10 2#   Prone Ys, Ts  2x10 ea 2# 3x10 ea 2# 3x10 ea 2#    Supine Flex with PB 2x10 with PT Shoulder blade overpressure 2x10 2x10 2x10 3x10   Supine D2 Flex 3x10 1# 3x10 2# 3x10 2# 3x10 2#     Supine Serratus Punch 3x10 5# 3x10 5# 3x10 5# 3x10 4# 3x10 4#           Ther Activity        Retro UBE                Gait Training                        Modalities        CP to left shld Supine x10'       MHP L Shoulder  x10' to start session x10' x10' x10'

## 2024-01-25 ENCOUNTER — APPOINTMENT (OUTPATIENT)
Dept: PHYSICAL THERAPY | Facility: CLINIC | Age: 49
End: 2024-01-25
Payer: OTHER MISCELLANEOUS

## 2024-01-30 ENCOUNTER — OFFICE VISIT (OUTPATIENT)
Dept: PHYSICAL THERAPY | Facility: CLINIC | Age: 49
End: 2024-01-30
Payer: OTHER MISCELLANEOUS

## 2024-01-30 DIAGNOSIS — M67.912 TENDINOPATHY OF ROTATOR CUFF, LEFT: Primary | ICD-10-CM

## 2024-01-30 PROCEDURE — 97110 THERAPEUTIC EXERCISES: CPT | Performed by: PHYSICAL THERAPIST

## 2024-01-30 PROCEDURE — 97140 MANUAL THERAPY 1/> REGIONS: CPT | Performed by: PHYSICAL THERAPIST

## 2024-01-30 NOTE — PROGRESS NOTES
Daily Note     Today's date: 2024  Patient name: Edwin Childs  : 1975  MRN: [id number]  Referring provider: Ford Mendoza MD  Dx:   Encounter Diagnosis     ICD-10-CM    1. Tendinopathy of rotator cuff, left  M67.912                      Subjective: Pt HEP going well. Reports some improvement with subscap pain, but still battling continued tightness and pain overall. Reports continues to see benefits from physical therapy at this time. Anxious to continue making progress.      Objective: See treatment diary below      Assessment: Tolerated treatment well. Patient demonstrated fatigue post treatment, exhibited good technique with therapeutic exercises, and would benefit from continued PT      Plan: Continue per plan of care.  Progress treatment as tolerated.           Precautions: None    HEP - Handout provided and discussed      Manuals 24   ART L Shoulder x20' X20' x20' X20' x15'   PROM/Stretch        STM                Neuro Re-Ed        Supine [place] 2x10 2x10 2x10 2x10 2x10   Standing Wall [place] 2x10 2x10 2x10 2x10 2x10   AROM Abd with scap retract and with rot at 90 degrees 2x10 2x10 2x10 2x10 2x10   AROM Scaption with scap retract with stick and wall finish 2x10 2x10 2x10 2x10 2x10   TRX 3 way Stretch 4x20''ea 4x20'' ea 4x20'' ea 4x20'' ea 4x20'' ea   Pulleys 3x10 3x10 3x10 3x10 3x10   Stool roll backs 20x10'' 20x10'' 20x10'' 20x10'' 20x10''   Isometrics 2 way 3x10 5'' 2 way 3x10 5'' 2 way 3x10 5'' 2 way, 3x10 5'' 2 way 3x10 5''                           Ther Ex        TB No $ 3x10 L2 2x10 L2 3x10 L2 3x10 L2 3x10 L2   TB 70 degree Shoulder Abd 3x10 L2 2x10 L2 2x10 L2 3x10 L2 3x10 L2   SL ER 3x10 2# 3x10 2# 3x10 2# 3x10 2# 3x10 2#   SL Abd 3x10 2# 3x10 2# 3x10 2# 3x10 2# 3x10 2#   Prone Ys, Ts  2x10 ea 2# 3x10 ea 2# 3x10 ea 2#    Supine Flex with PB 2x10 with PT Shoulder blade overpressure 2x10 2x10 2x10 3x10   Supine D2 Flex 3x10 1# 3x10 2# 3x10 2# 3x10 2#  3x10 1#   Supine Serratus Punch 3x10 5# 3x10 5# 3x10 5# 3x10 4# 3x10 4#           Ther Activity        Retro UBE                Gait Training                        Modalities        CP to left shld Supine x10'       MHP L Shoulder  x10' to start session x10' x10' x10'

## 2024-02-01 ENCOUNTER — OFFICE VISIT (OUTPATIENT)
Dept: PHYSICAL THERAPY | Facility: CLINIC | Age: 49
End: 2024-02-01
Payer: OTHER MISCELLANEOUS

## 2024-02-01 DIAGNOSIS — M67.912 TENDINOPATHY OF ROTATOR CUFF, LEFT: Primary | ICD-10-CM

## 2024-02-01 PROCEDURE — 97140 MANUAL THERAPY 1/> REGIONS: CPT | Performed by: PHYSICAL THERAPIST

## 2024-02-01 PROCEDURE — 97110 THERAPEUTIC EXERCISES: CPT | Performed by: PHYSICAL THERAPIST

## 2024-02-01 NOTE — PROGRESS NOTES
Daily Note     Today's date: 2024  Patient name: Edwin Childs  : 1975  MRN: [id number]  Referring provider: Ford Mendoza MD  Dx:   Encounter Diagnosis     ICD-10-CM    1. Tendinopathy of rotator cuff, left  M67.912                      Subjective: Pt HEP going well. Reports some improvement with subscap pain, but still battling continued tightness and pain overall. Reports continues to see benefits from physical therapy at this time. Anxious to continue making progress.      Objective: See treatment diary below      Assessment: Tolerated treatment well. Patient demonstrated fatigue post treatment, exhibited good technique with therapeutic exercises, and would benefit from continued PT      Plan: Continue per plan of care.  Progress treatment as tolerated.           Precautions: None    HEP - Handout provided and discussed      Manuals 24   ART L Shoulder x20' X20' x20' X20' x15'   PROM/Stretch        STM                Neuro Re-Ed        Supine [place] 2x10 2x10 2x10 2x10 2x10   Standing Wall [place] 2x10 2x10 2x10 2x10 2x10   AROM Abd with scap retract and with rot at 90 degrees 2x10 2x10 2x10 2x10 2x10   AROM Scaption with scap retract with stick and wall finish 2x10 2x10 2x10 2x10 2x10   TRX 3 way Stretch 4x20''ea 4x20'' ea 4x20'' ea 4x20'' ea 4x20'' ea   Pulleys 3x10 3x10 3x10 3x10 3x10   Stool roll backs 20x10'' 20x10'' 20x10'' 20x10'' 20x10''   Isometrics 2 way 3x10 5'' 2 way 3x10 5'' 2 way 3x10 5'' 2 way, 3x10 5'' 2 way 3x10 5''                           Ther Ex        TB No $ 3x10 L2 2x10 L2 3x10 L2 3x10 L2 3x10 L2   TB 70 degree Shoulder Abd 3x10 L2 2x10 L2 2x10 L2 3x10 L2 3x10 L2   SL ER 3x10 2# 3x10 2# 3x10 2# 3x10 2# 3x10 2#   SL Abd 3x10 2# 3x10 2# 3x10 2# 3x10 2# 3x10 2#   Prone Ys, Ts 2x10 ea 2# 2x10 ea 2# 3x10 ea 2# 3x10 ea 2#    Supine Flex with PB 2x10 with PT Shoulder blade overpressure 2x10 2x10 2x10 3x10   Supine D2 Flex 3x10 2# 3x10 2# 3x10 2#  3x10 2# 3x10 1#   Supine Serratus Punch 3x10 5# 3x10 5# 3x10 5# 3x10 4# 3x10 4#           Ther Activity        Retro UBE                Gait Training                        Modalities        CP to left shld Supine x10'       MHP L Shoulder  x10' to start session x10' x10' x10'

## 2024-02-06 ENCOUNTER — APPOINTMENT (OUTPATIENT)
Dept: PHYSICAL THERAPY | Facility: CLINIC | Age: 49
End: 2024-02-06
Payer: OTHER MISCELLANEOUS

## 2024-02-08 ENCOUNTER — OFFICE VISIT (OUTPATIENT)
Dept: PHYSICAL THERAPY | Facility: CLINIC | Age: 49
End: 2024-02-08
Payer: OTHER MISCELLANEOUS

## 2024-02-08 DIAGNOSIS — M67.912 TENDINOPATHY OF ROTATOR CUFF, LEFT: Primary | ICD-10-CM

## 2024-02-08 PROCEDURE — 97110 THERAPEUTIC EXERCISES: CPT | Performed by: PHYSICAL THERAPIST

## 2024-02-08 PROCEDURE — 97140 MANUAL THERAPY 1/> REGIONS: CPT | Performed by: PHYSICAL THERAPIST

## 2024-02-08 NOTE — PROGRESS NOTES
Daily Note     Today's date: 2024  Patient name: Edwin Childs  : 1975  MRN: [id number]  Referring provider: Ford Mendoza MD  Dx:   Encounter Diagnosis     ICD-10-CM    1. Tendinopathy of rotator cuff, left  M67.912                      Subjective: Patient continues to have constant pain in the shoulder. The shoulder tightness is improved after treatment, but but the shoulder stiffens up again later on.      Objective: See treatment diary below      Assessment: Mild PROM gains were noted after MT. Some discomfort was present during stretching and strengthening exercises.      Plan: Continue per plan of care.      Precautions: None    HEP - Handout provided and discussed      Manuals 24   ART L Shoulder x20' X20' x20' X20' x15'   PROM/Stretch  RK      STM                Neuro Re-Ed        Supine [place] 2x10 2x10 2x10 2x10 2x10   Standing Wall [place] 2x10 2x10 2x10 2x10 2x10   AROM Abd with scap retract and with rot at 90 degrees 2x10 2x10 2x10 2x10 2x10   AROM Scaption with scap retract with stick and wall finish 2x10 2x10 2x10 2x10 2x10   TRX 3 way Stretch 4x20''ea 4x20'' ea 4x20'' ea 4x20'' ea 4x20'' ea   Pulleys 3x10 3x10 3x10 3x10 3x10   Stool roll backs 20x10'' 20x10'' 20x10'' 20x10'' 20x10''   Isometrics 2 way 3x10 5'' 2 way 3x10 5'' 2 way 3x10 5'' 2 way, 3x10 5'' 2 way 3x10 5''                           Ther Ex        TB No $ 3x10 L2 2x10 L2 3x10 L2 3x10 L2 3x10 L2   TB 70 degree Shoulder Abd 3x10 L2 2x10 L2 2x10 L2 3x10 L2 3x10 L2   SL ER 3x10 2# 3x10 2# 3x10 2# 3x10 2# 3x10 2#   SL Abd 3x10 2# 3x10 2# 3x10 2# 3x10 2# 3x10 2#   Prone Ys, Ts 2x10 ea 2# 2x10 ea 2# 3x10 ea 2# 3x10 ea 2#    Supine Flex with PB 2x10 with PT Shoulder blade overpressure 2x10 2x10 2x10 3x10   Supine D2 Flex 3x10 2# 3x10 2# 3x10 2# 3x10 2# 3x10 1#   Supine Serratus Punch 3x10 5# 3x10 5# 3x10 5# 3x10 4# 3x10 4#           Ther Activity        Retro UBE                Gait Training                         Modalities        CP to left shld Supine x10' Def      MHP L Shoulder  Def x10' x10' x10'

## 2024-02-13 ENCOUNTER — APPOINTMENT (OUTPATIENT)
Dept: PHYSICAL THERAPY | Facility: CLINIC | Age: 49
End: 2024-02-13
Payer: OTHER MISCELLANEOUS

## 2024-02-15 ENCOUNTER — APPOINTMENT (OUTPATIENT)
Dept: PHYSICAL THERAPY | Facility: CLINIC | Age: 49
End: 2024-02-15
Payer: OTHER MISCELLANEOUS

## 2024-02-20 ENCOUNTER — OFFICE VISIT (OUTPATIENT)
Dept: PHYSICAL THERAPY | Facility: CLINIC | Age: 49
End: 2024-02-20
Payer: OTHER MISCELLANEOUS

## 2024-02-20 DIAGNOSIS — M67.912 TENDINOPATHY OF ROTATOR CUFF, LEFT: Primary | ICD-10-CM

## 2024-02-20 PROCEDURE — 97140 MANUAL THERAPY 1/> REGIONS: CPT

## 2024-02-20 PROCEDURE — 97110 THERAPEUTIC EXERCISES: CPT

## 2024-02-20 NOTE — PROGRESS NOTES
Daily Note     Today's date: 2024  Patient name: Edwin Childs  : 1975  MRN: [id number]  Referring provider: Ford Mendoza MD  Dx:   Encounter Diagnosis     ICD-10-CM    1. Tendinopathy of rotator cuff, left  M67.912           Start Time: 08  Stop Time: 930  Total time in clinic (min): 45 minutes    Subjective: Patient reports she doesn't feeling her motion is consistent with improvements. She reports feeling sore and stiff from not being into therapy last week because of the weather and other     Objective: See treatment diary below      Assessment: session started with heat to decrease soreness and tightness. Patient continued with some discomfort was present during stretching and strengthening exercises. Spent significant time with manual stretching supine to tolerance with mild guarding and good visual improvements seen after. Patient left with some increased soreness however overall in good condition.       Plan: Continue per plan of care.      Precautions: None    HEP - Handout provided and discussed      Manuals 24   ART L Shoulder x20' X20'  X20' x15'   PROM/Stretch  RK KL     STM                Neuro Re-Ed        Supine [place] 2x10 2x10 2x10 2x10 2x10   Standing Wall [place] 2x10 2x10 2x10 2x10 2x10   AROM Abd with scap retract and with rot at 90 degrees 2x10 2x10 2x10 2x10 2x10   AROM Scaption with scap retract with stick and wall finish 2x10 2x10 2x10 2x10 2x10   TRX 3 way Stretch 4x20''ea 4x20'' ea 4x20'' ea 4x20'' ea 4x20'' ea   Pulleys 3x10 3x10 3x10 3x10 3x10   Stool roll backs 20x10'' 20x10''  20x10'' 20x10''   Isometrics 2 way 3x10 5'' 2 way 3x10 5'' 2 way 3x10 5'' 2 way, 3x10 5'' 2 way 3x10 5''                           Ther Ex        TB No $ 3x10 L2 2x10 L2 2x10 L2 3x10 L2 3x10 L2   TB 70 degree Shoulder Abd 3x10 L2 2x10 L2 2x10 L2 3x10 L2 3x10 L2   SL ER 3x10 2# 3x10 2#  3x10 2# 3x10 2#   SL Abd 3x10 2# 3x10 2#  3x10 2# 3x10 2#   Prone Ys, Ts  2x10 ea 2# 2x10 ea 2#  3x10 ea 2#    Supine Flex with PB 2x10 with PT Shoulder blade overpressure 2x10 2x10 2x10 3x10   Supine D2 Flex 3x10 2# 3x10 2#  3x10 2# 3x10 1#   Supine Serratus Punch 3x10 5# 3x10 5#  3x10 4# 3x10 4#           Ther Activity        Retro UBE                Gait Training                        Modalities        CP to left shld Supine x10' Def      MHP L Shoulder  Def 10 min x10' x10'

## 2024-02-22 ENCOUNTER — OFFICE VISIT (OUTPATIENT)
Dept: PHYSICAL THERAPY | Facility: CLINIC | Age: 49
End: 2024-02-22
Payer: OTHER MISCELLANEOUS

## 2024-02-22 DIAGNOSIS — M67.912 TENDINOPATHY OF ROTATOR CUFF, LEFT: Primary | ICD-10-CM

## 2024-02-22 PROCEDURE — 97110 THERAPEUTIC EXERCISES: CPT

## 2024-02-22 PROCEDURE — 97140 MANUAL THERAPY 1/> REGIONS: CPT

## 2024-02-22 PROCEDURE — 97112 NEUROMUSCULAR REEDUCATION: CPT

## 2024-02-22 NOTE — PROGRESS NOTES
"Daily Note     Today's date: 2024  Patient name: Edwin Childs  : 1975  MRN: [id number]  Referring provider: Ford Mendoza MD  Dx:   Encounter Diagnosis     ICD-10-CM    1. Tendinopathy of rotator cuff, left  M67.912             Start Time: 1045  Stop Time: 1130  Total time in clinic (min): 45 minutes    Subjective: Patient reports constant inconsistencies regarding her L shoulder ROM/mobility and that her motion/lack of motion varies with each day. Patient notes that the only consistent factor is having an exacerbation of symptoms when the weather is bad.      Objective: See treatment diary below      Assessment: Tolerated treatment well today. No significant levels of pain or discomfort noted with any exercises performed today. Continued to focus on ROM, stabilization, and strengthening exercises to tolerance. Patient displayed proper form and had a good recall/understanding of all exercises in her program. Manual stretching/PROM resulted in patient experiencing soreness, but also provided relief and improvements in initially noted tightness. Patient would benefit from continued PT to restore mobility and maximize overall functional ability in order to return to PLOF.    Plan: Continue per plan of care.      Precautions: None    HEP - Handout provided and discussed      Manuals 24   ART L Shoulder x20' X20'   x15'   PROM/Stretch  RK KL KR    STM                Neuro Re-Ed        Supine [place] 2x10 2x10 2x10 2x10 2x10   Standing Wall [place] 2x10 2x10 2x10 2x10 2x10   AROM Abd with scap retract and with rot at 90 degrees 2x10 2x10 2x10 2x10 2x10   AROM Scaption with scap retract with stick and wall finish 2x10 2x10 2x10 2x10 2x10   TRX 3 way Stretch 4x20''ea 4x20'' ea 4x20'' ea 4x20'' ea 4x20'' ea   Pulleys 3x10 3x10 3x10 3x10 3x10   Stool roll backs 20x10'' 20x10''   20x10''   Isometrics 2 way 3x10 5'' 2 way 3x10 5'' 2 way 3x10 5'' 2 way 3x10 5\" 2 way 3x10 5''        "                    Ther Ex        TB No $ 3x10 L2 2x10 L2 2x10 L2 2x10 L2 3x10 L2   TB 70 degree Shoulder Abd 3x10 L2 2x10 L2 2x10 L2 2x10 L2 3x10 L2   SL ER 3x10 2# 3x10 2#   3x10 2#   SL Abd 3x10 2# 3x10 2#   3x10 2#   Prone Ys, Ts 2x10 ea 2# 2x10 ea 2#      Supine Flex with PB 2x10 with PT Shoulder blade overpressure 2x10 2x10 2x10 3x10   Supine D2 Flex 3x10 2# 3x10 2#  3x10 2# 3x10 1#   Supine Serratus Punch 3x10 5# 3x10 5#  3x10 5# 3x10 4#           Ther Activity        Retro UBE                Gait Training                        Modalities        CP to left shld Supine x10' Def      MHP L Shoulder  Def 10 min x10' x10'

## 2024-02-27 ENCOUNTER — OFFICE VISIT (OUTPATIENT)
Dept: PHYSICAL THERAPY | Facility: CLINIC | Age: 49
End: 2024-02-27
Payer: OTHER MISCELLANEOUS

## 2024-02-27 DIAGNOSIS — M67.912 TENDINOPATHY OF ROTATOR CUFF, LEFT: Primary | ICD-10-CM

## 2024-02-27 PROCEDURE — 97110 THERAPEUTIC EXERCISES: CPT

## 2024-02-27 PROCEDURE — 97112 NEUROMUSCULAR REEDUCATION: CPT

## 2024-02-27 PROCEDURE — 97140 MANUAL THERAPY 1/> REGIONS: CPT

## 2024-02-27 NOTE — PROGRESS NOTES
"Daily Note     Today's date: 2024  Patient name: Edwin Childs  : 1975  MRN: [id number]  Referring provider: Ford Mendoza MD  Dx:   Encounter Diagnosis     ICD-10-CM    1. Tendinopathy of rotator cuff, left  M67.912                      Subjective: Pt reports she is feeling about the same as last visit, no changes.       Objective: See treatment diary below      Assessment: Tolerated treatment well. Pt performed all exercises without issue, continue to progress to tolerance. Pt showed good control with exercises, continue to work towards full ROM. Pt would benefit from continued stretching and strengthening exercises and PROM stretching to increase ROM and overall function. Patient demonstrated fatigue post treatment, exhibited good technique with therapeutic exercises, and would benefit from continued PT      Plan: Continue per plan of care.      Precautions: None    HEP - Handout provided and discussed      Manuals 24   ART L Shoulder x20' X20'      PROM/Stretch  RK KL KR KM   STM                Neuro Re-Ed        Supine [place] 2x10 2x10 2x10 2x10    Standing Wall [place] 2x10 2x10 2x10 2x10 2x10   AROM Abd with scap retract and with rot at 90 degrees 2x10 2x10 2x10 2x10 2x10   AROM Scaption with scap retract with stick and wall finish 2x10 2x10 2x10 2x10    TRX 3 way Stretch 4x20''ea 4x20'' ea 4x20'' ea 4x20'' ea nv   Pulleys 3x10 3x10 3x10 3x10 3x10   Stool roll backs 20x10'' 20x10''      Isometrics 2 way 3x10 5'' 2 way 3x10 5'' 2 way 3x10 5'' 2 way 3x10 5\" 2 way 3x10 5\"                           Ther Ex        TB No $ 3x10 L2 2x10 L2 2x10 L2 2x10 L2 2x10 L2   TB 70 degree Shoulder Abd 3x10 L2 2x10 L2 2x10 L2 2x10 L2 2x10 L2   SL ER 3x10 2# 3x10 2#      SL Abd 3x10 2# 3x10 2#      Prone Ys, Ts 2x10 ea 2# 2x10 ea 2#      Supine Flex with PB 2x10 with PT Shoulder blade overpressure 2x10 2x10 2x10 2x10   Supine D2 Flex 3x10 2# 3x10 2#  3x10 2# 3x10 2#   Supine Serratus " Punch 3x10 5# 3x10 5#  3x10 5# 3x10 5#           Ther Activity        Retro UBE                Gait Training                        Modalities        CP to left shld Supine x10' Def      MHP L Shoulder  Def 10 min x10' 10'

## 2024-02-29 ENCOUNTER — APPOINTMENT (OUTPATIENT)
Dept: PHYSICAL THERAPY | Facility: CLINIC | Age: 49
End: 2024-02-29
Payer: OTHER MISCELLANEOUS

## 2024-03-05 ENCOUNTER — APPOINTMENT (OUTPATIENT)
Dept: PHYSICAL THERAPY | Facility: CLINIC | Age: 49
End: 2024-03-05
Payer: OTHER MISCELLANEOUS

## 2024-03-07 ENCOUNTER — OFFICE VISIT (OUTPATIENT)
Dept: PHYSICAL THERAPY | Facility: CLINIC | Age: 49
End: 2024-03-07
Payer: OTHER MISCELLANEOUS

## 2024-03-07 DIAGNOSIS — M67.912 TENDINOPATHY OF ROTATOR CUFF, LEFT: Primary | ICD-10-CM

## 2024-03-07 PROCEDURE — 97112 NEUROMUSCULAR REEDUCATION: CPT

## 2024-03-07 PROCEDURE — 97110 THERAPEUTIC EXERCISES: CPT

## 2024-03-07 PROCEDURE — 97140 MANUAL THERAPY 1/> REGIONS: CPT

## 2024-03-07 NOTE — PROGRESS NOTES
"Daily Note     Today's date: 3/7/2024  Patient name: Edwin Childs  : 1975  MRN: [id number]  Referring provider: Ford Mendoza MD  Dx:   Encounter Diagnosis     ICD-10-CM    1. Tendinopathy of rotator cuff, left  M67.912                      Subjective: Pt reports she is doing well but cont to have tightness and pain in L shoulder.       Objective: See treatment diary below      Assessment: Tolerated treatment well. Patient demonstrated fatigue post treatment and exhibited good technique with therapeutic exercises. Pt challenged with overhead reaching and stretching. Pt notable restriction with L shoulder abd/flx PROM. Will cont to benefit from program.       Plan: Continue per plan of care.      Precautions: None    HEP - Handout provided and discussed      Manuals 3/7 2/8/24 2/20 2/22/24 2/27   ART L Shoulder  X20'      PROM/Stretch JV RK KL KR KM   STM                Neuro Re-Ed        Supine [place]  2x10 2x10 2x10    Standing Wall [place] 2x10 2x10 2x10 2x10 2x10   AROM Abd with scap retract and with rot at 90 degrees 2x10 2x10 2x10 2x10 2x10   AROM Scaption with scap retract with stick and wall finish 2/10 2x10 2x10 2x10    TRX 3 way Stretch 4x20''ea 4x20'' ea 4x20'' ea 4x20'' ea nv   Pulleys 3x10 3x10 3x10 3x10 3x10   Stool roll backs ' 20x10''      Isometrics 2 way 3x10 5'' 2 way 3x10 5'' 2 way 3x10 5'' 2 way 3x10 5\" 2 way 3x10 5\"                           Ther Ex        TB No $  2x10 L2 2x10 L2 2x10 L2 2x10 L2   TB 70 degree Shoulder Abd 3x10 L2 2x10 L2 2x10 L2 2x10 L2 2x10 L2   SL ER  3x10 2#      SL Abd  3x10 2#      Prone Ys, Ts  2x10 ea 2#      Supine Flex with PB  2x10 2x10 2x10 2x10   Supine D2 Flex 3x10 2# 3x10 2#  3x10 2# 3x10 2#   Supine Serratus Punch 3x10 5# 3x10 5#  3x10 5# 3x10 5#           Ther Activity        Retro UBE                Gait Training                        Modalities        CP to left shld  Def      MHP L Shoulder 10' Def 10 min x10' 10'                "

## 2024-03-12 ENCOUNTER — OFFICE VISIT (OUTPATIENT)
Dept: PHYSICAL THERAPY | Facility: CLINIC | Age: 49
End: 2024-03-12
Payer: OTHER MISCELLANEOUS

## 2024-03-12 DIAGNOSIS — M67.912 TENDINOPATHY OF ROTATOR CUFF, LEFT: Primary | ICD-10-CM

## 2024-03-12 PROCEDURE — 97140 MANUAL THERAPY 1/> REGIONS: CPT

## 2024-03-12 PROCEDURE — 97110 THERAPEUTIC EXERCISES: CPT

## 2024-03-12 PROCEDURE — 97112 NEUROMUSCULAR REEDUCATION: CPT

## 2024-03-12 NOTE — PROGRESS NOTES
"Daily Note     Today's date: 3/12/2024  Patient name: Edwin Childs  : 1975  MRN: [id number]  Referring provider: Ford Mendoza MD  Dx:   Encounter Diagnosis     ICD-10-CM    1. Tendinopathy of rotator cuff, left  M67.912                      Subjective: Pt reports she is cont to have tightness in L shoulder but doing well.       Objective: See treatment diary below      Assessment: Tolerated treatment well. Patient demonstrated fatigue post treatment and exhibited good technique with therapeutic exercises. Pt making cont slow steady gains with program. Cont to [place] well with min c/o increased pain today. Notable restriction with shoulder flx/abd PROM.       Plan: Continue per plan of care.      Precautions: None    HEP - Handout provided and discussed      Manuals 3/7 3/12 2/20 2/22/24 2/27   ART L Shoulder        PROM/Stretch JV JV KL KR KM   STM                Neuro Re-Ed        Supine [place]   2x10 2x10    Standing Wall [place] 2x10 2x10 2x10 2x10 2x10   AROM Abd with scap retract and with rot at 90 degrees 2x10 2x10 2x10 2x10 2x10   AROM Scaption with scap retract with stick and wall finish 2/10 2x10 2x10 2x10    TRX 3 way Stretch 4x20''ea NV 4x20'' ea 4x20'' ea nv   Pulleys 3x10 3x10 3x10 3x10 3x10   Stool roll backs ' '      Isometrics 2 way 3x10 5'' 2 way 3x10 5'' 2 way 3x10 5'' 2 way 3x10 5\" 2 way 3x10 5\"                           Ther Ex        TB No $  2x10 L2 2x10 L2 2x10 L2 2x10 L2   TB 70 degree Shoulder Abd 3x10 L2 2x10 L2 2x10 L2 2x10 L2 2x10 L2   SL ER        SL Abd        Prone Ys, Ts        Supine Flex with PB  2x10 2x10 2x10 2x10   Supine D2 Flex 3x10 2# 3x10 2#  3x10 2# 3x10 2#   Supine Serratus Punch 3x10 5# 3x10 5#  3x10 5# 3x10 5#           Ther Activity        Retro UBE                Gait Training                        Modalities        CP to left shld        MHP L Shoulder 10' 10'  10 min x10' 10'                  "

## 2024-03-13 DIAGNOSIS — Z76.89 ENCOUNTER FOR WEIGHT MANAGEMENT: ICD-10-CM

## 2024-03-14 ENCOUNTER — APPOINTMENT (OUTPATIENT)
Dept: PHYSICAL THERAPY | Facility: CLINIC | Age: 49
End: 2024-03-14
Payer: OTHER MISCELLANEOUS

## 2024-03-14 RX ORDER — SEMAGLUTIDE 1.7 MG/.75ML
1.7 INJECTION, SOLUTION SUBCUTANEOUS WEEKLY
COMMUNITY

## 2024-03-19 ENCOUNTER — OFFICE VISIT (OUTPATIENT)
Dept: PHYSICAL THERAPY | Facility: CLINIC | Age: 49
End: 2024-03-19
Payer: OTHER MISCELLANEOUS

## 2024-03-19 DIAGNOSIS — M67.912 TENDINOPATHY OF ROTATOR CUFF, LEFT: Primary | ICD-10-CM

## 2024-03-19 PROCEDURE — 97140 MANUAL THERAPY 1/> REGIONS: CPT | Performed by: PHYSICAL THERAPIST

## 2024-03-19 PROCEDURE — 97110 THERAPEUTIC EXERCISES: CPT | Performed by: PHYSICAL THERAPIST

## 2024-03-19 NOTE — PROGRESS NOTES
"Daily Note     Today's date: 3/19/2024  Patient name: Edwin Childs  : 1975  MRN: [id number]  Referring provider: Ford Mendoza MD  Dx:   Encounter Diagnosis     ICD-10-CM    1. Tendinopathy of rotator cuff, left  M67.912                      Subjective: Pt reports she is cont to have tightness in L shoulder but doing well.       Objective: See treatment diary below      Assessment: Tolerated treatment well. Patient demonstrated fatigue post treatment, exhibited good technique with therapeutic exercises, and would benefit from continued PT. Pt making cont slow steady gains with program. Cont to [place] well with min c/o increased pain today. Notable restriction with shoulder flx/abd PROM.       Plan: Continue per plan of care.  Progress treatment as tolerated.         Precautions: None    HEP - Handout provided and discussed      Manuals 3/7 3/12 3/19 2/22/24 2/27   ART L Shoulder   x15'     PROM/Stretch JV JV  KR KM   STM                Neuro Re-Ed        Supine [place]   2x10 2x10    Standing Wall [place] 2x10 2x10 2x10 2x10 2x10   AROM Abd with scap retract and with rot at 90 degrees 2x10 2x10 2x10 2x10 2x10   AROM Scaption with scap retract with stick and wall finish 2/10 2x10 2x10 2x10    TRX 3 way Stretch 4x20''ea NV 4x20'' ea 4x20'' ea nv   Pulleys 3x10 3x10 3x10 3x10 3x10   Stool roll backs ' '      Isometrics 2 way 3x10 5'' 2 way 3x10 5'' 2 way 3x10 5'' 2 way 3x10 5\" 2 way 3x10 5\"                           Ther Ex        TB No $  2x10 L2 2x10 L2 2x10 L2 2x10 L2   TB 70 degree Shoulder Abd 3x10 L2 2x10 L2 2x10 L2 2x10 L2 2x10 L2   SL ER        SL Abd        Prone Ys, Ts        Supine Flex with PB  2x10 2x10 2x10 2x10   Supine D2 Flex 3x10 2# 3x10 2# 3x10 2# 3x10 2# 3x10 2#   Supine Serratus Punch 3x10 5# 3x10 5# 3x10 5# 3x10 5# 3x10 5#           Ther Activity        Retro UBE                Gait Training                        Modalities        CP to left shld        MHP L Shoulder 10' 10'  10 min x10' " 10'

## 2024-03-21 ENCOUNTER — APPOINTMENT (OUTPATIENT)
Dept: PHYSICAL THERAPY | Facility: CLINIC | Age: 49
End: 2024-03-21
Payer: OTHER MISCELLANEOUS

## 2024-03-22 RX ORDER — SEMAGLUTIDE 1.7 MG/.75ML
1.7 INJECTION, SOLUTION SUBCUTANEOUS WEEKLY
OUTPATIENT
Start: 2024-03-22

## 2024-03-22 RX ORDER — SEMAGLUTIDE 1.7 MG/.75ML
INJECTION, SOLUTION SUBCUTANEOUS
Qty: 12 ML | Refills: 0 | Status: SHIPPED | OUTPATIENT
Start: 2024-03-22

## 2024-03-24 ENCOUNTER — OFFICE VISIT (OUTPATIENT)
Dept: URGENT CARE | Facility: MEDICAL CENTER | Age: 49
End: 2024-03-24
Payer: COMMERCIAL

## 2024-03-24 VITALS
DIASTOLIC BLOOD PRESSURE: 78 MMHG | WEIGHT: 144 LBS | RESPIRATION RATE: 20 BRPM | HEART RATE: 88 BPM | HEIGHT: 61 IN | BODY MASS INDEX: 27.19 KG/M2 | OXYGEN SATURATION: 99 % | TEMPERATURE: 97.7 F | SYSTOLIC BLOOD PRESSURE: 118 MMHG

## 2024-03-24 DIAGNOSIS — B34.9 VIRAL ILLNESS: Primary | ICD-10-CM

## 2024-03-24 PROCEDURE — G0381 LEV 2 HOSP TYPE B ED VISIT: HCPCS | Performed by: PHYSICIAN ASSISTANT

## 2024-03-24 PROCEDURE — S9083 URGENT CARE CENTER GLOBAL: HCPCS | Performed by: PHYSICIAN ASSISTANT

## 2024-03-24 RX ORDER — METHYLPREDNISOLONE 4 MG/1
TABLET ORAL
Qty: 1 EACH | Refills: 0 | Status: SHIPPED | OUTPATIENT
Start: 2024-03-24

## 2024-03-24 NOTE — PATIENT INSTRUCTIONS
Start Medrol Dose Pack  Continue over the counter medication if needed  If symptoms worsen have yourself rechecked

## 2024-03-24 NOTE — PROGRESS NOTES
[facility]        NAME: Edwin Childs is a 49 y.o. female  : 1975    MRN: [id number]  DATE: 2024  TIME: 4:33 PM    Assessment and Plan   Viral illness [B34.9]  1. Viral illness  methylPREDNISolone 4 MG tablet therapy pack            Patient Instructions     Start Medrol Dose Pack  Continue over the counter medication if needed  If symptoms worsen have yourself rechecked  Follow up with PCP in 3-5 days.  Proceed to  ER if symptoms worsen.    If tests have been performed at [facility], our office will contact you with results if changes need to be made to the care plan discussed with you at the visit.  You can review your full results on [facility].    Chief Complaint     Chief Complaint   Patient presents with   • Cold Like Symptoms     Cough congestion ear pain and sore throat, Started a few days ago, States had episode today where she thought she had a hard time breathing         History of Present Illness       Patient presents with a 3 to 4-day history of chills, runny, stuffy nose, ear pain, sore throat, dry cough and diarrhea.  She has tried numerous over-the-counter medications without improvement.  She states she feels a lot of drainage and tightness down the back of her throat and some sinus pressure.        Review of Systems   Review of Systems   Constitutional:  Positive for chills. Negative for fever.   HENT:  Positive for congestion, ear pain, rhinorrhea and sore throat.    Respiratory:  Positive for cough.    Gastrointestinal:  Positive for diarrhea. Negative for nausea and vomiting.         Current Medications       Current Outpatient Medications:   •  DULoxetine (CYMBALTA) 20 mg capsule, Take 20 mg by mouth daily, Disp: , Rfl:   •  lidocaine (XYLOCAINE) 5 % ointment, APPLY 5-7 FINGERTIP UNITS TO THE AFFECTED AREA 3 TIMES DAILY AS NEEDED (2 FINGERTIP UNITS = 1 GRAM), Disp: , Rfl:   •  methylPREDNISolone 4 MG tablet therapy pack, Use as directed on package, Disp: 1  each, Rfl: 0  •  pregabalin (LYRICA) 50 mg capsule, Take 50 mg by mouth 2 (two) times a day, Disp: , Rfl:   •  Semaglutide-Weight Management (Wegovy) 1.7 MG/0.75ML, Inject 1.7 mg under the skin once a week, Disp: , Rfl:   •  Wegovy 1.7 MG/0.75ML, INJECT 0.75 ML (1.7MG) UNDER THE SKIN  ONCE A WEEK, Disp: 12 mL, Rfl: 0  •  buPROPion (WELLBUTRIN SR) 150 mg 12 hr tablet, take FIRST STARTING DOSE AT 6AM and SECOND dose AT 2PM (Patient not taking: Reported on 8/17/2023), Disp: , Rfl:   •  ergocalciferol (VITAMIN D2) 50,000 units, TAKE 1 CAPSULE BY MOUTH ONCE WEEKLY, Disp: 8 capsule, Rfl: 0  •  mupirocin (BACTROBAN) 2 % ointment, apply to affected area three times a day (Patient not taking: Reported on 8/17/2023), Disp: , Rfl:   •  traMADol (ULTRAM) 50 mg tablet, Take  mg by mouth daily at bedtime as needed (Patient not taking: Reported on 3/24/2024), Disp: , Rfl:     Current Allergies     Allergies as of 03/24/2024   • (No Known Allergies)            The following portions of the patient's history were reviewed and updated as appropriate: allergies, current medications, past family history, past medical history, past social history, past surgical history and problem list.     Past Medical History:   Diagnosis Date   • Carpal tunnel syndrome of left wrist     last assessed 1/23/15    • Morbid obesity with BMI of 40.0-44.9, adult ([facility])     last assessed 12/6/16    • Obesity        Past Surgical History:   Procedure Laterality Date   • APPENDECTOMY  2017   • GASTRECTOMY SLEEVE LAPAROSCOPIC  12/2016   • HAND SURGERY Bilateral     Carpal tunnel    • HYSTERECTOMY     • HYSTEROSCOPY     • NEUROPLASTY / TRANSPOSITION MEDIAN NERVE AT CARPAL TUNNEL Right 2013    decompression    • SHOULDER SURGERY Right 2013   • TONSILLECTOMY AND ADENOIDECTOMY         Family History   Problem Relation Age of Onset   • Hypertension Mother    • Alzheimer's disease Paternal Grandmother    • Ovarian cancer Maternal Aunt    • Uterine cancer  "Maternal Aunt    • Asthma Father    • No Known Problems Sister    • No Known Problems Daughter    • No Known Problems Maternal Grandmother    • No Known Problems Maternal Grandfather    • No Known Problems Paternal Grandfather    • No Known Problems Daughter    • No Known Problems Paternal Aunt    • No Known Problems Paternal Aunt          Medications have been verified.        Objective   /78   Pulse 88   Temp 97.7 °F (36.5 °C)   Resp 20   Ht 5' 1\" (1.549 m)   Wt 65.3 kg (144 lb)   LMP  (LMP Unknown)   SpO2 99%   BMI 27.21 kg/m²   No LMP recorded (lmp unknown). Patient has had a hysterectomy.       Physical Exam     Physical Exam  Vitals and nursing note reviewed.   Constitutional:       Appearance: Normal appearance.   HENT:      Head: Normocephalic and atraumatic.      Right Ear: Tympanic membrane normal.      Left Ear: Tympanic membrane normal.      Nose: Rhinorrhea present.      Mouth/Throat:      Mouth: Mucous membranes are moist.      Pharynx: Oropharynx is clear.   Eyes:      Conjunctiva/sclera: Conjunctivae normal.   Cardiovascular:      Rate and Rhythm: Normal rate and regular rhythm.      Heart sounds: Normal heart sounds.   Pulmonary:      Effort: Pulmonary effort is normal.      Breath sounds: Normal breath sounds.   Musculoskeletal:      Cervical back: Neck supple.   Lymphadenopathy:      Cervical: No cervical adenopathy.   Skin:     General: Skin is warm.   Neurological:      Mental Status: She is alert.                   "

## 2024-03-26 ENCOUNTER — OFFICE VISIT (OUTPATIENT)
Dept: PHYSICAL THERAPY | Facility: CLINIC | Age: 49
End: 2024-03-26
Payer: OTHER MISCELLANEOUS

## 2024-03-26 DIAGNOSIS — M67.912 TENDINOPATHY OF ROTATOR CUFF, LEFT: Primary | ICD-10-CM

## 2024-03-26 PROCEDURE — 97140 MANUAL THERAPY 1/> REGIONS: CPT | Performed by: PHYSICAL THERAPIST

## 2024-03-26 PROCEDURE — 97110 THERAPEUTIC EXERCISES: CPT | Performed by: PHYSICAL THERAPIST

## 2024-03-26 NOTE — LETTER
2024    Ford Mendoza MD  [address] [facility] [address]    Patient: Edwin Childs   YOB: 1975   Date of Visit: 3/26/2024     Encounter Diagnosis     ICD-10-CM    1. Tendinopathy of rotator cuff, left  M67.912           Dear Dr. Mendoza:    Thank you for your recent referral of Edwin Childs. Please review the attached evaluation summary from Edwin's recent visit.     Please verify that you agree with the plan of care by signing the attached order.     If you have any questions or concerns, please do not hesitate to call.     I sincerely appreciate the opportunity to share in the care of one of your patients and hope to have another opportunity to work with you in the near future.       Sincerely,    Juan Pablo Cr, PT, DPT, ATC, ART      Referring Provider:      I certify that I have read the below Plan of Care and certify the need for these services furnished under this plan of treatment while under my care.                    Ford Mendoza MD  [address] [facility] [address]  Via Fax: [phone]          PT Re-Evaluation     Today's date: 3/26/2024  Patient name: Edwin Childs  : 1975  MRN: [id number]  Referring provider: Ford Mendoza MD  Dx:   Encounter Diagnosis     ICD-10-CM    1. Tendinopathy of rotator cuff, left  M67.912                      Assessment  Assessment details: Assessment details: The patient is a 47 yo female who has been completing physical therapy with minimal progress from signs and symptoms consistent with left tendinopathy of rotator cuff s/p post tenotomy with PRP. Deficits are noted in left shoulder ROM and strength as well as functional deficits. She would benefit from continued skilled physical therapy to address deficits in ROM, strength, stability and functional status.       Impairments: abnormal or restricted ROM, abnormal movement, impaired physical strength and pain with function  Functional limitations:  Difficulty with UE dressing, donning bra, doing laundry, and cooking  Symptom irritability: moderateUnderstanding of Dx/Px/POC: excellent   Prognosis: good    Goals  STG(4 weeks) not met:             1. Independent with HEP            2. Decrease pain to 2/10 at worst with functional activities            3. Increase AROM L shoulder by 10-15 degrees in all planes            4. Increase strength LUE by 1/3 MMT grade     LTG(12 weeks) not met:             1.  Decrease pain to 1/10 at worst with functional activities             2. Increase LUE strength to 4+ to 5/5             3. L shoulder AROM WFL             4. Return to PLOF      Plan  Plan details: Progress per protocol  Patient would benefit from: skilled physical therapy  Referral necessary: No  Planned modality interventions: cryotherapy, TENS and electrical stimulation/Russian stimulation  Planned therapy interventions: IASTM, joint mobilization, ADL training, manual therapy, neuromuscular re-education, therapeutic activities, therapeutic exercise and patient education  Frequency: 2x week (1-2x week)  Duration in weeks: 6  Treatment plan discussed with: patient      Subjective Evaluation    History of Present Illness  Date of onset: 9/26/2023  Mechanism of injury: The patient's left shoulder pain began from a work injury which occurred on 8/21/2018. She was standing on a chair 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. She lost her balance and grabbed onto a ledge, yanking on her shoulder and twisting it. An MRI revealed a RTC which was repaired on 2/4/2019. 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 She started to develop shoulder pain again later, and had arthroscopic surgery performed on 5/29/2020 to release the posterior capsule. She also had a SAD and bursectomy. She had a course of PT, but continued to have pain. She then had a biceps tenodesis performed on 3/10/2021. She got cellulitis and had  additional surgery to clean up the infection on 3/18/2021 and was in a sling for 2 weeks. She had another course of PT for 5 months and hit a state of plateau and was discharged to [facility]. She states that her left shoulder pain continued to increase and she requested a platelet-rich plasma (PRP) injection. She is status post L rotator cuff tenotomy with PRP on 2023. She states that her pain increased to 9-10/10 the evening after procedure. She states that she was not prescribed pain meds and is currently taking OTC Tylenol. She is currently taking Tramadol for another health issue. She states that her pain level is slightly improving. 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  Update: 24: Reports not much change overall. Undergoing FCE in 2 weeks. Hoping to get and MRI after that.  Update 3/26/24: Again reports not much change since previous re-eval.  Quality of life: fair    Patient Goals  Patient goals for therapy: increased strength, decreased pain, increased motion and independence with ADLs/IADLs    Pain  Current pain ratin  At best pain ratin  At worst pain ratin  Location: L Shoulder  Quality: dull ache, radiating, cramping and squeezing  Relieving factors: ice, medications, relaxation, support and rest  Aggravating factors: walking and overhead activity  Progression: no change    Social Support  Steps to enter house: yes  Stairs in house: yes   Lives in: multiple-level home  Lives with: spouse and adult children    Employment status: working  Hand dominance: right    Treatments  Previous treatment: injection treatment, medication, physical therapy and home therapy        Objective     Postural Observations  Seated posture: good  Standing posture: good      Observations   Left Shoulder   Negative for edema.     Palpation   Left   No palpable tenderness to the biceps.   Tenderness of the anterior deltoid,  "biceps, middle deltoid, supraspinatus and upper trapezius.     Tenderness     Left Shoulder   Tenderness in the AC joint and biceps tendon (proximal).     Additional Tenderness Details  Patient has significant tenderness in left axillary region.    Cervical/Thoracic Screen   Cervical range of motion within normal limits    Neurological Testing     Sensation     Shoulder   Left Shoulder   Intact: light touch    Right Shoulder   Intact: Light touch    Active Range of Motion   Left Shoulder   Flexion: 105 degrees   Extension: 45 degrees   Abduction: 95 degrees   External rotation 90°: 75 degrees   Internal rotation 0°: 70 degrees   Internal rotation BTB: L5     Right Shoulder   Normal active range of motion    Left Elbow   Normal active range of motion    Right Elbow   Normal active range of motion    Strength/Myotome Testing     Additional Strength Details  L RTC 4/5 t/o all planes                Precautions: None    HEP - Handout provided and discussed      Manuals 3/7 3/12 3/19 3/26/24 2/27   ART L Shoulder   x15' x15'    PROM/Stretch JV JV   KM   STM                Neuro Re-Ed        Supine [place]   2x10 2x10    Standing Wall [place] 2x10 2x10 2x10 2x10 2x10   AROM Abd with scap retract and with rot at 90 degrees 2x10 2x10 2x10 2x10 2x10   AROM Scaption with scap retract with stick and wall finish 2/10 2x10 2x10 2x10    TRX 3 way Stretch 4x20''ea NV 4x20'' ea 4x20'' ea nv   Pulleys 3x10 3x10 3x10 3x10 3x10   Stool roll backs ' '      Isometrics 2 way 3x10 5'' 2 way 3x10 5'' 2 way 3x10 5'' 2 way 3x10 5\" 2 way 3x10 5\"                           Ther Ex        TB No $  2x10 L2 2x10 L2 2x10 L2 2x10 L2   TB 70 degree Shoulder Abd 3x10 L2 2x10 L2 2x10 L2 2x10 L2 2x10 L2   SL ER        SL Abd        Prone Ys, Ts        Supine Flex with PB  2x10 2x10 2x10 2x10   Supine D2 Flex 3x10 2# 3x10 2# 3x10 2# 3x10 2# 3x10 2#   Supine Serratus Punch 3x10 5# 3x10 5# 3x10 5# 3x10 5# 3x10 5#           Ther Activity        Retro UBE      "           Gait Training                        Modalities        CP to left shld        MHP L Shoulder 10' 10'  10 min x10' 10'

## 2024-03-26 NOTE — PROGRESS NOTES
PT Re-Evaluation     Today's date: 3/26/2024  Patient name: Edwin Childs  : 1975  MRN: [id number]  Referring provider: Ford Mendoza MD  Dx:   Encounter Diagnosis     ICD-10-CM    1. Tendinopathy of rotator cuff, left  M67.912                      Assessment  Assessment details: Assessment details: The patient is a 49 yo female who has been completing physical therapy with minimal progress from signs and symptoms consistent with left tendinopathy of rotator cuff s/p post tenotomy with PRP. Deficits are noted in left shoulder ROM and strength as well as functional deficits. She would benefit from continued skilled physical therapy to address deficits in ROM, strength, stability and functional status.       Impairments: abnormal or restricted ROM, abnormal movement, impaired physical strength and pain with function  Functional limitations: Difficulty with UE dressing, donning bra, doing laundry, and cooking  Symptom irritability: moderateUnderstanding of Dx/Px/POC: excellent   Prognosis: good    Goals  STG(4 weeks) not met:             1. Independent with HEP            2. Decrease pain to 2/10 at worst with functional activities            3. Increase AROM L shoulder by 10-15 degrees in all planes            4. Increase strength LUE by 1/3 MMT grade     LTG(12 weeks) not met:             1.  Decrease pain to 1/10 at worst with functional activities             2. Increase LUE strength to 4+ to 5/5             3. L shoulder AROM WFL             4. Return to PLOF      Plan  Plan details: Progress per protocol  Patient would benefit from: skilled physical therapy  Referral necessary: No  Planned modality interventions: cryotherapy, TENS and electrical stimulation/Russian stimulation  Planned therapy interventions: IASTM, joint mobilization, ADL training, manual therapy, neuromuscular re-education, therapeutic activities, therapeutic exercise and patient education  Frequency: 2x week (1-2x week)  Duration in  weeks: 6  Treatment plan discussed with: patient      Subjective Evaluation    History of Present Illness  Date of onset: 9/26/2023  Mechanism of injury: The patient's left shoulder pain began from a work injury which occurred on 8/21/2018. She was standing on a chair 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. She lost her balance and grabbed onto a ledge, yanking on her shoulder and twisting it. An MRI revealed a RTC which was repaired on 2/4/2019. 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 She started to develop shoulder pain again later, and had arthroscopic surgery performed on 5/29/2020 to release the posterior capsule. She also had a SAD and bursectomy. She had a course of PT, but continued to have pain. She then had a biceps tenodesis performed on 3/10/2021. She got cellulitis and had additional surgery to clean up the infection on 3/18/2021 and was in a sling for 2 weeks. She had another course of PT for 5 months and hit a state of plateau and was discharged to [facility]. She states that her left shoulder pain continued to increase and she requested a platelet-rich plasma (PRP) injection. She is status post L rotator cuff tenotomy with PRP on 9/26/2023. She states that her pain increased to 9-10/10 the evening after procedure. She states that she was not prescribed pain meds and is currently taking OTC Tylenol. She is currently taking Tramadol for another health issue. She states that her pain level is slightly improving. 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  Update: 1/2/24: Reports not much change overall. Undergoing FCE in 2 weeks. Hoping to get and MRI after that.  Update 3/26/24: Again reports not much change since previous re-eval.  Quality of life: fair    Patient Goals  Patient goals for therapy: increased strength, decreased pain, increased motion and  independence with ADLs/IADLs    Pain  Current pain ratin  At best pain ratin  At worst pain ratin  Location: L Shoulder  Quality: dull ache, radiating, cramping and squeezing  Relieving factors: ice, medications, relaxation, support and rest  Aggravating factors: walking and overhead activity  Progression: no change    Social Support  Steps to enter house: yes  Stairs in house: yes   Lives in: multiple-level home  Lives with: spouse and adult children    Employment status: working  Hand dominance: right    Treatments  Previous treatment: injection treatment, medication, physical therapy and home therapy        Objective     Postural Observations  Seated posture: good  Standing posture: good      Observations   Left Shoulder   Negative for edema.     Palpation   Left   No palpable tenderness to the biceps.   Tenderness of the anterior deltoid, biceps, middle deltoid, supraspinatus and upper trapezius.     Tenderness     Left Shoulder   Tenderness in the AC joint and biceps tendon (proximal).     Additional Tenderness Details  Patient has significant tenderness in left axillary region.    Cervical/Thoracic Screen   Cervical range of motion within normal limits    Neurological Testing     Sensation     Shoulder   Left Shoulder   Intact: light touch    Right Shoulder   Intact: Light touch    Active Range of Motion   Left Shoulder   Flexion: 105 degrees   Extension: 45 degrees   Abduction: 95 degrees   External rotation 90°: 75 degrees   Internal rotation 0°: 70 degrees   Internal rotation BTB: L5     Right Shoulder   Normal active range of motion    Left Elbow   Normal active range of motion    Right Elbow   Normal active range of motion    Strength/Myotome Testing     Additional Strength Details  L RTC 4/5 t/o all planes                Precautions: None    HEP - Handout provided and discussed      Manuals 3/7 3/12 3/19 3/26/24 2/27   ART L Shoulder   x15' x15'    PROM/Stretch JV JV   KM   [facility]               "  Neuro Re-Ed        Supine [place]   2x10 2x10    Standing Wall [place] 2x10 2x10 2x10 2x10 2x10   AROM Abd with scap retract and with rot at 90 degrees 2x10 2x10 2x10 2x10 2x10   AROM Scaption with scap retract with stick and wall finish 2/10 2x10 2x10 2x10    TRX 3 way Stretch 4x20''ea NV 4x20'' ea 4x20'' ea nv   Pulleys 3x10 3x10 3x10 3x10 3x10   Stool roll backs ' '      Isometrics 2 way 3x10 5'' 2 way 3x10 5'' 2 way 3x10 5'' 2 way 3x10 5\" 2 way 3x10 5\"                           Ther Ex        TB No $  2x10 L2 2x10 L2 2x10 L2 2x10 L2   TB 70 degree Shoulder Abd 3x10 L2 2x10 L2 2x10 L2 2x10 L2 2x10 L2   SL ER        SL Abd        Prone Ys, Ts        Supine Flex with PB  2x10 2x10 2x10 2x10   Supine D2 Flex 3x10 2# 3x10 2# 3x10 2# 3x10 2# 3x10 2#   Supine Serratus Punch 3x10 5# 3x10 5# 3x10 5# 3x10 5# 3x10 5#           Ther Activity        Retro UBE                Gait Training                        Modalities        CP to left shld        MHP L Shoulder 10' 10'  10 min x10' 10'          "

## 2024-03-28 ENCOUNTER — OFFICE VISIT (OUTPATIENT)
Dept: PHYSICAL THERAPY | Facility: CLINIC | Age: 49
End: 2024-03-28
Payer: OTHER MISCELLANEOUS

## 2024-03-28 DIAGNOSIS — M67.912 TENDINOPATHY OF ROTATOR CUFF, LEFT: Primary | ICD-10-CM

## 2024-03-28 PROCEDURE — 97110 THERAPEUTIC EXERCISES: CPT

## 2024-03-28 PROCEDURE — 97112 NEUROMUSCULAR REEDUCATION: CPT

## 2024-03-28 PROCEDURE — 97140 MANUAL THERAPY 1/> REGIONS: CPT

## 2024-03-28 NOTE — PROGRESS NOTES
"Daily Note     Today's date: 3/28/2024  Patient name: Edwin Childs  : 1975  MRN: [id number]  Referring provider: Ford Mendoza MD  Dx:   Encounter Diagnosis     ICD-10-CM    1. Tendinopathy of rotator cuff, left  M67.912                      Subjective: Pt reports cont to have stiffness in shoulder reports exercises are helpful.       Objective: See treatment diary below      Assessment: Tolerated treatment well. Patient demonstrated fatigue post treatment and exhibited good technique with therapeutic exercises. Pt making cont slow steady gains with program. Pt however cont to have notable restriction with shoulder abd during PROM.       Plan: Continue per plan of care.      Precautions: None    HEP - Handout provided and discussed      Manuals 3/7 3/12 3/19 3/26/24 3/28   ART L Shoulder   x15' x15'    PROM/Stretch JV JV   KM   STM                Neuro Re-Ed        Supine [place]   2x10 2x10    Standing Wall [place] 2x10 2x10 2x10 2x10 2x10   AROM Abd with scap retract and with rot at 90 degrees 2x10 2x10 2x10 2x10 2x10   AROM Scaption with scap retract with stick and wall finish 2/10 2x10 2x10 2x10 2/10   TRX 3 way Stretch 4x20''ea NV 4x20'' ea 4x20'' ea nv   Pulleys 3x10 3x10 3x10 3x10 3x10   Stool roll backs ' '      Isometrics 2 way 3x10 5'' 2 way 3x10 5'' 2 way 3x10 5'' 2 way 3x10 5\" 2 way 3x10 5\"                           Ther Ex        TB No $  2x10 L2 2x10 L2 2x10 L2 2x10 L2   TB 70 degree Shoulder Abd 3x10 L2 2x10 L2 2x10 L2 2x10 L2 2x10 L2   SL ER        SL Abd        Prone Ys, Ts     2# 2/10 ea   Supine Flex with PB  2x10 2x10 2x10    Supine D2 Flex 3x10 2# 3x10 2# 3x10 2# 3x10 2# 3x10 2#   Supine Serratus Punch 3x10 5# 3x10 5# 3x10 5# 3x10 5# 3x10 5#           Ther Activity        Retro UBE                Gait Training                        Modalities        CP to left shld        MHP L Shoulder 10' 10'  10 min x10' 10'            "

## 2024-04-04 ENCOUNTER — OFFICE VISIT (OUTPATIENT)
Dept: PHYSICAL THERAPY | Facility: CLINIC | Age: 49
End: 2024-04-04
Payer: OTHER MISCELLANEOUS

## 2024-04-04 DIAGNOSIS — M67.912 TENDINOPATHY OF ROTATOR CUFF, LEFT: Primary | ICD-10-CM

## 2024-04-04 PROCEDURE — 97140 MANUAL THERAPY 1/> REGIONS: CPT | Performed by: PHYSICAL THERAPIST

## 2024-04-04 PROCEDURE — 97110 THERAPEUTIC EXERCISES: CPT | Performed by: PHYSICAL THERAPIST

## 2024-04-04 NOTE — PROGRESS NOTES
"Daily Note     Today's date: 2024  Patient name: Edwin Childs  : 1975  MRN: [id number]  Referring provider: Ford Mendoza MD  Dx:   Encounter Diagnosis     ICD-10-CM    1. Tendinopathy of rotator cuff, left  M67.912                      Subjective: Pt reports cont to have stiffness in shoulder, but reports exercises are helpful.      Objective: See treatment diary below      Assessment: Tolerated treatment well. Patient demonstrated fatigue post treatment, exhibited good technique with therapeutic exercises, and would benefit from continued PT. Pt making cont slow steady gains with program. Pt however cont to have notable restriction with shoulder abd during PROM.       Plan: Continue per plan of care.  Progress treatment as tolerated.         Precautions: None    HEP - Handout provided and discussed      Manuals 4/4/24 3/12 3/19 3/26/24 3/28   ART L Shoulder   x15' x15'    PROM/Stretch JV JV   KM   STM                Neuro Re-Ed        Supine [place]   2x10 2x10    Standing Wall [place] 2x10 2x10 2x10 2x10 2x10   AROM Abd with scap retract and with rot at 90 degrees 2x10 2x10 2x10 2x10 2x10   AROM Scaption with scap retract with stick and wall finish 2/10 2x10 2x10 2x10 2/10   TRX 3 way Stretch 4x20''ea NV 4x20'' ea 4x20'' ea nv   Pulleys 3x10 3x10 3x10 3x10 3x10   Stool roll backs ' '      Isometrics 2 way 3x10 5'' 2 way 3x10 5'' 2 way 3x10 5'' 2 way 3x10 5\" 2 way 3x10 5\"                           Ther Ex        TB No $ 2x10 L2 2x10 L2 2x10 L2 2x10 L2 2x10 L2   TB 70 degree Shoulder Abd 3x10 L2 2x10 L2 2x10 L2 2x10 L2 2x10 L2   SL ER        SL Abd        Prone Ys, Ts 3x10 3#    2# 2/10 ea   Supine Flex with PB  2x10 2x10 2x10    Supine D2 Flex 3x10 2# 3x10 2# 3x10 2# 3x10 2# 3x10 2#   Supine Serratus Punch 3x10 5# 3x10 5# 3x10 5# 3x10 5# 3x10 5#           Ther Activity        Retro UBE                Gait Training                        Modalities        CP to left shld        MHP L Shoulder 10' 10' "  10 min x10' 10'

## 2024-04-09 ENCOUNTER — OFFICE VISIT (OUTPATIENT)
Dept: PHYSICAL THERAPY | Facility: CLINIC | Age: 49
End: 2024-04-09
Payer: OTHER MISCELLANEOUS

## 2024-04-09 DIAGNOSIS — M67.912 TENDINOPATHY OF ROTATOR CUFF, LEFT: Primary | ICD-10-CM

## 2024-04-09 PROCEDURE — 97140 MANUAL THERAPY 1/> REGIONS: CPT | Performed by: PHYSICAL THERAPIST

## 2024-04-09 PROCEDURE — 97110 THERAPEUTIC EXERCISES: CPT | Performed by: PHYSICAL THERAPIST

## 2024-04-09 NOTE — PROGRESS NOTES
"Daily Note     Today's date: 2024  Patient name: Edwin Childs  : 1975  MRN: [id number]  Referring provider: Ford Mendoza MD  Dx:   Encounter Diagnosis     ICD-10-CM    1. Tendinopathy of rotator cuff, left  M67.912                      Subjective: Pt reports cont to have stiffness in shoulder, but reports exercises are helpful.      Objective: See treatment diary below      Assessment: Tolerated treatment well. Patient demonstrated fatigue post treatment, exhibited good technique with therapeutic exercises, and would benefit from continued PT. Pt making cont slow steady gains with program. Pt however cont to have notable restriction with shoulder abd during PROM.       Plan: Continue per plan of care.  Progress treatment as tolerated.         Precautions: None    HEP - Handout provided and discussed      Manuals 4/4/24 4/9 3/19 3/26/24 3/28   ART L Shoulder   x15' x15'    PROM/Stretch JV JV   KM   STM                Neuro Re-Ed        Supine [place]   2x10 2x10    Standing Wall [place] 2x10 2x10 2x10 2x10 2x10   AROM Abd with scap retract and with rot at 90 degrees 2x10 2x10 2x10 2x10 2x10   AROM Scaption with scap retract with stick and wall finish 2/10 2x10 2x10 2x10 2/10   TRX 3 way Stretch 4x20''ea NV 4x20'' ea 4x20'' ea nv   Pulleys 3x10 3x10 3x10 3x10 3x10   Stool roll backs ' '      Isometrics 2 way 3x10 5'' 2 way 3x10 5'' 2 way 3x10 5'' 2 way 3x10 5\" 2 way 3x10 5\"                           Ther Ex        TB No $ 2x10 L2 2x10 L2 2x10 L2 2x10 L2 2x10 L2   TB 70 degree Shoulder Abd 3x10 L2 2x10 L2 2x10 L2 2x10 L2 2x10 L2   SL ER        SL Abd        Prone Ys, Ts 3x10 3# 3x10 3#   2# 2/10 ea   Supine Flex with PB  2x10 2x10 2x10    Supine D2 Flex 3x10 2# 3x10 2# 3x10 2# 3x10 2# 3x10 2#   Supine Serratus Punch 3x10 5# 3x10 5# 3x10 5# 3x10 5# 3x10 5#           Ther Activity        Retro UBE                Gait Training                        Modalities        CP to left shld        MHP L Shoulder " 10' 10'  10 min x10' 10'

## 2024-04-11 ENCOUNTER — OFFICE VISIT (OUTPATIENT)
Dept: PHYSICAL THERAPY | Facility: CLINIC | Age: 49
End: 2024-04-11
Payer: OTHER MISCELLANEOUS

## 2024-04-11 DIAGNOSIS — M67.912 TENDINOPATHY OF ROTATOR CUFF, LEFT: Primary | ICD-10-CM

## 2024-04-11 PROCEDURE — 97140 MANUAL THERAPY 1/> REGIONS: CPT | Performed by: PHYSICAL THERAPIST

## 2024-04-11 PROCEDURE — 97110 THERAPEUTIC EXERCISES: CPT | Performed by: PHYSICAL THERAPIST

## 2024-04-11 NOTE — PROGRESS NOTES
"Daily Note     Today's date: 2024  Patient name: Edwin Childs  : 1975  MRN: [id number]  Referring provider: Ford Mendoza MD  Dx:   Encounter Diagnosis     ICD-10-CM    1. Tendinopathy of rotator cuff, left  M67.912                      Subjective: Pt reports cont to have stiffness in shoulder, but reports exercises are helpful.      Objective: See treatment diary below      Assessment: Tolerated treatment well. Patient demonstrated fatigue post treatment, exhibited good technique with therapeutic exercises, and would benefit from continued PT. Pt would benefit from a home Estim unit for pain modulation.      Plan: Continue per plan of care.  Progress treatment as tolerated.         Precautions: None    HEP - Handout provided and discussed      Manuals 4/4/24 4/9 4/11 3/26/24 3/28   ART L Shoulder   x15' x15'    PROM/Stretch JV JV   KM   STM                Neuro Re-Ed        Supine [place]   2x10 2x10    Standing Wall [place] 2x10 2x10 2x10 2x10 2x10   AROM Abd with scap retract and with rot at 90 degrees 2x10 2x10 2x10 2x10 2x10   AROM Scaption with scap retract with stick and wall finish 2/10 2x10 2x10 2x10 2/10   TRX 3 way Stretch 4x20''ea NV 4x20'' ea 4x20'' ea nv   Pulleys 3x10 3x10 3x10 3x10 3x10   Stool roll backs ' '      Isometrics 2 way 3x10 5'' 2 way 3x10 5'' 2 way 3x10 5'' 2 way 3x10 5\" 2 way 3x10 5\"                           Ther Ex        TB No $ 2x10 L2 2x10 L2 2x10 L2 2x10 L2 2x10 L2   TB 70 degree Shoulder Abd 3x10 L2 2x10 L2 2x10 L2 2x10 L2 2x10 L2   SL ER        SL Abd        Prone Ys, Ts 3x10 3# 3x10 3# 3x10 3#  2# 2/10 ea   Supine Flex with PB  2x10 2x10 2x10    Supine D2 Flex 3x10 2# 3x10 2# 3x10 2# 3x10 2# 3x10 2#   Supine Serratus Punch 3x10 5# 3x10 5# 3x10 5# 3x10 5# 3x10 5#           Ther Activity        Retro UBE                Gait Training                        Modalities        CP to left shld        MHP L Shoulder 10' 10'  10 min x10' 10'            "

## 2024-04-16 ENCOUNTER — APPOINTMENT (OUTPATIENT)
Dept: PHYSICAL THERAPY | Facility: CLINIC | Age: 49
End: 2024-04-16
Payer: OTHER MISCELLANEOUS

## 2024-04-18 ENCOUNTER — OFFICE VISIT (OUTPATIENT)
Dept: PHYSICAL THERAPY | Facility: CLINIC | Age: 49
End: 2024-04-18
Payer: OTHER MISCELLANEOUS

## 2024-04-18 DIAGNOSIS — M67.912 TENDINOPATHY OF ROTATOR CUFF, LEFT: Primary | ICD-10-CM

## 2024-04-18 PROCEDURE — 97140 MANUAL THERAPY 1/> REGIONS: CPT | Performed by: PHYSICAL THERAPIST

## 2024-04-18 PROCEDURE — 97110 THERAPEUTIC EXERCISES: CPT | Performed by: PHYSICAL THERAPIST

## 2024-04-18 NOTE — PROGRESS NOTES
"Daily Note     Today's date: 2024  Patient name: Edwin Childs  : 1975  MRN: [id number]  Referring provider: Ford Mendoza MD  Dx:   Encounter Diagnosis     ICD-10-CM    1. Tendinopathy of rotator cuff, left  M67.912                      Subjective: Pt reports cont to have stiffness in shoulder, but reports exercises are helpful.      Objective: See treatment diary below      Assessment: Tolerated treatment well. Patient demonstrated fatigue post treatment, exhibited good technique with therapeutic exercises, and would benefit from continued PT. Pt would benefit from a home Estim unit for pain modulation.      Plan: Continue per plan of care.  Progress treatment as tolerated.         Precautions: None    HEP - Handout provided and discussed      Manuals 4/4/24 4/9 4/11 4/18/24 3/28   ART L Shoulder   x15' x15'    PROM/Stretch JV JV   KM   STM                Neuro Re-Ed        Supine [place]   2x10 2x10    Standing Wall [place] 2x10 2x10 2x10 2x10 2x10   AROM Abd with scap retract and with rot at 90 degrees 2x10 2x10 2x10 2x10 2x10   AROM Scaption with scap retract with stick and wall finish 2/10 2x10 2x10 2x10 2/10   TRX 3 way Stretch 4x20''ea NV 4x20'' ea 4x20'' ea nv   Pulleys 3x10 3x10 3x10 3x10 3x10   Stool roll backs ' '      Isometrics 2 way 3x10 5'' 2 way 3x10 5'' 2 way 3x10 5'' 2 way 3x10 5\" 2 way 3x10 5\"                           Ther Ex        TB No $ 2x10 L2 2x10 L2 2x10 L2 2x10 L2 2x10 L2   TB 70 degree Shoulder Abd 3x10 L2 2x10 L2 2x10 L2 2x10 L2 2x10 L2   SL ER        SL Abd        Prone Ys, Ts 3x10 3# 3x10 3# 3x10 3# 3x10 3# 2# 2/10 ea   Supine Flex with PB  2x10 2x10 2x10    Supine D2 Flex 3x10 2# 3x10 2# 3x10 2# 3x10 2# 3x10 2#   Supine Serratus Punch 3x10 5# 3x10 5# 3x10 5# 3x10 5# 3x10 5#           Ther Activity        Retro UBE                Gait Training                        Modalities        CP to left shld        MHP L Shoulder 10' 10'  10 min x10' 10'            "

## 2024-04-23 ENCOUNTER — OFFICE VISIT (OUTPATIENT)
Dept: PHYSICAL THERAPY | Facility: CLINIC | Age: 49
End: 2024-04-23
Payer: OTHER MISCELLANEOUS

## 2024-04-23 DIAGNOSIS — M67.912 TENDINOPATHY OF ROTATOR CUFF, LEFT: Primary | ICD-10-CM

## 2024-04-23 PROCEDURE — 97140 MANUAL THERAPY 1/> REGIONS: CPT

## 2024-04-23 PROCEDURE — 97112 NEUROMUSCULAR REEDUCATION: CPT

## 2024-04-23 PROCEDURE — 97110 THERAPEUTIC EXERCISES: CPT

## 2024-04-23 NOTE — PROGRESS NOTES
"Daily Note     Today's date: 2024  Patient name: Edwin Childs  : 1975  MRN: [id number]  Referring provider: Ford Mendoza MD  Dx:   Encounter Diagnosis     ICD-10-CM    1. Tendinopathy of rotator cuff, left  M67.912                      Subjective: Pt reports she is doing well today. Reports cont tightness in L shoulder with ovehead reach.       Objective: See treatment diary below      Assessment: Tolerated treatment well. Patient demonstrated fatigue post treatment. Pt making crispin slow steady gains with program. Cont to [facility] program with notable tightness in shoulder with PROM into flx/abd.       Plan: Continue per plan of care.      Precautions: None    HEP - Handout provided and discussed      Manuals 24   ART L Shoulder   x15' x15'    PROM/Stretch JV JV   JV   STM                Neuro Re-Ed        Supine [place]   2x10 2x10    Standing Wall [place] 2x10 2x10 2x10 2x10 2x10   AROM Abd with scap retract and with rot at 90 degrees 2x10 2x10 2x10 2x10 2x10   AROM Scaption with scap retract with stick and wall finish 2/10 2x10 2x10 2x10    TRX 3 way Stretch 4x20''ea NV 4x20'' ea 4x20'' ea nv   Pulleys 3x10 3x10 3x10 3x10 3x10   Stool roll backs ' '      Isometrics 2 way 3x10 5'' 2 way 3x10 5'' 2 way 3x10 5'' 2 way 3x10 5\" 2 way 3x10 5\"                           Ther Ex        TB No $ 2x10 L2 2x10 L2 2x10 L2 2x10 L2 2x10 L2   TB 70 degree Shoulder Abd 3x10 L2 2x10 L2 2x10 L2 2x10 L2 2x10 L2   SL ER        SL Abd        Prone Ys, Ts 3x10 3# 3x10 3# 3x10 3# 3x10 3# 3# 2/10 ea   Supine Flex with PB  2x10 2x10 2x10    Supine D2 Flex 3x10 2# 3x10 2# 3x10 2# 3x10 2# 3x10 3#   Supine Serratus Punch 3x10 5# 3x10 5# 3x10 5# 3x10 5# 3x10 5#           Ther Activity        Retro UBE                Gait Training                        Modalities        CP to left shld        MHP L Shoulder 10' 10'  10 min x10' 10'              "

## 2024-04-30 ENCOUNTER — APPOINTMENT (OUTPATIENT)
Dept: PHYSICAL THERAPY | Facility: CLINIC | Age: 49
End: 2024-04-30
Payer: OTHER MISCELLANEOUS

## 2024-05-02 ENCOUNTER — OFFICE VISIT (OUTPATIENT)
Dept: PHYSICAL THERAPY | Facility: CLINIC | Age: 49
End: 2024-05-02
Payer: OTHER MISCELLANEOUS

## 2024-05-02 DIAGNOSIS — M67.912 TENDINOPATHY OF ROTATOR CUFF, LEFT: Primary | ICD-10-CM

## 2024-05-02 PROCEDURE — 97140 MANUAL THERAPY 1/> REGIONS: CPT

## 2024-05-02 PROCEDURE — 97110 THERAPEUTIC EXERCISES: CPT

## 2024-05-02 PROCEDURE — 97112 NEUROMUSCULAR REEDUCATION: CPT

## 2024-05-02 NOTE — PROGRESS NOTES
"Daily Note     Today's date: 2024  Patient name: Edwin Childs  : 1975  MRN: [id number]  Referring provider: Ford Mendoza MD  Dx:   Encounter Diagnosis     ICD-10-CM    1. Tendinopathy of rotator cuff, left  M67.912                      Subjective: pt reports she was driving for long periods up to 8 hours and having increased pain in B shoulders.       Objective: See treatment diary below      Assessment: Tolerated treatment well. Patient demonstrated fatigue post treatment and exhibited good technique with therapeutic exercises. Pt with cont restriction with overhead ROM into shoulder flx and abd during A/PROM. Pt with notable increased discomfort today in L shoulder with program.       Plan: Continue per plan of care.      Precautions: None    HEP - Handout provided and discussed      Manuals 24   ART L Shoulder   x15' x15'    PROM/Stretch JV JV   JV   STM                Neuro Re-Ed        Supine [place]   2x10 2x10    Standing Wall [place] 2x10 2x10 2x10 2x10 2x10   AROM Abd with scap retract and with rot at 90 degrees 2x10 2x10 2x10 2x10 2x10   AROM Scaption with scap retract with stick and wall finish 2/10 2x10 2x10 2x10    TRX 3 way Stretch pain [place] 4x20'' ea 4x20'' ea nv   Pulleys 3x10 3x10 3x10 3x10 3x10   Stool roll backs ' '      Isometrics 2 way 3x10 5'' 2 way 3x10 5'' 2 way 3x10 5'' 2 way 3x10 5\" 2 way 3x10 5\"                           Ther Ex        TB No $ 2x10 L2 2x10 L2 2x10 L2 2x10 L2 2x10 L2   TB 70 degree Shoulder Abd 3x10 L2 2x10 L2 2x10 L2 2x10 L2 2x10 L2   SL ER        SL Abd        Prone Ys, Ts 3x10 2# 3x10 3# 3x10 3# 3x10 3# 3# 2/10 ea   Supine Flex with PB  2x10 2x10 2x10    Supine D2 Flex 3x10 2# 3x10 2# 3x10 2# 3x10 2# 3x10 3#   Supine Serratus Punch 3x10 5# 3x10 5# 3x10 5# 3x10 5# 3x10 5#           Ther Activity        Retro UBE                Gait Training                        Modalities        CP to left shld        MHP L Shoulder 10' 10'  10 min " x10' 10'

## 2024-05-07 ENCOUNTER — OFFICE VISIT (OUTPATIENT)
Dept: PHYSICAL THERAPY | Facility: CLINIC | Age: 49
End: 2024-05-07
Payer: OTHER MISCELLANEOUS

## 2024-05-07 DIAGNOSIS — M67.912 TENDINOPATHY OF ROTATOR CUFF, LEFT: Primary | ICD-10-CM

## 2024-05-07 PROCEDURE — 97110 THERAPEUTIC EXERCISES: CPT | Performed by: PHYSICAL THERAPIST

## 2024-05-07 PROCEDURE — 97140 MANUAL THERAPY 1/> REGIONS: CPT | Performed by: PHYSICAL THERAPIST

## 2024-05-07 PROCEDURE — 97112 NEUROMUSCULAR REEDUCATION: CPT | Performed by: PHYSICAL THERAPIST

## 2024-05-07 NOTE — PROGRESS NOTES
PT Discharge    Today's date: 2024  Patient name: Edwin Childs  : 1975  MRN: [id number]  Referring provider: Ford Mendoza MD  Dx:   Encounter Diagnosis     ICD-10-CM    1. Tendinopathy of rotator cuff, left  M67.912                      Assessment  Assessment details: Pt going to transition to HEP after today's session. Still working on attaining her goals, but L UE functional capacity high enough pt to safely transition to HEP at this time.      Functional limitations: Difficulty with UE dressing, donning bra, doing laundry, and cooking  Goals  STG(4 weeks) not met:             1. Independent with HEP            2. Decrease pain to 2/10 at worst with functional activities            3. Increase AROM L shoulder by 10-15 degrees in all planes            4. Increase strength LUE by 1/3 MMT grade     LTG(12 weeks) not met:             1.  Decrease pain to 1/10 at worst with functional activities             2. Increase LUE strength to 4+ to 5/5             3. L shoulder AROM WFL             4. Return to PLOF      Plan  Plan details: Progress per protocol  Frequency: 1-2x week.        Subjective Evaluation    History of Present Illness  Date of onset: 2023  Mechanism of injury: The patient's left shoulder pain began from a work injury which occurred on 2018. She was standing on a chair 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. She lost her balance and grabbed onto a ledge, yanking on her shoulder and twisting it. An MRI revealed a RTC which was repaired on 2019. 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 She started to develop shoulder pain again later, and had arthroscopic surgery performed on 2020 to release the posterior capsule. She also had a SAD and bursectomy. She had a course of PT, but continued to have pain. She then had a biceps tenodesis performed on 3/10/2021. She got cellulitis and had additional surgery to clean  up the infection on 3/18/2021 and was in a sling for 2 weeks. She had another course of PT for 5 months and hit a state of plateau and was discharged to [facility]. She states that her left shoulder pain continued to increase and she requested a platelet-rich plasma (PRP) injection. She is status post L rotator cuff tenotomy with PRP on 2023. She states that her pain increased to 9-10/10 the evening after procedure. She states that she was not prescribed pain meds and is currently taking OTC Tylenol. She is currently taking Tramadol for another health issue. She states that her pain level is slightly improving. 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  Update: 24: Reports not much change overall. Undergoing FCE in 2 weeks. Hoping to get and MRI after that.  Update 3/26/24: Again reports not much change since previous re-eval.  Update 24: Transitioning to HEP after today's session  Pain  Current pain ratin  At best pain ratin  At worst pain ratin  Location: L Shoulder  Quality: dull ache, radiating, cramping and squeezing  Relieving factors: ice, medications, relaxation, support and rest  Aggravating factors: walking and overhead activity  Progression: no change    Social Support  Steps to enter house: yes  Stairs in house: yes   Lives in: multiple-level home  Lives with: spouse and adult children    Employment status: working  Hand dominance: right          Objective     Postural Observations  Seated posture: good  Standing posture: good      Observations   Left Shoulder   Negative for edema.     Palpation   Left   No palpable tenderness to the biceps.   Tenderness of the anterior deltoid, biceps, middle deltoid, supraspinatus and upper trapezius.     Tenderness     Left Shoulder   Tenderness in the AC joint and biceps tendon (proximal).     Additional Tenderness Details  Patient has significant tenderness in  "left axillary region.    Cervical/Thoracic Screen   Cervical range of motion within normal limits    Neurological Testing     Sensation     Shoulder   Left Shoulder   Intact: light touch    Right Shoulder   Intact: Light touch    Active Range of Motion   Left Shoulder   Flexion: 105 degrees   Extension: 45 degrees   Abduction: 95 degrees   External rotation 90°: 75 degrees   Internal rotation 0°: 70 degrees   Internal rotation BTB: L5     Right Shoulder   Normal active range of motion    Left Elbow   Normal active range of motion    Right Elbow   Normal active range of motion    Strength/Myotome Testing     Additional Strength Details  L RTC 4/5 t/o all planes                recautions: None    HEP - Handout provided and discussed      Manuals 5/2 5/7/24 4/11 4/18/24 4/23   ART L Shoulder  x15' x15' x15'    PROM/Stretch JV    JV   STM                Neuro Re-Ed        Supine [place]  2x10 2x10 2x10    Standing Wall [place] 2x10 2x10 2x10 2x10 2x10   AROM Abd with scap retract and with rot at 90 degrees 2x10 2x10 2x10 2x10 2x10   AROM Scaption with scap retract with stick and wall finish 2/10 2x10 2x10 2x10    TRX 3 way Stretch pain 4x20'' ea 4x20'' ea 4x20'' ea nv   Pulleys 3x10 3x10 3x10 3x10 3x10   Stool roll backs ' '      Isometrics 2 way 3x10 5'' 2 way 3x10 5'' 2 way 3x10 5'' 2 way 3x10 5\" 2 way 3x10 5\"                           Ther Ex        TB No $ 2x10 L2 2x10 L2 2x10 L2 2x10 L2 2x10 L2   TB 70 degree Shoulder Abd 3x10 L2 2x10 L2 2x10 L2 2x10 L2 2x10 L2   SL ER        SL Abd        Prone Ys, Ts 3x10 2# 3x10 3# 3x10 3# 3x10 3# 3# 2/10 ea   Supine Flex with PB  2x10 2x10 2x10    Supine D2 Flex 3x10 2# 3x10 2# 3x10 2# 3x10 2# 3x10 3#   Supine Serratus Punch 3x10 5# 3x10 5# 3x10 5# 3x10 5# 3x10 5#           Ther Activity        Retro UBE                Gait Training                        Modalities        CP to left shld        MHP L Shoulder 10' 10'  10 min x10' 10'            "

## 2024-05-09 ENCOUNTER — APPOINTMENT (OUTPATIENT)
Dept: PHYSICAL THERAPY | Facility: CLINIC | Age: 49
End: 2024-05-09
Payer: OTHER MISCELLANEOUS

## 2024-05-12 ENCOUNTER — APPOINTMENT (OUTPATIENT)
Dept: RADIOLOGY | Facility: MEDICAL CENTER | Age: 49
End: 2024-05-12
Payer: COMMERCIAL

## 2024-05-12 ENCOUNTER — OFFICE VISIT (OUTPATIENT)
Dept: URGENT CARE | Facility: MEDICAL CENTER | Age: 49
End: 2024-05-12
Payer: COMMERCIAL

## 2024-05-12 VITALS
DIASTOLIC BLOOD PRESSURE: 60 MMHG | HEART RATE: 70 BPM | TEMPERATURE: 98.6 F | SYSTOLIC BLOOD PRESSURE: 121 MMHG | OXYGEN SATURATION: 99 % | BODY MASS INDEX: 27.19 KG/M2 | HEIGHT: 61 IN | WEIGHT: 144 LBS | RESPIRATION RATE: 18 BRPM

## 2024-05-12 DIAGNOSIS — S62.002A UNSPECIFIED FRACTURE OF NAVICULAR (SCAPHOID) BONE OF LEFT WRIST, INITIAL ENCOUNTER FOR CLOSED FRACTURE: Primary | ICD-10-CM

## 2024-05-12 DIAGNOSIS — M25.532 LEFT WRIST PAIN: ICD-10-CM

## 2024-05-12 PROCEDURE — 73110 X-RAY EXAM OF WRIST: CPT

## 2024-05-12 PROCEDURE — 99213 OFFICE O/P EST LOW 20 MIN: CPT | Performed by: STUDENT IN AN ORGANIZED HEALTH CARE EDUCATION/TRAINING PROGRAM

## 2024-05-12 PROCEDURE — 29125 APPL SHORT ARM SPLINT STATIC: CPT | Performed by: STUDENT IN AN ORGANIZED HEALTH CARE EDUCATION/TRAINING PROGRAM

## 2024-05-14 ENCOUNTER — APPOINTMENT (OUTPATIENT)
Dept: PHYSICAL THERAPY | Facility: CLINIC | Age: 49
End: 2024-05-14
Payer: OTHER MISCELLANEOUS

## 2024-05-16 ENCOUNTER — APPOINTMENT (OUTPATIENT)
Dept: PHYSICAL THERAPY | Facility: CLINIC | Age: 49
End: 2024-05-16
Payer: OTHER MISCELLANEOUS

## 2024-05-21 ENCOUNTER — APPOINTMENT (OUTPATIENT)
Dept: PHYSICAL THERAPY | Facility: CLINIC | Age: 49
End: 2024-05-21
Payer: OTHER MISCELLANEOUS

## 2024-05-23 ENCOUNTER — APPOINTMENT (OUTPATIENT)
Dept: PHYSICAL THERAPY | Facility: CLINIC | Age: 49
End: 2024-05-23
Payer: OTHER MISCELLANEOUS

## 2024-06-11 ENCOUNTER — EVALUATION (OUTPATIENT)
Dept: PHYSICAL THERAPY | Facility: CLINIC | Age: 49
End: 2024-06-11
Payer: COMMERCIAL

## 2024-06-11 DIAGNOSIS — G89.29 CHRONIC RIGHT SHOULDER PAIN: ICD-10-CM

## 2024-06-11 DIAGNOSIS — M25.511 CHRONIC RIGHT SHOULDER PAIN: ICD-10-CM

## 2024-06-11 DIAGNOSIS — Z98.890 S/P RIGHT ROTATOR CUFF REPAIR: Primary | ICD-10-CM

## 2024-06-11 DIAGNOSIS — M25.311 SHOULDER INSTABILITY, RIGHT: ICD-10-CM

## 2024-06-11 PROCEDURE — 97110 THERAPEUTIC EXERCISES: CPT | Performed by: PHYSICAL THERAPIST

## 2024-06-11 PROCEDURE — 97112 NEUROMUSCULAR REEDUCATION: CPT | Performed by: PHYSICAL THERAPIST

## 2024-06-11 PROCEDURE — 97161 PT EVAL LOW COMPLEX 20 MIN: CPT | Performed by: PHYSICAL THERAPIST

## 2024-06-11 PROCEDURE — 97535 SELF CARE MNGMENT TRAINING: CPT | Performed by: PHYSICAL THERAPIST

## 2024-06-11 NOTE — PROGRESS NOTES
PT Evaluation     Today's date: 2024  Patient name: Edwin Childs  : 1975  MRN: [id number]  Referring provider: Keven Avendaño MD  Dx:   Encounter Diagnosis     ICD-10-CM    1. S/P right rotator cuff repair  Z98.890       2. Chronic right shoulder pain  M25.511     G89.29       3. Shoulder instability, right  M25.311                      Assessment  Understanding of Dx/Px/POC: good     Prognosis: good    Goals  STGs: To be complete within 4 weeks  - Decrease pain to < 2/10 at worst  - Increase AROM to WNL  - Increase strength to > 4+/5  - Improve postural awareness capacity to > 60min before deficit     LTGs: To be complete within 6 weeks  - Able to repetitively complete all overhead activity without pain or limitation for increased safety and functional capacity with ADLs and work-related duty  - Able to repetitively complete all reaching activity without pain or limitation for increased safety and functional capacity with ADLs and work-related duty  - Able to repetitively complete all pushing/pulling activity without pain or limitation for increased safety and functional capacity with ADLs and work-related duty  - Able to complete all lifting/carrying activity without pain or limitation for increased safety and functional capacity with ADLs and work-related duty     Plan    Planned therapy interventions: manual therapy, neuromuscular re-education, postural training, self care, therapeutic activities, therapeutic exercise, home exercise program and patient/caregiver education    Frequency: 2x week  Duration in weeks: 6       Pt is a very pleasant 49 y.o. Female with R shoulder pain who presents with functional deficits including decreased capacity with overhead activity, pushing/pulling, lifting/carrying, and behind the back/cross body reaching activity. Upon completion of today's initial evaluation, Edwin's sx remain consistent with being s/p R Shoulder RTC repair 24. Patient will benefit from  skilled physical therapy to address current deficits.         Subjective Evaluation    Patient Goals  Patient goals for therapy: increased strength, decreased pain and increased motion    Pain  Current pain ratin  At best pain ratin  At worst pain ratin  Location: R Shoulder         Pt reports she is s/p R Shoulder RTC repair 24. Reports abiding by protocol.        Objective Pain level ranges 2-8/10  AROM: R Shoulder n/a; L Shoulder Flex 110 degrees, Abd 110 degrees, IR 45 degrees, ER 70 degrees  Strength: R Shoulder n/a; L Shoulder 4+/5 t/o all planes  Postural Awareness: Poor (rounded shoulders, forward head)  Incisions: Clean and healing well (will continue to monitor)  FOTO: 23; GOAL: 66  Unable to complete overhead activity without pain and limitation  Unable to complete pushing/pulling activity without pain and limitation  Unable to complete lifting/carrying activity without pain and limitation  Unable to complete cross body/behind the back reaching activity without pain and limitation              Precautions: MD protocol for R Shoulder RTC repair 24    Daily Treatment Diary    HEP: Handout provided and discussed      Manuals 24       ART        PROM/Stretch        IASTM        STM/Triggerpoint        JM                Neuro Re-Ed        Pendulums 5x20''       Scap retract 2x10       Isometrics    This session                                    Ther Ex        Scap Plane Table Slides 2x10       Putty x5' HEP      Seated Table ER PROM bend downs  This session      Pulleys    This session    [place] Flexion, ER     This session   Wall Slides     This session                           Ther Activity        Retro UBE                Gait Training                        Modalities        MHP  Start with this session      CP        US/Stim

## 2024-06-11 NOTE — LETTER
2024    Keven Avendaño MD  [address] [facility] [address]    Patient: Edwin Childs   YOB: 1975   Date of Visit: 2024     Encounter Diagnosis     ICD-10-CM    1. S/P right rotator cuff repair  Z98.890       2. Chronic right shoulder pain  M25.511     G89.29       3. Shoulder instability, right  M25.311           Dear Dr. Avendaño:    Thank you for your recent referral of Edwin Childs. Please review the attached evaluation summary from Edwin's recent visit.     Please verify that you agree with the plan of care by signing the attached order.     If you have any questions or concerns, please do not hesitate to call.     I sincerely appreciate the opportunity to share in the care of one of your patients and hope to have another opportunity to work with you in the near future.       Sincerely,    Juan Pablo Cr, PT, DPT, ATC, ART      Referring Provider:      I certify that I have read the below Plan of Care and certify the need for these services furnished under this plan of treatment while under my care.                    Keven Avendaño MD  [address] [place] [id number]  Via Fax: [phone]          PT Evaluation     Today's date: 2024  Patient name: Edwin Childs  : 1975  MRN: [id number]  Referring provider: Keven Avendaño MD  Dx:   Encounter Diagnosis     ICD-10-CM    1. S/P right rotator cuff repair  Z98.890       2. Chronic right shoulder pain  M25.511     G89.29       3. Shoulder instability, right  M25.311                      Assessment  Understanding of Dx/Px/POC: good     Prognosis: good    Goals  STGs: To be complete within 4 weeks  - Decrease pain to < 2/10 at worst  - Increase AROM to WNL  - Increase strength to > 4+/5  - Improve postural awareness capacity to > 60min before deficit     LTGs: To be complete within 6 weeks  - Able to repetitively complete all overhead activity without pain or limitation for increased safety  and functional capacity with ADLs and work-related duty  - Able to repetitively complete all reaching activity without pain or limitation for increased safety and functional capacity with ADLs and work-related duty  - Able to repetitively complete all pushing/pulling activity without pain or limitation for increased safety and functional capacity with ADLs and work-related duty  - Able to complete all lifting/carrying activity without pain or limitation for increased safety and functional capacity with ADLs and work-related duty     Plan    Planned therapy interventions: manual therapy, neuromuscular re-education, postural training, self care, therapeutic activities, therapeutic exercise, home exercise program and patient/caregiver education    Frequency: 2x week  Duration in weeks: 6       Pt is a very pleasant 49 y.o. Female with R shoulder pain who presents with functional deficits including decreased capacity with overhead activity, pushing/pulling, lifting/carrying, and behind the back/cross body reaching activity. Upon completion of today's initial evaluation, Edwin's sx remain consistent with being s/p R Shoulder RTC repair 24. Patient will benefit from skilled physical therapy to address current deficits.         Subjective Evaluation    Patient Goals  Patient goals for therapy: increased strength, decreased pain and increased motion    Pain  Current pain ratin  At best pain ratin  At worst pain ratin  Location: R Shoulder         Pt reports she is s/p R Shoulder RTC repair 24. Reports abiding by protocol.        Objective Pain level ranges 2-8/10  AROM: R Shoulder n/a; L Shoulder Flex 110 degrees, Abd 110 degrees, IR 45 degrees, ER 70 degrees  Strength: R Shoulder n/a; L Shoulder 4+/5 t/o all planes  Postural Awareness: Poor (rounded shoulders, forward head)  Incisions: Clean and healing well (will continue to monitor)  FOTO: 23; GOAL: 66  Unable to complete overhead activity without  pain and limitation  Unable to complete pushing/pulling activity without pain and limitation  Unable to complete lifting/carrying activity without pain and limitation  Unable to complete cross body/behind the back reaching activity without pain and limitation              Precautions: MD protocol for R Shoulder RTC repair 6/7/24    Daily Treatment Diary    HEP: Handout provided and discussed      Manuals 6/11/24       ART        PROM/Stretch        IASTM        STM/Triggerpoint        JM                Neuro Re-Ed        Pendulums 5x20''       Scap retract 2x10       Isometrics    This session                                    Ther Ex        Scap Plane Table Slides 2x10       Putty x5' HEP      Seated Table ER PROM bend downs  This session      Pulleys    This session    Wand Flexion, ER     This session   Wall Slides     This session                           Ther Activity        Retro UBE                Gait Training                        Modalities        MHP  Start with this session      CP        US/Stim

## 2024-06-17 ENCOUNTER — APPOINTMENT (OUTPATIENT)
Dept: PHYSICAL THERAPY | Facility: CLINIC | Age: 49
End: 2024-06-17
Payer: COMMERCIAL

## 2024-06-18 ENCOUNTER — OFFICE VISIT (OUTPATIENT)
Dept: PHYSICAL THERAPY | Facility: CLINIC | Age: 49
End: 2024-06-18
Payer: COMMERCIAL

## 2024-06-18 DIAGNOSIS — M25.511 CHRONIC RIGHT SHOULDER PAIN: ICD-10-CM

## 2024-06-18 DIAGNOSIS — G89.29 CHRONIC RIGHT SHOULDER PAIN: ICD-10-CM

## 2024-06-18 DIAGNOSIS — Z98.890 S/P RIGHT ROTATOR CUFF REPAIR: Primary | ICD-10-CM

## 2024-06-18 DIAGNOSIS — M25.311 SHOULDER INSTABILITY, RIGHT: ICD-10-CM

## 2024-06-18 PROCEDURE — 97140 MANUAL THERAPY 1/> REGIONS: CPT

## 2024-06-18 PROCEDURE — 97110 THERAPEUTIC EXERCISES: CPT

## 2024-06-18 NOTE — PROGRESS NOTES
"Daily Note     Today's date: 2024  Patient name: Edwin Childs  : 1975  MRN: [id number]  Referring provider: Keven Avendaño MD  Dx:   Encounter Diagnosis     ICD-10-CM    1. S/P right rotator cuff repair  Z98.890       2. Chronic right shoulder pain  M25.511     G89.29       3. Shoulder instability, right  M25.311                      Subjective: Pt reports she is doing well with program. Cont to reports pain at night but doing well overall.       Objective: See treatment diary below      Assessment: Tolerated treatment well. Patient exhibited good technique with therapeutic exercises Pt following protocol. Demonstrates good overall ROM with program. Min c/o pain today.       Plan: Continue per plan of care.      Precautions: MD protocol for R Shoulder RTC repair 24    Daily Treatment Diary    HEP: Handout provided and discussed      Manuals 24      ART        PROM/Stretch  10' PROM       IASTM        STM/Triggerpoint        JM                Neuro Re-Ed        Pendulums 5x20'' \"       Scap retract 2x10 3/10      Isometrics    This session                                    Ther Ex        Scap Plane Table Slides 2x10 2/10      Putty x5' HEP      Seated Table ER PROM bend downs  2/10      Pulleys    This session    Wand Flexion, ER     This session   Wall Slides     This session                           Ther Activity        Retro UBE                Gait Training                        Modalities        MHP  10'       CP        US/Stim                      "

## 2024-06-24 ENCOUNTER — OFFICE VISIT (OUTPATIENT)
Dept: PHYSICAL THERAPY | Facility: CLINIC | Age: 49
End: 2024-06-24
Payer: COMMERCIAL

## 2024-06-24 DIAGNOSIS — M25.311 SHOULDER INSTABILITY, RIGHT: ICD-10-CM

## 2024-06-24 DIAGNOSIS — G89.29 CHRONIC RIGHT SHOULDER PAIN: ICD-10-CM

## 2024-06-24 DIAGNOSIS — Z98.890 S/P RIGHT ROTATOR CUFF REPAIR: Primary | ICD-10-CM

## 2024-06-24 DIAGNOSIS — M25.511 CHRONIC RIGHT SHOULDER PAIN: ICD-10-CM

## 2024-06-24 PROCEDURE — 97110 THERAPEUTIC EXERCISES: CPT | Performed by: PHYSICAL THERAPIST

## 2024-06-24 PROCEDURE — 97112 NEUROMUSCULAR REEDUCATION: CPT | Performed by: PHYSICAL THERAPIST

## 2024-06-24 NOTE — PROGRESS NOTES
"Daily Note     Today's date: 2024  Patient name: Edwin Childs  : 1975  MRN: [id number]  Referring provider: Keven Avendaño MD  Dx:   Encounter Diagnosis     ICD-10-CM    1. S/P right rotator cuff repair  Z98.890       2. Chronic right shoulder pain  M25.511     G89.29       3. Shoulder instability, right  M25.311                      Subjective: Pt reports she is doing well with program. HEP going well. No setbacks. Anxious to continue making progress.      Objective: See treatment diary below      Assessment: Tolerated treatment well. Patient demonstrated fatigue post treatment, exhibited good technique with therapeutic exercises, and would benefit from continued PT Pt following protocol. Demonstrates good overall ROM with program. Min c/o pain today.       Plan: Continue per plan of care.  Progress treatment as tolerated.         Precautions: MD protocol for R Shoulder RTC repair 24    Daily Treatment Diary    HEP: Handout provided and discussed      Manuals 24     ART        PROM/Stretch  10' PROM  x10'     IASTM        STM/Triggerpoint        JM                Neuro Re-Ed        Pendulums 5x20'' \"  5x20''     Scap retract 2x10 3/10 3x10     Isometrics   2 way 2x10 ea                                     Ther Ex        Scap Plane Table Slides 2x10 2/10 3x10     Putty x5' HEP      Seated Table ER PROM bend downs  2/10 3x10     Pulleys    This session    Wand Flexion, ER     This session   Wall Slides     This session                           Ther Activity        Retro UBE                Gait Training                        Modalities        MHP  10'  x10'     CP        US/Stim                      "

## 2024-07-01 ENCOUNTER — OFFICE VISIT (OUTPATIENT)
Dept: PHYSICAL THERAPY | Facility: CLINIC | Age: 49
End: 2024-07-01
Payer: COMMERCIAL

## 2024-07-01 DIAGNOSIS — M25.311 SHOULDER INSTABILITY, RIGHT: ICD-10-CM

## 2024-07-01 DIAGNOSIS — G89.29 CHRONIC RIGHT SHOULDER PAIN: ICD-10-CM

## 2024-07-01 DIAGNOSIS — M25.511 CHRONIC RIGHT SHOULDER PAIN: ICD-10-CM

## 2024-07-01 DIAGNOSIS — Z98.890 S/P RIGHT ROTATOR CUFF REPAIR: Primary | ICD-10-CM

## 2024-07-01 PROCEDURE — 97110 THERAPEUTIC EXERCISES: CPT | Performed by: PHYSICAL THERAPIST

## 2024-07-01 PROCEDURE — 97112 NEUROMUSCULAR REEDUCATION: CPT | Performed by: PHYSICAL THERAPIST

## 2024-07-01 NOTE — PROGRESS NOTES
"Daily Note     Today's date: 2024  Patient name: Edwin Childs  : 1975  MRN: [id number]  Referring provider: Keven Avendaño MD  Dx:   Encounter Diagnosis     ICD-10-CM    1. S/P right rotator cuff repair  Z98.890       2. Chronic right shoulder pain  M25.511     G89.29       3. Shoulder instability, right  M25.311                      Subjective: Pt reports she is doing well with program. HEP going well. No setbacks. Anxious to continue making progress.      Objective: See treatment diary below      Assessment: Tolerated treatment well. Patient demonstrated fatigue post treatment, exhibited good technique with therapeutic exercises, and would benefit from continued PT Pt following protocol. Demonstrates good overall ROM with program. Min c/o pain today.       Plan: Continue per plan of care.  Progress treatment as tolerated.         Precautions: MD protocol for R Shoulder RTC repair 24    Daily Treatment Diary    HEP: Handout provided and discussed      Manuals 24    ART        PROM/Stretch  10' PROM  x10' x10'    IASTM        STM/Triggerpoint        JM                Neuro Re-Ed        Pendulums 5x20'' \"  5x20'' 5x20''    Scap retract 2x10 3/10 3x10 3x10    Isometrics   2 way 2x10 ea 2 way 2x10 ea                                    Ther Ex        Scap Plane Table Slides 2x10 2/10 3x10 3x10    Putty x5' HEP      Seated Table ER PROM bend downs  2/10 3x10 3x10    Pulleys    3x10    Wand Flexion, ER     This session   Wall Slides     This session                           Ther Activity        Retro UBE                Gait Training                        Modalities        MHP  10'  x10' x10'    CP        US/Stim                      "

## 2024-07-08 ENCOUNTER — OFFICE VISIT (OUTPATIENT)
Dept: PHYSICAL THERAPY | Facility: CLINIC | Age: 49
End: 2024-07-08
Payer: COMMERCIAL

## 2024-07-08 DIAGNOSIS — G89.29 CHRONIC RIGHT SHOULDER PAIN: ICD-10-CM

## 2024-07-08 DIAGNOSIS — M25.511 CHRONIC RIGHT SHOULDER PAIN: ICD-10-CM

## 2024-07-08 DIAGNOSIS — Z98.890 S/P RIGHT ROTATOR CUFF REPAIR: Primary | ICD-10-CM

## 2024-07-08 DIAGNOSIS — M25.311 SHOULDER INSTABILITY, RIGHT: ICD-10-CM

## 2024-07-08 PROCEDURE — 97110 THERAPEUTIC EXERCISES: CPT

## 2024-07-08 PROCEDURE — 97140 MANUAL THERAPY 1/> REGIONS: CPT

## 2024-07-08 NOTE — PROGRESS NOTES
"Daily Note     Today's date: 2024  Patient name: Edwin Childs  : 1975  MRN: [id number]  Referring provider: Keven Avendaño MD  Dx:   Encounter Diagnosis     ICD-10-CM    1. S/P right rotator cuff repair  Z98.890       2. Chronic right shoulder pain  M25.511     G89.29       3. Shoulder instability, right  M25.311                      Subjective: Reports pleased with progress and cont to do well with R shoulder. Reports no pain.       Objective: See treatment diary below      Assessment: Tolerated treatment well. Patient exhibited good technique with therapeutic exercises. Pt demonstrates improved overall AAROM with wall slides today. Cont to make progress toward goals as per protocol.       Plan: Continue per plan of care.      Precautions: MD protocol for R Shoulder RTC repair 24    Daily Treatment Diary    HEP: Handout provided and discussed      Manuals 24   ART        PROM/Stretch  10' PROM  x10' x10' 10'   IASTM        STM/Triggerpoint        JM                Neuro Re-Ed        Pendulums 5x20'' 20\"  5x20'' 5x20''    Scap retract 2x10 3/10 3x10 3x10 3/10   Isometrics   2 way 2x10 ea 2 way 2x10 ea 2 way 2/10 ea                                   Ther Ex        Scap Plane Table Slides 2x10 2/10 3x10 3x10 3/10   Putty x5' HEP      Seated Table ER PROM bend downs  2/10 3x10 3x10 3/10   Pulleys    3x10 3/10   Wand Flexion, ER     2/10   Wall Slides     2/10                           Ther Activity        Retro UBE                Gait Training                        Modalities        MHP  10'  x10' x10' 10'   CP        US/Stim                        "

## 2024-07-15 ENCOUNTER — APPOINTMENT (OUTPATIENT)
Dept: PHYSICAL THERAPY | Facility: CLINIC | Age: 49
End: 2024-07-15
Payer: COMMERCIAL

## 2024-07-19 ENCOUNTER — OFFICE VISIT (OUTPATIENT)
Dept: PHYSICAL THERAPY | Facility: CLINIC | Age: 49
End: 2024-07-19
Payer: COMMERCIAL

## 2024-07-19 DIAGNOSIS — G89.29 CHRONIC RIGHT SHOULDER PAIN: ICD-10-CM

## 2024-07-19 DIAGNOSIS — Z98.890 S/P RIGHT ROTATOR CUFF REPAIR: Primary | ICD-10-CM

## 2024-07-19 DIAGNOSIS — M25.511 CHRONIC RIGHT SHOULDER PAIN: ICD-10-CM

## 2024-07-19 DIAGNOSIS — M25.311 SHOULDER INSTABILITY, RIGHT: ICD-10-CM

## 2024-07-19 PROCEDURE — 97140 MANUAL THERAPY 1/> REGIONS: CPT

## 2024-07-19 PROCEDURE — 97110 THERAPEUTIC EXERCISES: CPT

## 2024-07-19 NOTE — PROGRESS NOTES
"Daily Note     Today's date: 2024  Patient name: Edwin Childs  : 1975  MRN: [id number]  Referring provider: Keven Avendaño MD  Dx:   Encounter Diagnosis     ICD-10-CM    1. S/P right rotator cuff repair  Z98.890       2. Chronic right shoulder pain  M25.511     G89.29       3. Shoulder instability, right  M25.311                      Subjective: Pt reports she cont to be pleased with progress in R shoulder.       Objective: See treatment diary below      Assessment: Tolerated treatment well. Patient demonstrated fatigue post treatment and exhibited good technique with therapeutic exercises. Pt has cont to make gains as per protocol. Pt evangelina well without c/o pain.       Plan: Continue per plan of care.      Precautions: MD protocol for R Shoulder RTC repair 24    Daily Treatment Diary    HEP: Handout provided and discussed      Manuals 23 7   ART        PROM/Stretch 10'  10' PROM  x10' x10' 10'   IASTM        STM/Triggerpoint        JM                Neuro Re-Ed        Pendulums 5x20'' 5/20\"  5x20'' 5x20''    Scap retract 2x10 3/10 3x10 3x10 3/10   Isometrics 2 way 2/10 ea  2 way 2x10 ea 2 way 2x10 ea 2 way 2/10 ea                                   Ther Ex        Scap Plane Table Slides 2x10 2/10 3x10 3x10 3/10   Putty x5' HEP      Seated Table ER PROM bend downs 3/10 2/10 3x10 3x10 3/10   Pulleys 3/10   3x10 3/10   Wand Flexion, ER 2/10    2/10   Wall Slides 2/10    2/10   SLER/ABD 2/10 ea                       Ther Activity        Retro UBE                Gait Training                        Modalities        MHP 10' 10'  x10' x10' 10'   CP        US/Stim                          "

## 2024-07-22 ENCOUNTER — OFFICE VISIT (OUTPATIENT)
Dept: PHYSICAL THERAPY | Facility: CLINIC | Age: 49
End: 2024-07-22
Payer: COMMERCIAL

## 2024-07-22 DIAGNOSIS — M25.311 SHOULDER INSTABILITY, RIGHT: ICD-10-CM

## 2024-07-22 DIAGNOSIS — G89.29 CHRONIC RIGHT SHOULDER PAIN: ICD-10-CM

## 2024-07-22 DIAGNOSIS — Z98.890 S/P RIGHT ROTATOR CUFF REPAIR: Primary | ICD-10-CM

## 2024-07-22 DIAGNOSIS — M25.511 CHRONIC RIGHT SHOULDER PAIN: ICD-10-CM

## 2024-07-22 PROCEDURE — 97112 NEUROMUSCULAR REEDUCATION: CPT | Performed by: PHYSICAL THERAPIST

## 2024-07-22 PROCEDURE — 97140 MANUAL THERAPY 1/> REGIONS: CPT | Performed by: PHYSICAL THERAPIST

## 2024-07-22 PROCEDURE — 97110 THERAPEUTIC EXERCISES: CPT | Performed by: PHYSICAL THERAPIST

## 2024-07-22 NOTE — PROGRESS NOTES
"Daily Note     Today's date: 2024  Patient name: Edwin Childs  : 1975  MRN: [id number]  Referring provider: Keven Avendaño MD  Dx:   Encounter Diagnosis     ICD-10-CM    1. S/P right rotator cuff repair  Z98.890       2. Chronic right shoulder pain  M25.511     G89.29       3. Shoulder instability, right  M25.311                      Subjective: Pt reports she cont to be pleased with progress in R shoulder. No setbacks. HEP going well. Anxious to continue making progress.      Objective: See treatment diary below      Assessment: Tolerated treatment well. Patient demonstrated fatigue post treatment, exhibited good technique with therapeutic exercises, and would benefit from continued PT. Pt has cont to make gains as per protocol. Pt evangelina well without c/o pain.       Plan: Continue per plan of care.  Progress treatment as tolerated.         Precautions: MD protocol for R Shoulder RTC repair 24    Daily Treatment Diary    HEP: Handout provided and discussed      Manuals 23   ART        PROM/Stretch 10'  10' PROM  x10' x10' 10'   IASTM        STM/Triggerpoint        JM                Neuro Re-Ed        Pendulums 5x20'' 20\"  5x20'' 5x20''    Scap retract 2x10 3/10 3x10 3x10 3/10   Isometrics 2 way 2/10 ea 2 way 2x10 2 way 2x10 ea 2 way 2x10 ea 2 way 2/10 ea                                   Ther Ex        Scap Plane Table Slides 2x10 2/10 3x10 3x10 3/10   Putty x5' HEP      Seated Table ER PROM bend downs 3/10 2/10 3x10 3x10 3/10   Pulleys 3/10 3x10  3x10 3/10   Wand Flexion, ER 2/10    2/10   Wall Slides 2/10 3x10   2/10   SLER/ABD 2/10 ea 3x10 ea      Standing Abd  2x10      Standing Scap  2x10      No $  3x10      Supine D2 Flex  3x10                              Ther Activity        Retro UBE  x5'              Gait Training                        Modalities        MHP 10' 10'  x10' x10' 10'   CP        US/Stim                          "

## 2024-07-23 DIAGNOSIS — Z76.89 ENCOUNTER FOR WEIGHT MANAGEMENT: ICD-10-CM

## 2024-07-24 DIAGNOSIS — Z76.89 ENCOUNTER FOR WEIGHT MANAGEMENT: ICD-10-CM

## 2024-07-24 RX ORDER — SEMAGLUTIDE 1.7 MG/.75ML
INJECTION, SOLUTION SUBCUTANEOUS
Qty: 16 ML | Refills: 0 | OUTPATIENT
Start: 2024-07-24

## 2024-07-25 RX ORDER — SEMAGLUTIDE 1.7 MG/.75ML
1.7 INJECTION, SOLUTION SUBCUTANEOUS WEEKLY
Qty: 3 ML | Refills: 0 | Status: SHIPPED | OUTPATIENT
Start: 2024-07-25 | End: 2024-08-24

## 2024-07-29 ENCOUNTER — OFFICE VISIT (OUTPATIENT)
Dept: PHYSICAL THERAPY | Facility: CLINIC | Age: 49
End: 2024-07-29
Payer: COMMERCIAL

## 2024-07-29 DIAGNOSIS — M25.511 CHRONIC RIGHT SHOULDER PAIN: ICD-10-CM

## 2024-07-29 DIAGNOSIS — M25.311 SHOULDER INSTABILITY, RIGHT: ICD-10-CM

## 2024-07-29 DIAGNOSIS — Z98.890 S/P RIGHT ROTATOR CUFF REPAIR: Primary | ICD-10-CM

## 2024-07-29 DIAGNOSIS — G89.29 CHRONIC RIGHT SHOULDER PAIN: ICD-10-CM

## 2024-07-29 PROCEDURE — 97140 MANUAL THERAPY 1/> REGIONS: CPT

## 2024-07-29 PROCEDURE — 97110 THERAPEUTIC EXERCISES: CPT

## 2024-07-29 NOTE — PROGRESS NOTES
"Daily Note     Today's date: 2024  Patient name: Edwin Childs  : 1975  MRN: [id number]  Referring provider: Keven Avendaño MD  Dx:   Encounter Diagnosis     ICD-10-CM    1. S/P right rotator cuff repair  Z98.890       2. Chronic right shoulder pain  M25.511     G89.29       3. Shoulder instability, right  M25.311                      Subjective: Pt reports she is doing well and R shoulder cont to make gains.       Objective: See treatment diary below      Assessment: Tolerated treatment well. Patient exhibited good technique with therapeutic exercises. Pt cont to evangelina added AROM program with good overall evangelina. Min c/o fatigue today or pain.       Plan: Continue per plan of care.      Precautions: MD protocol for R Shoulder RTC repair 24    Daily Treatment Diary    HEP: Handout provided and discussed      Manuals 24   ART        PROM/Stretch 10'  10' PROM  x10' x10' 10'   IASTM        STM/Triggerpoint        JM                Neuro Re-Ed        Pendulums 5x20'' \"  DC' 5x20''    Scap retract 2x10 3/10 3x10 3x10 3/10   Isometrics 2 way 2/10 ea 2 way 2x10 2 way 2x10 ea 2 way 2x10 ea 2 way 2/10 ea                                   Ther Ex        Scap Plane Table Slides 2x10 2/10 3x10 3x10 3/10   Putty x5' HEP      Seated Table ER PROM bend downs 3/10 2/10 3x10 3x10 3/10   Pulleys 3/10 3x10 3/10 3x10 3/10   Wand Flexion, ER 2/10    2/10   Wall Slides 2/10 3x10 3/10  2/10   SLER/ABD 2/10 ea 3x10 ea 3/10 ea     Standing Abd  2x10 2/10     Standing Scap  2x10 2/10     No $  3x10 3/10     Supine D2 Flex  3x10 3/10                             Ther Activity        Retro UBE  x5' NV             Gait Training                        Modalities        MHP 10' 10'  DC x10' 10'   CP        US/Stim                            "

## 2024-08-01 ENCOUNTER — APPOINTMENT (OUTPATIENT)
Dept: PHYSICAL THERAPY | Facility: CLINIC | Age: 49
End: 2024-08-01
Payer: COMMERCIAL

## 2024-08-05 ENCOUNTER — OFFICE VISIT (OUTPATIENT)
Dept: PHYSICAL THERAPY | Facility: CLINIC | Age: 49
End: 2024-08-05
Payer: COMMERCIAL

## 2024-08-05 DIAGNOSIS — Z98.890 S/P RIGHT ROTATOR CUFF REPAIR: Primary | ICD-10-CM

## 2024-08-05 DIAGNOSIS — G89.29 CHRONIC RIGHT SHOULDER PAIN: ICD-10-CM

## 2024-08-05 DIAGNOSIS — M25.311 SHOULDER INSTABILITY, RIGHT: ICD-10-CM

## 2024-08-05 DIAGNOSIS — M25.511 CHRONIC RIGHT SHOULDER PAIN: ICD-10-CM

## 2024-08-05 PROCEDURE — 97112 NEUROMUSCULAR REEDUCATION: CPT | Performed by: PHYSICAL THERAPIST

## 2024-08-05 PROCEDURE — 97110 THERAPEUTIC EXERCISES: CPT | Performed by: PHYSICAL THERAPIST

## 2024-08-05 PROCEDURE — 97140 MANUAL THERAPY 1/> REGIONS: CPT | Performed by: PHYSICAL THERAPIST

## 2024-08-05 NOTE — PROGRESS NOTES
"Daily Note     Today's date: 2024  Patient name: Edwin Childs  : 1975  MRN: [id number]  Referring provider: Keven Avendaño MD  Dx:   Encounter Diagnosis     ICD-10-CM    1. S/P right rotator cuff repair  Z98.890       2. Chronic right shoulder pain  M25.511     G89.29       3. Shoulder instability, right  M25.311                      Subjective: Pt she's feeling tight and catching/clicking/popping. Reports hoping she didn't do anything to disrupt her progress.      Objective: See treatment diary below      Assessment: Tolerated treatment well. Patient demonstrated fatigue post treatment, exhibited good technique with therapeutic exercises, and would benefit from continued PT. (-) Empty Can Test. Will continue to monitor symptoms, but no signs of re-tear at this time.      Plan: Continue per plan of care.  Progress treatment as tolerated.         Precautions: MD protocol for R Shoulder RTC repair 24    Daily Treatment Diary    HEP: Handout provided and discussed      Manuals 24   ART        PROM/Stretch 10'  10' PROM  x10' x10' 10'   IASTM        STM/Triggerpoint        JM                Neuro Re-Ed        Pendulums 5x20'' \"  DC' 5x20''    Scap retract 2x10 3/10 3x10 3x10 3/10   Isometrics 2 way 2/10 ea 2 way 2x10 2 way 2x10 ea 2 way 2x10 ea 2 way 2/10 ea   Supine [place]    10x                            Ther Ex        Scap Plane Table Slides 2x10 2/10 3x10 3x10 3/10   Putty x5' HEP      Seated Table ER PROM bend downs 3/10 2/10 3x10 3x10 3/10   Pulleys 3/10 3x10 3/10 3x10 3/10   Wand Flexion, ER 2/10    2/10   Wall Slides 2/10 3x10 3/10 3x10 2/10   SLER/ABD 2/10 ea 3x10 ea 3/10 ea 3x10 ea    Standing Abd  2x10 2/10 2x10    Standing Scap  2x10 2/10 2x10    No $  3x10 3/10 3x10    Supine D2 Flex  3x10 3/10 3x10                            Ther Activity        Retro UBE  x5' NV x5'            Gait Training                        Modalities        MHP 10' 10'  DC x10' 10'   CP  "       US/Stim

## 2024-08-08 ENCOUNTER — APPOINTMENT (OUTPATIENT)
Dept: PHYSICAL THERAPY | Facility: CLINIC | Age: 49
End: 2024-08-08
Payer: COMMERCIAL

## 2024-08-09 ENCOUNTER — OFFICE VISIT (OUTPATIENT)
Dept: PHYSICAL THERAPY | Facility: CLINIC | Age: 49
End: 2024-08-09
Payer: COMMERCIAL

## 2024-08-09 DIAGNOSIS — M25.511 CHRONIC RIGHT SHOULDER PAIN: ICD-10-CM

## 2024-08-09 DIAGNOSIS — G89.29 CHRONIC RIGHT SHOULDER PAIN: ICD-10-CM

## 2024-08-09 DIAGNOSIS — M25.311 SHOULDER INSTABILITY, RIGHT: ICD-10-CM

## 2024-08-09 DIAGNOSIS — Z98.890 S/P RIGHT ROTATOR CUFF REPAIR: Primary | ICD-10-CM

## 2024-08-09 PROCEDURE — 97110 THERAPEUTIC EXERCISES: CPT | Performed by: PHYSICAL THERAPIST

## 2024-08-09 PROCEDURE — 97140 MANUAL THERAPY 1/> REGIONS: CPT | Performed by: PHYSICAL THERAPIST

## 2024-08-09 PROCEDURE — 97112 NEUROMUSCULAR REEDUCATION: CPT | Performed by: PHYSICAL THERAPIST

## 2024-08-09 NOTE — PROGRESS NOTES
"Daily Note     Today's date: 2024  Patient name: Edwin Childs  : 1975  MRN: [id number]  Referring provider: Keven Avendaño MD  Dx:   Encounter Diagnosis     ICD-10-CM    1. S/P right rotator cuff repair  Z98.890       2. Chronic right shoulder pain  M25.511     G89.29       3. Shoulder instability, right  M25.311                      Subjective: Pt she's feeling tight and catching/clicking/popping. Not impacting strength or ROM, which is good, just hoping it starts to feel better soon.      Objective: See treatment diary below      Assessment: Tolerated treatment well. Patient demonstrated fatigue post treatment, exhibited good technique with therapeutic exercises, and would benefit from continued PT. (-) Empty Can Test. Will continue to monitor symptoms, but no signs of re-tear at this time.      Plan: Continue per plan of care.  Progress treatment as tolerated.         Precautions: MD protocol for R Shoulder RTC repair 24    Daily Treatment Diary    HEP: Handout provided and discussed      Manuals 24   ART        PROM/Stretch 10'  10' PROM  x10' x10' 10'   IASTM        STM/Triggerpoint        JM                Neuro Re-Ed        Pendulums 5x20'' 5\"  DC' 5x20'' 5x20''   Scap retract 2x10 3/10 3x10 3x10 3/10   Isometrics 2 way 2/10 ea 2 way 2x10 2 way 2x10 ea 2 way 2x10 ea 2 way 2/10 ea   Supine [place]    10x 10x   R Levator Stretch     4x20''   R Trap Stretch     4x20''   Sleeper Stretch     6x20''                   Ther Ex        Scap Plane Table Slides 2x10 2/10 3x10 3x10 3/10   Putty x5' HEP      Seated Table ER PROM bend downs 3/10 2/10 3x10 3x10 3/10   Pulleys 3/10 3x10 3/10 3x10 3/10   Wand Flexion, ER 2/10    2/10   Wall Slides 2/10 3x10 3/10 3x10 2/10   SLER/ABD 2/10 ea 3x10 ea 3/10 ea 3x10 ea 3x10 ea   Standing Abd  2x10 2/10 2x10 2x10   Standing Scap  2x10 2/10 2x10 2x10   No $  3x10 3/10 3x10 3x10   Supine D2 Flex  3x10 3/10 3x10 3x10                         "   Ther Activity        Retro UBE  x5' NV x5' x5'           Gait Training                        Modalities        MHP 10' 10'  DC x10' 10'   CP        US/Stim

## 2024-08-12 ENCOUNTER — OFFICE VISIT (OUTPATIENT)
Dept: PHYSICAL THERAPY | Facility: CLINIC | Age: 49
End: 2024-08-12
Payer: COMMERCIAL

## 2024-08-12 DIAGNOSIS — M25.511 CHRONIC RIGHT SHOULDER PAIN: ICD-10-CM

## 2024-08-12 DIAGNOSIS — G89.29 CHRONIC RIGHT SHOULDER PAIN: ICD-10-CM

## 2024-08-12 DIAGNOSIS — M25.311 SHOULDER INSTABILITY, RIGHT: ICD-10-CM

## 2024-08-12 DIAGNOSIS — Z98.890 S/P RIGHT ROTATOR CUFF REPAIR: Primary | ICD-10-CM

## 2024-08-12 PROCEDURE — 97140 MANUAL THERAPY 1/> REGIONS: CPT | Performed by: PHYSICAL THERAPIST

## 2024-08-12 PROCEDURE — 97112 NEUROMUSCULAR REEDUCATION: CPT | Performed by: PHYSICAL THERAPIST

## 2024-08-12 PROCEDURE — 97110 THERAPEUTIC EXERCISES: CPT | Performed by: PHYSICAL THERAPIST

## 2024-08-12 NOTE — LETTER
2024    Keven Avendaño MD  [address] [address]    Patient: Edwin Childs   YOB: 1975   Date of Visit: 2024     Encounter Diagnosis     ICD-10-CM    1. S/P right rotator cuff repair  Z98.890       2. Chronic right shoulder pain  M25.511     G89.29       3. Shoulder instability, right  M25.311           Dear Dr. Avendaño:    Thank you for your recent referral of Edwin Childs. Please review the attached evaluation summary from Edwin's recent visit.     Please verify that you agree with the plan of care by signing the attached order.     If you have any questions or concerns, please do not hesitate to call.     I sincerely appreciate the opportunity to share in the care of one of your patients and hope to have another opportunity to work with you in the near future.       Sincerely,    Juan Pablo Cr, PT, DPT, ATC, ART      Referring Provider:      I certify that I have read the below Plan of Care and certify the need for these services furnished under this plan of treatment while under my care.                    Keven Avendaño MD  [address] [address]  Via Fax: [phone]          PT Re-Evaluation     Today's date: 2024  Patient name: Edwin Childs  : 1975  MRN: [id number]  Referring provider: Keven Avendaño MD  Dx:   Encounter Diagnosis     ICD-10-CM    1. S/P right rotator cuff repair  Z98.890       2. Chronic right shoulder pain  M25.511     G89.29       3. Shoulder instability, right  M25.311                      Assessment  Understanding of Dx/Px/POC: good     Prognosis: good    Goals  STGs: To be complete within 4 weeks (partially met)  - Decrease pain to < 2/10 at worst  - Increase AROM to WNL  - Increase strength to > 4+/5  - Improve postural awareness capacity to > 60min before deficit     LTGs: To be complete within 6 weeks (partially met)  - Able to repetitively complete all overhead activity without  pain or limitation for increased safety and functional capacity with ADLs and work-related duty  - Able to repetitively complete all reaching activity without pain or limitation for increased safety and functional capacity with ADLs and work-related duty  - Able to repetitively complete all pushing/pulling activity without pain or limitation for increased safety and functional capacity with ADLs and work-related duty  - Able to complete all lifting/carrying activity without pain or limitation for increased safety and functional capacity with ADLs and work-related duty     Plan    Planned therapy interventions: neuromuscular re-education, postural training, self care, therapeutic activities, therapeutic exercise, home exercise program, patient/caregiver education and manual therapy    Frequency: 2x week  Duration in weeks: 6       Upon completion of today's re-evaluation, as evidenced by present objective and subjective measures, Edwin's sx remain consistent with continued, fair-paced progress from being s/p R Shoulder RTC repair 24. Pt demonstrating good progress with AROM and strength testing, but has had increased pain over the past couple weeks. Holding off on program progression into resistance training until pain subsides/patient consults with referring MD. Patient will benefit from continued skilled physical therapy to address current deficits.         Subjective Evaluation    Patient Goals  Patient goals for therapy: increased strength, decreased pain and increased motion    Pain  Current pain ratin  At best pain ratin  At worst pain ratin  Location: R Shoulder         Pt reports she is s/p R Shoulder RTC repair 24. Reports abiding by protocol.  Update 24: Reports her ROM and strength seem good, however has had increased pain over the past couple weeks. Reports it is negatively impacting her function and sleep. Reports she can't remember doing anything and has not gone against  "protocol. Hopeful the pain subsides soon and can continue to make progress.        Objective Pain level ranges 1-8/10  AROM: R Shoulder Flex 170 degrees, Abd 170 degrees, ER 90 degrees, IR 45 degrees; L Shoulder Flex 110 degrees, Abd 110 degrees, IR 45 degrees, ER 70 degrees  Strength: R Shoulder Flex 3+/5, Abd 3+/5, ER 3+/5; IR 4/5; L Shoulder 4+/5 t/o all planes  Postural Awareness: Fair (rounded shoulders, forward head)  Incisions: Clean and healing well (will continue to monitor)  FOTO: 62; GOAL: 66  Unable to complete overhead activity without pain and limitation, but improving  Unable to complete pushing/pulling activity without pain and limitation, but improving  Unable to complete lifting/carrying activity without pain and limitation, but improving  Unable to complete cross body/behind the back reaching activity without pain and limitation, but improving              Precautions: MD protocol for R Shoulder RTC repair 6/7/24    Daily Treatment Diary    HEP: Handout provided and discussed      Manuals 8/12 7/22 7/29 8/5/24 8/9/24   ART        PROM/Stretch 10'  10' PROM  x10' x10' 10'   IASTM        STM/Triggerpoint        JM                Neuro Re-Ed        Pendulums 5x20'' 5/20\"  DC' 5x20'' 5x20''   Scap retract 2x10 3/10 3x10 3x10 3/10   Isometrics 2 way 2/10 ea 2 way 2x10 2 way 2x10 ea 2 way 2x10 ea 2 way 2/10 ea   Supine [place] 10x   10x 10x   R Levator Stretch 4x20''    4x20''   R Trap Stretch 4x20''    4x20''   Sleeper Stretch 4x20''    6x20''                   Ther Ex        Scap Plane Table Slides 2x10 2/10 3x10 3x10 3/10   Putty x5' HEP      Seated Table ER PROM bend downs 3/10 2/10 3x10 3x10 3/10   Pulleys 3/10 3x10 3/10 3x10 3/10   Wand Flexion, ER 2/10    2/10   Wall Slides 2/10 3x10 3/10 3x10 2/10   SLER/ABD 2/10 ea 3x10 ea 3/10 ea 3x10 ea 3x10 ea   Standing Abd 2x10 2x10 2/10 2x10 2x10   Standing Scap 2x10 2x10 2/10 2x10 2x10   No $ 2x10 3x10 3/10 3x10 3x10   Supine D2 Flex 2x10 3x10 3/10 3x10 " 3x10                           Ther Activity        Retro UBE x5' x5' NV x5' x5'           Gait Training                        Modalities        MHP 10' 10'  DC x10' 10'   CP        US/Stim

## 2024-08-12 NOTE — PROGRESS NOTES
PT Re-Evaluation     Today's date: 2024  Patient name: Edwin Childs  : 1975  MRN: [id number]  Referring provider: Keven Avendaño MD  Dx:   Encounter Diagnosis     ICD-10-CM    1. S/P right rotator cuff repair  Z98.890       2. Chronic right shoulder pain  M25.511     G89.29       3. Shoulder instability, right  M25.311                      Assessment  Understanding of Dx/Px/POC: good     Prognosis: good    Goals  STGs: To be complete within 4 weeks (partially met)  - Decrease pain to < 2/10 at worst  - Increase AROM to WNL  - Increase strength to > 4+/5  - Improve postural awareness capacity to > 60min before deficit     LTGs: To be complete within 6 weeks (partially met)  - Able to repetitively complete all overhead activity without pain or limitation for increased safety and functional capacity with ADLs and work-related duty  - Able to repetitively complete all reaching activity without pain or limitation for increased safety and functional capacity with ADLs and work-related duty  - Able to repetitively complete all pushing/pulling activity without pain or limitation for increased safety and functional capacity with ADLs and work-related duty  - Able to complete all lifting/carrying activity without pain or limitation for increased safety and functional capacity with ADLs and work-related duty     Plan    Planned therapy interventions: neuromuscular re-education, postural training, self care, therapeutic activities, therapeutic exercise, home exercise program, patient/caregiver education and manual therapy    Frequency: 2x week  Duration in weeks: 6       Upon completion of today's re-evaluation, as evidenced by present objective and subjective measures, Ruth sx remain consistent with continued, fair-paced progress from being s/p R Shoulder RTC repair 24. Pt demonstrating good progress with AROM and strength testing, but has had increased pain over the past couple weeks. Holding off on  program progression into resistance training until pain subsides/patient consults with referring MD. Patient will benefit from continued skilled physical therapy to address current deficits.         Subjective Evaluation    Patient Goals  Patient goals for therapy: increased strength, decreased pain and increased motion    Pain  Current pain ratin  At best pain ratin  At worst pain ratin  Location: R Shoulder         Pt reports she is s/p R Shoulder RTC repair 24. Reports abiding by protocol.  Update 24: Reports her ROM and strength seem good, however has had increased pain over the past couple weeks. Reports it is negatively impacting her function and sleep. Reports she can't remember doing anything and has not gone against protocol. Hopeful the pain subsides soon and can continue to make progress.        Objective Pain level ranges 1-8/10  AROM: R Shoulder Flex 170 degrees, Abd 170 degrees, ER 90 degrees, IR 45 degrees; L Shoulder Flex 110 degrees, Abd 110 degrees, IR 45 degrees, ER 70 degrees  Strength: R Shoulder Flex 3+/5, Abd 3+/5, ER 3+/5; IR 4/5; L Shoulder 4+/5 t/o all planes  Postural Awareness: Fair (rounded shoulders, forward head)  Incisions: Clean and healing well (will continue to monitor)  FOTO: 62; GOAL: 66  Unable to complete overhead activity without pain and limitation, but improving  Unable to complete pushing/pulling activity without pain and limitation, but improving  Unable to complete lifting/carrying activity without pain and limitation, but improving  Unable to complete cross body/behind the back reaching activity without pain and limitation, but improving              Precautions: MD protocol for R Shoulder RTC repair 24    Daily Treatment Diary    HEP: Handout provided and discussed      Manuals 24   ART        PROM/Stretch 10'  10' PROM  x10' x10' 10'   IASTM        STM/Triggerpoint        JM                Neuro Re-Ed        Pendulums  "5x20'' 5/20\"  DC' 5x20'' 5x20''   Scap retract 2x10 3/10 3x10 3x10 3/10   Isometrics 2 way 2/10 ea 2 way 2x10 2 way 2x10 ea 2 way 2x10 ea 2 way 2/10 ea   Supine [place] 10x   10x 10x   R Levator Stretch 4x20''    4x20''   R Trap Stretch 4x20''    4x20''   Sleeper Stretch 4x20''    6x20''                   Ther Ex        Scap Plane Table Slides 2x10 2/10 3x10 3x10 3/10   Putty x5' HEP      Seated Table ER PROM bend downs 3/10 2/10 3x10 3x10 3/10   Pulleys 3/10 3x10 3/10 3x10 3/10   Wand Flexion, ER 2/10    2/10   Wall Slides 2/10 3x10 3/10 3x10 2/10   SLER/ABD 2/10 ea 3x10 ea 3/10 ea 3x10 ea 3x10 ea   Standing Abd 2x10 2x10 2/10 2x10 2x10   Standing Scap 2x10 2x10 2/10 2x10 2x10   No $ 2x10 3x10 3/10 3x10 3x10   Supine D2 Flex 2x10 3x10 3/10 3x10 3x10                           Ther Activity        Retro UBE x5' x5' NV x5' x5'           Gait Training                        Modalities        MHP 10' 10'  DC x10' 10'   CP        US/Stim                        "

## 2024-08-15 ENCOUNTER — APPOINTMENT (OUTPATIENT)
Dept: PHYSICAL THERAPY | Facility: CLINIC | Age: 49
End: 2024-08-15
Payer: COMMERCIAL

## 2024-08-22 ENCOUNTER — APPOINTMENT (OUTPATIENT)
Dept: PHYSICAL THERAPY | Facility: CLINIC | Age: 49
End: 2024-08-22
Payer: COMMERCIAL

## 2024-08-26 ENCOUNTER — OFFICE VISIT (OUTPATIENT)
Dept: PHYSICAL THERAPY | Facility: CLINIC | Age: 49
End: 2024-08-26
Payer: COMMERCIAL

## 2024-08-26 DIAGNOSIS — Z98.890 S/P RIGHT ROTATOR CUFF REPAIR: Primary | ICD-10-CM

## 2024-08-26 DIAGNOSIS — G89.29 CHRONIC RIGHT SHOULDER PAIN: ICD-10-CM

## 2024-08-26 DIAGNOSIS — M25.511 CHRONIC RIGHT SHOULDER PAIN: ICD-10-CM

## 2024-08-26 DIAGNOSIS — M25.311 SHOULDER INSTABILITY, RIGHT: ICD-10-CM

## 2024-08-26 PROCEDURE — 97112 NEUROMUSCULAR REEDUCATION: CPT | Performed by: PHYSICAL THERAPIST

## 2024-08-26 PROCEDURE — 97140 MANUAL THERAPY 1/> REGIONS: CPT | Performed by: PHYSICAL THERAPIST

## 2024-08-26 PROCEDURE — 97110 THERAPEUTIC EXERCISES: CPT | Performed by: PHYSICAL THERAPIST

## 2024-08-26 NOTE — PROGRESS NOTES
"Daily Note     Today's date: 2024  Patient name: Edwin Childs  : 1975  MRN: [id number]  Referring provider: Keven Avendaño MD  Dx:   Encounter Diagnosis     ICD-10-CM    1. S/P right rotator cuff repair  Z98.890       2. Chronic right shoulder pain  M25.511     G89.29       3. Shoulder instability, right  M25.311                      Subjective: Pt she's feeling tight and catching/clicking/popping. Not impacting strength or ROM, which is good, just hoping it starts to feel better soon.      Objective: See treatment diary below      Assessment: Tolerated treatment well. Patient demonstrated fatigue post treatment, exhibited good technique with therapeutic exercises, and would benefit from continued PT. (-) Empty Can Test. Will continue to monitor symptoms, but no signs of re-tear at this time.      Plan: Continue per plan of care.  Progress treatment as tolerated.         Precautions: MD protocol for R Shoulder RTC repair 24    Daily Treatment Diary    HEP: Handout provided and discussed      Manuals 24   ART  X15' R Shoulder      PROM/Stretch 10'    x10' x10' 10'   IASTM        STM/Triggerpoint        JM                Neuro Re-Ed        Pendulums 5x20'' 520\"  DC' 5x20'' 5x20''   Scap retract 2x10 3/10 3x10 3x10 3/10   Isometrics 2 way 2/10 ea 2 way 2x10 2 way 2x10 ea 2 way 2x10 ea 2 way 2/10 ea   Supine [place] 10x 10x  10x 10x   R Levator Stretch 4x20'' 4x20''   4x20''   R Trap Stretch 4x20'' 4x20''   4x20''   Sleeper Stretch 4x20'' 6x20''   6x20''   Sidelying horizontal Add Stretch  6x20''              Ther Ex        Scap Plane Table Slides 2x10 2/10 3x10 3x10 3/10   Putty x5' HEP      Seated Table ER PROM bend downs 3/10 2/10 3x10 3x10 3/10   Pulleys 3/10 3x10 3/10 3x10 3/10   Wand Flexion, ER 2/10    2/10   Wall Slides 2/10 3x10 3/10 3x10 2/10   SLER/ABD 2/10 ea 3x10 ea 3/10 ea 3x10 ea 3x10 ea   Standing Abd 2x10 2x10 2/10 2x10 2x10   Standing Scap 2x10 2x10 2/10 2x10 " 2x10   No $ 2x10 3x10 3/10 3x10 3x10   Supine D2 Flex 2x10 3x10 3/10 3x10 3x10                           Ther Activity        Retro UBE x5' x5' NV x5' x5'           Gait Training                        Modalities        MHP 10' 10'  DC x10' 10'   CP        US/Stim

## 2024-08-29 ENCOUNTER — OFFICE VISIT (OUTPATIENT)
Dept: PHYSICAL THERAPY | Facility: CLINIC | Age: 49
End: 2024-08-29
Payer: COMMERCIAL

## 2024-08-29 DIAGNOSIS — G89.29 CHRONIC RIGHT SHOULDER PAIN: ICD-10-CM

## 2024-08-29 DIAGNOSIS — M25.311 SHOULDER INSTABILITY, RIGHT: ICD-10-CM

## 2024-08-29 DIAGNOSIS — M25.511 CHRONIC RIGHT SHOULDER PAIN: ICD-10-CM

## 2024-08-29 DIAGNOSIS — Z98.890 S/P RIGHT ROTATOR CUFF REPAIR: Primary | ICD-10-CM

## 2024-08-29 PROCEDURE — 97110 THERAPEUTIC EXERCISES: CPT | Performed by: PHYSICAL THERAPIST

## 2024-08-29 PROCEDURE — 97140 MANUAL THERAPY 1/> REGIONS: CPT | Performed by: PHYSICAL THERAPIST

## 2024-08-29 PROCEDURE — 97112 NEUROMUSCULAR REEDUCATION: CPT | Performed by: PHYSICAL THERAPIST

## 2024-08-29 NOTE — PROGRESS NOTES
"Daily Note     Today's date: 2024  Patient name: Edwin Childs  : 1975  MRN: [id number]  Referring provider: Keven Avendaño MD  Dx:   Encounter Diagnosis     ICD-10-CM    1. S/P right rotator cuff repair  Z98.890       2. Chronic right shoulder pain  M25.511     G89.29       3. Shoulder instability, right  M25.311                      Subjective: Pt she's feeling tight and catching/clicking/popping. Not impacting strength or ROM, which is good, just hoping it starts to feel better soon.      Objective: See treatment diary below      Assessment: Tolerated treatment well. Patient demonstrated fatigue post treatment, exhibited good technique with therapeutic exercises, and would benefit from continued PT. (-) Empty Can Test. Will continue to monitor symptoms, but no signs of re-tear at this time.      Plan: Continue per plan of care.  Progress treatment as tolerated.         Precautions: MD protocol for R Shoulder RTC repair 24    Daily Treatment Diary    HEP: Handout provided and discussed      Manuals 24   ART  X15' R Shoulder      PROM/Stretch 10'    x10' x10' 10'   IASTM        STM/Triggerpoint        JM                Neuro Re-Ed        Pendulums 5x20'' 520\"  DC' 5x20'' 5x20''   Scap retract 2x10 3/10 3x10 3x10 3/10   Isometrics 2 way 2/10 ea 2 way 2x10 2 way 2x10 ea 2 way 2x10 ea 2 way 2/10 ea   Supine [place] 10x 10x 10x 10x 10x   R Levator Stretch 4x20'' 4x20'' 4x20''  4x20''   R Trap Stretch 4x20'' 4x20'' 4x20''  4x20''   Sleeper Stretch 4x20'' 6x20'' 6x20''  6x20''   Sidelying horizontal Add Stretch  6x20'' 6x20''             Ther Ex        Scap Plane Table Slides 2x10 2/10 3x10 3x10 3/10   Putty x5' HEP      Seated Table ER PROM bend downs 3/10 2/10 3x10 3x10 3/10   Pulleys 3/10 3x10 3/10 3x10 3/10   Wand Flexion, ER 2/10    2/10   Wall Slides 2/10 3x10 3/10 3x10 2/10   SLER/ABD 2/10 ea 3x10 ea 3/10 ea 3x10 ea 3x10 ea   Standing Abd 2x10 2x10 2/10 2x10 2x10 "   Standing Scap 2x10 2x10 2/10 2x10 2x10   No $ 2x10 3x10 3/10 3x10 3x10   Supine D2 Flex 2x10 3x10 3/10 3x10 3x10                           Ther Activity        Retro UBE x5' x5' NV x5' x5'           Gait Training                        Modalities        MHP 10' 10'  DC x10' 10'   CP        US/Stim

## 2024-09-03 ENCOUNTER — OFFICE VISIT (OUTPATIENT)
Dept: PHYSICAL THERAPY | Facility: CLINIC | Age: 49
End: 2024-09-03
Payer: COMMERCIAL

## 2024-09-03 DIAGNOSIS — G89.29 CHRONIC RIGHT SHOULDER PAIN: ICD-10-CM

## 2024-09-03 DIAGNOSIS — M25.511 CHRONIC RIGHT SHOULDER PAIN: ICD-10-CM

## 2024-09-03 DIAGNOSIS — Z98.890 S/P RIGHT ROTATOR CUFF REPAIR: Primary | ICD-10-CM

## 2024-09-03 DIAGNOSIS — M25.311 SHOULDER INSTABILITY, RIGHT: ICD-10-CM

## 2024-09-03 PROCEDURE — 97112 NEUROMUSCULAR REEDUCATION: CPT | Performed by: PHYSICAL THERAPIST

## 2024-09-03 PROCEDURE — 97140 MANUAL THERAPY 1/> REGIONS: CPT | Performed by: PHYSICAL THERAPIST

## 2024-09-03 PROCEDURE — 97110 THERAPEUTIC EXERCISES: CPT | Performed by: PHYSICAL THERAPIST

## 2024-09-03 NOTE — PROGRESS NOTES
"Daily Note     Today's date: 9/3/2024  Patient name: Edwin Childs  : 1975  MRN: [id number]  Referring provider: Keven Avendaño MD  Dx:   Encounter Diagnosis     ICD-10-CM    1. S/P right rotator cuff repair  Z98.890       2. Chronic right shoulder pain  M25.511     G89.29       3. Shoulder instability, right  M25.311                      Subjective: Pt she's feeling tight and catching/clicking/popping. Not impacting strength or ROM, which is good, just hoping it starts to feel better soon.      Objective: See treatment diary below      Assessment: Tolerated treatment well. Patient demonstrated fatigue post treatment, exhibited good technique with therapeutic exercises, and would benefit from continued PT. (-) Empty Can Test. Will continue to monitor symptoms, but no signs of re-tear at this time.      Plan: Continue per plan of care.  Progress treatment as tolerated.         Precautions: MD protocol for R Shoulder RTC repair 24    Daily Treatment Diary    HEP: Handout provided and discussed      Manuals 8/12 8/26 8/29 9/3/24 8/9/24   ART  X15' R Shoulder  X15' R Shoulder    PROM/Stretch 10'    x10'  10'   IASTM        STM/Triggerpoint        JM                Neuro Re-Ed        Pendulums 5x20'' 5/20\"  DC' 5x20'' 5x20''   Scap retract 2x10 3/10 3x10 3x10 3/10   Isometrics 2 way 2/10 ea 2 way 2x10 2 way 2x10 ea 2 way 2x10 ea 2 way 2/10 ea   Supine [place] 10x 10x 10x 10x 10x   R Levator Stretch 4x20'' 4x20'' 4x20'' 4x20'' 4x20''   R Trap Stretch 4x20'' 4x20'' 4x20'' 4x20'' 4x20''   Sleeper Stretch 4x20'' 6x20'' 6x20'' 6x20'' 6x20''   Sidelying horizontal Add Stretch  6x20'' 6x20'' 6x20''            Ther Ex        Scap Plane Table Slides 2x10 2/10 3x10 3x10 3/10   Putty x5' HEP      Seated Table ER PROM bend downs 3/10 2/10 3x10 3x10 3/10   Pulleys 3/10 3x10 3/10 3x10 3/10   Wand Flexion, ER 2/10    2/10   Wall Slides 2/10 3x10 3/10 3x10 2/10   SLER/ABD 2/10 ea 3x10 ea 3/10 ea 3x10 ea 3x10 ea   Standing Abd " 2x10 2x10 2/10 2x10 2x10   Standing Scap 2x10 2x10 2/10 2x10 2x10   No $ 2x10 3x10 3/10 3x10 3x10   Supine D2 Flex 2x10 3x10 3/10 3x10 3x10                           Ther Activity        Retro UBE x5' x5' NV x5' x5'           Gait Training                        Modalities        MHP 10' 10'  DC x10' 10'   CP        US/Stim

## 2024-09-04 ENCOUNTER — OFFICE VISIT (OUTPATIENT)
Dept: FAMILY MEDICINE CLINIC | Facility: CLINIC | Age: 49
End: 2024-09-04
Payer: COMMERCIAL

## 2024-09-04 VITALS
TEMPERATURE: 97.1 F | DIASTOLIC BLOOD PRESSURE: 78 MMHG | SYSTOLIC BLOOD PRESSURE: 124 MMHG | HEIGHT: 61 IN | WEIGHT: 151 LBS | RESPIRATION RATE: 18 BRPM | HEART RATE: 83 BPM | OXYGEN SATURATION: 98 % | BODY MASS INDEX: 28.51 KG/M2

## 2024-09-04 DIAGNOSIS — Z00.00 ANNUAL PHYSICAL EXAM: Primary | ICD-10-CM

## 2024-09-04 DIAGNOSIS — Z12.31 ENCOUNTER FOR SCREENING MAMMOGRAM FOR MALIGNANT NEOPLASM OF BREAST: ICD-10-CM

## 2024-09-04 DIAGNOSIS — Z13.820 ENCOUNTER FOR SCREENING FOR OSTEOPOROSIS: ICD-10-CM

## 2024-09-04 PROCEDURE — 99396 PREV VISIT EST AGE 40-64: CPT | Performed by: STUDENT IN AN ORGANIZED HEALTH CARE EDUCATION/TRAINING PROGRAM

## 2024-09-04 RX ORDER — SEMAGLUTIDE 1.7 MG/.75ML
1.7 INJECTION, SOLUTION SUBCUTANEOUS WEEKLY
Qty: 3 ML | Refills: 2 | Status: SHIPPED | OUTPATIENT
Start: 2024-09-04 | End: 2024-12-03

## 2024-09-04 NOTE — PROGRESS NOTES
Adult Annual Physical  Name: Edwin Childs      : 1975      MRN: [id number]  Encounter Provider: Jaz Adams DO  Encounter Date: 2024   Encounter department: [facility]    Assessment & Plan   1. Annual physical exam  Comments:  -Concerns addressed   -Prevention reviewed   -In need of shingles vaccine   -RTC in 1 year for annual or as concerns arise  2. Encounter for screening mammogram for malignant neoplasm of breast  Comments:  -ordered  Orders:  -     Mammo screening bilateral w 3d and cad; Future  3. Encounter for screening for osteoporosis  Comments:  -Ordered, will need Q2Y given hx of bariatric surgery  Orders:  -     DXA bone density spine hip and pelvis; Future; Expected date: 2024  4. BMI 28.0-28.9,adult  Comments:  -Continue current dose of Wegovy  Orders:  -     Semaglutide-Weight Management (Wegovy) 1.7 MG/0.75ML; Inject 0.75 mL (1.7 mg total) under the skin once a week    Immunizations and preventive care screenings were discussed with patient today. Appropriate education was printed on patient's after visit summary.    Counseling:  Alcohol/drug use: discussed moderation in alcohol intake, the recommendations for healthy alcohol use, and avoidance of illicit drug use.  Dental Health: discussed importance of regular tooth brushing, flossing, and dental visits.  Injury prevention: discussed safety/seat belts, safety helmets, smoke detectors, carbon dioxide detectors, and smoking near bedding or upholstery.  Sexual health: discussed sexually transmitted diseases, partner selection, use of condoms, avoidance of unintended pregnancy, and contraceptive alternatives.  Exercise: the importance of regular exercise/physical activity was discussed. Recommend exercise 3-5 times per week for at least 30 minutes.       Depression Screening and Follow-up Plan: Patient was screened for depression during today's encounter. They screened negative with a PHQ-2 score  of 0.      BMI Counseling: Body mass index is 28.53 kg/m². The BMI is above normal. Nutrition recommendations include reducing portion sizes, decreasing overall calorie intake, 3-5 servings of fruits/vegetables daily, reducing fast food intake, consuming healthier snacks, moderation in carbohydrate intake, and increasing intake of lean protein. Exercise recommendations include moderate aerobic physical activity for 150 minutes/week, vigorous aerobic physical activity for 75 minutes/week, and exercising 3-5 times per week. Pharmacotherapy was ordered for patient to aid in weight loss.     History of Present Illness     Adult Annual Physical:  Patient presents for annual physical.     Diet and Physical Activity:  - Diet/Nutrition: well balanced diet and consuming 3-5 servings of fruits/vegetables daily. water  - Exercise: walking.    Depression Screening:  - PHQ-2 Score: 0    General Health:  - Sleep: sleeps poorly. due to pain  - Hearing: normal hearing bilateral ears.  - Vision: wears glasses and most recent eye exam < 1 year ago.  - Dental: brushes teeth twice daily and regular dental visits.    /GYN Health:  - Follows with GYN: no.   - Menopause: postmenopausal.   - Contraception:. hysterectomy due to non cancerous lesion      Advanced Care Planning:  - Has an advanced directive?: no    - Has a durable medical POA?: no    - ACP document given to patient?: no        Preventive Screenings  Colorectal Cancer Screening: Cologaurd completed in 2023, not due until 2026  Breast Cancer Screening: ordered  Cervical Cancer Screening: Done in 2020 with HPV co testing, will be done in 2025  Lung Cancer Screening: N/A, no smoking hx  Osteoporosis Screening: indicated 2/2 bariatric surgery   AAA Screening: No indicated due to smoking hx and gender   STD Screening: Declined  Preconception Counseling: Pt had hysterectomy   Cardiovascular Screening: Lipid panel and A1c up to date   Kidney health: last eGFR of 106  Advance Care  "Planning: Discussed and info provided  History of Bariatric surgery: Yes, s/p gastric sleeve  Polypharmacy? None  Vaccination indicated: Shingrix    Review of Systems   Constitutional:  Negative for unexpected weight change.   HENT:  Negative for ear pain.    Respiratory:  Negative for cough and shortness of breath.    Cardiovascular:  Negative for chest pain.   Gastrointestinal:  Negative for constipation, diarrhea, nausea and vomiting.   Genitourinary:  Negative for difficulty urinating.   Musculoskeletal:  Negative for gait problem.   Skin:  Negative for rash.   Neurological:  Negative for dizziness and syncope.   Psychiatric/Behavioral:  Positive for sleep disturbance.          Objective     /78   Pulse 83   Temp (!) 97.1 °F (36.2 °C)   Resp 18   Ht 5' 1\" (1.549 m)   Wt 68.5 kg (151 lb)   LMP  (LMP Unknown)   SpO2 98%   BMI 28.53 kg/m²     Physical Exam  Vitals reviewed.   Constitutional:       General: She is not in acute distress.     Appearance: Normal appearance. She is normal weight. She is not ill-appearing or toxic-appearing.   HENT:      Head: Normocephalic and atraumatic.      Right Ear: Tympanic membrane, ear canal and external ear normal.      Left Ear: Tympanic membrane, ear canal and external ear normal.      Nose: Nose normal. No congestion.   Eyes:      Conjunctiva/sclera: Conjunctivae normal.   Cardiovascular:      Rate and Rhythm: Normal rate and regular rhythm.      Heart sounds: Normal heart sounds. No murmur heard.     No friction rub. No gallop.   Pulmonary:      Effort: Pulmonary effort is normal. No respiratory distress.      Breath sounds: Normal breath sounds. No wheezing, rhonchi or rales.   Musculoskeletal:      Right lower leg: No edema.      Left lower leg: No edema.   Lymphadenopathy:      Cervical: No cervical adenopathy.   Skin:     General: Skin is warm and dry.      Findings: No rash.   Neurological:      General: No focal deficit present.      Mental Status: She " is alert and oriented to person, place, and time.      Gait: Gait normal.   Psychiatric:         Mood and Affect: Mood normal.         Behavior: Behavior normal.         Thought Content: Thought content normal.       Jaz Adams DO   PGY-2 [facility]  Res[facility]   [facility]

## 2024-09-04 NOTE — PATIENT INSTRUCTIONS
"Patient Education     Routine physical for adults   The Basics   Written by the doctors and editors at [facility]   What is a physical? -- A physical is a routine visit, or \"check-up,\" with your doctor. You might also hear it called a \"wellness visit\" or \"preventive visit.\"  During each visit, the doctor will:   Ask about your physical and mental health   Ask about your habits, behaviors, and lifestyle   Do an exam   Give you vaccines if needed   Talk to you about any medicines you take   Give advice about your health   Answer your questions  Getting regular check-ups is an important part of taking care of your health. It can help your doctor find and treat any problems you have. But it's also important for preventing health problems.  A routine physical is different from a \"sick visit.\" A sick visit is when you see a doctor because of a health concern or problem. Since physicals are scheduled ahead of time, you can think about what you want to ask the doctor.  How often should I get a physical? -- It depends on your age and health. In general, for people age 21 years and older:   If you are younger than 50 years, you might be able to get a physical every 3 years.   If you are 50 years or older, your doctor might recommend a physical every year.  If you have an ongoing health condition, like diabetes or high blood pressure, your doctor will probably want to see you more often.  What happens during a physical? -- In general, each visit will include:   Physical exam - The doctor or nurse will check your height, weight, heart rate, and blood pressure. They will also look at your eyes and ears. They will ask about how you are feeling and whether you have any symptoms that bother you.   Medicines - It's a good idea to bring a list of all the medicines you take to each doctor visit. Your doctor will talk to you about your medicines and answer any questions. Tell them if you are having any side effects that bother you. You " "should also tell them if you are having trouble paying for any of your medicines.   Habits and behaviors - This includes:   Your diet   Your exercise habits   Whether you smoke, drink alcohol, or use drugs   Whether you are sexually active   Whether you feel safe at home  Your doctor will talk to you about things you can do to improve your health and lower your risk of health problems. They will also offer help and support. For example, if you want to quit smoking, they can give you advice and might prescribe medicines. If you want to improve your diet or get more physical activity, they can help you with this, too.   Lab tests, if needed - The tests you get will depend on your age and situation. For example, your doctor might want to check your:   Cholesterol   Blood sugar   Iron level   Vaccines - The recommended vaccines will depend on your age, health, and what vaccines you already had. Vaccines are very important because they can prevent certain serious or deadly infections.   Discussion of screening - \"Screening\" means checking for diseases or other health problems before they cause symptoms. Your doctor can recommend screening based on your age, risk, and preferences. This might include tests to check for:   Cancer, such as breast, prostate, cervical, ovarian, colorectal, prostate, lung, or skin cancer   Sexually transmitted infections, such as chlamydia and gonorrhea   Mental health conditions like depression and anxiety  Your doctor will talk to you about the different types of screening tests. They can help you decide which screenings to have. They can also explain what the results might mean.   Answering questions - The physical is a good time to ask the doctor or nurse questions about your health. If needed, they can refer you to other doctors or specialists, too.  Adults older than 65 years often need other care, too. As you get older, your doctor will talk to you about:   How to prevent falling at " home   Hearing or vision tests   Memory testing   How to take your medicines safely   Making sure that you have the help and support you need at home  All topics are updated as new evidence becomes available and our peer review process is complete.  This topic retrieved from [facility] on: May 02, 2024.  Topic 362996 Version 1.0  Release: 32.4.3 - C32.122  © 2024 UpToDate, Inc. and/or its affiliates. All rights reserved.  Consumer Information Use and Disclaimer   Disclaimer: This generalized information is a limited summary of diagnosis, treatment, and/or medication information. It is not meant to be comprehensive and should be used as a tool to help the user understand and/or assess potential diagnostic and treatment options. It does NOT include all information about conditions, treatments, medications, side effects, or risks that may apply to a specific patient. It is not intended to be medical advice or a substitute for the medical advice, diagnosis, or treatment of a health care provider based on the health care provider's examination and assessment of a patient's specific and unique circumstances. Patients must speak with a health care provider for complete information about their health, medical questions, and treatment options, including any risks or benefits regarding use of medications. This information does not endorse any treatments or medications as safe, effective, or approved for treating a specific patient. UpToDate, Inc. and its affiliates disclaim any warranty or liability relating to this information or the use thereof.The use of this information is governed by the Terms of Use, available at https://www.woltersDelightuwer.com/en/know/clinical-effectiveness-terms. 2024© UpToDate, Inc. and its affiliates and/or licensors. All rights reserved.  Copyright   © 2024 UpToDate, Inc. and/or its affiliates. All rights reserved.

## 2024-09-05 ENCOUNTER — OFFICE VISIT (OUTPATIENT)
Dept: PHYSICAL THERAPY | Facility: CLINIC | Age: 49
End: 2024-09-05
Payer: COMMERCIAL

## 2024-09-05 DIAGNOSIS — Z98.890 S/P RIGHT ROTATOR CUFF REPAIR: Primary | ICD-10-CM

## 2024-09-05 DIAGNOSIS — M25.511 CHRONIC RIGHT SHOULDER PAIN: ICD-10-CM

## 2024-09-05 DIAGNOSIS — M25.311 SHOULDER INSTABILITY, RIGHT: ICD-10-CM

## 2024-09-05 DIAGNOSIS — G89.29 CHRONIC RIGHT SHOULDER PAIN: ICD-10-CM

## 2024-09-05 PROCEDURE — 97110 THERAPEUTIC EXERCISES: CPT

## 2024-09-05 PROCEDURE — 97140 MANUAL THERAPY 1/> REGIONS: CPT

## 2024-09-05 NOTE — PROGRESS NOTES
"Daily Note     Today's date: 2024  Patient name: Edwin Childs  : 1975  MRN: [id number]  Referring provider: Keven Avendaño MD  Dx:   Encounter Diagnosis     ICD-10-CM    1. S/P right rotator cuff repair  Z98.890       2. Chronic right shoulder pain  M25.511     G89.29       3. Shoulder instability, right  M25.311                      Subjective: Reports cont pain and c/o popping in shoulder. Reports will have MRI next week.       Objective: See treatment diary below      Assessment: Tolerated treatment well. Patient demonstrated fatigue post treatment and exhibited good technique with therapeutic exercises. Pt cont to c/o pain at superior portion of shoulder. Pt does demonstrate near full AROM into shoulder abd and scaption however does c/o pain at end range.       Plan: Continue per plan of care.      Precautions: MD protocol for R Shoulder RTC repair 24    Daily Treatment Diary    HEP: Handout provided and discussed      Manuals 8/12 8/26 8/29 9/3/24 9/5   ART  X15' R Shoulder  X15' R Shoulder    PROM/Stretch 10'    x10'  10'   IASTM        STM/Triggerpoint        JM                Neuro Re-Ed        Pendulums 5x20'' \"  DC' 5x20''    Scap retract 2x10 3/10 3x10 3x10    Isometrics 2 way 2/10 ea 2 way 2x10 2 way 2x10 ea 2 way 2x10 ea    Supine [place] 10x 10x 10x 10x    R Levator Stretch 4x20'' 4x20'' 4x20'' 4x20''    R Trap Stretch 4x20'' 4x20'' 4x20'' 4x20''    Sleeper Stretch 4x20'' 6x20'' 6x20'' 6x20'' 6x20''   Sidelying horizontal Add Stretch  6x20'' 6x20'' 6x20'' \"            Ther Ex        Scap Plane Table Slides 2x10 2/10 3x10 3x10 3/10   Putty x5' HEP      Seated Table ER PROM bend downs 3/10 2/10 3x10 3x10 3/10   Pulleys 3/10 3x10 3/10 3x10 3/10   Wand Flexion, ER 2/10       Wall Slides 2/10 3x10 3/10 3x10 2/10   SLER/ABD 2/10 ea 3x10 ea 3/10 ea 3x10 ea 3x10 ea   Standing Abd 2x10 2x10 2/10 2x10 2x10   Standing Scap 2x10 2x10 2/10 2x10 2x10   No $ 2x10 3x10 3/10 3x10    Supine D2 " Flex 2x10 3x10 3/10 3x10                            Ther Activity        Retro UBE x5' x5' NV x5' x5'           Gait Training                        Modalities        MHP 10' 10'  DC x10'    CP        US/Stim

## 2024-09-09 ENCOUNTER — APPOINTMENT (OUTPATIENT)
Dept: PHYSICAL THERAPY | Facility: CLINIC | Age: 49
End: 2024-09-09
Payer: COMMERCIAL

## 2024-09-11 ENCOUNTER — OFFICE VISIT (OUTPATIENT)
Dept: PHYSICAL THERAPY | Facility: CLINIC | Age: 49
End: 2024-09-11
Payer: COMMERCIAL

## 2024-09-11 DIAGNOSIS — M25.311 SHOULDER INSTABILITY, RIGHT: ICD-10-CM

## 2024-09-11 DIAGNOSIS — G89.29 CHRONIC RIGHT SHOULDER PAIN: ICD-10-CM

## 2024-09-11 DIAGNOSIS — M25.511 CHRONIC RIGHT SHOULDER PAIN: ICD-10-CM

## 2024-09-11 DIAGNOSIS — Z98.890 S/P RIGHT ROTATOR CUFF REPAIR: Primary | ICD-10-CM

## 2024-09-11 PROCEDURE — 97140 MANUAL THERAPY 1/> REGIONS: CPT

## 2024-09-11 PROCEDURE — 97110 THERAPEUTIC EXERCISES: CPT

## 2024-09-11 NOTE — PROGRESS NOTES
"Daily Note     Today's date: 2024  Patient name: Edwin Childs  : 1975  MRN: [id number]  Referring provider: Keven Avendaño MD  Dx:   Encounter Diagnosis     ICD-10-CM    1. S/P right rotator cuff repair  Z98.890       2. Chronic right shoulder pain  M25.511     G89.29       3. Shoulder instability, right  M25.311                      Subjective: Pt reports cont pain along prox bicep and superior shoulder. Pt awaiting MRI.       Objective: See treatment diary below      Assessment: Tolerated treatment well. Patient exhibited good technique with therapeutic exercises. Pt cont to complete abbreviated program secondary to shoulder pain and awaiting further testing.       Plan: Continue per plan of care.      Precautions: MD protocol for R Shoulder RTC repair 24    Daily Treatment Diary    HEP: Handout provided and discussed      Manuals 9/11 8/26 8/29 9/3/24 9/5   ART  X15' R Shoulder  X15' R Shoulder    PROM/Stretch 10'    x10'  10'   IASTM        STM/Triggerpoint        JM                Neuro Re-Ed        Pendulums  \"  DC' 5x20''    Scap retract  3/10 3x10 3x10    Isometrics  2 way 2x10 2 way 2x10 ea 2 way 2x10 ea    Supine [place]  10x 10x 10x    R Levator Stretch 4x20'' 4x20'' 4x20'' 4x20''    R Trap Stretch 4x20'' 4x20'' 4x20'' 4x20''    Sleeper Stretch 4x20'' 6x20'' 6x20'' 6x20'' 6x20''   Sidelying horizontal Add Stretch \" 6x20'' 6x20'' 6x20'' \"            Ther Ex        Scap Plane Table Slides  2/10 3x10 3x10 3/10   Putty  HEP      Seated Table ER PROM bend downs  2/10 3x10 3x10 3/10   Pulleys 3/10 3x10 3/10 3x10 3/10   Wand Flexion, ER        Wall Slides  3x10 3/10 3x10 2/10   SLER/ABD  3x10 ea 3/10 ea 3x10 ea 3x10 ea   Standing Abd  2x10 2/10 2x10 2x10   Standing Scap  2x10 2/10 2x10 2x10   No $ 2x10 3x10 3/10 3x10    Supine D2 Flex  3x10 3/10 3x10                            Ther Activity        Retro UBE x5' x5' NV x5' x5'           Gait Training                        Modalities  "       MHP  10'  DC x10'    CP        US/Stim

## 2024-09-12 ENCOUNTER — APPOINTMENT (OUTPATIENT)
Dept: PHYSICAL THERAPY | Facility: CLINIC | Age: 49
End: 2024-09-12
Payer: COMMERCIAL

## 2024-09-16 ENCOUNTER — OFFICE VISIT (OUTPATIENT)
Dept: PHYSICAL THERAPY | Facility: CLINIC | Age: 49
End: 2024-09-16
Payer: COMMERCIAL

## 2024-09-16 DIAGNOSIS — M25.311 SHOULDER INSTABILITY, RIGHT: ICD-10-CM

## 2024-09-16 DIAGNOSIS — Z98.890 S/P RIGHT ROTATOR CUFF REPAIR: Primary | ICD-10-CM

## 2024-09-16 DIAGNOSIS — G89.29 CHRONIC RIGHT SHOULDER PAIN: ICD-10-CM

## 2024-09-16 DIAGNOSIS — M25.511 CHRONIC RIGHT SHOULDER PAIN: ICD-10-CM

## 2024-09-16 PROCEDURE — 97140 MANUAL THERAPY 1/> REGIONS: CPT

## 2024-09-16 PROCEDURE — 97110 THERAPEUTIC EXERCISES: CPT

## 2024-09-16 NOTE — PROGRESS NOTES
"Daily Note     Today's date: 2024  Patient name: Edwin Childs  : 1975  MRN: [id number]  Referring provider: Keven Avendaño MD  Dx:   Encounter Diagnosis     ICD-10-CM    1. S/P right rotator cuff repair  Z98.890       2. Chronic right shoulder pain  M25.511     G89.29       3. Shoulder instability, right  M25.311                      Subjective: Pt awaiting MRI on . Reports cont discomfort no change.       Objective: See treatment diary below      Assessment: Tolerated treatment well. Patient exhibited good technique with therapeutic exercises. Pt with cont c/o pain in R shoulder. Able to complete stretching with improved sx post treatment.       Plan: Continue per plan of care.      Precautions: MD protocol for R Shoulder RTC repair 24    Daily Treatment Diary    HEP: Handout provided and discussed      Manuals 9/11 9/16 8/29 9/3/24 9   ART    X15' R Shoulder    PROM/Stretch 10'   10' x10'  10'   IASTM        STM/Triggerpoint        JM                Neuro Re-Ed        Pendulums   DC' 5x20''    Scap retract   3x10 3x10    Isometrics   2 way 2x10 ea 2 way 2x10 ea    Supine [place]   10x 10x    R Levator Stretch 4x20'' 4x20'' 4x20'' 4x20''    R Trap Stretch 4x20'' 4x20'' 4x20'' 4x20''    Sleeper Stretch 4x20'' 6x20'' 6x20'' 6x20'' 6x20''   Sidelying horizontal Add Stretch \" 6x20'' 6x20'' 6x20'' \"            Ther Ex        Scap Plane Table Slides   3x10 3x10 3/10   Putty  HEP      Seated Table ER PROM bend downs  2/10 3x10 3x10 3/10   Pulleys 3/10 3x10 3/10 3x10 3/10   Wand Flexion, ER        Wall Slides   3/10 3x10 2/10   SLER/ABD   3/10 ea 3x10 ea 3x10 ea   Standing Abd   2/10 2x10 2x10   Standing Scap   2/10 2x10 2x10   No $ 2x10 3x10 3/10 3x10    Supine D2 Flex   3/10 3x10                            Ther Activity        Retro UBE x5' x5' NV x5' x5'           Gait Training                        Modalities        MHP   DC x10'    CP        US/Stim                                      "

## 2024-09-17 NOTE — PROGRESS NOTES
PT Discharge    Today's date: 2024  Patient name: Edwin Childs  : 1975  MRN: [id number]  Referring provider: Keven Avendaño MD  Dx:   Encounter Diagnosis     ICD-10-CM    1. S/P right rotator cuff repair  Z98.890       2. Chronic right shoulder pain  M25.511     G89.29       3. Shoulder instability, right  M25.311           Start Time: 1530  Stop Time: 1610  Total time in clinic (min): 40 minutes    Assessment  Understanding of Dx/Px/POC: good     Prognosis: good    Goals  STGs: To be complete within 4 weeks (partially met)  - Decrease pain to < 2/10 at worst  - Increase AROM to WNL  - Increase strength to > 4+/5  - Improve postural awareness capacity to > 60min before deficit     LTGs: To be complete within 6 weeks (partially met)  - Able to repetitively complete all overhead activity without pain or limitation for increased safety and functional capacity with ADLs and work-related duty  - Able to repetitively complete all reaching activity without pain or limitation for increased safety and functional capacity with ADLs and work-related duty  - Able to repetitively complete all pushing/pulling activity without pain or limitation for increased safety and functional capacity with ADLs and work-related duty  - Able to complete all lifting/carrying activity without pain or limitation for increased safety and functional capacity with ADLs and work-related duty     Plan    Planned therapy interventions: neuromuscular re-education, postural training, self care, therapeutic activities, therapeutic exercise, home exercise program, patient/caregiver education and manual therapy    Frequency: 2x week  Duration in weeks: 6       Pt will be D/C from physical therapy at this time, as she will be getting an MRI and discussing next option with referring MD. Pt instructed to reach out with any questions/concerns/setbacks.        Subjective Evaluation    Patient Goals  Patient goals for therapy: increased strength,  decreased pain and increased motion    Pain  Current pain ratin  At best pain ratin  At worst pain ratin  Location: R Shoulder         Pt reports she is s/p R Shoulder RTC repair 24. Reports abiding by protocol.  Update 24: Reports her ROM and strength seem good, however has had increased pain over the past couple weeks. Reports it is negatively impacting her function and sleep. Reports she can't remember doing anything and has not gone against protocol. Hopeful the pain subsides soon and can continue to make progress.  Update: Reports getting an MRI and putting PT on hold for now.        Objective Pain level ranges 1-810  AROM: R Shoulder Flex 170 degrees, Abd 170 degrees, ER 90 degrees, IR 45 degrees; L Shoulder Flex 110 degrees, Abd 110 degrees, IR 45 degrees, ER 70 degrees  Strength: R Shoulder Flex 3+/5, Abd 3+/5, ER 3+/5; IR 4/5; L Shoulder 4+/5 t/o all planes  Postural Awareness: Fair (rounded shoulders, forward head)  Incisions: Clean and healing well (will continue to monitor)  FOTO: 62; GOAL: 66  Unable to complete overhead activity without pain and limitation, but improving  Unable to complete pushing/pulling activity without pain and limitation, but improving  Unable to complete lifting/carrying activity without pain and limitation, but improving  Unable to complete cross body/behind the back reaching activity without pain and limitation, but improving

## 2024-09-18 ENCOUNTER — APPOINTMENT (OUTPATIENT)
Dept: PHYSICAL THERAPY | Facility: CLINIC | Age: 49
End: 2024-09-18
Payer: COMMERCIAL

## 2024-09-19 ENCOUNTER — APPOINTMENT (OUTPATIENT)
Dept: PHYSICAL THERAPY | Facility: CLINIC | Age: 49
End: 2024-09-19
Payer: COMMERCIAL

## 2024-09-23 ENCOUNTER — APPOINTMENT (OUTPATIENT)
Dept: PHYSICAL THERAPY | Facility: CLINIC | Age: 49
End: 2024-09-23
Payer: COMMERCIAL

## 2024-09-25 ENCOUNTER — APPOINTMENT (OUTPATIENT)
Dept: PHYSICAL THERAPY | Facility: CLINIC | Age: 49
End: 2024-09-25
Payer: COMMERCIAL

## 2024-09-30 ENCOUNTER — APPOINTMENT (OUTPATIENT)
Dept: PHYSICAL THERAPY | Facility: CLINIC | Age: 49
End: 2024-09-30
Payer: COMMERCIAL

## 2024-10-14 RX ORDER — SEMAGLUTIDE 1.7 MG/.75ML
1.7 INJECTION, SOLUTION SUBCUTANEOUS WEEKLY
Qty: 3 ML | Refills: 2 | Status: SHIPPED | OUTPATIENT
Start: 2024-10-14 | End: 2025-01-12

## 2024-11-01 ENCOUNTER — OFFICE VISIT (OUTPATIENT)
Dept: URGENT CARE | Facility: MEDICAL CENTER | Age: 49
End: 2024-11-01
Payer: COMMERCIAL

## 2024-11-01 VITALS
BODY MASS INDEX: 26.05 KG/M2 | SYSTOLIC BLOOD PRESSURE: 110 MMHG | RESPIRATION RATE: 17 BRPM | DIASTOLIC BLOOD PRESSURE: 78 MMHG | OXYGEN SATURATION: 99 % | HEART RATE: 88 BPM | TEMPERATURE: 96.9 F | HEIGHT: 63 IN | WEIGHT: 147 LBS

## 2024-11-01 DIAGNOSIS — R05.1 ACUTE COUGH: ICD-10-CM

## 2024-11-01 DIAGNOSIS — U07.1 COVID-19: Primary | ICD-10-CM

## 2024-11-01 LAB
SARS-COV-2 AG UPPER RESP QL IA: POSITIVE
VALID CONTROL: ABNORMAL

## 2024-11-01 PROCEDURE — S9083 URGENT CARE CENTER GLOBAL: HCPCS

## 2024-11-01 PROCEDURE — 87811 SARS-COV-2 COVID19 W/OPTIC: CPT

## 2024-11-01 PROCEDURE — G0382 LEV 3 HOSP TYPE B ED VISIT: HCPCS

## 2024-11-01 NOTE — PROGRESS NOTES
[facility]        NAME: Edwin Childs is a 49 y.o. female  : 1975    MRN: [id number]  DATE: 2024  TIME: 4:23 PM    Assessment and Plan   COVID-19 [U07.1]  1. COVID-19        2. Acute cough  Poct Covid 19 Rapid Antigen Test            Patient Instructions       Follow up with PCP in 3-5 days.  Proceed to  ER if symptoms worsen.    If tests are performed, our office will contact you with results only if changes need to made to the care plan discussed with you at the visit. You can review your full results on [facility].    Chief Complaint     Chief Complaint   Patient presents with    Fever     3days: feverish, sweats/chills, sore throat, ear ache, cough, sneezing, diarrhea 1x today, nasal congestion, sinus pressure, tylenol sinus and tylenol extra strength was taken for symptoms         History of Present Illness       Wednesday started with sore throat and ear pain. Patient took tylenol for her symptoms. She has not had any shortness of breath. She has had fevers - unknown TMAX, was not measured. Took tylenol about 1 hour ago. She has had decreased appetite since Wednesday, but has been drinking fluids. 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         Review of Systems   Review of Systems   Constitutional:  Positive for fatigue and fever. Negative for appetite change and chills.   HENT:  Positive for congestion, postnasal drip, rhinorrhea, sinus pressure, sinus pain and sore throat. Negative for trouble swallowing.    Respiratory:  Positive for cough. Negative for chest tightness, shortness of breath and wheezing.    Cardiovascular:  Negative for chest pain and palpitations.   Gastrointestinal:  Positive for diarrhea. Negative for abdominal pain, constipation, nausea and vomiting.   Skin:  Negative for color change and rash.   Neurological:  Negative for dizziness, light-headedness and headaches.         Current Medications       Current Outpatient  Medications:     DULoxetine (CYMBALTA) 20 mg capsule, Take 20 mg by mouth daily, Disp: , Rfl:     ergocalciferol (VITAMIN D2) 50,000 units, TAKE 1 CAPSULE BY MOUTH ONCE WEEKLY, Disp: 8 capsule, Rfl: 0    pregabalin (LYRICA) 50 mg capsule, Take 50 mg by mouth 2 (two) times a day, Disp: , Rfl:     Semaglutide-Weight Management (Wegovy) 1.7 MG/0.75ML, Inject 0.75 mL (1.7 mg total) under the skin once a week, Disp: 3 mL, Rfl: 2    traMADol (ULTRAM) 50 mg tablet, Take  mg by mouth daily at bedtime as needed, Disp: , Rfl:     buPROPion (WELLBUTRIN SR) 150 mg 12 hr tablet, take FIRST STARTING DOSE AT 6AM and SECOND dose AT 2PM (Patient not taking: Reported on 8/17/2023), Disp: , Rfl:     lidocaine (XYLOCAINE) 5 % ointment, APPLY 5-7 FINGERTIP UNITS TO THE AFFECTED AREA 3 TIMES DAILY AS NEEDED (2 FINGERTIP UNITS = 1 GRAM) (Patient not taking: Reported on 11/1/2024), Disp: , Rfl:     methylPREDNISolone 4 MG tablet therapy pack, Use as directed on package, Disp: 1 each, Rfl: 0    mupirocin (BACTROBAN) 2 % ointment, apply to affected area three times a day (Patient not taking: Reported on 8/17/2023), Disp: , Rfl:     Current Allergies     Allergies as of 11/01/2024 - Reviewed 11/01/2024   Allergen Reaction Noted    Chlorhexidine Hives 09/04/2024            The following portions of the patient's history were reviewed and updated as appropriate: allergies, current medications, past family history, past medical history, past social history, past surgical history and problem list.     Past Medical History:   Diagnosis Date    Carpal tunnel syndrome of left wrist     last assessed 1/23/15     Morbid obesity with BMI of 40.0-44.9, adult (HCC)     last assessed 12/6/16     Obesity        Past Surgical History:   Procedure Laterality Date    APPENDECTOMY  2017    GASTRECTOMY SLEEVE LAPAROSCOPIC  12/2016    HAND SURGERY Bilateral     Carpal tunnel     HYSTERECTOMY      HYSTEROSCOPY      NEUROPLASTY / TRANSPOSITION MEDIAN NERVE  "AT CARPAL TUNNEL Right 2013    decompression     SHOULDER SURGERY Right 2013    TONSILLECTOMY AND ADENOIDECTOMY         Family History   Problem Relation Age of Onset    Hypertension Mother     Asthma Father     No Known Problems Sister     No Known Problems Daughter     No Known Problems Daughter     No Known Problems Maternal Grandmother     No Known Problems Maternal Grandfather     Alzheimer's disease Paternal Grandmother     No Known Problems Paternal Grandfather     Ovarian cancer Maternal Aunt     Uterine cancer Maternal Aunt     No Known Problems Paternal Aunt     No Known Problems Paternal Aunt          Medications have been verified.        Objective   /78   Pulse 88   Temp (!) 96.9 °F (36.1 °C)   Resp 17   Ht 5' 2.5\" (1.588 m)   Wt 66.7 kg (147 lb)   LMP  (LMP Unknown)   SpO2 99%   BMI 26.46 kg/m²        Physical Exam     Physical Exam  Vitals and nursing note reviewed.   Constitutional:       General: She is awake. She is not in acute distress.     Appearance: Normal appearance. She is normal weight. She is not ill-appearing.   HENT:      Head: Normocephalic and atraumatic.      Right Ear: Tympanic membrane, ear canal and external ear normal. Tympanic membrane is not erythematous or bulging.      Left Ear: Tympanic membrane, ear canal and external ear normal. Tympanic membrane is not erythematous or bulging.      Nose: Congestion and rhinorrhea present. Rhinorrhea is clear.      Right Sinus: Maxillary sinus tenderness and frontal sinus tenderness present.      Left Sinus: Maxillary sinus tenderness and frontal sinus tenderness present.      Mouth/Throat:      Lips: Pink.      Mouth: Mucous membranes are moist.      Pharynx: Oropharynx is clear.   Eyes:      General: Lids are normal.      Extraocular Movements: Extraocular movements intact.      Conjunctiva/sclera: Conjunctivae normal.      Pupils: Pupils are equal, round, and reactive to light.   Cardiovascular:      Rate and Rhythm: Normal " rate and regular rhythm.      Pulses: Normal pulses.      Heart sounds: Normal heart sounds.   Pulmonary:      Effort: Pulmonary effort is normal.      Breath sounds: Normal breath sounds. No decreased breath sounds, wheezing or rhonchi.   Abdominal:      General: Abdomen is flat. Bowel sounds are normal.      Palpations: Abdomen is soft.   Musculoskeletal:         General: Normal range of motion.      Cervical back: Full passive range of motion without pain, normal range of motion and neck supple.   Skin:     General: Skin is warm and dry.      Capillary Refill: Capillary refill takes less than 2 seconds.      Findings: No rash.   Neurological:      General: No focal deficit present.      Mental Status: She is alert and oriented to person, place, and time.   Psychiatric:         Mood and Affect: Mood normal.         Behavior: Behavior normal. Behavior is cooperative.

## 2024-11-01 NOTE — PATIENT INSTRUCTIONS
"You may take over the counter Tylenol (Acetaminophen) and/or Motrin (Ibuprofen) as needed, as directed on packaging. Be sure to get plenty of rest, and drinking fluids to remain hydrated.     Please follow up with your primary provider in the next several days. Should you have any worsening of symptoms, or lack of improvement please be re-evaluated. If needed for significant concerns, consider 911 or ER evaluation.     Pt appears to have a viral upper respiratory infection. Antibiotics are contraindicated at this time and would not help you feel better faster.      Although the symptoms are troublesome, usually the patient's body is able to recover from a viral infection on an average time of 7-10 days.  Fever, if any, typically resolves after 3-5 days.  If patient has sore throat, typically this resolves within 3-5 days.  Any nasal congestion, runny nose, post nasal drip typically begin to  improve after 7-10 days.  Any cough may linger over a couple weeks.  Please note that having a cough is not necessarily a bad thing.  It often times is part of our body's protective mechanism to help keep our airways clear.       Please note that yellow mucous doesn't necessarily mean a \"bacterial\" infection.  Yellow mucous doesn't automatically mean that an antibiotic is needed.  It is not unusual for mucus to become more discolored in the days after the start of an upper respiratory infection.  Often times this is due to mucous that has thickened  with white blood cells that have flooded the mucosa to try and fight the viral infection.       Ear Pain may occur when the eustachian tubes become blocked with mucous or swollen due to acute inflammation from illness. May give Ibuprofen or Tylenol as needed for comfort.  May also use warm compress against ear for comfort.  If ear ache is persisting and not improving over 2-3 days or if there is any gross drainage coming from ear, please seek further evaluation.       Natural " remedies to help provide comfort for cough/ cold symptoms include: one teaspoon of honey (only in infants over 1 year of age), increased vitamin C (oranges, evelyn, etc.), pricila, and drinking plenty of fluids. Vaporizer by bedside.     If you should have prolonged symptoms (Greater than 10 days), worsening symptoms, or any new symptoms please seek further medical attention.

## 2025-01-28 ENCOUNTER — OFFICE VISIT (OUTPATIENT)
Dept: FAMILY MEDICINE CLINIC | Facility: CLINIC | Age: 50
End: 2025-01-28
Payer: COMMERCIAL

## 2025-01-28 VITALS
RESPIRATION RATE: 18 BRPM | WEIGHT: 151.8 LBS | BODY MASS INDEX: 28.66 KG/M2 | DIASTOLIC BLOOD PRESSURE: 76 MMHG | HEIGHT: 61 IN | OXYGEN SATURATION: 98 % | SYSTOLIC BLOOD PRESSURE: 112 MMHG | HEART RATE: 79 BPM

## 2025-01-28 DIAGNOSIS — R53.1 GENERALIZED WEAKNESS: ICD-10-CM

## 2025-01-28 DIAGNOSIS — R51.9 NONINTRACTABLE HEADACHE, UNSPECIFIED CHRONICITY PATTERN, UNSPECIFIED HEADACHE TYPE: ICD-10-CM

## 2025-01-28 DIAGNOSIS — R29.818 POSITIVE ROMBERG TEST: Primary | ICD-10-CM

## 2025-01-28 PROCEDURE — 99213 OFFICE O/P EST LOW 20 MIN: CPT

## 2025-01-28 NOTE — PROGRESS NOTES
Name: Edwin Childs      : 1975      MRN: [id number]  Encounter Provider: Errol Murillo MD  Encounter Date: 2025   Encounter department: [facility]  :  Assessment & Plan  Positive Romberg test  Rest of the neurophysical exam was unremarkable  Will benefit from vestibular eval  Orders:    Basic vestibular eval; Future    Generalized weakness  Patient has not had labs done in over 1 year  Patient is amenable to getting labs and then considering pharmacology  Vit D level from  showed 13 and pt was treated with high doses in the past, but is currently only on OTC dosage  Orders:    Vitamin D 25 hydroxy; Future    Comprehensive metabolic panel; Future    CBC and differential; Future    TSH, 3rd generation; Future    T4, free; Future    Nonintractable headache, unspecified chronicity pattern, unspecified headache type  Feels like head is congested but no other flu-like symptoms x 5 days  Denies SOB, fevers, cough, palpitations   No sinus tenderness on PE  Can consider Medrol dose carole in future and/or flonase               History of Present Illness   Patient presenting to the clinic complaining of 5 days of headache, fatigue, brain fogginess, lack of energy. At home Covid test yesterday was negative.  Patient does have some nausea but no vomiting and does have some mild sensitivity to the light.  Patient used to have migraines in the past, but this does not feel like those symptoms and she has not had a migraine in over 1 year.  Patient states that she does drink about 1 gallon of water a day and does not feel that this is dehydration related.  No recent travel or sick contacts.  Patient denies any fevers, shortness of breath, cough, palpitations.  Patient has tried Tylenol over-the-counter which did not help.  Patient has a history of bariatric surgery so does avoid NSAIDs.  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    Refused flu and shingles  "vaccine.  Advised magnesium oxide 400 mg daily over-the-counter.           Review of Systems   Constitutional:  Positive for fatigue. Negative for chills, diaphoresis and fever.   HENT:  Positive for congestion. Negative for ear discharge, ear pain, rhinorrhea and sinus pressure.    Respiratory:  Negative for shortness of breath and wheezing.    Cardiovascular:  Negative for chest pain and palpitations.   Gastrointestinal:  Positive for nausea. Negative for blood in stool, diarrhea and vomiting.   Genitourinary:  Negative for hematuria.   Neurological:  Positive for weakness and headaches. Negative for seizures and syncope.       Objective   /76 (BP Location: Left arm)   Pulse 79   Resp 18   Ht 5' 1\" (1.549 m)   Wt 68.9 kg (151 lb 12.8 oz)   LMP  (LMP Unknown)   SpO2 98%   BMI 28.68 kg/m²      Physical Exam  Constitutional:       General: She is not in acute distress.     Appearance: Normal appearance. She is not ill-appearing.   HENT:      Head: Normocephalic.      Right Ear: Tympanic membrane, ear canal and external ear normal.      Left Ear: Tympanic membrane, ear canal and external ear normal.      Nose: Nose normal. No congestion or rhinorrhea.      Right Sinus: No maxillary sinus tenderness or frontal sinus tenderness.      Left Sinus: No maxillary sinus tenderness or frontal sinus tenderness.      Mouth/Throat:      Mouth: Mucous membranes are moist.   Eyes:      General: No scleral icterus.     Extraocular Movements: Extraocular movements intact.      Conjunctiva/sclera: Conjunctivae normal.   Cardiovascular:      Rate and Rhythm: Normal rate and regular rhythm.      Pulses: Normal pulses.      Heart sounds: Normal heart sounds. No murmur heard.  Pulmonary:      Effort: Pulmonary effort is normal. No respiratory distress.      Breath sounds: Normal breath sounds. No wheezing.   Abdominal:      General: Bowel sounds are normal.      Palpations: Abdomen is soft.      Tenderness: There is no " abdominal tenderness.   Musculoskeletal:      Right lower leg: No edema.      Left lower leg: No edema.   Lymphadenopathy:      Cervical: No cervical adenopathy.   Skin:     General: Skin is warm.      Capillary Refill: Capillary refill takes less than 2 seconds.   Neurological:      General: No focal deficit present.      Mental Status: She is alert.      Cranial Nerves: Cranial nerves 2-12 are intact. No cranial nerve deficit.      Sensory: Sensation is intact. No sensory deficit.      Coordination: Romberg sign positive. Coordination normal. Finger-Nose-Finger Test and Heel to Shin Test normal.      Gait: Gait is intact. Gait normal.   Psychiatric:         Mood and Affect: Mood normal.         Behavior: Behavior normal.

## 2025-01-29 ENCOUNTER — APPOINTMENT (OUTPATIENT)
Dept: LAB | Facility: MEDICAL CENTER | Age: 50
End: 2025-01-29
Payer: COMMERCIAL

## 2025-01-29 DIAGNOSIS — R53.1 GENERALIZED WEAKNESS: ICD-10-CM

## 2025-01-29 LAB
25(OH)D3 SERPL-MCNC: 16.4 NG/ML (ref 30–100)
BASOPHILS # BLD AUTO: 0.04 THOUSANDS/ΜL (ref 0–0.1)
BASOPHILS NFR BLD AUTO: 1 % (ref 0–1)
EOSINOPHIL # BLD AUTO: 0.04 THOUSAND/ΜL (ref 0–0.61)
EOSINOPHIL NFR BLD AUTO: 1 % (ref 0–6)
ERYTHROCYTE [DISTWIDTH] IN BLOOD BY AUTOMATED COUNT: 11.6 % (ref 11.6–15.1)
HCT VFR BLD AUTO: 39.8 % (ref 34.8–46.1)
HGB BLD-MCNC: 13.4 G/DL (ref 11.5–15.4)
IMM GRANULOCYTES # BLD AUTO: 0.01 THOUSAND/UL (ref 0–0.2)
IMM GRANULOCYTES NFR BLD AUTO: 0 % (ref 0–2)
LYMPHOCYTES # BLD AUTO: 1.69 THOUSANDS/ΜL (ref 0.6–4.47)
LYMPHOCYTES NFR BLD AUTO: 35 % (ref 14–44)
MCH RBC QN AUTO: 30.2 PG (ref 26.8–34.3)
MCHC RBC AUTO-ENTMCNC: 33.7 G/DL (ref 31.4–37.4)
MCV RBC AUTO: 90 FL (ref 82–98)
MONOCYTES # BLD AUTO: 0.31 THOUSAND/ΜL (ref 0.17–1.22)
MONOCYTES NFR BLD AUTO: 6 % (ref 4–12)
NEUTROPHILS # BLD AUTO: 2.73 THOUSANDS/ΜL (ref 1.85–7.62)
NEUTS SEG NFR BLD AUTO: 57 % (ref 43–75)
NRBC BLD AUTO-RTO: 0 /100 WBCS
PLATELET # BLD AUTO: 235 THOUSANDS/UL (ref 149–390)
PMV BLD AUTO: 10.5 FL (ref 8.9–12.7)
RBC # BLD AUTO: 4.43 MILLION/UL (ref 3.81–5.12)
T4 FREE SERPL-MCNC: 0.87 NG/DL (ref 0.61–1.12)
TSH SERPL DL<=0.05 MIU/L-ACNC: 1.17 UIU/ML (ref 0.45–4.5)
WBC # BLD AUTO: 4.82 THOUSAND/UL (ref 4.31–10.16)

## 2025-01-29 PROCEDURE — 80053 COMPREHEN METABOLIC PANEL: CPT

## 2025-01-29 PROCEDURE — 82306 VITAMIN D 25 HYDROXY: CPT

## 2025-01-29 PROCEDURE — 36415 COLL VENOUS BLD VENIPUNCTURE: CPT

## 2025-01-29 PROCEDURE — 85025 COMPLETE CBC W/AUTO DIFF WBC: CPT

## 2025-01-29 PROCEDURE — 84439 ASSAY OF FREE THYROXINE: CPT

## 2025-01-29 PROCEDURE — 84443 ASSAY THYROID STIM HORMONE: CPT

## 2025-01-30 LAB
ALBUMIN SERPL BCG-MCNC: 4.3 G/DL (ref 3.5–5)
ALP SERPL-CCNC: 47 U/L (ref 34–104)
ALT SERPL W P-5'-P-CCNC: 9 U/L (ref 7–52)
ANION GAP SERPL CALCULATED.3IONS-SCNC: 9 MMOL/L (ref 4–13)
AST SERPL W P-5'-P-CCNC: 14 U/L (ref 13–39)
BILIRUB SERPL-MCNC: 0.44 MG/DL (ref 0.2–1)
BUN SERPL-MCNC: 10 MG/DL (ref 5–25)
CALCIUM SERPL-MCNC: 9.2 MG/DL (ref 8.4–10.2)
CHLORIDE SERPL-SCNC: 105 MMOL/L (ref 96–108)
CO2 SERPL-SCNC: 26 MMOL/L (ref 21–32)
CREAT SERPL-MCNC: 0.67 MG/DL (ref 0.6–1.3)
GFR SERPL CREATININE-BSD FRML MDRD: 102 ML/MIN/1.73SQ M
GLUCOSE SERPL-MCNC: 69 MG/DL (ref 65–140)
POTASSIUM SERPL-SCNC: 4.1 MMOL/L (ref 3.5–5.3)
PROT SERPL-MCNC: 6.9 G/DL (ref 6.4–8.4)
SODIUM SERPL-SCNC: 140 MMOL/L (ref 135–147)

## 2025-02-03 ENCOUNTER — RESULTS FOLLOW-UP (OUTPATIENT)
Dept: NEPHROLOGY | Facility: CLINIC | Age: 50
End: 2025-02-03

## 2025-02-03 ENCOUNTER — TELEPHONE (OUTPATIENT)
Dept: FAMILY MEDICINE CLINIC | Facility: CLINIC | Age: 50
End: 2025-02-03

## 2025-02-03 DIAGNOSIS — E55.9 VITAMIN D DEFICIENCY: Primary | ICD-10-CM

## 2025-02-03 RX ORDER — CHOLECALCIFEROL (VITAMIN D3) 1250 MCG
50000 CAPSULE ORAL WEEKLY
Qty: 12 CAPSULE | Refills: 0 | Status: SHIPPED | OUTPATIENT
Start: 2025-02-03 | End: 2025-04-22

## 2025-02-03 NOTE — TELEPHONE ENCOUNTER
----- Message from Errol Murillo MD sent at 2/3/2025  9:43 AM EST -----  Hello ladies,    Please let pt know that her labs mostly look great. Kidney, liver, thyroid are great. Her Vit D level was low, as it was in the past. At this time, I will send a once weekly pill of high dose Vit D to be taken for 3 months and we can recheck it at that time. Thank you.

## 2025-02-03 NOTE — TELEPHONE ENCOUNTER
Lft msg for patient, checking to see if date and time is ok for appt on 2/13 at 9 am with Dr Madrigal that she had requested, if not she is to call back and ask to speak with someone in the office.

## 2025-02-05 RX ORDER — SEMAGLUTIDE 1.7 MG/.75ML
1.7 INJECTION, SOLUTION SUBCUTANEOUS WEEKLY
Qty: 3 ML | Refills: 2 | Status: SHIPPED | OUTPATIENT
Start: 2025-02-05 | End: 2025-05-06

## 2025-02-13 ENCOUNTER — OFFICE VISIT (OUTPATIENT)
Dept: FAMILY MEDICINE CLINIC | Facility: CLINIC | Age: 50
End: 2025-02-13
Payer: COMMERCIAL

## 2025-02-13 VITALS
TEMPERATURE: 97.4 F | DIASTOLIC BLOOD PRESSURE: 62 MMHG | WEIGHT: 153 LBS | OXYGEN SATURATION: 99 % | SYSTOLIC BLOOD PRESSURE: 124 MMHG | HEART RATE: 76 BPM | HEIGHT: 61 IN | BODY MASS INDEX: 28.89 KG/M2

## 2025-02-13 DIAGNOSIS — Z12.31 ENCOUNTER FOR SCREENING MAMMOGRAM FOR BREAST CANCER: ICD-10-CM

## 2025-02-13 DIAGNOSIS — E55.9 VITAMIN D DEFICIENCY: ICD-10-CM

## 2025-02-13 DIAGNOSIS — G44.52 NEW DAILY PERSISTENT HEADACHE: Primary | ICD-10-CM

## 2025-02-13 DIAGNOSIS — J01.01 ACUTE RECURRENT MAXILLARY SINUSITIS: ICD-10-CM

## 2025-02-13 PROCEDURE — 99214 OFFICE O/P EST MOD 30 MIN: CPT | Performed by: FAMILY MEDICINE

## 2025-02-13 RX ORDER — AMOXICILLIN 500 MG/1
500 CAPSULE ORAL EVERY 8 HOURS SCHEDULED
Qty: 30 CAPSULE | Refills: 0 | Status: SHIPPED | OUTPATIENT
Start: 2025-02-13 | End: 2025-02-23

## 2025-02-13 NOTE — PROGRESS NOTES
Name: Edwin Childs      : 1975      MRN: [id number]  Encounter Provider: Jones Madrigal DO  Encounter Date: 2025   Encounter department: [facility]  :  Assessment & Plan  New daily persistent headache    Orders:    Ambulatory Referral to Neurology; Future    MRI brain w wo contrast; Future    Encounter for screening mammogram for breast cancer    Orders:    Mammo screening bilateral w 3d and cad; Future    Acute recurrent maxillary sinusitis    Orders:    amoxicillin (AMOXIL) 500 mg capsule; Take 1 capsule (500 mg total) by mouth every 8 (eight) hours for 10 days    Vitamin D deficiency    Orders:    Vitamin D 25 hydroxy; Future           History of Present Illness   Headache  Headache pattern:  Headache sometimes there, sometimes not at all  Initial event:  None  Recent changes:  Headaches come more often than they used to and headaches are shorter than they used to be  Frequency:  Daily  Providers seen:  Primary care provider  Previous testing:  Blood work and MRI  Lifetime total:  2 to 4  Longest time without a headache:  Months  Number of ER visits for headache:  0  Number of hospitalizations for headaches:  0  ADL impact frequency:  Daily  Time of day symptoms are worse:  No specific time of day  Do headaches wake patient from sleep?: Yes    Days of the week symptoms are worse:  No specific day of the week  Season symptoms are worse:  No particular season  Quality:  Pressure pushing out and dull  Laterality:  Both sides at the same time  Location:  All over  Pain severity:  5  Escalation timing:  Wakes up with severe pain  Duration:  24 hours  Headaches last more than three days?: Yes    Aggravating factors:  None  Allodynia triggers:  None    Review of Systems   Constitutional:  Negative for activity change, appetite change, diaphoresis, fatigue and fever.   HENT: Negative.     Eyes: Negative.    Respiratory:  Negative for apnea, cough, chest tightness, shortness of breath and  "wheezing.    Cardiovascular:  Negative for chest pain, palpitations and leg swelling.   Gastrointestinal:  Negative for abdominal distention, abdominal pain, anal bleeding, constipation, diarrhea, nausea and vomiting.   Endocrine: Negative for cold intolerance, heat intolerance, polydipsia, polyphagia and polyuria.   Genitourinary:  Negative for difficulty urinating, dysuria, flank pain, hematuria and urgency.   Musculoskeletal:  Negative for arthralgias, back pain, gait problem, joint swelling and myalgias.   Skin:  Negative for color change, rash and wound.   Allergic/Immunologic: Negative for environmental allergies, food allergies and immunocompromised state.   Neurological:  Positive for headaches. Negative for dizziness, seizures, syncope, speech difficulty and numbness.   Hematological:  Negative for adenopathy. Does not bruise/bleed easily.   Psychiatric/Behavioral:  Negative for agitation, behavioral problems, hallucinations, sleep disturbance and suicidal ideas.        Objective   /62 (BP Location: Left arm, Patient Position: Sitting, Cuff Size: Standard)   Pulse 76   Temp (!) 97.4 °F (36.3 °C) (Temporal)   Ht 5' 1\" (1.549 m)   Wt 69.4 kg (153 lb)   LMP  (LMP Unknown)   SpO2 99%   BMI 28.91 kg/m²      Physical Exam  Constitutional:       Appearance: She is well-developed.   HENT:      Head: Normocephalic and atraumatic.      Right Ear: External ear normal.      Left Ear: External ear normal.      Nose: Nose normal.   Eyes:      Conjunctiva/sclera: Conjunctivae normal.      Pupils: Pupils are equal, round, and reactive to light.   Cardiovascular:      Rate and Rhythm: Normal rate and regular rhythm.      Heart sounds: Normal heart sounds. No murmur heard.     No friction rub.   Pulmonary:      Effort: Pulmonary effort is normal. No respiratory distress.      Breath sounds: Normal breath sounds. No wheezing or rales.   Chest:      Chest wall: No tenderness.   Abdominal:      General: Bowel " sounds are normal.      Palpations: Abdomen is soft.   Musculoskeletal:         General: Normal range of motion.      Cervical back: Normal range of motion and neck supple.   Skin:     General: Skin is warm and dry.      Capillary Refill: Capillary refill takes 2 to 3 seconds.   Neurological:      Mental Status: She is alert and oriented to person, place, and time.   Psychiatric:         Behavior: Behavior normal.         Thought Content: Thought content normal.         Judgment: Judgment normal.

## 2025-02-27 ENCOUNTER — HOSPITAL ENCOUNTER (OUTPATIENT)
Dept: MRI IMAGING | Facility: HOSPITAL | Age: 50
Discharge: HOME | End: 2025-02-27
Attending: FAMILY MEDICINE
Payer: COMMERCIAL

## 2025-02-27 DIAGNOSIS — G44.52 NEW DAILY PERSISTENT HEADACHE: ICD-10-CM

## 2025-02-27 PROCEDURE — 70553 MRI BRAIN STEM W/O & W/DYE: CPT

## 2025-02-27 PROCEDURE — A9585 GADOBUTROL INJECTION: HCPCS | Performed by: FAMILY MEDICINE

## 2025-02-27 RX ORDER — GADOBUTROL 604.72 MG/ML
6 INJECTION INTRAVENOUS
Status: COMPLETED | OUTPATIENT
Start: 2025-02-27 | End: 2025-02-27

## 2025-02-27 RX ADMIN — GADOBUTROL 6 ML: 604.72 INJECTION INTRAVENOUS at 06:39

## 2025-02-28 ENCOUNTER — RESULTS FOLLOW-UP (OUTPATIENT)
Dept: FAMILY MEDICINE CLINIC | Facility: CLINIC | Age: 50
End: 2025-02-28

## 2025-02-28 NOTE — RESULT ENCOUNTER NOTE
Please call the patient regarding her abnormal result.  MRI does not show any tumors the brain has some minimal white matter changes which are not usually seen in people with migraine headaches she does have a small dilated vein on the left frontal area of the brain again that probably is not causing her headaches in her sinuses she has thickening of the walls of the sinuses she also has a polyp that is about half an inch in diameter in the right maxillary sinus she may want to visit an ENT to have her sinuses scoped and to discuss treatment of her chronic sinusitis.  And she may benefit from seeing a headache specialist to try some of the new headache medicines that are available both with pills and injections

## 2025-03-07 ENCOUNTER — OFFICE VISIT (OUTPATIENT)
Dept: NEUROLOGY | Facility: CLINIC | Age: 50
End: 2025-03-07

## 2025-03-07 VITALS
OXYGEN SATURATION: 99 % | HEIGHT: 61 IN | WEIGHT: 155.4 LBS | BODY MASS INDEX: 29.34 KG/M2 | HEART RATE: 81 BPM | TEMPERATURE: 97.7 F | SYSTOLIC BLOOD PRESSURE: 112 MMHG | RESPIRATION RATE: 16 BRPM | DIASTOLIC BLOOD PRESSURE: 77 MMHG

## 2025-03-07 DIAGNOSIS — G44.52 NEW DAILY PERSISTENT HEADACHE: Primary | ICD-10-CM

## 2025-03-07 RX ORDER — METHYLPREDNISOLONE 4 MG/1
TABLET ORAL
Qty: 1 EACH | Refills: 0 | Status: SHIPPED | OUTPATIENT
Start: 2025-03-07

## 2025-03-07 NOTE — PATIENT INSTRUCTIONS
Headache/migraine treatment:    Prevention: To take every day to help prevent headaches - not to take at the time of headache:    -Over the counter preventive supplements for headaches/migraines (if you try, try for 3 months straight):  -Magnesium oxide 400mg daily (If any diarrhea or upset stomach, decrease dose as tolerated)  -Riboflavin (Vitamin B2) 400mg daily (may make your urine bright/neon yellow)  - All supplements can be purchased online     Physical therapy     Abortive:   Medrol dose carole to break current headache cycle.      For now, please stop using NSAIDS until headaches break.    -It is ok to take ibuprofen, acetaminophen or naproxen (Advil, Tylenol,  Aleve, Excedrin) if they help your headaches you should limit these to No more than 3 times a week to avoid medication overuse/rebound headaches.     Lifestyle Recommendations:  -Remain well-hydrated drinking at least 48 to 64 ounces of noncaffeinated beverages per day in addition to anything caffeinated.    -It is important to eat meals throughout the day and not go long periods of time between eating.  -Getting adequate rest is also very important for migraine prevention (aim for 7-8 hours per night).     -Regular exercise is also beneficial for headache prevention.  I would encourage at the least 5 days of physical exercise weekly for at least 30 minutes.     Education and Follow-up:  -Please call or send a [employer] message with any questions or concerns. Please present to the emergency room with any concerning symptoms such as: worst headache of your life, sudden painless loss of vision or double vision, difficulty speaking or swallowing, vertigo/room spinning that does not quickly resolve, or weakness/numbness/loss of coordination affecting 1 side of the face or body.    -Follow up in 2 months

## 2025-03-17 NOTE — PROGRESS NOTES
[facility]        NAME: Edwin Childs is a 49 y.o. female  : 1975    MRN: [id number]    Assessment and Plan   Unspecified fracture of navicular (scaphoid) bone of left wrist, initial encounter for closed fracture [S62.002A]  1. Unspecified fracture of navicular (scaphoid) bone of left wrist, initial encounter for closed fracture  XR wrist 3+ vw left    Splint application        Scaphoid fracture noted on x-ray, final radiology read pending.  Placed in a thumb spica Ortho-Glass splint.  Patient already follows with a hand surgeon with [facility] and will follow-up with them regarding this, no referral needed, provided with disc of x-rays done here to share with the surgeon.  Recommend RICE and NSAIDs.  Provided warning signs of circulatory compromise and educated on how to check for capillary refill.  Instructed on how to remove splint and placed back on if too tight or any other concerns that require removal.      Patient Instructions     See wrap up for details  Proceed to  ER if symptoms worsen.    Chief Complaint     Chief Complaint   Patient presents with    Wrist Pain     Left wrist pain . Fell last night          History of Present Illness     Wrist Pain   Pertinent negatives include no fever.     P/w left wrist pain since last night.  Was falling and she broke her fall on her left hand in an outstretched position.  Since then has had pain especially over the lateral aspect of the wrist and hand with some swelling as well.  Does report some limitation in range of motion and extension.  Has been taking Tylenol and tramadol which she has for another medical condition.  Denies any prior wrist injury.  Follows with hand surgeon and [facility] for carpal tunnel issues.    Review of Systems   Review of Systems   Constitutional:  Negative for chills and fever.   HENT:  Negative for ear pain and sore throat.    Eyes:  Negative for pain and visual disturbance.   Respiratory:  Negative for cough, chest tightness  Refill sent to pharm      and shortness of breath.    Cardiovascular:  Negative for chest pain and palpitations.   Gastrointestinal:  Negative for abdominal pain, constipation, diarrhea, nausea and vomiting.   Genitourinary:  Negative for dysuria, hematuria and menstrual problem.   Musculoskeletal:  Positive for arthralgias and joint swelling. Negative for back pain.   Skin:  Negative for color change and rash.   Neurological:  Negative for seizures and syncope.   Psychiatric/Behavioral:  Negative for dysphoric mood and suicidal ideas.    All other systems reviewed and are negative.    Current Medications       Current Outpatient Medications:     DULoxetine (CYMBALTA) 20 mg capsule, Take 20 mg by mouth daily, Disp: , Rfl:     ergocalciferol (VITAMIN D2) 50,000 units, TAKE 1 CAPSULE BY MOUTH ONCE WEEKLY, Disp: 8 capsule, Rfl: 0    lidocaine (XYLOCAINE) 5 % ointment, APPLY 5-7 FINGERTIP UNITS TO THE AFFECTED AREA 3 TIMES DAILY AS NEEDED (2 FINGERTIP UNITS = 1 GRAM), Disp: , Rfl:     methylPREDNISolone 4 MG tablet therapy pack, Use as directed on package, Disp: 1 each, Rfl: 0    pregabalin (LYRICA) 50 mg capsule, Take 50 mg by mouth 2 (two) times a day, Disp: , Rfl:     Semaglutide-Weight Management (Wegovy) 1.7 MG/0.75ML, Inject 1.7 mg under the skin once a week, Disp: , Rfl:     Wegovy 1.7 MG/0.75ML, INJECT 0.75 ML (1.7MG) UNDER THE SKIN  ONCE A WEEK, Disp: 12 mL, Rfl: 0    buPROPion (WELLBUTRIN SR) 150 mg 12 hr tablet, take FIRST STARTING DOSE AT 6AM and SECOND dose AT 2PM (Patient not taking: Reported on 8/17/2023), Disp: , Rfl:     mupirocin (BACTROBAN) 2 % ointment, apply to affected area three times a day (Patient not taking: Reported on 8/17/2023), Disp: , Rfl:     traMADol (ULTRAM) 50 mg tablet, Take  mg by mouth daily at bedtime as needed (Patient not taking: Reported on 3/24/2024), Disp: , Rfl:     Current Allergies     Allergies as of 05/12/2024    (No Known Allergies)            The following portions of the patient's history  "were reviewed and updated as appropriate: allergies, current medications, past family history, past medical history, past social history, past surgical history and problem list.     Past Medical History:   Diagnosis Date    Carpal tunnel syndrome of left wrist     last assessed 1/23/15     Morbid obesity with BMI of 40.0-44.9, adult ([facility])     last assessed 12/6/16     Obesity        Past Surgical History:   Procedure Laterality Date    APPENDECTOMY  2017    GASTRECTOMY SLEEVE LAPAROSCOPIC  12/2016    HAND SURGERY Bilateral     Carpal tunnel     HYSTERECTOMY      HYSTEROSCOPY      NEUROPLASTY / TRANSPOSITION MEDIAN NERVE AT CARPAL TUNNEL Right 2013    decompression     SHOULDER SURGERY Right 2013    TONSILLECTOMY AND ADENOIDECTOMY         Family History   Problem Relation Age of Onset    Hypertension Mother     Alzheimer's disease Paternal Grandmother     Ovarian cancer Maternal Aunt     Uterine cancer Maternal Aunt     Asthma Father     No Known Problems Sister     No Known Problems Daughter     No Known Problems Maternal Grandmother     No Known Problems Maternal Grandfather     No Known Problems Paternal Grandfather     No Known Problems Daughter     No Known Problems Paternal Aunt     No Known Problems Paternal Aunt          Medications have been verified.        Objective   /60   Pulse 70   Temp 98.6 °F (37 °C)   Resp 18   Ht 5' 1\" (1.549 m)   Wt 65.3 kg (144 lb)   LMP  (LMP Unknown)   SpO2 99%   BMI 27.21 kg/m²        Physical Exam     Physical Exam  Constitutional:       General: She is not in acute distress.     Appearance: Normal appearance.   HENT:      Head: Normocephalic and atraumatic.   Eyes:      Extraocular Movements: Extraocular movements intact.      Conjunctiva/sclera: Conjunctivae normal.   Cardiovascular:      Rate and Rhythm: Normal rate.   Pulmonary:      Effort: Pulmonary effort is normal. No respiratory distress.   Musculoskeletal:      Left wrist: Swelling, tenderness, bony " tenderness and snuff box tenderness present. No deformity, effusion, lacerations or crepitus. Decreased range of motion. Normal pulse.      Comments: Left wrist-mild swelling noted over the dorsal aspect of the wrist.  Tenderness to palpation over the thenar eminence, especially from the dorsal aspect.  Mild limitation range of motion with wrist extension.  Strength 5/5 in wrist flexion and extension, thumb flexion and extension and opposition.   Skin:     General: Skin is warm and dry.   Neurological:      General: No focal deficit present.      Mental Status: She is alert and oriented to person, place, and time.   Psychiatric:         Mood and Affect: Mood normal.         Behavior: Behavior normal.       Splint application    Date/Time: 5/12/2024 9:30 AM    Performed by: Angéilca Lopez DO  Authorized by: Angélica Lopez DO  Universal Protocol:  Consent: Verbal consent obtained.  Risks and benefits: risks, benefits and alternatives were discussed  Consent given by: patient  Required items: required blood products, implants, devices, and special equipment available  Patient identity confirmed: verbally with patient    Pre-procedure details:     Sensation:  Normal    Skin color:  Normal  Procedure details:     Laterality:  Left    Location:  Wrist    Wrist:  L wrist    Splint type:  Thumb spica    Supplies:  Skin protective strip, Ortho-Glass and elastic bandage

## 2025-03-18 ENCOUNTER — VBI (OUTPATIENT)
Dept: ADMINISTRATIVE | Facility: OTHER | Age: 50
End: 2025-03-18

## 2025-03-18 NOTE — TELEPHONE ENCOUNTER
03/18/25 12:17 PM     Chart reviewed for Mammogram ; nothing is submitted to the patient's insurance at this time.     Anne Dumont MA   [facility]

## 2025-03-22 ENCOUNTER — HOSPITAL ENCOUNTER (EMERGENCY)
Facility: HOSPITAL | Age: 50
Discharge: HOME | End: 2025-03-22
Attending: EMERGENCY MEDICINE | Admitting: EMERGENCY MEDICINE
Payer: COMMERCIAL

## 2025-03-22 VITALS
RESPIRATION RATE: 18 BRPM | DIASTOLIC BLOOD PRESSURE: 56 MMHG | TEMPERATURE: 98.1 F | OXYGEN SATURATION: 98 % | HEART RATE: 79 BPM | SYSTOLIC BLOOD PRESSURE: 116 MMHG

## 2025-03-22 DIAGNOSIS — R21 RASH AND NONSPECIFIC SKIN ERUPTION: ICD-10-CM

## 2025-03-22 DIAGNOSIS — R21 RASH: Primary | ICD-10-CM

## 2025-03-22 DIAGNOSIS — L50.9 URTICARIA: ICD-10-CM

## 2025-03-22 DIAGNOSIS — M25.512 LEFT SHOULDER PAIN: ICD-10-CM

## 2025-03-22 PROCEDURE — 99284 EMERGENCY DEPT VISIT MOD MDM: CPT | Performed by: EMERGENCY MEDICINE

## 2025-03-22 PROCEDURE — 93005 ELECTROCARDIOGRAM TRACING: CPT

## 2025-03-22 PROCEDURE — 99283 EMERGENCY DEPT VISIT LOW MDM: CPT

## 2025-03-22 RX ORDER — PREDNISONE 10 MG/1
TABLET ORAL
Qty: 63 TABLET | Refills: 0 | Status: SHIPPED | OUTPATIENT
Start: 2025-03-22 | End: 2025-04-09

## 2025-03-22 RX ORDER — PREDNISONE 20 MG/1
60 TABLET ORAL ONCE
Status: COMPLETED | OUTPATIENT
Start: 2025-03-22 | End: 2025-03-22

## 2025-03-22 RX ADMIN — PREDNISONE 60 MG: 20 TABLET ORAL at 11:39

## 2025-03-22 NOTE — ED PROVIDER NOTES
Time reflects when diagnosis was documented in both MDM as applicable and the Disposition within this note       Time User Action Codes Description Comment    3/22/2025 11:04 AM Binsedmond, Lopez T Add [R21] Rash     3/22/2025 11:04 AM Binsedmond, Lopez T Add [L50.9] Urticaria     3/22/2025 11:04 AM Binstead, Lopez T Add [R21] Rash and nonspecific skin eruption     3/22/2025 11:04 AM Binsedmond, Lopez T Add [M25.512] Left shoulder pain           ED Disposition       ED Disposition   Discharge    Condition   Stable    Date/Time   Sat Mar 22, 2025 11:04 AM    Comment   Edwin Amadeo discharge to home/self care.                   Assessment & Plan       Medical Decision Making  50-year-old female presenting with generalized rash and left shoulder pain.      Problems Addressed:  Left shoulder pain: acute illness or injury  Rash: acute illness or injury  Rash and nonspecific skin eruption: acute illness or injury  Urticaria: acute illness or injury    Risk  Prescription drug management.  Risk Details: Follow-up with primary care physician.  Will place on steroid taper for urticaria and this will also alleviate some of her left shoulder chronic pain as well.             Medications   predniSONE tablet 60 mg (has no administration in time range)       ED Risk Strat Scores            I personally discussed return precautions with this patient and family. I provided the patient with written discharge instructions and particularly highlighted specific areas of interest to this patient, including but not limited to: medications for symptom managment, follow up recommendations, and return precautions. Patient and family are in agreement with this plan as outlined above.                SBIRT 20yo+      Flowsheet Row Most Recent Value   Initial Alcohol Screen: US AUDIT-C     1. How often do you have a drink containing alcohol? 0 Filed at: 03/22/2025 1006   2. How many drinks containing alcohol do you have on a typical day you are  drinking?  0 Filed at: 2025 1006   3b. FEMALE Any Age, or MALE 65+: How often do you have 4 or more drinks on one occassion? 0 Filed at: 2025 1006   Audit-C Score 0 Filed at: 2025 1006   SHANNAN: How many times in the past year have you...    Used an illegal drug or used a prescription medication for non-medical reasons? Never Filed at: 2025 1006                            History of Present Illness       Chief Complaint   Patient presents with    Rash     Started with a red itchy rash last night, denies any new foods, soaps or detergents. Complains of left shoulder pain that started last night. Denies any recent injuries. States that she started feeling dizzy this morning          Past Medical History:   Diagnosis Date    Allergic     Carpal tunnel syndrome of left wrist     last assessed 1/23/15     Morbid obesity with BMI of 40.0-44.9, adult ([facility])     last assessed 16     Obesity       Past Surgical History:   Procedure Laterality Date    APPENDECTOMY  2017     SECTION  1996.     1997    EYE SURGERY  2003    Lasik    GASTRECTOMY SLEEVE LAPAROSCOPIC  2016    HAND SURGERY Bilateral     Carpal tunnel     HYSTERECTOMY      HYSTEROSCOPY      NEUROPLASTY / TRANSPOSITION MEDIAN NERVE AT CARPAL TUNNEL Right 2013    decompression     ROTATOR CUFF REPAIR Right 2024    Dr Avendaño    SHOULDER SURGERY Right 2013    TONSILLECTOMY AND ADENOIDECTOMY        Family History   Problem Relation Age of Onset    Hypertension Mother     Alcohol abuse Mother     Bipolar disorder Mother     Asthma Father     Anxiety disorder Father     No Known Problems Sister     No Known Problems Daughter     No Known Problems Daughter     Stroke Maternal Grandmother     No Known Problems Maternal Grandfather     Alzheimer's disease Paternal Grandmother     Dementia Paternal Grandmother     No Known Problems Paternal Grandfather     Ovarian cancer Maternal Aunt     Uterine cancer Maternal Aunt     No Known  Problems Paternal Aunt     No Known Problems Paternal Aunt     Cancer Maternal Aunt       Social History     Tobacco Use    Smoking status: Never     Passive exposure: Never    Smokeless tobacco: Never   Vaping Use    Vaping status: Never Used   Substance Use Topics    Alcohol use: Not Currently    Drug use: No      E-Cigarette/Vaping    E-Cigarette Use Never User       E-Cigarette/Vaping Substances    Nicotine No     THC No     CBD No     Flavoring No     Other No     Unknown No       I have reviewed and agree with the history as documented.     50-year-old female presenting with generalized rash and left shoulder pain.  Patient has chronic left shoulder pain secondary to previous surgeries but was not sure if was related to this rash.  Patient states her feet felt itchy last night.  Does not recall any new or different lotions, detergents, foods or any other environmental exposures.  Has had previous episodes of urticaria and normally takes an allergy tablet before bed.  Also noted pain in the left shoulder similar to previous episodes of pain but this appeared slightly more intense that she was not sure if it was related to the rash or not.      Rash  Associated symptoms: no abdominal pain, no diarrhea, no fatigue, no fever, no headaches, no joint pain, no nausea, no shortness of breath, no sore throat, not vomiting and not wheezing        Review of Systems   Constitutional:  Negative for appetite change, chills, fatigue, fever and unexpected weight change.   HENT:  Negative for congestion, ear pain, rhinorrhea and sore throat.    Eyes:  Negative for pain and visual disturbance.   Respiratory:  Negative for cough, chest tightness, shortness of breath and wheezing.    Cardiovascular:  Negative for chest pain, palpitations and leg swelling.   Gastrointestinal:  Negative for abdominal pain, constipation, diarrhea, nausea and vomiting.   Genitourinary:  Negative for difficulty urinating, dysuria, frequency,  hematuria, menstrual problem, pelvic pain, vaginal bleeding and vaginal discharge.   Musculoskeletal:  Negative for arthralgias, back pain and neck pain.        Left shoulder pain   Skin:  Positive for rash. Negative for color change.   Neurological:  Negative for dizziness, seizures, syncope, light-headedness and headaches.   Psychiatric/Behavioral:  Negative for sleep disturbance.    All other systems reviewed and are negative.          Objective       ED Triage Vitals [03/22/25 1005]   Temperature Pulse Blood Pressure Respirations SpO2 Patient Position - Orthostatic VS   (!) 97.4 °F (36.3 °C) 70 119/67 18 98 % Sitting      Temp Source Heart Rate Source BP Location FiO2 (%) Pain Score    Temporal Monitor Right arm -- 2      Vitals      Date and Time Temp Pulse SpO2 Resp BP Pain Score FACES Pain Rating User   03/22/25 1005 97.4 °F (36.3 °C) 70 98 % 18 119/67 2 -- [place]            Physical Exam  Vitals and nursing note reviewed.   Constitutional:       General: She is not in acute distress.     Appearance: Normal appearance. She is well-developed and normal weight. She is not ill-appearing, toxic-appearing or diaphoretic.   HENT:      Head: Normocephalic and atraumatic.      Nose: Nose normal.      Mouth/Throat:      Mouth: Mucous membranes are moist.      Pharynx: Oropharynx is clear.   Eyes:      General: No scleral icterus.     Extraocular Movements: Extraocular movements intact.      Conjunctiva/sclera: Conjunctivae normal.   Cardiovascular:      Rate and Rhythm: Normal rate and regular rhythm.      Pulses: Normal pulses.      Heart sounds: Normal heart sounds. No murmur heard.     No friction rub. No gallop.   Pulmonary:      Effort: Pulmonary effort is normal. No respiratory distress.      Breath sounds: Normal breath sounds. No wheezing or rales.   Chest:      Chest wall: No tenderness.   Abdominal:      General: Bowel sounds are normal. There is no distension.      Palpations: Abdomen is soft. There is no mass.       Tenderness: There is no abdominal tenderness. There is no right CVA tenderness, left CVA tenderness, guarding or rebound.      Hernia: No hernia is present.   Musculoskeletal:         General: Tenderness present. No deformity. Normal range of motion.      Cervical back: Normal range of motion and neck supple. No rigidity or tenderness.      Right lower leg: No edema.      Left lower leg: No edema.      Comments: Left shoulder tenderness with range of motion at patient's baseline.  No effusion noted.  No crepitus.  No erythema.   Lymphadenopathy:      Cervical: No cervical adenopathy.   Skin:     General: Skin is warm and dry.      Capillary Refill: Capillary refill takes less than 2 seconds.      Coloration: Skin is not jaundiced or pale.      Findings: Rash present. No bruising, erythema or lesion.      Comments: Generalized urticaria with macular rash that blanches.  Rash is diffuse including arms legs back abdomen chest and lower extremities.  Does not involve any mucosal membranes.   Neurological:      General: No focal deficit present.      Mental Status: She is alert and oriented to person, place, and time. Mental status is at baseline.   Psychiatric:         Mood and Affect: Mood normal.         Behavior: Behavior normal.         Results Reviewed       None            No orders to display       Procedures    ED Medication and Procedure Management   Prior to Admission Medications   Prescriptions Last Dose Informant Patient Reported? Taking?   Cholecalciferol (Vitamin D3) 1.25 MG (08464 UT) CAPS  Self No No   Sig: Take 1 capsule (50,000 Units total) by mouth once a week for 12 doses   DULoxetine (CYMBALTA) 20 mg capsule  Self Yes No   Sig: Take 20 mg by mouth daily   Semaglutide-Weight Management (Wegovy) 1.7 MG/0.75ML  Self No No   Sig: Inject 0.75 mL (1.7 mg total) under the skin once a week   methylPREDNISolone 4 MG tablet therapy pack   No No   Sig: Use as directed on package   pregabalin (LYRICA) 50  mg capsule  Self Yes No   Sig: Take 50 mg by mouth 2 (two) times a day   traMADol (ULTRAM) 50 mg tablet  Self Yes No   Sig: Take  mg by mouth daily at bedtime as needed      Facility-Administered Medications: None     Patient's Medications   Discharge Prescriptions    PREDNISONE 10 MG TABLET    Take 6 tablets (60 mg total) by mouth daily for 3 days, THEN 5 tablets (50 mg total) daily for 3 days, THEN 4 tablets (40 mg total) daily for 3 days, THEN 3 tablets (30 mg total) daily for 3 days, THEN 2 tablets (20 mg total) daily for 3 days, THEN 1 tablet (10 mg total) daily for 3 days.       Start Date: 3/22/2025 End Date: 4/9/2025       Order Dose: --       Quantity: 63 tablet    Refills: 0     No discharge procedures on file.  ED SEPSIS DOCUMENTATION   Time reflects when diagnosis was documented in both MDM as applicable and the Disposition within this note       Time User Action Codes Description Comment    3/22/2025 11:04 AM Lopez Uribe Add [R21] Rash     3/22/2025 11:04 AM Lopez Uribe Add [L50.9] Urticaria     3/22/2025 11:04 AM Lopez Uribe Add [R21] Rash and nonspecific skin eruption     3/22/2025 11:04 AM Lopez Uribe Add [M25.512] Left shoulder pain                  Lopez Uribe, DO  03/22/25 1118

## 2025-03-24 LAB
ATRIAL RATE: 70 BPM
P AXIS: -22 DEGREES
PR INTERVAL: 162 MS
QRS AXIS: 41 DEGREES
QRSD INTERVAL: 92 MS
QT INTERVAL: 390 MS
QTC INTERVAL: 421 MS
T WAVE AXIS: 25 DEGREES
VENTRICULAR RATE: 70 BPM

## 2025-03-24 PROCEDURE — 93010 ELECTROCARDIOGRAM REPORT: CPT | Performed by: INTERNAL MEDICINE

## 2025-03-27 ENCOUNTER — NURSE TRIAGE (OUTPATIENT)
Age: 50
End: 2025-03-27

## 2025-03-27 ENCOUNTER — HOSPITAL ENCOUNTER (EMERGENCY)
Facility: HOSPITAL | Age: 50
Discharge: HOME | End: 2025-03-27
Attending: EMERGENCY MEDICINE
Payer: COMMERCIAL

## 2025-03-27 ENCOUNTER — APPOINTMENT (EMERGENCY)
Dept: CT IMAGING | Facility: HOSPITAL | Age: 50
End: 2025-03-27
Payer: COMMERCIAL

## 2025-03-27 ENCOUNTER — TELEPHONE (OUTPATIENT)
Age: 50
End: 2025-03-27

## 2025-03-27 ENCOUNTER — APPOINTMENT (EMERGENCY)
Dept: RADIOLOGY | Facility: HOSPITAL | Age: 50
End: 2025-03-27
Payer: COMMERCIAL

## 2025-03-27 VITALS
HEART RATE: 83 BPM | RESPIRATION RATE: 14 BRPM | BODY MASS INDEX: 29.27 KG/M2 | SYSTOLIC BLOOD PRESSURE: 110 MMHG | OXYGEN SATURATION: 97 % | WEIGHT: 155 LBS | HEIGHT: 61 IN | DIASTOLIC BLOOD PRESSURE: 55 MMHG | TEMPERATURE: 97.9 F

## 2025-03-27 DIAGNOSIS — R07.9 CHEST PAIN: Primary | ICD-10-CM

## 2025-03-27 DIAGNOSIS — R59.9 ADENOPATHY: ICD-10-CM

## 2025-03-27 LAB
ALBUMIN SERPL BCG-MCNC: 4.1 G/DL (ref 3.5–5)
ALP SERPL-CCNC: 45 U/L (ref 34–104)
ALT SERPL W P-5'-P-CCNC: 23 U/L (ref 7–52)
ANION GAP SERPL CALCULATED.3IONS-SCNC: 8 MMOL/L (ref 4–13)
ANISOCYTOSIS BLD QL SMEAR: PRESENT
AST SERPL W P-5'-P-CCNC: 17 U/L (ref 13–39)
ATRIAL RATE: 90 BPM
BASOPHILS # BLD MANUAL: 0.11 THOUSAND/UL (ref 0–0.1)
BASOPHILS NFR MAR MANUAL: 2 % (ref 0–1)
BILIRUB SERPL-MCNC: 0.55 MG/DL (ref 0.2–1)
BUN SERPL-MCNC: 13 MG/DL (ref 5–25)
CALCIUM SERPL-MCNC: 9.3 MG/DL (ref 8.4–10.2)
CARDIAC TROPONIN I PNL SERPL HS: <2 NG/L (ref ?–50)
CHLORIDE SERPL-SCNC: 103 MMOL/L (ref 96–108)
CO2 SERPL-SCNC: 28 MMOL/L (ref 21–32)
CREAT SERPL-MCNC: 0.73 MG/DL (ref 0.6–1.3)
D DIMER PPP FEU-MCNC: 1.39 UG/ML FEU
EOSINOPHIL # BLD MANUAL: 0 THOUSAND/UL (ref 0–0.4)
EOSINOPHIL NFR BLD MANUAL: 0 % (ref 0–6)
ERYTHROCYTE [DISTWIDTH] IN BLOOD BY AUTOMATED COUNT: 11.6 % (ref 11.6–15.1)
GFR SERPL CREATININE-BSD FRML MDRD: 94 ML/MIN/1.73SQ M
GLUCOSE SERPL-MCNC: 89 MG/DL (ref 65–140)
HCT VFR BLD AUTO: 36.3 % (ref 34.8–46.1)
HGB BLD-MCNC: 12.3 G/DL (ref 11.5–15.4)
LIPASE SERPL-CCNC: 67 U/L (ref 11–82)
LYMPHOCYTES # BLD AUTO: 1.69 THOUSAND/UL (ref 0.6–4.47)
LYMPHOCYTES # BLD AUTO: 32 % (ref 14–44)
MCH RBC QN AUTO: 29.9 PG (ref 26.8–34.3)
MCHC RBC AUTO-ENTMCNC: 33.9 G/DL (ref 31.4–37.4)
MCV RBC AUTO: 88 FL (ref 82–98)
METAMYELOCYTE ABSOLUTE CT: 0.05 THOUSAND/UL (ref 0–0.1)
METAMYELOCYTES NFR BLD MANUAL: 1 % (ref 0–1)
MONOCYTES # BLD AUTO: 0.05 THOUSAND/UL (ref 0–1.22)
MONOCYTES NFR BLD: 1 % (ref 4–12)
NEUTROPHILS # BLD MANUAL: 3.38 THOUSAND/UL (ref 1.85–7.62)
NEUTS SEG NFR BLD AUTO: 64 % (ref 43–75)
P AXIS: 59 DEGREES
PLATELET # BLD AUTO: 335 THOUSANDS/UL (ref 149–390)
PLATELET BLD QL SMEAR: ABNORMAL
PMV BLD AUTO: 8.9 FL (ref 8.9–12.7)
POIKILOCYTOSIS BLD QL SMEAR: PRESENT
POLYCHROMASIA BLD QL SMEAR: PRESENT
POTASSIUM SERPL-SCNC: 3.5 MMOL/L (ref 3.5–5.3)
PR INTERVAL: 152 MS
PROT SERPL-MCNC: 7 G/DL (ref 6.4–8.4)
QRS AXIS: 53 DEGREES
QRSD INTERVAL: 80 MS
QT INTERVAL: 344 MS
QTC INTERVAL: 420 MS
RBC # BLD AUTO: 4.12 MILLION/UL (ref 3.81–5.12)
RBC MORPH BLD: PRESENT
SMUDGE CELLS BLD QL SMEAR: PRESENT
SODIUM SERPL-SCNC: 139 MMOL/L (ref 135–147)
STOMATOCYTES BLD QL SMEAR: PRESENT
T WAVE AXIS: 74 DEGREES
VENTRICULAR RATE: 90 BPM
WBC # BLD AUTO: 5.28 THOUSAND/UL (ref 4.31–10.16)

## 2025-03-27 PROCEDURE — 85379 FIBRIN DEGRADATION QUANT: CPT | Performed by: PHYSICIAN ASSISTANT

## 2025-03-27 PROCEDURE — 36415 COLL VENOUS BLD VENIPUNCTURE: CPT

## 2025-03-27 PROCEDURE — 99285 EMERGENCY DEPT VISIT HI MDM: CPT | Performed by: PHYSICIAN ASSISTANT

## 2025-03-27 PROCEDURE — 80053 COMPREHEN METABOLIC PANEL: CPT

## 2025-03-27 PROCEDURE — 71045 X-RAY EXAM CHEST 1 VIEW: CPT

## 2025-03-27 PROCEDURE — 93005 ELECTROCARDIOGRAM TRACING: CPT

## 2025-03-27 PROCEDURE — 93010 ELECTROCARDIOGRAM REPORT: CPT | Performed by: INTERNAL MEDICINE

## 2025-03-27 PROCEDURE — 84484 ASSAY OF TROPONIN QUANT: CPT

## 2025-03-27 PROCEDURE — 71275 CT ANGIOGRAPHY CHEST: CPT

## 2025-03-27 PROCEDURE — 85007 BL SMEAR W/DIFF WBC COUNT: CPT

## 2025-03-27 PROCEDURE — 99285 EMERGENCY DEPT VISIT HI MDM: CPT

## 2025-03-27 PROCEDURE — 83690 ASSAY OF LIPASE: CPT | Performed by: PHYSICIAN ASSISTANT

## 2025-03-27 PROCEDURE — 85027 COMPLETE CBC AUTOMATED: CPT

## 2025-03-27 RX ORDER — METHOCARBAMOL 500 MG/1
500 TABLET, FILM COATED ORAL 2 TIMES DAILY PRN
Qty: 20 TABLET | Refills: 0 | Status: SHIPPED | OUTPATIENT
Start: 2025-03-27

## 2025-03-27 RX ADMIN — IOHEXOL 85 ML: 350 INJECTION, SOLUTION INTRAVENOUS at 16:32

## 2025-03-27 NOTE — TELEPHONE ENCOUNTER
Pt c/o severe chest tightness, bilateral hand swelling and SOB.  Pt requesting an appointment with PCP but agreed to speak with RN.    Warm transfer to nurse triage for further assistance.   
LACERATION

## 2025-03-27 NOTE — DISCHARGE INSTRUCTIONS
Continue medications as needed for symptoms.  Caution sedation with muscle relaxant.  Can also continue OTC tylenol.  Recheck with PCP or return to ER as needed.

## 2025-03-27 NOTE — TELEPHONE ENCOUNTER
"FOLLOW UP: ED    REASON FOR CONVERSATION: Chest Pain and Hand Swelling    SYMPTOMS: Chest pain, hand swelling    OTHER: Pt reports her hands were so swollen this morning that she couldn't make a fist, her swelling at this point has gone down but still swollen. Pt reports \"she feels like theres a vice  around her chest, like a crushing feeling\".    Pt states she has chronic pain in her shoulders and doesn't feel its related. Pt denies chest pain, numbness/tingling or SOB at this time. Pt stated \"she feels like her labored breathing is from her increase pain\".    Per protocol, Triage nurse advised pt to go to the ER. Pt verbalized agreement.   PCP please f/u with pt.    DISPOSITION: Go to ED/[facility] Now (Or to Office with PCP Approval)    Reason for Disposition   Chest pain lasting longer than 5 minutes and occurred in last 3 days (72 hours) (Exception: Feels exactly the same as previously diagnosed heartburn and has accompanying sour taste in mouth.)   Chest pain or 'angina' comes and goes and is happening more often (increasing in frequency) or getting worse (increasing in severity) (Exception: Chest pains that last only a few seconds.)    Answer Assessment - Initial Assessment Questions  1. LOCATION: \"Where does it hurt?\"        Feels crushing on chest   2. RADIATION: \"Does the pain go anywhere else?\" (e.g., into neck, jaw, arms, back)      denies  3. ONSET: \"When did the chest pain begin?\" (Minutes, hours or days)       monday  4. PATTERN: \"Does the pain come and go, or has it been constant since it started?\"  \"Does it get worse with exertion?\"       Constant, eases up but gets bad  5. DURATION: \"How long does it last\" (e.g., seconds, minutes, hours)      constant  6. SEVERITY: \"How bad is the pain?\"  (e.g., Scale 1-10; mild, moderate, or severe)      6  7. CARDIAC RISK FACTORS: \"Do you have any history of heart problems or risk factors for heart disease?\" (e.g., angina, prior heart attack; diabetes, high blood " "pressure, high cholesterol, smoker, or strong family history of heart disease)      denies  8. PULMONARY RISK FACTORS: \"Do you have any history of lung disease?\"  (e.g., blood clots in lung, asthma, emphysema, birth control pills)      denies  9. CAUSE: \"What do you think is causing the chest pain?\"      unsure  10. OTHER SYMPTOMS: \"Do you have any other symptoms?\" (e.g., dizziness, nausea, vomiting, sweating, fever, difficulty breathing, cough)        Pain is causes pt to take her breath states she doesn't feel labored with breathing    11. PREGNANCY: \"Is there any chance you are pregnant?\" \"When was your last menstrual period?\"        Hysterectomy    Protocols used: Chest Pain-Adult-[place]    "

## 2025-03-27 NOTE — ED PROVIDER NOTES
Time reflects when diagnosis was documented in both MDM as applicable and the Disposition within this note       Time User Action Codes Description Comment    3/27/2025  5:41 PM Caryn Bradford Add [R07.9] Chest pain     3/27/2025  5:41 PM Caryn Bradford Add [R59.9] Adenopathy     3/27/2025  5:41 PM Caryn Bradford Modify [R59.9] Adenopathy Mild nonspecific subcarinal and right infrahilar adenopathy.          ED Disposition       ED Disposition   Discharge    Condition   Stable    Date/Time   u Mar 27, 2025  5:41 PM    Comment   Edwin Childs discharge to home/self care.                   Assessment & Plan       Medical Decision Making  51 yo female presenting for evaluation of chest pain.  Prior records reviewed.  Will obtain EKG, CXR, labs.  Will screen for PE with d dimer otherwise low risk by [facility]'.  Pt afebrile, vitals stable.  On recent steroid but stopped taking as she thought this was contributing to symptoms.  Certainly could be causing some gastric irritation.    Work up obtained as noted above.  EKG and troponin not c/w ACS.  CXR does not reveal pneumonia, pneumothorax, vascular congestion or pleural effusion.  No noted leukocytosis, no anemia.  No hypo or hyperglycemia.  Renal function within normal limits.  Lipase normal limits, doubt pancreatitis.  Electrolytes within normal limits.  D dimer was elevated.  CTA chest was pursued to further evaluate.  CTA chest negative for PE.  Some mild non specific lymphadenopathy noted and reviewed with pt.    Pt afebrile, vitals stable.  Given constant nature over the past 3 days, serial troponin deferred.  Recommended recheck with PCP.     Continue tylenol (pt notes she has tried avoiding due to rebound headaches).  Cannot take NSAIDs.  Will trial muscle relaxant.  Sedation precautions reviewed.  Reviewed symptomatic management.  OTC meds reviewed.  Anticipatory guidance.  Advised recheck with PCP or return to ER as needed.  Strict return precautions  outlined.  Patient voiced understanding and had no further questions.    Please refer to above ER course for further details/discussion.      Problems Addressed:  Adenopathy: acute illness or injury  Chest pain: acute illness or injury    Amount and/or Complexity of Data Reviewed  External Data Reviewed: labs, ECG and notes.  Labs: ordered. Decision-making details documented in ED Course.  Radiology: ordered and independent interpretation performed. Decision-making details documented in ED Course.  ECG/medicine tests: ordered and independent interpretation performed. Decision-making details documented in ED Course.    Risk  OTC drugs.  Prescription drug management.        ED Course as of 03/27/25 2104   Thu Mar 27, 2025   1439 Prior records reviewed.  Pt was seen here on 3/22/25 for a rash and was placed on an 18 day prednisone taper.   1447 Pt offered and declined anything for pain at this time.   1453 WBC: 5.28   1453 Hemoglobin: 12.3   1453 Platelet Count: 335   1502 XR chest 1 view portable  Independently viewed and interpreted by me - poor inspiration otherwise no acute cardiopulmonary process; pending official read.   1512 GLUCOSE: 89   1512 Creatinine: 0.73   1512 BUN: 13   1512 Sodium: 139   1512 Potassium: 3.5   1512 Chloride: 103   1512 Carbon Dioxide: 28   1512 ANION GAP: 8   1512 Calcium: 9.3   1512 AST: 17   1512 ALT: 23   1512 ALK PHOS: 45   1512 Total Protein: 7.0   1512 Albumin: 4.1   1512 Total Bilirubin: 0.55   1512 GFR, Calculated: 94   1512 hs TnI 0hr: <2   1522 Call placed to lab for add on specimens   1550 LIPASE: 67   1550 D-Dimer, Quant(!): 1.39  elevated   1723 CTA chest pe study  IMPRESSION:     No acute findings.     Mild nonspecific subcarinal and right infrahilar adenopathy.   1725 Pt reassessed.  Spouse also at bedside.  Reviewed CT results.  No PE.  Non specific adenopathy.  She is comfortable with discharge and outpatient follow up.  Willing to try muscle relaxant.  She is unable to  take NSAIDs due to prior gastric surgery.       Medications   iohexol (OMNIPAQUE) 350 MG/ML injection (MULTI-DOSE) 85 mL (85 mL Intravenous Given 3/27/25 1632)       ED Risk Strat Scores   HEART Risk Score      Flowsheet Row Most Recent Value   Heart Score Risk Calculator    History 0 Filed at: 03/27/2025 1441   ECG 0 Filed at: 03/27/2025 1441   Age 1 Filed at: 03/27/2025 1441   Risk Factors 1 Filed at: 03/27/2025 1441   Troponin 0 Filed at: 03/27/2025 1441   HEART Score 2 Filed at: 03/27/2025 1441          HEART Risk Score      Flowsheet Row Most Recent Value   Heart Score Risk Calculator    History 0 Filed at: 03/27/2025 1441   ECG 0 Filed at: 03/27/2025 1441   Age 1 Filed at: 03/27/2025 1441   Risk Factors 1 Filed at: 03/27/2025 1441   Troponin 0 Filed at: 03/27/2025 1441   HEART Score 2 Filed at: 03/27/2025 1441                              SBIRT 20yo+      Flowsheet Row Most Recent Value   Initial Alcohol Screen: US AUDIT-C     1. How often do you have a drink containing alcohol? 0 Filed at: 03/27/2025 1429   2. How many drinks containing alcohol do you have on a typical day you are drinking?  0 Filed at: 03/27/2025 1429   3b. FEMALE Any Age, or MALE 65+: How often do you have 4 or more drinks on one occassion? 0 Filed at: 03/27/2025 1429   Audit-C Score 0 Filed at: 03/27/2025 1429   SHANNAN: How many times in the past year have you...    Used an illegal drug or used a prescription medication for non-medical reasons? Never Filed at: 03/27/2025 1429            Wells' Criteria for PE      Flowsheet Row Most Recent Value   Wells' Criteria for PE    Clinical signs and symptoms of DVT 0 Filed at: 03/27/2025 1550   PE is primary diagnosis or equally likely 0 Filed at: 03/27/2025 1550   HR >100 0 Filed at: 03/27/2025 1550   Immobilization at least 3 days or Surgery in the previous 4 weeks 0 Filed at: 03/27/2025 1550   Previous, objectively diagnosed PE or DVT 0 Filed at: 03/27/2025 1550   Hemoptysis 0 Filed at:  2025 1550   Malignancy with treatment within 6 months or palliative 0 Filed at: 2025 1550   Wells' Criteria Total 0 Filed at: 2025 1550                        History of Present Illness       Chief Complaint   Patient presents with    Chest Pain     Pt presents to ER from home for constant pressures around rib cage and chest since Monday. No known provocation/palliation. Worse today and states she woke up with hands swollen which has since improved.        Past Medical History:   Diagnosis Date    Allergic     Carpal tunnel syndrome of left wrist     last assessed 1/23/15     Morbid obesity with BMI of 40.0-44.9, adult ([facility])     last assessed 16     Obesity       Past Surgical History:   Procedure Laterality Date    APPENDECTOMY  2017     SECTION  1996.     1997    EYE SURGERY  2003    Lasik    GASTRECTOMY SLEEVE LAPAROSCOPIC  2016    HAND SURGERY Bilateral     Carpal tunnel     HYSTERECTOMY      HYSTEROSCOPY      NEUROPLASTY / TRANSPOSITION MEDIAN NERVE AT CARPAL TUNNEL Right 2013    decompression     ROTATOR CUFF REPAIR Right 2024    Dr Avendaño    SHOULDER SURGERY Right 2013    TONSILLECTOMY AND ADENOIDECTOMY        Family History   Problem Relation Age of Onset    Hypertension Mother     Alcohol abuse Mother     Bipolar disorder Mother     Asthma Father     Anxiety disorder Father     No Known Problems Sister     No Known Problems Daughter     No Known Problems Daughter     Stroke Maternal Grandmother     No Known Problems Maternal Grandfather     Alzheimer's disease Paternal Grandmother     Dementia Paternal Grandmother     No Known Problems Paternal Grandfather     Ovarian cancer Maternal Aunt     Uterine cancer Maternal Aunt     No Known Problems Paternal Aunt     No Known Problems Paternal Aunt     Cancer Maternal Aunt       Social History     Tobacco Use    Smoking status: Never     Passive exposure: Never    Smokeless tobacco: Never   Vaping Use    Vaping status:  Never Used   Substance Use Topics    Alcohol use: Not Currently    Drug use: No      E-Cigarette/Vaping    E-Cigarette Use Never User       E-Cigarette/Vaping Substances    Nicotine No     THC No     CBD No     Flavoring No     Other No     Unknown No       I have reviewed and agree with the history as documented.     50 year old female with no significant reported PMH presenting ambulatory from home for evaluation of chest pain.  Pt reports symptoms started on Monday.  She complains of a pain in her chest around her ribs.  She reports this radiates around to her sides to her back.  She reports feeling like there is a vice squeezing around her chest.  No reported injury/trauma.  Denies fever, chills, cough, congestion.  Denies feeling short of breath but feels like she can't take a deep breath secondary to pain.  Denies N/V/D, abdominal pain  No reported indigestion.  Denies dizziness, lightheadedness.  No syncope.  She notes today her hands felt swollen but feel this has gone down.  She was recently seen for a rash which she notes has improved.  She was prescribed prednisone taper.  She states she didn't take yesterday or today due to the way she was feeling.  No reported alleviating factors.  Took a dose of tylenol earlier.  No leg swelling or pain.  No personal h/o DVT or PE.  No past cardiac history.      History provided by:  Patient and medical records   used: No    Chest Pain  Pain quality: tightness    Progression:  Unchanged  Chronicity:  New  Relieved by:  Nothing  Worsened by:  Deep breathing  Associated symptoms: back pain    Associated symptoms: no abdominal pain, no cough, no diaphoresis, no dizziness, no fatigue, no fever, no headache, no heartburn, no lower extremity edema, no nausea, no numbness, no palpitations, no shortness of breath, no syncope and not vomiting    Risk factors: no birth control, no coronary artery disease, no prior DVT/PE, no smoking and no surgery         Review of Systems   Constitutional: Negative.  Negative for chills, diaphoresis, fatigue and fever.   HENT: Negative.  Negative for congestion, rhinorrhea and sore throat.    Eyes: Negative.  Negative for visual disturbance.   Respiratory: Negative.  Negative for cough, shortness of breath and wheezing.    Cardiovascular:  Positive for chest pain. Negative for palpitations, leg swelling and syncope.   Gastrointestinal: Negative.  Negative for abdominal pain, constipation, diarrhea, heartburn, nausea and vomiting.   Genitourinary:  Positive for flank pain. Negative for dysuria, frequency and hematuria.   Musculoskeletal:  Positive for back pain. Negative for myalgias.   Skin: Negative.  Negative for rash.   Neurological: Negative.  Negative for dizziness, syncope, light-headedness, numbness and headaches.   Psychiatric/Behavioral: Negative.     All other systems reviewed and are negative.          Objective       ED Triage Vitals   Temperature Pulse Blood Pressure Respirations SpO2 Patient Position - Orthostatic VS   03/27/25 1530 03/27/25 1428 03/27/25 1428 03/27/25 1428 03/27/25 1428 03/27/25 1428   98.7 °F (37.1 °C) 90 147/76 18 100 % Sitting      Temp Source Heart Rate Source BP Location FiO2 (%) Pain Score    03/27/25 1530 03/27/25 1428 03/27/25 1428 -- 03/27/25 1530    Temporal Monitor Right arm  No Pain      Vitals      Date and Time Temp Pulse SpO2 Resp BP Pain Score FACES Pain Rating User   03/27/25 1730 97.9 °F (36.6 °C) 83 97 % 14 110/55 No Pain -- AB   03/27/25 1630 97.9 °F (36.6 °C) 80 98 % 14 115/56 No Pain -- AB   03/27/25 1530 98.7 °F (37.1 °C) 81 98 % 14 115/58 No Pain -- AB   03/27/25 1428 -- 90 100 % 18 147/76 -- -- AM            Physical Exam  Vitals and nursing note reviewed.   Constitutional:       General: She is awake. She is not in acute distress.     Appearance: She is well-developed. She is not toxic-appearing or diaphoretic.   HENT:      Head: Normocephalic and atraumatic.      Right  Ear: Hearing and external ear normal.      Left Ear: Hearing and external ear normal.      Nose: Nose normal.      Mouth/Throat:      Mouth: Mucous membranes are moist.      Pharynx: Oropharynx is clear. Uvula midline.   Eyes:      General: Lids are normal. No scleral icterus.     Conjunctiva/sclera: Conjunctivae normal.      Pupils: Pupils are equal, round, and reactive to light.   Neck:      Trachea: Trachea and phonation normal. No tracheal deviation.   Cardiovascular:      Rate and Rhythm: Normal rate and regular rhythm.      Pulses: Normal pulses.           Radial pulses are 2+ on the right side and 2+ on the left side.        Dorsalis pedis pulses are 2+ on the right side and 2+ on the left side.        Posterior tibial pulses are 2+ on the right side and 2+ on the left side.      Heart sounds: Normal heart sounds, S1 normal and S2 normal. No murmur heard.  Pulmonary:      Effort: Pulmonary effort is normal. No tachypnea or respiratory distress.      Breath sounds: Normal breath sounds. No wheezing, rhonchi or rales.   Chest:      Chest wall: Tenderness (has some tenderness along lower ribs bilaterally) present.   Abdominal:      General: Bowel sounds are normal. There is no distension.      Palpations: Abdomen is soft.      Tenderness: There is no abdominal tenderness. There is no right CVA tenderness, left CVA tenderness, guarding or rebound.   Musculoskeletal:         General: No tenderness.      Right lower leg: No tenderness. No edema.      Left lower leg: No tenderness. No edema.   Skin:     General: Skin is warm and dry.      Capillary Refill: Capillary refill takes less than 2 seconds.      Findings: No rash.   Neurological:      General: No focal deficit present.      Mental Status: She is alert and oriented to person, place, and time.      GCS: GCS eye subscore is 4. GCS verbal subscore is 5. GCS motor subscore is 6.      Cranial Nerves: No cranial nerve deficit.      Sensory: No sensory deficit.       Motor: No abnormal muscle tone.   Psychiatric:         Mood and Affect: Mood normal.         Speech: Speech normal.         Behavior: Behavior normal. Behavior is cooperative.         Results Reviewed       Procedure Component Value Units Date/Time    Lipase [[id number]]  (Normal) Collected: 03/27/25 1439    Lab Status: Final result Specimen: Blood from Arm, Left Updated: 03/27/25 1535     Lipase 67 u/L     Manual Differential(PHLEBS Do Not Order) [[id number]]  (Abnormal) Collected: 03/27/25 1439    Lab Status: Final result Specimen: Blood from Arm, Left Updated: 03/27/25 1534     Segmented % 64 %      Lymphocytes % 32 %      Monocytes % 1 %      Eosinophils % 0 %      Basophils % 2 %      Metamyelocytes % 1 %      Absolute Neutrophils 3.38 Thousand/uL      Absolute Lymphocytes 1.69 Thousand/uL      Absolute Monocytes 0.05 Thousand/uL      Absolute Eosinophils 0.00 Thousand/uL      Absolute Basophils 0.11 Thousand/uL      Absolute Metamyelocytes 0.05 Thousand/uL      Total Counted --     Smudge Cells Present     RBC Morphology Present     Platelet Estimate Increased     Anisocytosis Present     Poikilocytes Present     Polychromasia Present     Stomatocytes Present    D-Dimer [[id number]]  (Abnormal) Collected: 03/27/25 1439    Lab Status: Final result Specimen: Blood from Arm, Left Updated: 03/27/25 1534     D-Dimer, Quant 1.39 ug/ml FEU     Narrative:      In the evaluation for possible pulmonary embolism, in the appropriate (Well's Score of 4 or less) patient, the age adjusted d-dimer cutoff for this patient can be calculated as:    Age x 0.01 (in ug/mL) for Age-adjusted D-dimer exclusion threshold for a patient over 50 years.    HS Troponin 0hr (reflex protocol) [[id number]]  (Normal) Collected: 03/27/25 1439    Lab Status: Final result Specimen: Blood from Arm, Left Updated: 03/27/25 1511     hs TnI 0hr <2 ng/L     Comprehensive metabolic panel [[id number]] Collected: 03/27/25 1439    Lab Status: Final result  Specimen: Blood from Arm, Left Updated: 03/27/25 1509     Sodium 139 mmol/L      Potassium 3.5 mmol/L      Chloride 103 mmol/L      CO2 28 mmol/L      ANION GAP 8 mmol/L      BUN 13 mg/dL      Creatinine 0.73 mg/dL      Glucose 89 mg/dL      Calcium 9.3 mg/dL      AST 17 U/L      ALT 23 U/L      Alkaline Phosphatase 45 U/L      Total Protein 7.0 g/dL      Albumin 4.1 g/dL      Total Bilirubin 0.55 mg/dL      eGFR 94 ml/min/1.73sq m     Narrative:      National Kidney Disease Foundation guidelines for Chronic Kidney Disease (CKD):     Stage 1 with normal or high GFR (GFR > 90 mL/min/1.73 square meters)    Stage 2 Mild CKD (GFR = 60-89 mL/min/1.73 square meters)    Stage 3A Moderate CKD (GFR = 45-59 mL/min/1.73 square meters)    Stage 3B Moderate CKD (GFR = 30-44 mL/min/1.73 square meters)    Stage 4 Severe CKD (GFR = 15-29 mL/min/1.73 square meters)    Stage 5 End Stage CKD (GFR <15 mL/min/1.73 square meters)  Note: GFR calculation is accurate only with a steady state creatinine    CBC and differential [[id number]]  (Normal) Collected: 03/27/25 1439    Lab Status: Final result Specimen: Blood from Arm, Left Updated: 03/27/25 1451     WBC 5.28 Thousand/uL      RBC 4.12 Million/uL      Hemoglobin 12.3 g/dL      Hematocrit 36.3 %      MCV 88 fL      MCH 29.9 pg      MCHC 33.9 g/dL      RDW 11.6 %      MPV 8.9 fL      Platelets 335 Thousands/uL     Narrative:      This is an appended report.  These results have been appended to a previously verified report.            CTA chest pe study   Final Interpretation by Baldomero Boyd MD (03/27 1711)      No acute findings.      Mild nonspecific subcarinal and right infrahilar adenopathy.                     Workstation performed: [id number]         XR chest 1 view portable   Final Interpretation by Tomasz Reynoso MD (03/27 1636)      No acute cardiopulmonary disease.            Resident: Chandrakant Cm I, the attending radiologist, have reviewed the images and  agree with the final report above.      Workstation performed: [id number]             ECG 12 Lead Documentation Only    Date/Time: 3/27/2025 2:35 PM    Performed by: Caryn Bradford PA-C  Authorized by: Caryn Bradford PA-C    Indications / Diagnosis:  Chest pain  ECG reviewed by me, the ED Provider: yes    Patient location:  ED  Previous ECG:     Previous ECG:  Compared to current    Comparison ECG info:  3/22/25    Similarity:  No change    Comparison to cardiac monitor: Yes    Interpretation:     Interpretation: non-specific    Rate:     ECG rate:  90    ECG rate assessment: normal    Rhythm:     Rhythm: sinus rhythm    Ectopy:     Ectopy: none    QRS:     QRS axis:  Normal    QRS intervals:  Normal  Conduction:     Conduction: normal    ST segments:     ST segments:  Normal  T waves:     T waves: normal    Comments:      , QRS 80, QT//420; no acute ischemic changes, no interval change from prior      ED Medication and Procedure Management   Prior to Admission Medications   Prescriptions Last Dose Informant Patient Reported? Taking?   Cholecalciferol (Vitamin D3) 1.25 MG (70885 UT) CAPS  Self No No   Sig: Take 1 capsule (50,000 Units total) by mouth once a week for 12 doses   DULoxetine (CYMBALTA) 20 mg capsule  Self Yes No   Sig: Take 20 mg by mouth daily   Semaglutide-Weight Management (Wegovy) 1.7 MG/0.75ML  Self No No   Sig: Inject 0.75 mL (1.7 mg total) under the skin once a week   methylPREDNISolone 4 MG tablet therapy pack   No No   Sig: Use as directed on package   predniSONE 10 mg tablet   No No   Sig: Take 6 tablets (60 mg total) by mouth daily for 3 days, THEN 5 tablets (50 mg total) daily for 3 days, THEN 4 tablets (40 mg total) daily for 3 days, THEN 3 tablets (30 mg total) daily for 3 days, THEN 2 tablets (20 mg total) daily for 3 days, THEN 1 tablet (10 mg total) daily for 3 days.   pregabalin (LYRICA) 50 mg capsule  Self Yes No   Sig: Take 50 mg by mouth 2 (two) times a day    traMADol (ULTRAM) 50 mg tablet  Self Yes No   Sig: Take  mg by mouth daily at bedtime as needed      Facility-Administered Medications: None     Discharge Medication List as of 3/27/2025  5:42 PM        START taking these medications    Details   methocarbamol (ROBAXIN) 500 mg tablet Take 1 tablet (500 mg total) by mouth 2 (two) times a day as needed for muscle spasms, Starting Thu 3/27/2025, Normal           CONTINUE these medications which have NOT CHANGED    Details   Cholecalciferol (Vitamin D3) 1.25 MG (16765 UT) CAPS Take 1 capsule (50,000 Units total) by mouth once a week for 12 doses, Starting Mon 2/3/2025, Until Tue 4/22/2025, Normal      DULoxetine (CYMBALTA) 20 mg capsule Take 20 mg by mouth daily, Starting Thu 5/11/2023, Historical Med      methylPREDNISolone 4 MG tablet therapy pack Use as directed on package, Normal      predniSONE 10 mg tablet Multiple Dosages:Starting Sat 3/22/2025, Until Mon 3/24/2025 at 2359, THEN Starting Tue 3/25/2025, Until Thu 3/27/2025 at 2359, THEN Starting Fri 3/28/2025, Until Sun 3/30/2025 at 2359, THEN Starting Mon 3/31/2025, Until Wed 4/2/2025 at 2359, THEN S tarting Thu 4/3/2025, Until Sat 4/5/2025 at 2359, THEN Starting Sun 4/6/2025, Until Tue 4/8/2025 at 2359Take 6 tablets (60 mg total) by mouth daily for 3 days, THEN 5 tablets (50 mg total) daily for 3 days, THEN 4 tablets (40 mg total) daily for 3 days , THEN 3 tablets (30 mg total) daily for 3 days, THEN 2 tablets (20 mg total) daily for 3 days, THEN 1 tablet (10 mg total) daily for 3 days., Normal      pregabalin (LYRICA) 50 mg capsule Take 50 mg by mouth 2 (two) times a day, Starting Thu 5/11/2023, Historical Med      Semaglutide-Weight Management (Wegovy) 1.7 MG/0.75ML Inject 0.75 mL (1.7 mg total) under the skin once a week, Starting Wed 2/5/2025, Until Tue 5/6/2025, Normal      traMADol (ULTRAM) 50 mg tablet Take  mg by mouth daily at bedtime as needed, Starting Thu 5/11/2023, Historical Med            No discharge procedures on file.  ED SEPSIS DOCUMENTATION   Time reflects when diagnosis was documented in both MDM as applicable and the Disposition within this note       Time User Action Codes Description Comment    3/27/2025  5:41 PM Caryn Bradford Add [R07.9] Chest pain     3/27/2025  5:41 PM Caryn Bradford Add [R59.9] Adenopathy     3/27/2025  5:41 PM Caryn Bradford Modify [R59.9] Adenopathy Mild nonspecific subcarinal and right infrahilar adenopathy.                 Caryn Bradford PA-C  03/27/25 5714

## 2025-03-28 DIAGNOSIS — M25.50 ARTHRALGIA, UNSPECIFIED JOINT: Primary | ICD-10-CM

## 2025-04-01 ENCOUNTER — OFFICE VISIT (OUTPATIENT)
Dept: FAMILY MEDICINE CLINIC | Facility: CLINIC | Age: 50
End: 2025-04-01
Payer: COMMERCIAL

## 2025-04-01 VITALS
BODY MASS INDEX: 30.4 KG/M2 | OXYGEN SATURATION: 99 % | SYSTOLIC BLOOD PRESSURE: 122 MMHG | HEIGHT: 61 IN | DIASTOLIC BLOOD PRESSURE: 78 MMHG | TEMPERATURE: 96.8 F | HEART RATE: 85 BPM | WEIGHT: 161 LBS

## 2025-04-01 DIAGNOSIS — Z12.31 ENCOUNTER FOR SCREENING MAMMOGRAM FOR MALIGNANT NEOPLASM OF BREAST: Primary | ICD-10-CM

## 2025-04-01 DIAGNOSIS — M25.50 ARTHRALGIA, UNSPECIFIED JOINT: ICD-10-CM

## 2025-04-01 PROCEDURE — 99214 OFFICE O/P EST MOD 30 MIN: CPT | Performed by: FAMILY MEDICINE

## 2025-04-01 NOTE — PROGRESS NOTES
"Name: Edwin Childs      : 1975      MRN: [id number]  Encounter Provider: Jones Madrigal DO  Encounter Date: 2025   Encounter department: [facility]  :  Assessment & Plan  Encounter for screening mammogram for malignant neoplasm of breast    Orders:    Mammo screening bilateral w 3d and cad; Future    Arthralgia, unspecified joint                History of Present Illness   Mrs. Childs is here was in the emergency room awakened in the morning with epigastric pain that was severe went to the emergency room had a complete workup for sources of her abdominal pain had a CAT scan which was unremarkable was treated and released patient gradually got better they did give her prednisone which probably masked some of her symptoms I asked her to stop her prednisone a few days ago because it was concerned about the fact that she has had gastric bypass and she might to have a marginal ulcer no pain with palpation of the epigastric area at the present time      Review of Systems   Constitutional:  Positive for activity change.   Gastrointestinal:  Positive for abdominal pain.   Musculoskeletal:  Positive for arthralgias.       Objective   /78 (BP Location: Left arm, Patient Position: Sitting, Cuff Size: Standard)   Pulse 85   Temp (!) 96.8 °F (36 °C) (Temporal)   Ht 5' 1\" (1.549 m)   Wt 73 kg (161 lb)   LMP  (LMP Unknown)   SpO2 99%   BMI 30.42 kg/m²      Physical Exam  HENT:      Head: Normocephalic and atraumatic.   Abdominal:      General: There is no distension.      Palpations: There is no mass.      Tenderness: There is no abdominal tenderness. There is no guarding or rebound.      Hernia: No hernia is present.   Neurological:      Mental Status: She is alert.         "

## 2025-04-13 ENCOUNTER — APPOINTMENT (OUTPATIENT)
Dept: LAB | Facility: HOSPITAL | Age: 50
End: 2025-04-13
Payer: COMMERCIAL

## 2025-04-13 DIAGNOSIS — E55.9 VITAMIN D DEFICIENCY: ICD-10-CM

## 2025-04-13 DIAGNOSIS — M25.50 ARTHRALGIA, UNSPECIFIED JOINT: ICD-10-CM

## 2025-04-13 LAB
25(OH)D3 SERPL-MCNC: 45.4 NG/ML (ref 30–100)
BACTERIA UR QL AUTO: ABNORMAL /HPF
BASOPHILS # BLD AUTO: 0.01 THOUSANDS/ÂΜL (ref 0–0.1)
BASOPHILS NFR BLD AUTO: 0 % (ref 0–1)
BILIRUB UR QL STRIP: ABNORMAL
CLARITY UR: ABNORMAL
COLOR UR: ABNORMAL
CRP SERPL QL: 12.4 MG/L
EOSINOPHIL # BLD AUTO: 0.02 THOUSAND/ÂΜL (ref 0–0.61)
EOSINOPHIL NFR BLD AUTO: 1 % (ref 0–6)
ERYTHROCYTE [DISTWIDTH] IN BLOOD BY AUTOMATED COUNT: 12.6 % (ref 11.6–15.1)
GLUCOSE UR STRIP-MCNC: NEGATIVE MG/DL
HCT VFR BLD AUTO: 40.7 % (ref 34.8–46.1)
HGB BLD-MCNC: 13.3 G/DL (ref 11.5–15.4)
HGB UR QL STRIP.AUTO: NEGATIVE
IMM GRANULOCYTES # BLD AUTO: 0.02 THOUSAND/UL (ref 0–0.2)
IMM GRANULOCYTES NFR BLD AUTO: 1 % (ref 0–2)
KETONES UR STRIP-MCNC: ABNORMAL MG/DL
LEUKOCYTE ESTERASE UR QL STRIP: ABNORMAL
LYMPHOCYTES # BLD AUTO: 0.9 THOUSANDS/ÂΜL (ref 0.6–4.47)
LYMPHOCYTES NFR BLD AUTO: 35 % (ref 14–44)
MCH RBC QN AUTO: 29.7 PG (ref 26.8–34.3)
MCHC RBC AUTO-ENTMCNC: 32.7 G/DL (ref 31.4–37.4)
MCV RBC AUTO: 91 FL (ref 82–98)
MONOCYTES # BLD AUTO: 0.36 THOUSAND/ÂΜL (ref 0.17–1.22)
MONOCYTES NFR BLD AUTO: 14 % (ref 4–12)
MUCOUS THREADS UR QL AUTO: ABNORMAL
NEUTROPHILS # BLD AUTO: 1.28 THOUSANDS/ÂΜL (ref 1.85–7.62)
NEUTS SEG NFR BLD AUTO: 49 % (ref 43–75)
NITRITE UR QL STRIP: NEGATIVE
NON-SQ EPI CELLS URNS QL MICRO: ABNORMAL /HPF
NRBC BLD AUTO-RTO: 0 /100 WBCS
PH UR STRIP.AUTO: 6 [PH]
PLATELET # BLD AUTO: 220 THOUSANDS/UL (ref 149–390)
PMV BLD AUTO: 9.1 FL (ref 8.9–12.7)
PROT UR STRIP-MCNC: ABNORMAL MG/DL
RBC # BLD AUTO: 4.48 MILLION/UL (ref 3.81–5.12)
RBC #/AREA URNS AUTO: ABNORMAL /HPF
SP GR UR STRIP.AUTO: >=1.03 (ref 1–1.03)
UROBILINOGEN UR STRIP-ACNC: 2 MG/DL
WBC # BLD AUTO: 2.59 THOUSAND/UL (ref 4.31–10.16)
WBC #/AREA URNS AUTO: ABNORMAL /HPF

## 2025-04-13 PROCEDURE — 82306 VITAMIN D 25 HYDROXY: CPT

## 2025-04-13 PROCEDURE — 85025 COMPLETE CBC W/AUTO DIFF WBC: CPT

## 2025-04-13 PROCEDURE — 81001 URINALYSIS AUTO W/SCOPE: CPT

## 2025-04-13 PROCEDURE — 36415 COLL VENOUS BLD VENIPUNCTURE: CPT

## 2025-04-13 PROCEDURE — 86140 C-REACTIVE PROTEIN: CPT

## 2025-04-13 PROCEDURE — 86618 LYME DISEASE ANTIBODY: CPT

## 2025-04-14 LAB — B BURGDOR IGG+IGM SER QL IA: NEGATIVE

## 2025-04-18 DIAGNOSIS — N30.01 ACUTE CYSTITIS WITH HEMATURIA: ICD-10-CM

## 2025-04-18 DIAGNOSIS — N39.0 PYURIA DUE TO BACTERIAL URINARY TRACT INFECTION: ICD-10-CM

## 2025-04-18 DIAGNOSIS — D70.9 NEUTROPENIA, UNSPECIFIED TYPE (HCC): Primary | ICD-10-CM

## 2025-04-18 RX ORDER — SULFAMETHOXAZOLE AND TRIMETHOPRIM 800; 160 MG/1; MG/1
1 TABLET ORAL EVERY 12 HOURS SCHEDULED
Qty: 20 TABLET | Refills: 0 | Status: SHIPPED | OUTPATIENT
Start: 2025-04-18 | End: 2025-04-28

## 2025-04-25 ENCOUNTER — TELEPHONE (OUTPATIENT)
Age: 50
End: 2025-04-25

## 2025-04-25 NOTE — TELEPHONE ENCOUNTER
Memo, Generic  Neurology Pod Clerical3 hours ago (6:44 AM)       Appointment canceled for Edwin Childs ([id number])  Visit type: OFFICE VISIT SHORT [facility]  5/8/2025 4:00 PM (30 minutes) [facility]    Reason for cancellation: Canceled via Memo        Called pt and LMOM for a return call.

## 2025-04-28 DIAGNOSIS — M79.10 MYALGIA: ICD-10-CM

## 2025-04-28 DIAGNOSIS — M25.50 ARTHRALGIA, UNSPECIFIED JOINT: Primary | ICD-10-CM

## 2025-05-09 ENCOUNTER — DOCUMENTATION (OUTPATIENT)
Dept: ADMINISTRATIVE | Facility: OTHER | Age: 50
End: 2025-05-09

## 2025-05-09 NOTE — PROGRESS NOTES
05/09/25 10:23 AM    Osteoporosis Management Post Fracture outreach is not required; there is an active DEXA screening order on file.    Thank you.  Vasile Del Cid MA  [facility]

## 2025-05-22 ENCOUNTER — APPOINTMENT (OUTPATIENT)
Dept: LAB | Facility: MEDICAL CENTER | Age: 50
End: 2025-05-22
Attending: FAMILY MEDICINE
Payer: COMMERCIAL

## 2025-05-22 DIAGNOSIS — M25.50 ARTHRALGIA, UNSPECIFIED JOINT: ICD-10-CM

## 2025-05-22 DIAGNOSIS — M79.10 MYALGIA: ICD-10-CM

## 2025-05-22 LAB
ALBUMIN SERPL BCG-MCNC: 4.1 G/DL (ref 3.5–5)
ALP SERPL-CCNC: 46 U/L (ref 34–104)
ALT SERPL W P-5'-P-CCNC: 14 U/L (ref 7–52)
ANION GAP SERPL CALCULATED.3IONS-SCNC: 8 MMOL/L (ref 4–13)
AST SERPL W P-5'-P-CCNC: 16 U/L (ref 13–39)
BILIRUB SERPL-MCNC: 0.51 MG/DL (ref 0.2–1)
BUN SERPL-MCNC: 9 MG/DL (ref 5–25)
CALCIUM SERPL-MCNC: 9.2 MG/DL (ref 8.4–10.2)
CHLORIDE SERPL-SCNC: 106 MMOL/L (ref 96–108)
CO2 SERPL-SCNC: 25 MMOL/L (ref 21–32)
CREAT SERPL-MCNC: 0.5 MG/DL (ref 0.6–1.3)
CRP SERPL QL: <1 MG/L
ERYTHROCYTE [DISTWIDTH] IN BLOOD BY AUTOMATED COUNT: 12 % (ref 11.6–15.1)
ERYTHROCYTE [SEDIMENTATION RATE] IN BLOOD: 18 MM/HOUR (ref 0–29)
GFR SERPL CREATININE-BSD FRML MDRD: 111 ML/MIN/1.73SQ M
GLUCOSE P FAST SERPL-MCNC: 80 MG/DL (ref 65–99)
HCT VFR BLD AUTO: 39.5 % (ref 34.8–46.1)
HGB BLD-MCNC: 12.7 G/DL (ref 11.5–15.4)
MCH RBC QN AUTO: 29.5 PG (ref 26.8–34.3)
MCHC RBC AUTO-ENTMCNC: 32.2 G/DL (ref 31.4–37.4)
MCV RBC AUTO: 92 FL (ref 82–98)
PLATELET # BLD AUTO: 276 THOUSANDS/UL (ref 149–390)
PMV BLD AUTO: 10.3 FL (ref 8.9–12.7)
POTASSIUM SERPL-SCNC: 4 MMOL/L (ref 3.5–5.3)
PROT SERPL-MCNC: 7.2 G/DL (ref 6.4–8.4)
RBC # BLD AUTO: 4.3 MILLION/UL (ref 3.81–5.12)
RHEUMATOID FACT SERPL-ACNC: <10 IU/ML
SODIUM SERPL-SCNC: 139 MMOL/L (ref 135–147)
URATE SERPL-MCNC: 3.6 MG/DL (ref 2–7.5)
WBC # BLD AUTO: 4.45 THOUSAND/UL (ref 4.31–10.16)

## 2025-05-22 PROCEDURE — 86704 HEP B CORE ANTIBODY TOTAL: CPT

## 2025-05-22 PROCEDURE — 86803 HEPATITIS C AB TEST: CPT

## 2025-05-22 PROCEDURE — 86225 DNA ANTIBODY NATIVE: CPT

## 2025-05-22 PROCEDURE — 80053 COMPREHEN METABOLIC PANEL: CPT

## 2025-05-22 PROCEDURE — 36415 COLL VENOUS BLD VENIPUNCTURE: CPT

## 2025-05-22 PROCEDURE — 86235 NUCLEAR ANTIGEN ANTIBODY: CPT

## 2025-05-22 PROCEDURE — 86200 CCP ANTIBODY: CPT

## 2025-05-22 PROCEDURE — 85027 COMPLETE CBC AUTOMATED: CPT

## 2025-05-22 PROCEDURE — 86618 LYME DISEASE ANTIBODY: CPT

## 2025-05-22 PROCEDURE — 86140 C-REACTIVE PROTEIN: CPT

## 2025-05-22 PROCEDURE — 87340 HEPATITIS B SURFACE AG IA: CPT

## 2025-05-22 PROCEDURE — 85652 RBC SED RATE AUTOMATED: CPT

## 2025-05-22 PROCEDURE — 86705 HEP B CORE ANTIBODY IGM: CPT

## 2025-05-22 PROCEDURE — 84550 ASSAY OF BLOOD/URIC ACID: CPT

## 2025-05-22 PROCEDURE — 86431 RHEUMATOID FACTOR QUANT: CPT

## 2025-05-22 PROCEDURE — 86038 ANTINUCLEAR ANTIBODIES: CPT

## 2025-05-23 ENCOUNTER — RESULTS FOLLOW-UP (OUTPATIENT)
Dept: FAMILY MEDICINE CLINIC | Facility: CLINIC | Age: 50
End: 2025-05-23

## 2025-05-23 LAB
B BURGDOR IGG+IGM SER QL IA: NEGATIVE
BACTERIA UR CULT: NORMAL
CCP AB SER IA-ACNC: 2.6 (ref ?–10)
DSDNA IGG SERPL IA-ACNC: 1.8 IU/ML (ref ?–15)
ENA SS-A AB SER IA-ACNC: <0.5 U/ML (ref ?–10)
ENA SS-B IGG SER IA-ACNC: <0.6 U/ML (ref ?–10)
HBV CORE AB SER QL: NORMAL
HBV CORE IGM SER QL: NORMAL
HBV SURFACE AG SER QL: NORMAL
HCV AB SER QL: NORMAL
NUCLEAR IGG SER IA-RTO: 0.3 RATIO (ref ?–1)

## 2025-05-23 NOTE — RESULT ENCOUNTER NOTE
Call patient to notify normal results chemistry panel is good kidney function is excellent blood sugar was 80 liver functions are good the patient's rheumatoid factor is negative so she is testing negative for rheumatoid arthritis and the uric acid level which is a test for gout is normal the C-reactive protein which is a general marker for inflammation has come down to normal levels 1 month ago it was quite high the blood count is also normal her sedimentation rate is normal which is another nonspecific marker for inflammation are still some other orders and lab tests that are not back yet

## 2025-06-26 ENCOUNTER — VBI (OUTPATIENT)
Dept: ADMINISTRATIVE | Facility: OTHER | Age: 50
End: 2025-06-26

## 2025-06-26 NOTE — TELEPHONE ENCOUNTER
06/26/25 3:12 PM     Chart reviewed for Mammogram ; nothing is submitted to the patient's insurance at this time.     Lynda Garcia MA   [facility]

## 2025-06-30 ENCOUNTER — HOSPITAL ENCOUNTER (OUTPATIENT)
Dept: MAMMOGRAPHY | Facility: HOSPITAL | Age: 50
Discharge: HOME | End: 2025-06-30
Attending: FAMILY MEDICINE
Payer: COMMERCIAL

## 2025-06-30 VITALS — WEIGHT: 161 LBS | HEIGHT: 61 IN | BODY MASS INDEX: 30.4 KG/M2

## 2025-06-30 DIAGNOSIS — Z12.31 ENCOUNTER FOR SCREENING MAMMOGRAM FOR MALIGNANT NEOPLASM OF BREAST: ICD-10-CM

## 2025-06-30 PROCEDURE — 77067 SCR MAMMO BI INCL CAD: CPT

## 2025-06-30 PROCEDURE — 77063 BREAST TOMOSYNTHESIS BI: CPT

## 2025-07-02 RX ORDER — SEMAGLUTIDE 1.7 MG/.75ML
1.7 INJECTION, SOLUTION SUBCUTANEOUS WEEKLY
Qty: 4 ML | Refills: 0 | Status: SHIPPED | OUTPATIENT
Start: 2025-07-02 | End: 2025-09-30

## 2025-07-03 ENCOUNTER — TELEPHONE (OUTPATIENT)
Dept: FAMILY MEDICINE CLINIC | Facility: CLINIC | Age: 50
End: 2025-07-03

## 2025-07-03 NOTE — TELEPHONE ENCOUNTER
PA for WEGOVY 1.7MG SUBMITTED             via    [x]SS    [x]PA sent as URGENT    All office notes, labs and other pertaining documents and studies sent. Clinical questions answered. Awaiting determination from insurance company.     Turnaround time for your insurance to make a decision on your Prior Authorization can take 7-21 business days.

## 2025-07-03 NOTE — TELEPHONE ENCOUNTER
Please see scanned prior auth for Wegovy 1.7 mg received from [facility] pharmacy. Thank you.

## 2025-07-03 NOTE — TELEPHONE ENCOUNTER
PA for WEGOVY 1.7MG  APPROVED     Date(s) approved         Patient advised by          [x]MyChart Message  []Phone call   []LMOM  []L/M to call office as no active Communication consent on file  []Unable to leave detailed message as VM not approved on Communication consent       Pharmacy advised by    [x]Fax  []Phone call  []Secure Chat    Approval letter scanned into Media No TBD

## 2025-08-04 RX ORDER — SEMAGLUTIDE 1.7 MG/.75ML
INJECTION, SOLUTION SUBCUTANEOUS
Qty: 4 ML | Refills: 0 | Status: SHIPPED | OUTPATIENT
Start: 2025-08-04

## 2025-08-04 RX ORDER — SEMAGLUTIDE 1.7 MG/.75ML
1.7 INJECTION, SOLUTION SUBCUTANEOUS WEEKLY
Qty: 4 ML | Refills: 0 | Status: SHIPPED | OUTPATIENT
Start: 2025-08-04 | End: 2025-11-02

## 2025-08-17 ENCOUNTER — TELEPHONE (OUTPATIENT)
Dept: OTHER | Facility: OTHER | Age: 50
End: 2025-08-17